# Patient Record
Sex: FEMALE | Race: WHITE | NOT HISPANIC OR LATINO | Employment: FULL TIME | URBAN - METROPOLITAN AREA
[De-identification: names, ages, dates, MRNs, and addresses within clinical notes are randomized per-mention and may not be internally consistent; named-entity substitution may affect disease eponyms.]

---

## 2017-01-04 ENCOUNTER — APPOINTMENT (OUTPATIENT)
Dept: PHYSICAL THERAPY | Facility: HOME HEALTHCARE | Age: 52
End: 2017-01-04
Payer: COMMERCIAL

## 2017-01-04 PROCEDURE — 97110 THERAPEUTIC EXERCISES: CPT

## 2017-01-04 PROCEDURE — 97140 MANUAL THERAPY 1/> REGIONS: CPT

## 2017-01-04 PROCEDURE — 97014 ELECTRIC STIMULATION THERAPY: CPT

## 2017-01-06 ENCOUNTER — APPOINTMENT (OUTPATIENT)
Dept: PHYSICAL THERAPY | Facility: HOME HEALTHCARE | Age: 52
End: 2017-01-06
Payer: COMMERCIAL

## 2017-01-06 PROCEDURE — 97014 ELECTRIC STIMULATION THERAPY: CPT

## 2017-01-06 PROCEDURE — 97140 MANUAL THERAPY 1/> REGIONS: CPT

## 2017-01-06 PROCEDURE — 97110 THERAPEUTIC EXERCISES: CPT

## 2017-01-11 ENCOUNTER — APPOINTMENT (OUTPATIENT)
Dept: PHYSICAL THERAPY | Facility: HOME HEALTHCARE | Age: 52
End: 2017-01-11
Payer: COMMERCIAL

## 2017-01-11 PROCEDURE — 97140 MANUAL THERAPY 1/> REGIONS: CPT

## 2017-01-11 PROCEDURE — 97110 THERAPEUTIC EXERCISES: CPT

## 2017-01-11 PROCEDURE — 97014 ELECTRIC STIMULATION THERAPY: CPT

## 2017-01-13 ENCOUNTER — ALLSCRIPTS OFFICE VISIT (OUTPATIENT)
Dept: OTHER | Facility: OTHER | Age: 52
End: 2017-01-13

## 2017-01-16 ENCOUNTER — GENERIC CONVERSION - ENCOUNTER (OUTPATIENT)
Dept: OTHER | Facility: OTHER | Age: 52
End: 2017-01-16

## 2017-01-16 ENCOUNTER — APPOINTMENT (OUTPATIENT)
Dept: PHYSICAL THERAPY | Facility: HOME HEALTHCARE | Age: 52
End: 2017-01-16
Payer: COMMERCIAL

## 2017-01-16 ENCOUNTER — APPOINTMENT (OUTPATIENT)
Dept: PHYSICAL THERAPY | Facility: CLINIC | Age: 52
End: 2017-01-16
Payer: COMMERCIAL

## 2017-01-16 PROCEDURE — 97162 PT EVAL MOD COMPLEX 30 MIN: CPT

## 2017-01-16 PROCEDURE — 97140 MANUAL THERAPY 1/> REGIONS: CPT

## 2017-01-18 ENCOUNTER — APPOINTMENT (OUTPATIENT)
Dept: PHYSICAL THERAPY | Facility: CLINIC | Age: 52
End: 2017-01-18
Payer: COMMERCIAL

## 2017-01-18 PROCEDURE — 97140 MANUAL THERAPY 1/> REGIONS: CPT

## 2017-01-18 PROCEDURE — 97110 THERAPEUTIC EXERCISES: CPT

## 2017-01-20 ENCOUNTER — APPOINTMENT (OUTPATIENT)
Dept: PHYSICAL THERAPY | Facility: HOME HEALTHCARE | Age: 52
End: 2017-01-20
Payer: COMMERCIAL

## 2017-01-23 ENCOUNTER — APPOINTMENT (OUTPATIENT)
Dept: PHYSICAL THERAPY | Facility: CLINIC | Age: 52
End: 2017-01-23
Payer: COMMERCIAL

## 2017-01-23 PROCEDURE — 97014 ELECTRIC STIMULATION THERAPY: CPT

## 2017-01-23 PROCEDURE — 97140 MANUAL THERAPY 1/> REGIONS: CPT

## 2017-01-23 PROCEDURE — 97110 THERAPEUTIC EXERCISES: CPT

## 2017-01-25 ENCOUNTER — APPOINTMENT (OUTPATIENT)
Dept: PHYSICAL THERAPY | Facility: CLINIC | Age: 52
End: 2017-01-25
Payer: COMMERCIAL

## 2017-01-25 PROCEDURE — 97140 MANUAL THERAPY 1/> REGIONS: CPT

## 2017-01-25 PROCEDURE — 97110 THERAPEUTIC EXERCISES: CPT

## 2017-01-27 ENCOUNTER — APPOINTMENT (OUTPATIENT)
Dept: PHYSICAL THERAPY | Facility: CLINIC | Age: 52
End: 2017-01-27
Payer: COMMERCIAL

## 2017-01-27 PROCEDURE — 97110 THERAPEUTIC EXERCISES: CPT

## 2017-01-27 PROCEDURE — 97140 MANUAL THERAPY 1/> REGIONS: CPT

## 2017-01-30 ENCOUNTER — APPOINTMENT (OUTPATIENT)
Dept: PHYSICAL THERAPY | Facility: CLINIC | Age: 52
End: 2017-01-30
Payer: COMMERCIAL

## 2017-01-30 PROCEDURE — 97140 MANUAL THERAPY 1/> REGIONS: CPT

## 2017-01-30 PROCEDURE — 97110 THERAPEUTIC EXERCISES: CPT

## 2017-02-01 ENCOUNTER — APPOINTMENT (OUTPATIENT)
Dept: PHYSICAL THERAPY | Facility: CLINIC | Age: 52
End: 2017-02-01
Payer: COMMERCIAL

## 2017-02-01 PROCEDURE — 97140 MANUAL THERAPY 1/> REGIONS: CPT

## 2017-02-01 PROCEDURE — 97110 THERAPEUTIC EXERCISES: CPT

## 2017-02-02 ENCOUNTER — GENERIC CONVERSION - ENCOUNTER (OUTPATIENT)
Dept: OTHER | Facility: OTHER | Age: 52
End: 2017-02-02

## 2017-02-03 ENCOUNTER — ALLSCRIPTS OFFICE VISIT (OUTPATIENT)
Dept: OTHER | Facility: OTHER | Age: 52
End: 2017-02-03

## 2017-02-03 ENCOUNTER — TRANSCRIBE ORDERS (OUTPATIENT)
Dept: ADMINISTRATIVE | Facility: HOSPITAL | Age: 52
End: 2017-02-03

## 2017-02-03 DIAGNOSIS — G57.02 LESION OF LEFT SCIATIC NERVE: ICD-10-CM

## 2017-02-03 DIAGNOSIS — S76.011A STRAIN OF FLEXOR MUSCLE OF HIP, RIGHT, INITIAL ENCOUNTER: Primary | ICD-10-CM

## 2017-02-03 DIAGNOSIS — M76.892 OTHER SPECIFIED ENTHESOPATHIES OF LEFT LOWER LIMB, EXCLUDING FOOT: ICD-10-CM

## 2017-02-03 DIAGNOSIS — M25.552 PAIN IN LEFT HIP: ICD-10-CM

## 2017-02-03 DIAGNOSIS — M70.62 TROCHANTERIC BURSITIS OF LEFT HIP: ICD-10-CM

## 2017-02-03 DIAGNOSIS — M76.32 ILIOTIBIAL BAND SYNDROME OF LEFT SIDE: ICD-10-CM

## 2017-02-03 DIAGNOSIS — R68.89 OTHER GENERAL SYMPTOMS AND SIGNS: ICD-10-CM

## 2017-02-03 DIAGNOSIS — S76.019A STRAIN OF MUSCLE, FASCIA AND TENDON OF UNSPECIFIED HIP, INITIAL ENCOUNTER: ICD-10-CM

## 2017-02-03 DIAGNOSIS — G57.00 LESION OF SCIATIC NERVE: ICD-10-CM

## 2017-02-03 DIAGNOSIS — M54.16 RADICULOPATHY OF LUMBAR REGION: ICD-10-CM

## 2017-02-06 ENCOUNTER — APPOINTMENT (OUTPATIENT)
Dept: PHYSICAL THERAPY | Facility: CLINIC | Age: 52
End: 2017-02-06
Payer: COMMERCIAL

## 2017-02-06 PROCEDURE — 97140 MANUAL THERAPY 1/> REGIONS: CPT

## 2017-02-06 PROCEDURE — 97110 THERAPEUTIC EXERCISES: CPT

## 2017-02-08 ENCOUNTER — APPOINTMENT (OUTPATIENT)
Dept: PHYSICAL THERAPY | Facility: CLINIC | Age: 52
End: 2017-02-08
Payer: COMMERCIAL

## 2017-02-08 PROCEDURE — 97014 ELECTRIC STIMULATION THERAPY: CPT

## 2017-02-08 PROCEDURE — 97140 MANUAL THERAPY 1/> REGIONS: CPT

## 2017-02-08 PROCEDURE — 97110 THERAPEUTIC EXERCISES: CPT

## 2017-02-09 ENCOUNTER — HOSPITAL ENCOUNTER (OUTPATIENT)
Dept: MRI IMAGING | Facility: HOSPITAL | Age: 52
Discharge: HOME | End: 2017-02-09
Attending: FAMILY MEDICINE
Payer: COMMERCIAL

## 2017-02-09 DIAGNOSIS — G57.00 LESION OF SCIATIC NERVE: ICD-10-CM

## 2017-02-09 DIAGNOSIS — M70.62 TROCHANTERIC BURSITIS OF LEFT HIP: ICD-10-CM

## 2017-02-09 DIAGNOSIS — S76.019A STRAIN OF MUSCLE, FASCIA AND TENDON OF UNSPECIFIED HIP, INITIAL ENCOUNTER: ICD-10-CM

## 2017-02-09 DIAGNOSIS — M25.552 PAIN IN LEFT HIP: ICD-10-CM

## 2017-02-09 PROCEDURE — 73721 MRI JNT OF LWR EXTRE W/O DYE: CPT

## 2017-02-10 ENCOUNTER — APPOINTMENT (OUTPATIENT)
Dept: PHYSICAL THERAPY | Facility: CLINIC | Age: 52
End: 2017-02-10
Payer: COMMERCIAL

## 2017-02-13 ENCOUNTER — APPOINTMENT (OUTPATIENT)
Dept: PHYSICAL THERAPY | Facility: CLINIC | Age: 52
End: 2017-02-13
Payer: COMMERCIAL

## 2017-02-15 ENCOUNTER — APPOINTMENT (OUTPATIENT)
Dept: PHYSICAL THERAPY | Facility: CLINIC | Age: 52
End: 2017-02-15
Payer: COMMERCIAL

## 2017-02-15 ENCOUNTER — APPOINTMENT (OUTPATIENT)
Dept: LAB | Facility: HOSPITAL | Age: 52
End: 2017-02-15
Attending: INTERNAL MEDICINE
Payer: COMMERCIAL

## 2017-02-15 ENCOUNTER — TRANSCRIBE ORDERS (OUTPATIENT)
Dept: ADMISSIONS | Facility: HOSPITAL | Age: 52
End: 2017-02-15

## 2017-02-15 ENCOUNTER — ALLSCRIPTS OFFICE VISIT (OUTPATIENT)
Dept: OTHER | Facility: OTHER | Age: 52
End: 2017-02-15

## 2017-02-15 DIAGNOSIS — R68.89 OTHER GENERAL SYMPTOMS AND SIGNS: ICD-10-CM

## 2017-02-15 LAB
FLUAV AG SPEC QL IA: NEGATIVE
FLUAV AG SPEC QL: NORMAL
FLUBV AG SPEC QL IA: NEGATIVE
FLUBV AG SPEC QL: NORMAL
RSV B RNA SPEC QL NAA+PROBE: NORMAL

## 2017-02-15 PROCEDURE — 87400 INFLUENZA A/B EACH AG IA: CPT

## 2017-02-15 PROCEDURE — 87798 DETECT AGENT NOS DNA AMP: CPT

## 2017-02-17 ENCOUNTER — APPOINTMENT (OUTPATIENT)
Dept: PHYSICAL THERAPY | Facility: CLINIC | Age: 52
End: 2017-02-17
Payer: COMMERCIAL

## 2017-02-17 ENCOUNTER — ALLSCRIPTS OFFICE VISIT (OUTPATIENT)
Dept: OTHER | Facility: OTHER | Age: 52
End: 2017-02-17

## 2017-02-20 ENCOUNTER — APPOINTMENT (OUTPATIENT)
Dept: PHYSICAL THERAPY | Facility: CLINIC | Age: 52
End: 2017-02-20
Payer: COMMERCIAL

## 2017-02-20 PROCEDURE — 97012 MECHANICAL TRACTION THERAPY: CPT

## 2017-02-20 PROCEDURE — 97110 THERAPEUTIC EXERCISES: CPT

## 2017-02-20 PROCEDURE — 97140 MANUAL THERAPY 1/> REGIONS: CPT

## 2017-02-22 ENCOUNTER — APPOINTMENT (OUTPATIENT)
Dept: PHYSICAL THERAPY | Facility: CLINIC | Age: 52
End: 2017-02-22
Payer: COMMERCIAL

## 2017-02-22 PROCEDURE — 97110 THERAPEUTIC EXERCISES: CPT

## 2017-02-22 PROCEDURE — 97140 MANUAL THERAPY 1/> REGIONS: CPT

## 2017-02-22 PROCEDURE — 97012 MECHANICAL TRACTION THERAPY: CPT

## 2017-02-24 ENCOUNTER — APPOINTMENT (OUTPATIENT)
Dept: PHYSICAL THERAPY | Facility: CLINIC | Age: 52
End: 2017-02-24
Payer: COMMERCIAL

## 2017-02-24 PROCEDURE — 97012 MECHANICAL TRACTION THERAPY: CPT

## 2017-02-24 PROCEDURE — 97110 THERAPEUTIC EXERCISES: CPT

## 2017-02-24 PROCEDURE — 97140 MANUAL THERAPY 1/> REGIONS: CPT

## 2017-02-27 ENCOUNTER — APPOINTMENT (OUTPATIENT)
Dept: PHYSICAL THERAPY | Facility: CLINIC | Age: 52
End: 2017-02-27
Payer: COMMERCIAL

## 2017-02-27 ENCOUNTER — GENERIC CONVERSION - ENCOUNTER (OUTPATIENT)
Dept: OTHER | Facility: OTHER | Age: 52
End: 2017-02-27

## 2017-02-27 PROCEDURE — 97140 MANUAL THERAPY 1/> REGIONS: CPT

## 2017-02-27 PROCEDURE — 97012 MECHANICAL TRACTION THERAPY: CPT

## 2017-02-27 PROCEDURE — 97164 PT RE-EVAL EST PLAN CARE: CPT

## 2017-02-27 PROCEDURE — 97110 THERAPEUTIC EXERCISES: CPT

## 2017-03-01 ENCOUNTER — APPOINTMENT (OUTPATIENT)
Dept: PHYSICAL THERAPY | Facility: CLINIC | Age: 52
End: 2017-03-01
Payer: COMMERCIAL

## 2017-03-01 PROCEDURE — 97140 MANUAL THERAPY 1/> REGIONS: CPT

## 2017-03-01 PROCEDURE — 97110 THERAPEUTIC EXERCISES: CPT

## 2017-03-01 PROCEDURE — 97012 MECHANICAL TRACTION THERAPY: CPT

## 2017-03-03 ENCOUNTER — APPOINTMENT (OUTPATIENT)
Dept: PHYSICAL THERAPY | Facility: CLINIC | Age: 52
End: 2017-03-03
Payer: COMMERCIAL

## 2017-03-03 PROCEDURE — 97140 MANUAL THERAPY 1/> REGIONS: CPT

## 2017-03-03 PROCEDURE — 97012 MECHANICAL TRACTION THERAPY: CPT

## 2017-03-03 PROCEDURE — 97110 THERAPEUTIC EXERCISES: CPT

## 2017-03-06 ENCOUNTER — APPOINTMENT (OUTPATIENT)
Dept: PHYSICAL THERAPY | Facility: CLINIC | Age: 52
End: 2017-03-06
Payer: COMMERCIAL

## 2017-03-08 ENCOUNTER — APPOINTMENT (OUTPATIENT)
Dept: PHYSICAL THERAPY | Facility: CLINIC | Age: 52
End: 2017-03-08
Payer: COMMERCIAL

## 2017-03-08 PROCEDURE — 97140 MANUAL THERAPY 1/> REGIONS: CPT

## 2017-03-08 PROCEDURE — 97012 MECHANICAL TRACTION THERAPY: CPT

## 2017-03-08 PROCEDURE — 97110 THERAPEUTIC EXERCISES: CPT

## 2017-03-10 ENCOUNTER — APPOINTMENT (OUTPATIENT)
Dept: PHYSICAL THERAPY | Facility: CLINIC | Age: 52
End: 2017-03-10
Payer: COMMERCIAL

## 2017-03-10 PROCEDURE — 97110 THERAPEUTIC EXERCISES: CPT

## 2017-03-10 PROCEDURE — 97012 MECHANICAL TRACTION THERAPY: CPT

## 2017-03-10 PROCEDURE — 97140 MANUAL THERAPY 1/> REGIONS: CPT

## 2017-03-13 ENCOUNTER — APPOINTMENT (OUTPATIENT)
Dept: PHYSICAL THERAPY | Facility: CLINIC | Age: 52
End: 2017-03-13
Payer: COMMERCIAL

## 2017-03-13 PROCEDURE — 97110 THERAPEUTIC EXERCISES: CPT

## 2017-03-13 PROCEDURE — 97140 MANUAL THERAPY 1/> REGIONS: CPT

## 2017-03-15 ENCOUNTER — APPOINTMENT (OUTPATIENT)
Dept: PHYSICAL THERAPY | Facility: CLINIC | Age: 52
End: 2017-03-15
Payer: COMMERCIAL

## 2017-03-15 PROCEDURE — 97012 MECHANICAL TRACTION THERAPY: CPT

## 2017-03-15 PROCEDURE — 97110 THERAPEUTIC EXERCISES: CPT

## 2017-03-15 PROCEDURE — 97140 MANUAL THERAPY 1/> REGIONS: CPT

## 2017-03-17 ENCOUNTER — ALLSCRIPTS OFFICE VISIT (OUTPATIENT)
Dept: OTHER | Facility: OTHER | Age: 52
End: 2017-03-17

## 2017-03-20 ENCOUNTER — APPOINTMENT (OUTPATIENT)
Dept: PHYSICAL THERAPY | Facility: CLINIC | Age: 52
End: 2017-03-20
Payer: COMMERCIAL

## 2017-03-20 PROCEDURE — 97012 MECHANICAL TRACTION THERAPY: CPT

## 2017-03-20 PROCEDURE — 97110 THERAPEUTIC EXERCISES: CPT

## 2017-03-20 PROCEDURE — 97140 MANUAL THERAPY 1/> REGIONS: CPT

## 2017-03-22 ENCOUNTER — APPOINTMENT (OUTPATIENT)
Dept: PHYSICAL THERAPY | Facility: CLINIC | Age: 52
End: 2017-03-22
Payer: COMMERCIAL

## 2017-03-22 PROCEDURE — 97012 MECHANICAL TRACTION THERAPY: CPT

## 2017-03-22 PROCEDURE — 97140 MANUAL THERAPY 1/> REGIONS: CPT

## 2017-03-22 PROCEDURE — 97110 THERAPEUTIC EXERCISES: CPT

## 2017-03-24 ENCOUNTER — APPOINTMENT (OUTPATIENT)
Dept: PHYSICAL THERAPY | Facility: CLINIC | Age: 52
End: 2017-03-24
Payer: COMMERCIAL

## 2017-03-24 PROCEDURE — 97012 MECHANICAL TRACTION THERAPY: CPT

## 2017-03-24 PROCEDURE — 97110 THERAPEUTIC EXERCISES: CPT

## 2017-03-24 PROCEDURE — 97140 MANUAL THERAPY 1/> REGIONS: CPT

## 2017-03-27 ENCOUNTER — APPOINTMENT (OUTPATIENT)
Dept: PHYSICAL THERAPY | Facility: CLINIC | Age: 52
End: 2017-03-27
Payer: COMMERCIAL

## 2017-03-27 PROCEDURE — 97140 MANUAL THERAPY 1/> REGIONS: CPT

## 2017-03-27 PROCEDURE — 97110 THERAPEUTIC EXERCISES: CPT

## 2017-03-27 PROCEDURE — 97012 MECHANICAL TRACTION THERAPY: CPT

## 2017-03-29 ENCOUNTER — GENERIC CONVERSION - ENCOUNTER (OUTPATIENT)
Dept: OTHER | Facility: OTHER | Age: 52
End: 2017-03-29

## 2017-03-29 ENCOUNTER — APPOINTMENT (OUTPATIENT)
Dept: PHYSICAL THERAPY | Facility: CLINIC | Age: 52
End: 2017-03-29
Payer: COMMERCIAL

## 2017-03-29 PROCEDURE — 97164 PT RE-EVAL EST PLAN CARE: CPT

## 2017-03-29 PROCEDURE — 97140 MANUAL THERAPY 1/> REGIONS: CPT

## 2017-03-29 PROCEDURE — 97012 MECHANICAL TRACTION THERAPY: CPT

## 2017-03-29 PROCEDURE — 97110 THERAPEUTIC EXERCISES: CPT

## 2017-03-31 ENCOUNTER — APPOINTMENT (OUTPATIENT)
Dept: PHYSICAL THERAPY | Facility: CLINIC | Age: 52
End: 2017-03-31
Payer: COMMERCIAL

## 2017-03-31 PROCEDURE — 97110 THERAPEUTIC EXERCISES: CPT

## 2017-03-31 PROCEDURE — 97012 MECHANICAL TRACTION THERAPY: CPT

## 2017-03-31 PROCEDURE — 97140 MANUAL THERAPY 1/> REGIONS: CPT

## 2017-04-03 ENCOUNTER — APPOINTMENT (OUTPATIENT)
Dept: PHYSICAL THERAPY | Facility: CLINIC | Age: 52
End: 2017-04-03
Payer: COMMERCIAL

## 2017-04-03 PROCEDURE — 97110 THERAPEUTIC EXERCISES: CPT

## 2017-04-03 PROCEDURE — 97012 MECHANICAL TRACTION THERAPY: CPT

## 2017-04-03 PROCEDURE — 97140 MANUAL THERAPY 1/> REGIONS: CPT

## 2017-04-05 ENCOUNTER — APPOINTMENT (OUTPATIENT)
Dept: PHYSICAL THERAPY | Facility: CLINIC | Age: 52
End: 2017-04-05
Payer: COMMERCIAL

## 2017-04-05 PROCEDURE — 97140 MANUAL THERAPY 1/> REGIONS: CPT

## 2017-04-05 PROCEDURE — 97110 THERAPEUTIC EXERCISES: CPT

## 2017-04-07 ENCOUNTER — APPOINTMENT (OUTPATIENT)
Dept: PHYSICAL THERAPY | Facility: CLINIC | Age: 52
End: 2017-04-07
Payer: COMMERCIAL

## 2017-04-07 PROCEDURE — 97140 MANUAL THERAPY 1/> REGIONS: CPT

## 2017-04-07 PROCEDURE — 97110 THERAPEUTIC EXERCISES: CPT

## 2017-04-10 ENCOUNTER — APPOINTMENT (OUTPATIENT)
Dept: PHYSICAL THERAPY | Facility: CLINIC | Age: 52
End: 2017-04-10
Payer: COMMERCIAL

## 2017-04-10 PROCEDURE — 97140 MANUAL THERAPY 1/> REGIONS: CPT

## 2017-04-10 PROCEDURE — 97012 MECHANICAL TRACTION THERAPY: CPT

## 2017-04-10 PROCEDURE — 97110 THERAPEUTIC EXERCISES: CPT

## 2017-04-12 ENCOUNTER — APPOINTMENT (OUTPATIENT)
Dept: PHYSICAL THERAPY | Facility: CLINIC | Age: 52
End: 2017-04-12
Payer: COMMERCIAL

## 2017-04-12 PROCEDURE — 97140 MANUAL THERAPY 1/> REGIONS: CPT

## 2017-04-12 PROCEDURE — 97110 THERAPEUTIC EXERCISES: CPT

## 2017-04-14 ENCOUNTER — APPOINTMENT (OUTPATIENT)
Dept: PHYSICAL THERAPY | Facility: CLINIC | Age: 52
End: 2017-04-14
Payer: COMMERCIAL

## 2017-04-14 PROCEDURE — 97140 MANUAL THERAPY 1/> REGIONS: CPT

## 2017-04-14 PROCEDURE — 97110 THERAPEUTIC EXERCISES: CPT

## 2017-04-17 ENCOUNTER — APPOINTMENT (OUTPATIENT)
Dept: PHYSICAL THERAPY | Facility: CLINIC | Age: 52
End: 2017-04-17
Payer: COMMERCIAL

## 2017-04-17 PROCEDURE — 97110 THERAPEUTIC EXERCISES: CPT

## 2017-04-17 PROCEDURE — 97140 MANUAL THERAPY 1/> REGIONS: CPT

## 2017-04-17 PROCEDURE — 97012 MECHANICAL TRACTION THERAPY: CPT

## 2017-04-19 ENCOUNTER — GENERIC CONVERSION - ENCOUNTER (OUTPATIENT)
Dept: OTHER | Facility: OTHER | Age: 52
End: 2017-04-19

## 2017-04-19 ENCOUNTER — APPOINTMENT (OUTPATIENT)
Dept: PHYSICAL THERAPY | Facility: CLINIC | Age: 52
End: 2017-04-19
Payer: COMMERCIAL

## 2017-04-19 PROCEDURE — 97140 MANUAL THERAPY 1/> REGIONS: CPT

## 2017-04-19 PROCEDURE — 97110 THERAPEUTIC EXERCISES: CPT

## 2017-04-21 ENCOUNTER — APPOINTMENT (OUTPATIENT)
Dept: PHYSICAL THERAPY | Facility: CLINIC | Age: 52
End: 2017-04-21
Payer: COMMERCIAL

## 2017-04-21 ENCOUNTER — ALLSCRIPTS OFFICE VISIT (OUTPATIENT)
Dept: OTHER | Facility: OTHER | Age: 52
End: 2017-04-21

## 2017-04-26 ENCOUNTER — APPOINTMENT (OUTPATIENT)
Dept: PHYSICAL THERAPY | Facility: CLINIC | Age: 52
End: 2017-04-26
Payer: COMMERCIAL

## 2017-04-28 ENCOUNTER — APPOINTMENT (OUTPATIENT)
Dept: PHYSICAL THERAPY | Facility: CLINIC | Age: 52
End: 2017-04-28
Payer: COMMERCIAL

## 2017-04-28 ENCOUNTER — GENERIC CONVERSION - ENCOUNTER (OUTPATIENT)
Dept: OTHER | Facility: OTHER | Age: 52
End: 2017-04-28

## 2017-04-28 PROCEDURE — 97012 MECHANICAL TRACTION THERAPY: CPT

## 2017-04-28 PROCEDURE — 97110 THERAPEUTIC EXERCISES: CPT

## 2017-04-28 PROCEDURE — 97164 PT RE-EVAL EST PLAN CARE: CPT

## 2017-04-28 PROCEDURE — 97140 MANUAL THERAPY 1/> REGIONS: CPT

## 2017-05-01 ENCOUNTER — APPOINTMENT (OUTPATIENT)
Dept: PHYSICAL THERAPY | Facility: CLINIC | Age: 52
End: 2017-05-01
Payer: COMMERCIAL

## 2017-05-01 DIAGNOSIS — R10.11 RIGHT UPPER QUADRANT PAIN: ICD-10-CM

## 2017-05-01 DIAGNOSIS — Z20.5 CONTACT WITH AND (SUSPECTED) EXPOSURE TO VIRAL HEPATITIS: ICD-10-CM

## 2017-05-01 DIAGNOSIS — R10.9 ABDOMINAL PAIN: ICD-10-CM

## 2017-05-01 PROCEDURE — 97110 THERAPEUTIC EXERCISES: CPT

## 2017-05-01 PROCEDURE — 97140 MANUAL THERAPY 1/> REGIONS: CPT

## 2017-05-02 ENCOUNTER — APPOINTMENT (OUTPATIENT)
Dept: LAB | Facility: CLINIC | Age: 52
End: 2017-05-02
Payer: COMMERCIAL

## 2017-05-02 DIAGNOSIS — R10.9 ABDOMINAL PAIN: ICD-10-CM

## 2017-05-02 LAB
ALBUMIN SERPL BCP-MCNC: 3.7 G/DL (ref 3.5–5)
ALP SERPL-CCNC: 87 U/L (ref 46–116)
ALT SERPL W P-5'-P-CCNC: 27 U/L (ref 12–78)
AST SERPL W P-5'-P-CCNC: 25 U/L (ref 5–45)
BASOPHILS # BLD AUTO: 0.04 THOUSANDS/ΜL (ref 0–0.1)
BASOPHILS NFR BLD AUTO: 1 % (ref 0–1)
BILIRUB DIRECT SERPL-MCNC: 0.13 MG/DL (ref 0–0.2)
BILIRUB SERPL-MCNC: 0.49 MG/DL (ref 0.2–1)
EOSINOPHIL # BLD AUTO: 0.74 THOUSAND/ΜL (ref 0–0.61)
EOSINOPHIL NFR BLD AUTO: 9 % (ref 0–6)
ERYTHROCYTE [DISTWIDTH] IN BLOOD BY AUTOMATED COUNT: 12.4 % (ref 11.6–15.1)
HCT VFR BLD AUTO: 39.2 % (ref 34.8–46.1)
HGB BLD-MCNC: 13 G/DL (ref 11.5–15.4)
LYMPHOCYTES # BLD AUTO: 1.59 THOUSANDS/ΜL (ref 0.6–4.47)
LYMPHOCYTES NFR BLD AUTO: 19 % (ref 14–44)
MCH RBC QN AUTO: 30.1 PG (ref 26.8–34.3)
MCHC RBC AUTO-ENTMCNC: 33.2 G/DL (ref 31.4–37.4)
MCV RBC AUTO: 91 FL (ref 82–98)
MONOCYTES # BLD AUTO: 0.65 THOUSAND/ΜL (ref 0.17–1.22)
MONOCYTES NFR BLD AUTO: 8 % (ref 4–12)
NEUTROPHILS # BLD AUTO: 5.14 THOUSANDS/ΜL (ref 1.85–7.62)
NEUTS SEG NFR BLD AUTO: 63 % (ref 43–75)
NRBC BLD AUTO-RTO: 0 /100 WBCS
PLATELET # BLD AUTO: 241 THOUSANDS/UL (ref 149–390)
PMV BLD AUTO: 10 FL (ref 8.9–12.7)
PROT SERPL-MCNC: 7.3 G/DL (ref 6.4–8.2)
RBC # BLD AUTO: 4.32 MILLION/UL (ref 3.81–5.12)
WBC # BLD AUTO: 8.18 THOUSAND/UL (ref 4.31–10.16)

## 2017-05-02 PROCEDURE — 85025 COMPLETE CBC W/AUTO DIFF WBC: CPT

## 2017-05-02 PROCEDURE — 80076 HEPATIC FUNCTION PANEL: CPT

## 2017-05-02 PROCEDURE — 36415 COLL VENOUS BLD VENIPUNCTURE: CPT

## 2017-05-03 ENCOUNTER — APPOINTMENT (OUTPATIENT)
Dept: PHYSICAL THERAPY | Facility: CLINIC | Age: 52
End: 2017-05-03
Payer: COMMERCIAL

## 2017-05-05 ENCOUNTER — HOSPITAL ENCOUNTER (OUTPATIENT)
Dept: ULTRASOUND IMAGING | Facility: HOSPITAL | Age: 52
Discharge: HOME | End: 2017-05-05
Attending: INTERNAL MEDICINE
Payer: COMMERCIAL

## 2017-05-05 ENCOUNTER — TRANSCRIBE ORDERS (OUTPATIENT)
Dept: ADMINISTRATIVE | Facility: HOSPITAL | Age: 52
End: 2017-05-05

## 2017-05-05 ENCOUNTER — APPOINTMENT (OUTPATIENT)
Dept: LAB | Facility: HOSPITAL | Age: 52
End: 2017-05-05
Attending: INTERNAL MEDICINE
Payer: COMMERCIAL

## 2017-05-05 DIAGNOSIS — R10.11 RIGHT UPPER QUADRANT PAIN: ICD-10-CM

## 2017-05-05 DIAGNOSIS — Z20.5 CONTACT WITH AND (SUSPECTED) EXPOSURE TO VIRAL HEPATITIS: ICD-10-CM

## 2017-05-05 DIAGNOSIS — Z20.5 EXPOSURE TO HEPATITIS A: Primary | ICD-10-CM

## 2017-05-05 DIAGNOSIS — Z20.5 EXPOSURE TO HEPATITIS A: ICD-10-CM

## 2017-05-05 DIAGNOSIS — Z20.5 EXPOSURE TO HEPATITIS B: ICD-10-CM

## 2017-05-05 LAB
ANION GAP SERPL CALCULATED.3IONS-SCNC: 10 MMOL/L (ref 4–13)
BUN SERPL-MCNC: 13 MG/DL (ref 5–25)
CALCIUM SERPL-MCNC: 8.9 MG/DL (ref 8.3–10.1)
CHLORIDE SERPL-SCNC: 105 MMOL/L (ref 100–108)
CO2 SERPL-SCNC: 28 MMOL/L (ref 21–32)
CREAT SERPL-MCNC: 0.88 MG/DL (ref 0.6–1.3)
GFR SERPL CREATININE-BSD FRML MDRD: >60 ML/MIN/1.73SQ M
GLUCOSE SERPL-MCNC: 98 MG/DL (ref 65–140)
POTASSIUM SERPL-SCNC: 3.7 MMOL/L (ref 3.5–5.3)
SODIUM SERPL-SCNC: 143 MMOL/L (ref 136–145)

## 2017-05-05 PROCEDURE — 36415 COLL VENOUS BLD VENIPUNCTURE: CPT

## 2017-05-05 PROCEDURE — 80048 BASIC METABOLIC PNL TOTAL CA: CPT

## 2017-05-05 PROCEDURE — 80074 ACUTE HEPATITIS PANEL: CPT

## 2017-05-05 PROCEDURE — 76705 ECHO EXAM OF ABDOMEN: CPT

## 2017-05-06 LAB
HAV IGM SER QL: NORMAL
HBV CORE IGM SER QL: NORMAL
HBV SURFACE AG SER QL: NORMAL
HCV AB SER QL: NORMAL

## 2017-06-02 ENCOUNTER — APPOINTMENT (EMERGENCY)
Dept: RADIOLOGY | Facility: HOSPITAL | Age: 52
End: 2017-06-02
Payer: COMMERCIAL

## 2017-06-02 ENCOUNTER — HOSPITAL ENCOUNTER (INPATIENT)
Facility: HOSPITAL | Age: 52
LOS: 1 days | Discharge: HOME | DRG: 103 | End: 2017-06-03
Attending: FAMILY MEDICINE | Admitting: HOSPITALIST
Payer: COMMERCIAL

## 2017-06-02 ENCOUNTER — APPOINTMENT (EMERGENCY)
Dept: CT IMAGING | Facility: HOSPITAL | Age: 52
End: 2017-06-02
Payer: COMMERCIAL

## 2017-06-02 ENCOUNTER — HOSPITAL ENCOUNTER (EMERGENCY)
Facility: HOSPITAL | Age: 52
End: 2017-06-02
Attending: EMERGENCY MEDICINE | Admitting: EMERGENCY MEDICINE
Payer: COMMERCIAL

## 2017-06-02 VITALS
WEIGHT: 161 LBS | DIASTOLIC BLOOD PRESSURE: 65 MMHG | RESPIRATION RATE: 16 BRPM | BODY MASS INDEX: 25.88 KG/M2 | OXYGEN SATURATION: 99 % | HEART RATE: 66 BPM | TEMPERATURE: 98.8 F | HEIGHT: 66 IN | SYSTOLIC BLOOD PRESSURE: 138 MMHG

## 2017-06-02 DIAGNOSIS — R11.0 NAUSEA: ICD-10-CM

## 2017-06-02 DIAGNOSIS — R26.89 BALANCE DISORDER: Primary | ICD-10-CM

## 2017-06-02 DIAGNOSIS — R26.2 AMBULATORY DYSFUNCTION: ICD-10-CM

## 2017-06-02 DIAGNOSIS — G45.9 TIA (TRANSIENT ISCHEMIC ATTACK): Primary | ICD-10-CM

## 2017-06-02 PROBLEM — R42 DIZZINESS: Status: ACTIVE | Noted: 2017-06-02

## 2017-06-02 PROBLEM — E78.5 HYPERLIPIDEMIA: Chronic | Status: ACTIVE | Noted: 2017-06-02

## 2017-06-02 PROBLEM — E78.5 HYPERLIPIDEMIA: Status: ACTIVE | Noted: 2017-06-02

## 2017-06-02 LAB
ALBUMIN SERPL BCP-MCNC: 4 G/DL (ref 3.5–5)
ALP SERPL-CCNC: 102 U/L (ref 46–116)
ALT SERPL W P-5'-P-CCNC: 28 U/L (ref 12–78)
ANION GAP SERPL CALCULATED.3IONS-SCNC: 13 MMOL/L (ref 4–13)
APTT PPP: 31 SECONDS (ref 23–35)
AST SERPL W P-5'-P-CCNC: 23 U/L (ref 5–45)
BACTERIA UR QL AUTO: ABNORMAL /HPF
BASOPHILS # BLD AUTO: 0.03 THOUSANDS/ΜL (ref 0–0.1)
BASOPHILS NFR BLD AUTO: 1 % (ref 0–1)
BILIRUB SERPL-MCNC: 0.4 MG/DL (ref 0.2–1)
BILIRUB UR QL STRIP: NEGATIVE
BUN SERPL-MCNC: 13 MG/DL (ref 5–25)
CALCIUM SERPL-MCNC: 9.2 MG/DL (ref 8.3–10.1)
CHLORIDE SERPL-SCNC: 101 MMOL/L (ref 100–108)
CLARITY UR: CLEAR
CO2 SERPL-SCNC: 26 MMOL/L (ref 21–32)
COLOR UR: YELLOW
CREAT SERPL-MCNC: 0.93 MG/DL (ref 0.6–1.3)
EOSINOPHIL # BLD AUTO: 0.35 THOUSAND/ΜL (ref 0–0.61)
EOSINOPHIL NFR BLD AUTO: 5 % (ref 0–6)
ERYTHROCYTE [DISTWIDTH] IN BLOOD BY AUTOMATED COUNT: 12.3 % (ref 11.6–15.1)
GFR SERPL CREATININE-BSD FRML MDRD: >60 ML/MIN/1.73SQ M
GLUCOSE SERPL-MCNC: 111 MG/DL (ref 65–140)
GLUCOSE UR STRIP-MCNC: NEGATIVE MG/DL
HCT VFR BLD AUTO: 41.3 % (ref 34.8–46.1)
HGB BLD-MCNC: 14.1 G/DL (ref 11.5–15.4)
HGB UR QL STRIP.AUTO: ABNORMAL
INR PPP: 0.9 (ref 0.86–1.16)
KETONES UR STRIP-MCNC: NEGATIVE MG/DL
LEUKOCYTE ESTERASE UR QL STRIP: NEGATIVE
LYMPHOCYTES # BLD AUTO: 2.25 THOUSANDS/ΜL (ref 0.6–4.47)
LYMPHOCYTES NFR BLD AUTO: 34 % (ref 14–44)
MCH RBC QN AUTO: 30.9 PG (ref 26.8–34.3)
MCHC RBC AUTO-ENTMCNC: 34.1 G/DL (ref 31.4–37.4)
MCV RBC AUTO: 90 FL (ref 82–98)
MONOCYTES # BLD AUTO: 0.63 THOUSAND/ΜL (ref 0.17–1.22)
MONOCYTES NFR BLD AUTO: 10 % (ref 4–12)
NEUTROPHILS # BLD AUTO: 3.28 THOUSANDS/ΜL (ref 1.85–7.62)
NEUTS SEG NFR BLD AUTO: 50 % (ref 43–75)
NITRITE UR QL STRIP: NEGATIVE
NON-SQ EPI CELLS URNS QL MICRO: ABNORMAL /HPF
PH UR STRIP.AUTO: 6 [PH] (ref 4.5–8)
PLATELET # BLD AUTO: 220 THOUSANDS/UL (ref 149–390)
PMV BLD AUTO: 9.5 FL (ref 8.9–12.7)
POTASSIUM SERPL-SCNC: 3.3 MMOL/L (ref 3.5–5.3)
PROT SERPL-MCNC: 7.4 G/DL (ref 6.4–8.2)
PROT UR STRIP-MCNC: NEGATIVE MG/DL
PROTHROMBIN TIME: 12 SECONDS (ref 12.1–14.4)
RBC # BLD AUTO: 4.57 MILLION/UL (ref 3.81–5.12)
RBC #/AREA URNS AUTO: ABNORMAL /HPF
SODIUM SERPL-SCNC: 140 MMOL/L (ref 136–145)
SP GR UR STRIP.AUTO: 1.01 (ref 1–1.03)
UROBILINOGEN UR QL STRIP.AUTO: 0.2 E.U./DL
WBC # BLD AUTO: 6.54 THOUSAND/UL (ref 4.31–10.16)
WBC #/AREA URNS AUTO: ABNORMAL /HPF

## 2017-06-02 PROCEDURE — 81001 URINALYSIS AUTO W/SCOPE: CPT | Performed by: EMERGENCY MEDICINE

## 2017-06-02 PROCEDURE — 71020 HB CHEST X-RAY 2VW FRONTAL&LATL: CPT

## 2017-06-02 PROCEDURE — 85730 THROMBOPLASTIN TIME PARTIAL: CPT | Performed by: EMERGENCY MEDICINE

## 2017-06-02 PROCEDURE — 99285 EMERGENCY DEPT VISIT HI MDM: CPT

## 2017-06-02 PROCEDURE — 96361 HYDRATE IV INFUSION ADD-ON: CPT

## 2017-06-02 PROCEDURE — 85610 PROTHROMBIN TIME: CPT | Performed by: EMERGENCY MEDICINE

## 2017-06-02 PROCEDURE — 36415 COLL VENOUS BLD VENIPUNCTURE: CPT | Performed by: EMERGENCY MEDICINE

## 2017-06-02 PROCEDURE — 96374 THER/PROPH/DIAG INJ IV PUSH: CPT

## 2017-06-02 PROCEDURE — 93005 ELECTROCARDIOGRAM TRACING: CPT | Performed by: EMERGENCY MEDICINE

## 2017-06-02 PROCEDURE — 85025 COMPLETE CBC W/AUTO DIFF WBC: CPT | Performed by: EMERGENCY MEDICINE

## 2017-06-02 PROCEDURE — 80053 COMPREHEN METABOLIC PANEL: CPT | Performed by: EMERGENCY MEDICINE

## 2017-06-02 PROCEDURE — 70450 CT HEAD/BRAIN W/O DYE: CPT

## 2017-06-02 RX ORDER — METOCLOPRAMIDE 5 MG/1
TABLET ORAL AS NEEDED
COMMUNITY
End: 2018-09-10 | Stop reason: ALTCHOICE

## 2017-06-02 RX ORDER — SENNOSIDES 8.6 MG
1 TABLET ORAL DAILY
Status: DISCONTINUED | OUTPATIENT
Start: 2017-06-03 | End: 2017-06-03 | Stop reason: HOSPADM

## 2017-06-02 RX ORDER — MELATONIN
50000 WEEKLY
COMMUNITY
End: 2018-06-04 | Stop reason: DRUGHIGH

## 2017-06-02 RX ORDER — DEXLANSOPRAZOLE 60 MG/1
60 CAPSULE, DELAYED RELEASE ORAL
COMMUNITY
Start: 2014-03-27 | End: 2018-03-06 | Stop reason: SDUPTHER

## 2017-06-02 RX ORDER — SODIUM CHLORIDE 9 MG/ML
80 INJECTION, SOLUTION INTRAVENOUS CONTINUOUS
Status: DISCONTINUED | OUTPATIENT
Start: 2017-06-02 | End: 2017-06-03 | Stop reason: HOSPADM

## 2017-06-02 RX ORDER — POTASSIUM CHLORIDE 20 MEQ/1
40 TABLET, EXTENDED RELEASE ORAL ONCE
Status: DISCONTINUED | OUTPATIENT
Start: 2017-06-02 | End: 2017-06-03 | Stop reason: HOSPADM

## 2017-06-02 RX ORDER — ASPIRIN 325 MG
325 TABLET ORAL DAILY
Status: DISCONTINUED | OUTPATIENT
Start: 2017-06-03 | End: 2017-06-03 | Stop reason: HOSPADM

## 2017-06-02 RX ORDER — ATORVASTATIN CALCIUM 40 MG/1
40 TABLET, FILM COATED ORAL EVERY EVENING
Status: DISCONTINUED | OUTPATIENT
Start: 2017-06-02 | End: 2017-06-03 | Stop reason: HOSPADM

## 2017-06-02 RX ORDER — SODIUM CHLORIDE 9 MG/ML
125 INJECTION, SOLUTION INTRAVENOUS CONTINUOUS
Status: DISCONTINUED | OUTPATIENT
Start: 2017-06-02 | End: 2017-06-02 | Stop reason: HOSPADM

## 2017-06-02 RX ORDER — MULTIVIT WITH MINERALS/LUTEIN
1000 TABLET ORAL DAILY
COMMUNITY

## 2017-06-02 RX ORDER — CHLORAL HYDRATE 500 MG
1000 CAPSULE ORAL DAILY
COMMUNITY

## 2017-06-02 RX ORDER — DOCUSATE SODIUM 100 MG/1
100 CAPSULE, LIQUID FILLED ORAL 2 TIMES DAILY
Status: DISCONTINUED | OUTPATIENT
Start: 2017-06-03 | End: 2017-06-03 | Stop reason: HOSPADM

## 2017-06-02 RX ORDER — ONDANSETRON 2 MG/ML
4 INJECTION INTRAMUSCULAR; INTRAVENOUS EVERY 6 HOURS PRN
Status: DISCONTINUED | OUTPATIENT
Start: 2017-06-02 | End: 2017-06-03 | Stop reason: HOSPADM

## 2017-06-02 RX ORDER — ACETAMINOPHEN 325 MG/1
650 TABLET ORAL EVERY 4 HOURS PRN
Status: DISCONTINUED | OUTPATIENT
Start: 2017-06-02 | End: 2017-06-03 | Stop reason: HOSPADM

## 2017-06-02 RX ORDER — ONDANSETRON 2 MG/ML
4 INJECTION INTRAMUSCULAR; INTRAVENOUS ONCE
Status: COMPLETED | OUTPATIENT
Start: 2017-06-02 | End: 2017-06-02

## 2017-06-02 RX ADMIN — SODIUM CHLORIDE 125 ML/HR: 0.9 INJECTION, SOLUTION INTRAVENOUS at 18:00

## 2017-06-02 RX ADMIN — ONDANSETRON 4 MG: 2 INJECTION INTRAMUSCULAR; INTRAVENOUS at 18:01

## 2017-06-03 ENCOUNTER — APPOINTMENT (INPATIENT)
Dept: RADIOLOGY | Facility: HOSPITAL | Age: 52
DRG: 103 | End: 2017-06-03
Payer: COMMERCIAL

## 2017-06-03 VITALS
WEIGHT: 160.72 LBS | RESPIRATION RATE: 18 BRPM | OXYGEN SATURATION: 99 % | SYSTOLIC BLOOD PRESSURE: 122 MMHG | HEART RATE: 59 BPM | HEIGHT: 66 IN | TEMPERATURE: 98 F | DIASTOLIC BLOOD PRESSURE: 66 MMHG | BODY MASS INDEX: 25.83 KG/M2

## 2017-06-03 PROBLEM — R42 DIZZINESS: Status: RESOLVED | Noted: 2017-06-02 | Resolved: 2017-06-03

## 2017-06-03 LAB
ANION GAP SERPL CALCULATED.3IONS-SCNC: 8 MMOL/L (ref 4–13)
BUN SERPL-MCNC: 10 MG/DL (ref 5–25)
CALCIUM SERPL-MCNC: 9 MG/DL (ref 8.3–10.1)
CHLORIDE SERPL-SCNC: 108 MMOL/L (ref 100–108)
CHOLEST SERPL-MCNC: 165 MG/DL (ref 50–200)
CO2 SERPL-SCNC: 27 MMOL/L (ref 21–32)
CREAT SERPL-MCNC: 0.76 MG/DL (ref 0.6–1.3)
ERYTHROCYTE [DISTWIDTH] IN BLOOD BY AUTOMATED COUNT: 12.7 % (ref 11.6–15.1)
GFR SERPL CREATININE-BSD FRML MDRD: >60 ML/MIN/1.73SQ M
GLUCOSE SERPL-MCNC: 96 MG/DL (ref 65–140)
HCT VFR BLD AUTO: 38.1 % (ref 34.8–46.1)
HDLC SERPL-MCNC: 64 MG/DL (ref 40–60)
HGB BLD-MCNC: 12.8 G/DL (ref 11.5–15.4)
LDLC SERPL CALC-MCNC: 93 MG/DL (ref 0–100)
MAGNESIUM SERPL-MCNC: 2.2 MG/DL (ref 1.6–2.6)
MCH RBC QN AUTO: 30.3 PG (ref 26.8–34.3)
MCHC RBC AUTO-ENTMCNC: 33.6 G/DL (ref 31.4–37.4)
MCV RBC AUTO: 90 FL (ref 82–98)
PLATELET # BLD AUTO: 214 THOUSANDS/UL (ref 149–390)
PMV BLD AUTO: 10.3 FL (ref 8.9–12.7)
POTASSIUM SERPL-SCNC: 3.9 MMOL/L (ref 3.5–5.3)
RBC # BLD AUTO: 4.22 MILLION/UL (ref 3.81–5.12)
SODIUM SERPL-SCNC: 143 MMOL/L (ref 136–145)
TRIGL SERPL-MCNC: 38 MG/DL
WBC # BLD AUTO: 5.15 THOUSAND/UL (ref 4.31–10.16)

## 2017-06-03 PROCEDURE — A9577 INJ MULTIHANCE: HCPCS | Performed by: INTERNAL MEDICINE

## 2017-06-03 PROCEDURE — 70553 MRI BRAIN STEM W/O & W/DYE: CPT

## 2017-06-03 PROCEDURE — 70544 MR ANGIOGRAPHY HEAD W/O DYE: CPT

## 2017-06-03 PROCEDURE — 70549 MR ANGIOGRAPH NECK W/O&W/DYE: CPT

## 2017-06-03 PROCEDURE — 85027 COMPLETE CBC AUTOMATED: CPT | Performed by: HOSPITALIST

## 2017-06-03 PROCEDURE — 80048 BASIC METABOLIC PNL TOTAL CA: CPT | Performed by: HOSPITALIST

## 2017-06-03 PROCEDURE — 80061 LIPID PANEL: CPT | Performed by: HOSPITALIST

## 2017-06-03 PROCEDURE — 83735 ASSAY OF MAGNESIUM: CPT | Performed by: HOSPITALIST

## 2017-06-03 RX ORDER — ASPIRIN 81 MG/1
81 TABLET ORAL DAILY
Qty: 30 TABLET | Refills: 0 | Status: ON HOLD | OUTPATIENT
Start: 2017-06-03 | End: 2018-02-16 | Stop reason: ALTCHOICE

## 2017-06-03 RX ORDER — PANTOPRAZOLE SODIUM 20 MG/1
20 TABLET, DELAYED RELEASE ORAL
Status: DISCONTINUED | OUTPATIENT
Start: 2017-06-03 | End: 2017-06-03 | Stop reason: HOSPADM

## 2017-06-03 RX ORDER — CHLORAL HYDRATE 500 MG
1000 CAPSULE ORAL DAILY
Status: DISCONTINUED | OUTPATIENT
Start: 2017-06-03 | End: 2017-06-03 | Stop reason: HOSPADM

## 2017-06-03 RX ORDER — ASCORBIC ACID 500 MG
1000 TABLET ORAL DAILY
Status: DISCONTINUED | OUTPATIENT
Start: 2017-06-03 | End: 2017-06-03 | Stop reason: HOSPADM

## 2017-06-03 RX ORDER — OMEGA-3S/DHA/EPA/FISH OIL/D3 300MG-1000
400 CAPSULE ORAL DAILY
Status: DISCONTINUED | OUTPATIENT
Start: 2017-06-03 | End: 2017-06-03 | Stop reason: HOSPADM

## 2017-06-03 RX ADMIN — GADOBENATE DIMEGLUMINE 14 ML: 529 INJECTION, SOLUTION INTRAVENOUS at 11:44

## 2017-06-03 RX ADMIN — PANTOPRAZOLE SODIUM 20 MG: 20 TABLET, DELAYED RELEASE ORAL at 05:15

## 2017-06-03 RX ADMIN — SODIUM CHLORIDE 80 ML/HR: 0.9 INJECTION, SOLUTION INTRAVENOUS at 00:14

## 2017-06-03 RX ADMIN — SODIUM CHLORIDE 80 ML/HR: 0.9 INJECTION, SOLUTION INTRAVENOUS at 05:15

## 2017-06-05 LAB
ATRIAL RATE: 62 BPM
P AXIS: 71 DEGREES
PR INTERVAL: 196 MS
QRS AXIS: 81 DEGREES
QRSD INTERVAL: 92 MS
QT INTERVAL: 410 MS
QTC INTERVAL: 416 MS
T WAVE AXIS: 67 DEGREES
VENTRICULAR RATE: 62 BPM

## 2017-06-06 ENCOUNTER — GENERIC CONVERSION - ENCOUNTER (OUTPATIENT)
Dept: INTERNAL MEDICINE CLINIC | Facility: CLINIC | Age: 52
End: 2017-06-06

## 2017-06-06 ENCOUNTER — GENERIC CONVERSION - ENCOUNTER (OUTPATIENT)
Dept: OTHER | Facility: OTHER | Age: 52
End: 2017-06-06

## 2017-06-06 ENCOUNTER — ALLSCRIPTS OFFICE VISIT (OUTPATIENT)
Dept: OTHER | Facility: OTHER | Age: 52
End: 2017-06-06

## 2017-06-16 ENCOUNTER — ALLSCRIPTS OFFICE VISIT (OUTPATIENT)
Dept: OTHER | Facility: OTHER | Age: 52
End: 2017-06-16

## 2017-06-18 ENCOUNTER — TRANSCRIBE ORDERS (OUTPATIENT)
Dept: ADMINISTRATIVE | Facility: HOSPITAL | Age: 52
End: 2017-06-18

## 2017-06-18 ENCOUNTER — APPOINTMENT (OUTPATIENT)
Dept: LAB | Facility: HOSPITAL | Age: 52
End: 2017-06-18
Payer: COMMERCIAL

## 2017-06-18 DIAGNOSIS — Z00.8 HEALTH EXAMINATION IN POPULATION SURVEYS: Primary | ICD-10-CM

## 2017-06-18 DIAGNOSIS — Z00.8 HEALTH EXAMINATION IN POPULATION SURVEYS: ICD-10-CM

## 2017-06-18 LAB
CHOLEST SERPL-MCNC: 182 MG/DL (ref 50–200)
EST. AVERAGE GLUCOSE BLD GHB EST-MCNC: 111 MG/DL
HBA1C MFR BLD: 5.5 % (ref 4.2–6.3)
HDLC SERPL-MCNC: 67 MG/DL (ref 40–60)
LDLC SERPL CALC-MCNC: 109 MG/DL (ref 0–100)
TRIGL SERPL-MCNC: 29 MG/DL

## 2017-06-18 PROCEDURE — 83036 HEMOGLOBIN GLYCOSYLATED A1C: CPT

## 2017-06-18 PROCEDURE — 80061 LIPID PANEL: CPT

## 2017-06-18 PROCEDURE — 36415 COLL VENOUS BLD VENIPUNCTURE: CPT

## 2017-08-08 ENCOUNTER — ALLSCRIPTS OFFICE VISIT (OUTPATIENT)
Dept: OTHER | Facility: OTHER | Age: 52
End: 2017-08-08

## 2017-11-06 ENCOUNTER — TRANSCRIBE ORDERS (OUTPATIENT)
Dept: ADMINISTRATIVE | Facility: HOSPITAL | Age: 52
End: 2017-11-06

## 2017-11-06 DIAGNOSIS — Z12.31 VISIT FOR SCREENING MAMMOGRAM: Primary | ICD-10-CM

## 2017-11-13 ENCOUNTER — TRANSCRIBE ORDERS (OUTPATIENT)
Dept: URGENT CARE | Facility: CLINIC | Age: 52
End: 2017-11-13

## 2017-11-13 ENCOUNTER — APPOINTMENT (OUTPATIENT)
Dept: RADIOLOGY | Facility: CLINIC | Age: 52
End: 2017-11-13
Payer: COMMERCIAL

## 2017-11-13 DIAGNOSIS — R05.9 COUGH: ICD-10-CM

## 2017-11-13 DIAGNOSIS — Z12.31 ENCOUNTER FOR SCREENING MAMMOGRAM FOR MALIGNANT NEOPLASM OF BREAST: ICD-10-CM

## 2017-11-13 DIAGNOSIS — J11.1 INFLUENZA DUE TO UNIDENTIFIED INFLUENZA VIRUS WITH OTHER RESPIRATORY MANIFESTATIONS: ICD-10-CM

## 2017-11-13 PROCEDURE — 71020 HB CHEST X-RAY 2VW FRONTAL&LATL: CPT

## 2017-12-05 ENCOUNTER — HOSPITAL ENCOUNTER (OUTPATIENT)
Dept: MAMMOGRAPHY | Facility: HOSPITAL | Age: 52
Discharge: HOME | End: 2017-12-05
Attending: INTERNAL MEDICINE
Payer: COMMERCIAL

## 2017-12-05 ENCOUNTER — GENERIC CONVERSION - ENCOUNTER (OUTPATIENT)
Dept: INTERNAL MEDICINE CLINIC | Facility: CLINIC | Age: 52
End: 2017-12-05

## 2017-12-05 DIAGNOSIS — Z12.31 VISIT FOR SCREENING MAMMOGRAM: ICD-10-CM

## 2017-12-05 PROCEDURE — 77063 BREAST TOMOSYNTHESIS BI: CPT

## 2017-12-05 PROCEDURE — G0202 SCR MAMMO BI INCL CAD: HCPCS

## 2017-12-08 ENCOUNTER — APPOINTMENT (OUTPATIENT)
Dept: LAB | Facility: CLINIC | Age: 52
End: 2017-12-08
Payer: COMMERCIAL

## 2017-12-08 ENCOUNTER — TRANSCRIBE ORDERS (OUTPATIENT)
Dept: URGENT CARE | Facility: CLINIC | Age: 52
End: 2017-12-08

## 2017-12-08 DIAGNOSIS — J11.1 INFLUENZA DUE TO UNIDENTIFIED INFLUENZA VIRUS WITH OTHER RESPIRATORY MANIFESTATIONS: ICD-10-CM

## 2017-12-08 LAB
FLUAV AG SPEC QL IA: NEGATIVE
FLUBV AG SPEC QL IA: NEGATIVE

## 2017-12-08 PROCEDURE — 87798 DETECT AGENT NOS DNA AMP: CPT

## 2017-12-08 PROCEDURE — 87400 INFLUENZA A/B EACH AG IA: CPT

## 2017-12-09 LAB
FLUAV AG SPEC QL: NORMAL
FLUBV AG SPEC QL: NORMAL
RSV B RNA SPEC QL NAA+PROBE: NORMAL

## 2017-12-11 ENCOUNTER — ALLSCRIPTS OFFICE VISIT (OUTPATIENT)
Dept: OTHER | Facility: OTHER | Age: 52
End: 2017-12-11

## 2017-12-12 NOTE — CONSULTS
Assessment    1  Nausea (787 02) (R11 0)  2  Colon cancer screening (V76 51) (Z12 11)  3  Gastroparesis (536 3) (K31 84)    Plan  Colon cancer screening    · COLONOSCOPY (GI, SURG); Status:Hold For - Scheduling; Requested for:10Xtq0351;   Perform:[facility]; Due:93Nqo7113; Ordered; For:Colon cancer screening; Ordered By:See Bee;  Colon cancer screening, Constipation    · Start: Prepopik 10-3 5-12 MG-GM-GM Oral Packet; DILUTE CONTENTS OF PACKET ANDUSE AS DIRECTED FOR BOWEL PREP  Rx By: David Nicholas; Dispense: 1 Days ; #:1 X 2 Packet Box; Refill: 0;For: Colon cancer screening, Constipation; STEPHY = N; Verified Transmission to [facility] #[id number] Last Updated By: System, SureScripts; 12/11/2017 2:19:30 PM  Constipation    · Start: PEG 3350 Oral Powder; Take 17 grams of powder mixed in 8 ounces of water dailyas needed  Rx By: David Nicholas; Dispense: 1 Days ; #:1 X 238 GM Bottle; Refill: 5;For: Constipation; STEPHY = N; Verified Transmission to [facility] #[id number] Last Updated By: System, SureScripts; 12/11/2017 2:29:03 PM    Discussion/Summary  Discussion Summary:   Gwen Martinez is a 50year old female who presents today, per referral by Dr Luz Gerardo, regarding her constipation, nausea, and GERD  Constipation  She reports irregular periods of constipation lasting up to three days despite adequate hydration  She was prescribed a capful of Miralax as needed to address her constipation  Her constipation will be further assessed by colonoscopy  Screening colonoscopy  Discussed risks, benefits, and alternatives of a screening colonoscopy  She is now due for one since she is now 50years old  Explained purposes of a screening colonoscopy to assess for possible causes of her constipation and to excise any polyps which could develop into cancer if left in place  She will do a Prepopik prep for her colonoscopy since she was unable to tolerate liquid prep  Nausea   Explained possible side effects of Reglan and discussed addition of Zofran to address her frequent nausea  She agreed to start Zofran 8 mg every 8 hours as needed for nausea  Chronic cough  Recommended that she try Claritin or Zyrtec for a possible allergic component of this cough  will return for follow-up regarding her symptoms in four months  GERD RUQ pain  She complains of RUQ pain which is worse in the early morning hours  Her recent liver tests were normal and she has already had a cholecystectomy  Therefore it is likely that this pain is related to her GERD  She will begin taking her Dexilant at night to reduce the likelihood that this pain will occur while she is sleeping or in the early morning  Gastroparesis- continue frequent small meals  will return in 4 months to follow-up on her constipation  Chief Complaint  Chief Complaint Free Text Note Form: pt consult for constipation, nausea and GERD, referred by Dr Rigo Starr      History of Present Illness  HPI: Juan Sierra is a 50year old female who presents today, per referral by Dr Rigo Starr, regarding her constipation, nausea, and GERD  She has periods of constipation lasting for up to three days with painful hard stools when she does have a bowel movement  She has not tried any OTC constipation medications in the past  She states that she has had looser bowel movements recently which she believes is associated with a viral illness that she currently has says that her last colonoscopy was when she was in her early 45s  She reports difficulty tolerating colonoscopy preps in the past  She denies a family history of colon cancer or noticing any bloody stools  Dexilant 60 mg in the morning for GERD  She only takes Reglan for nausea rarely for when she goes out to dinner  She has never tried any medication specifically for nausea  also reports a chronic cough which began a few months ago  She had a chest XR which was negative had an ERCP for SOD dysfunction about 16 years ago   She also has a past surgical history of cholecystectomy  she reports waking up in the middle of the night or early morning due to RUQ pain  Review of Systems  Complete-Female GI Adult:  Constitutional: No fever, no chills, feels well, no tiredness, no recent weight gain or weight loss  Eyes: No complaints of eye pain, no red eyes, no eyesight problems, no discharge, no dry eyes, no itching of eyes  ENT: no complaints of earache, no loss of hearing, no nose bleeds, no nasal discharge, no sore throat, no hoarseness  Cardiovascular: No complaints of slow heart rate, no fast heart rate, no chest pain, no palpitations, no leg claudication, no lower extremity edema  Respiratory: No complaints of shortness of breath, no wheezing, no cough, no SOB on exertion, no orthopnea, no PND  Gastrointestinal: constipation-- and-- GERD, but-- no vomiting,-- no diarrhea-- and-- no blood in stools--   The patient presents with complaints of abdominal pain (sometimes, on and off)  The patient presents with complaints of nausea (once or twice a week, depends on what she eats)  Genitourinary: No complaints of dysuria, no incontinence, no pelvic pain, no dysmenorrhea, no vaginal discharge or bleeding  Musculoskeletal: No complaints of arthralgias, no myalgias, no joint swelling or stiffness, no limb pain or swelling  Integumentary: No complaints of skin rash or lesions, no itching, no skin wounds, no breast pain or lump  Neurological: No complaints of headache, no confusion, no convulsions, no numbness, no dizziness or fainting, no tingling, no limb weakness, no difficulty walking  Psychiatric: Not suicidal, no sleep disturbance, no anxiety or depression, no change in personality, no emotional problems  Endocrine: No complaints of proptosis, no hot flashes, no muscle weakness, no deepening of the voice, no feelings of weakness    Hematologic/Lymphatic: No complaints of swollen glands, no swollen glands in the neck, does not bleed easily, does not bruise easily  ROS Reviewed:   ROS reviewed  Active Problems  1  Abnormal uterine bleeding (AUB) (626 9) (N93 9)  2  Ataxia (781 3) (R27 0)  3  Atopic dermatitis (691 8) (L20 9)  4  Closed fracture of symphysis pubis, right, initial encounter (808 2) (S32 591A)  5  Constipation (564 00) (K59 00)  6  Exposure to hepatitis A (V01 79) (Z20 5)  7  Female pelvic pain (625 9) (R10 2)  8  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)  9  Greater trochanteric bursitis of left hip (726 5) (M70 62)  10  Hamstring tendonitis of left thigh (727 09) (M76 892)  11  Headache (784 0) (R51)  12  Heart burn (787 1) (R12)  13  Hyperlipidemia (272 4) (E78 5)  14  Iliotibial band syndrome of left side (728 89) (M76 32)  15  Influenza (487 1) (J11 1)  16  Left hip pain (719 45) (M25 552)  17  Left lumbar radiculopathy (724 4) (M54 16)  18  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)  19  Lumbar strain (847 2) (S39 012A)  20  Menopausal disorder (627 9) (N95 9)  21  Menorrhagia (626 2) (N92 0)  22  Piriformis syndrome (355 0) (G57 00)  23  Piriformis syndrome, left (355 0) (G57 02)  24  Poor flexibility of tendon (727 81) (M67 80)  25  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)  26  Sacroiliitis (720 2) (M46 1)  27  Thyroid nodule (241 0) (E04 1)  28  Vitamin D deficiency (268 9) (E55 9)    Past Medical History    1  History of Acute frontal sinusitis, recurrence not specified (461 1) (J01 10)  2  History of Acute sinusitis (461 9) (J01 90)  3  History of Blood in urine (599 70) (R31 9)  4  History of Cough (786 2) (R05)  5  History of Encounter for routine pelvic examination (V72 31) (Z01 419)  6  History of Encounter for screening mammogram for malignant neoplasm of breast (V76 12) (Z12 31)  7  History of Encounter for screening mammogram for malignant neoplasm of breast (V76 12) (Z12 31)  8  History Of 1  Previous Pregnancies (V61 5)  9  History of uterine leiomyoma (V13 29) (Z86 018)  10   History of Irritable bowel syndrome (564 1) (K58 9)  11  History of Mild cervical dysplasia (622 11) (N87 0)  12  History of Sore throat (462) (J02 9)  Active Problems And Past Medical History Reviewed: The active problems and past medical history were reviewed and updated today  Surgical History    1  History of Cholecystectomy  2  History of Colonoscopy (Fiberoptic) Screening  3  History of Colposcopy  4  History of Diagnostic Esophagogastroduodenoscopy  5  History of Laparoscopy (Diagnostic)  6  History of Tubal Ligation  Surgical History Reviewed: The surgical history was reviewed and updated today  Family History  Mother   1  Family history of Hyperlipidemia  Paternal Grandmother   2  Family history of Breast Cancer (V16 3)  Family History   3  Family history of Cardiovascular Disorder (V17 49)  4  Denied: Family history of Colon Cancer  5  Family history of Diabetes Mellitus (V18 0)  6  Denied: Family history of Drug use  7  Denied: Family history of Osteoporosis  8  Denied: Family history of Ovarian Cancer  9  Family history of Thyroid Disorder (V18 19)  10  Denied: Family history of Uterine Cancer  Family History Reviewed: The family history was reviewed and updated today  Social History     · Always uses sunscreen   · Caffeine use (V49 89) (F15 90)   · Dental care, regularly   · Lives independently with spouse   · Never a smoker   · No alcohol use   · No drug use   · Uses Safety Equipment - Seatbelts  Social History Reviewed: The social history was reviewed and updated today  Current Meds  1  Dexilant 60 MG Oral Capsule Delayed Release; TAKE 1 CAPSULE DAILY EVERY MORNING BEFORE BREAKFAST; Therapy: 43MPT7134 to Recorded  2  Oseltamivir Phosphate 75 MG Oral Capsule; take one tablet twice a day for seven days; Therapy: 85UQL2283 to (Evaluate:93Wil2107)  Requested for: 66SHA7895; Last Rx:87Ciy0865 Ordered  3  Reglan TABS Recorded  4   Vitamin D (Ergocalciferol) 58890 UNIT Oral Capsule; take 1 capsule weekly; Therapy: 61PAH2218 to (Evaluate:89Iea5729)  Requested for: 35QRN9651; Last Rx:82Smr0960 Ordered  Medication List Reviewed: The medication list was reviewed and updated today  Allergies  1  Bactrim TABS    Vitals  Vital Signs    Recorded: 00Xbu6087 01:59PM   Temperature 98 F, Tympanic   Heart Rate 83   Systolic 933, LUE, Sitting   Diastolic 80, LUE, Sitting   Height 5 ft 6 in   Weight 164 lb    BMI Calculated 26 47   BSA Calculated 1 84   O2 Saturation 98, RA       Physical Exam   Constitutional  General appearance: No acute distress, well appearing and well nourished  Eyes  Conjunctiva and lids: No swelling, erythema or discharge  Pupils and irises: Equal, round and reactive to light  Ears, Nose, Mouth, and Throat  External inspection of ears and nose: Normal    Nasal mucosa, septum, and turbinates: Normal without edema or erythema  Oropharynx: Normal with no erythema, edema, exudate or lesions  Pulmonary  Respiratory effort: No increased work of breathing or signs of respiratory distress  Auscultation of lungs: Clear to auscultation  Cardiovascular  Auscultation of heart: Normal rate and rhythm, normal S1 and S2, without murmurs  Examination of extremities for edema and/or varicosities: Normal    Abdomen  Abdomen: Non-tender, no masses  Liver and spleen: No hepatomegaly or splenomegaly  Lymphatic  Palpation of lymph nodes in neck: No lymphadenopathy  Musculoskeletal  Gait and station: Normal    Digits and nails: Normal without clubbing or cyanosis  Inspection/palpation of joints, bones, and muscles: Normal    Skin  Skin and subcutaneous tissue: Normal without rashes or lesions  Psychiatric  Orientation to person, place, and time: Normal    Mood and affect: Normal          Attending Note  Scribe Attestation:  Sana [address] [facility] Prosper de souza acting as a scribe in the presence of the attending physician while the attending physician examines the patient    Physician Attestation:  I, Julito Bee DO personally performed the services described in this documentation as scribed in my presence, and it is both accurate and complete        Future Appointments    Date/Time Provider Specialty Site   03/02/2018 01:00 PM Tommy Grayson MD Neurology [address]  [facility]       Signatures   Electronically signed by : Abran Clark DO; Dec 11 2017  2:48PM EST                       (Author)    Electronically signed by : Abran Clark DO; Dec 11 2017  2:50PM EST                       (Author)    Electronically signed by : Abran Clark DO; Dec 11 2017  2:50PM EST                       (Author)

## 2018-01-11 NOTE — RESULT NOTES
Verified Results  (1) TISSUE EXAM 19Sxm3669 12:00AM Micky Giang     Test Name Result Flag Reference   LAB AP CASE REPORT (Report)     Surgical Pathology Report             Case: [id number]                   Authorizing Provider: Erlinda Long MD     Collected:      12/22/2016           Pathologist:      Dahlia Chavarria MD    Received:      12/27/2016 8189        Specimen:  Endometrium, Endometrial biopsy   LAB AP FINAL DIAGNOSIS (Report)     A  Endometrium, biopsy:        - Inactive endometrial glands with tubal metaplasia, and with   stromal condensation, fibrin thrombi and breakdown, suggestive of estrogen   related bleeding         - No hyperplasia or malignancy is identified  Interpretation performed at , [address]   Electronically signed by Dahlia Chavarria MD on 12/29/2016 at 12:19 PM   LAB AP SURGICAL ADDITIONAL INFORMATION (Report)     These tests were developed and their performance characteristics   determined by [facility] or [employer]  They may not be cleared or approved by the [place]  Food and   Drug Administration  The FDA has determined that such clearance or   approval is not necessary  These tests are used for clinical purposes  They should not be regarded as investigational or for research  This   laboratory has been approved by CLIA 88, designated as a high-complexity   laboratory and is qualified to perform these tests  LAB AP GROSS DESCRIPTION (Report)     A  The specimen is received in formalin, labeled with the patient's name   and hospital number, and is designated endometrial biopsy  The specimen   consists of multiple red-brown soft tissue fragments measuring in   aggregate 2 5 x 1 3 x 0 2 cm  Entirely submitted  One cassette      Note: The estimated total formalin fixation time based upon information   provided by the submitting clinician and the standard processing schedule   is over 72 hours     BMV

## 2018-01-11 NOTE — RESULT NOTES
Verified Results  * [facility] COMPLETE [facility] [id number] 07:19PM [facility] Order Number: [id number]   Performing Comments: 3 month f/u   - Patient Instructions: To schedule this appointment, please contact Central Scheduling at [phone]  Test Name Result Flag Reference   US PELVIS COMPLETE (TRANSABDOMINAL AND TRANSVAGINAL) (Report)     PELVIC ULTRASOUND, COMPLETE     INDICATION: 81-year-old with pelvic and perineal pain  LMP was March 2016  COMPARISON: None  TECHNIQUE:  Transabdominal pelvic ultrasound was performed in sagittal and transverse planes with a curvilinear transducer  Additional transvaginal imaging was performed to better evaluate the endometrium and ovaries  Imaging included volumetric    sweeps as well as traditional still imaging technique  FINDINGS:     UTERUS:   The uterus is anteverted in position, measuring 9 4 x 4 2 x 5 0 cm  Contour and echotexture appear normal      The cervix shows no suspicious abnormality  ENDOMETRIUM:    Normal caliber of 9 mm  Homogenous and normal in appearance  OVARIES/ADNEXA:   Right ovary: 2 0 x 1 4 x 1 5 cm  No suspicious right ovarian abnormality  Doppler flow within normal limits  Left ovary: 4 0 x 2 6 x 2 4 cm  No suspicious left ovarian abnormality  Dominant simple benign appearing follicle noted  Doppler flow within normal limits  No suspicious adnexal mass or loculated collections  There is no free fluid  IMPRESSION:        1  Left ovarian dominant follicle       2  Normal uterus            Workstation performed: [id number]     Signed by:   Souleymane Melara MD   6/10/16

## 2018-01-12 VITALS
WEIGHT: 161 LBS | HEART RATE: 65 BPM | SYSTOLIC BLOOD PRESSURE: 122 MMHG | DIASTOLIC BLOOD PRESSURE: 81 MMHG | BODY MASS INDEX: 25.99 KG/M2

## 2018-01-12 VITALS
WEIGHT: 160.13 LBS | BODY MASS INDEX: 25.85 KG/M2 | SYSTOLIC BLOOD PRESSURE: 128 MMHG | HEART RATE: 76 BPM | DIASTOLIC BLOOD PRESSURE: 86 MMHG

## 2018-01-12 NOTE — RESULT NOTES
Verified Results  * US PELVIS COMPLETE [facility] [place] AND TRANSVAGINAL) [id number] 10:16AM Tamiko Vargas Order Number: [id number]     Test Name Result Flag Reference   US PELVIS COMPLETE (TRANSABDOMINAL AND TRANSVAGINAL) (Report)     PELVIC ULTRASOUND, COMPLETE     INDICATION: 44-year-old woman with pelvic pain for 2 months  History of tubal ligation  LMP was in January 20, 2016  COMPARISON: Pelvic sonogram from February 9, 2013  TECHNIQUE:  Transabdominal pelvic ultrasound was performed in sagittal and transverse planes with a curvilinear transducer  Additional transvaginal imaging was performed to better evaluate the endometrium and ovaries  Imaging included volumetric    sweeps as well as traditional still imaging technique  FINDINGS:     UTERUS:   Position: Anteverted and anteflexed  Size: 7 3 x 3 9 x 4 5 cm  Echotexture: Normal contour and echogenicity  Cervix: Normal in appearance  ENDOMETRIUM:    Thickness: Normal, measuring 6 mm in maximal thickness  Echotexture: Normal and homogenous in echogenicity with no evidence of endometrial mass or fluid collection  OVARIES/ADNEXA:   Right ovary: 3 7 x 2 6 x 3 7 cm  No suspicious right ovarian abnormality  Two adjacent masses, the larger of which is a simple cyst, measuring 2 5 cm  The other mass measures 1 7 cm and is homogeneously moderately echogenic with what appears to be a fluid level and no internal    vascularity upon color Doppler interrogation  This is most likely a hemorrhagic cyst  Follow-up with endovaginal sonography in 3 months is recommended to ensure resolution  Doppler flow within normal limits  Left ovary: 2 6 x 1 7 x 1 8 cm  No suspicious left ovarian abnormality  Doppler flow within normal limits  No suspicious adnexal mass  Free fluid: None  IMPRESSION:      1  1 7 cm hemorrhagic cyst adjacent to a 2 5 cm simple cyst in the right ovary   Consider follow-up endovaginal sonography in 3 months to ensure resolution     2  Otherwise normal pelvic sonogram           Workstation performed: [id number]     Signed by:   Ashkan Jefferson MD   3/3/16

## 2018-01-13 VITALS
DIASTOLIC BLOOD PRESSURE: 76 MMHG | SYSTOLIC BLOOD PRESSURE: 119 MMHG | BODY MASS INDEX: 26.01 KG/M2 | HEART RATE: 57 BPM | WEIGHT: 161.13 LBS

## 2018-01-14 VITALS
WEIGHT: 163 LBS | BODY MASS INDEX: 26.2 KG/M2 | TEMPERATURE: 97.7 F | DIASTOLIC BLOOD PRESSURE: 76 MMHG | HEIGHT: 66 IN | SYSTOLIC BLOOD PRESSURE: 118 MMHG

## 2018-01-14 VITALS
BODY MASS INDEX: 25.88 KG/M2 | HEIGHT: 66 IN | HEART RATE: 67 BPM | WEIGHT: 161 LBS | SYSTOLIC BLOOD PRESSURE: 132 MMHG | DIASTOLIC BLOOD PRESSURE: 76 MMHG

## 2018-01-14 VITALS
DIASTOLIC BLOOD PRESSURE: 68 MMHG | BODY MASS INDEX: 26.74 KG/M2 | HEIGHT: 66 IN | SYSTOLIC BLOOD PRESSURE: 108 MMHG | WEIGHT: 166.38 LBS | TEMPERATURE: 98.3 F

## 2018-01-15 VITALS
DIASTOLIC BLOOD PRESSURE: 72 MMHG | OXYGEN SATURATION: 99 % | HEART RATE: 73 BPM | WEIGHT: 163.13 LBS | HEIGHT: 66 IN | SYSTOLIC BLOOD PRESSURE: 118 MMHG | BODY MASS INDEX: 26.22 KG/M2 | TEMPERATURE: 98.5 F

## 2018-01-17 NOTE — RESULT NOTES
Verified Results  (1) THIN PREP PAP WITH IMAGING [id number] 12:00AM Lakeshia Polk     Test Name Result Flag Reference   LAB AP CASE REPORT (Report)     Gynecologic Cytology Report            Case: [id number]                  Authorizing Provider: Cory Lawrence MD     Collected:      11/21/2016           First Screen:     LUZ Schwartz   Received:      11/27/2016 1973        Specimen:  LIQUID-BASED PAP, SCREENING, Endocervical   HPV HIGH RISK RESULT (Report)     HPV, High Risk: HPV NEG, HPV16 NEG, HPV18 NEG      Other High Risk HPV Negative, HPV 16 Negative, HPV 18 Negative  HPV types: 16,18,31,33,35,39,45,51,52,56,58,59,66 and 68 DNA are undetectable or below the pre-set threshold  Roche?s FDA approved Miladys 4800 is utilized with strict adherence to the ?s instruction  manual to test for the presence of High-Risk HPV DNA, as well as HPV 16 and HPV 18  This instrument  has been validated by our laboratory and/or by the   A negative result does not preclude the presence of HPV infection because results depend on adequate  specimen collection, absence of inhibitors and sufficient DNA to be detected  Additionally, HPV negative  results are not intended to prevent women from proceeding to colposcopy if clinically warranted  Positive HPV test results indicate the presence of any one or more of the high risk types, but since patients  are often co-infected with low-risk types it does not rule out the presence of low-risk types in patients  with mixed infections  LAB AP GYN PRIMARY INTERPRETATION      Negative for intraepithelial lesion or malignancy  Electronically signed by LUZ Schwartz on 11/30/2016 at 10:44 AM   LAB AP GYN SPECIMEN ADEQUACY      Satisfactory for evaluation  Endocervical/transformation zone component present     LAB AP GYN ADDITIONAL INFORMATION (Report)     [employer] FDA approved ,  and ThinPrep Imaging System are   utilized with strict adherence to the 's instruction manual to   prepare gynecologic and non-gynecologic cytology specimens for the   production of ThinPrep slides as well as for gynecologic ThinPrep imaging  These processes have been validated by our laboratory and/or by the     The Pap test is not a diagnostic procedure and should not be used as the   sole means to detect cervical cancer  It is only a screening procedure to   aid in the detection of cervical cancer and its precursors  Both   false-negative and false-positive results have been experienced  Your   patient's test result should be interpreted in this context together with   the history and clinical findings

## 2018-01-22 VITALS
TEMPERATURE: 97.2 F | HEART RATE: 97 BPM | WEIGHT: 160.25 LBS | HEIGHT: 66 IN | BODY MASS INDEX: 25.75 KG/M2 | OXYGEN SATURATION: 100 %

## 2018-01-22 VITALS — DIASTOLIC BLOOD PRESSURE: 76 MMHG | SYSTOLIC BLOOD PRESSURE: 126 MMHG

## 2018-01-22 VITALS
HEART RATE: 76 BPM | HEIGHT: 66 IN | SYSTOLIC BLOOD PRESSURE: 113 MMHG | DIASTOLIC BLOOD PRESSURE: 77 MMHG | BODY MASS INDEX: 26.3 KG/M2 | WEIGHT: 163.63 LBS

## 2018-01-23 VITALS
HEIGHT: 66 IN | SYSTOLIC BLOOD PRESSURE: 118 MMHG | BODY MASS INDEX: 26.36 KG/M2 | TEMPERATURE: 98 F | HEART RATE: 83 BPM | DIASTOLIC BLOOD PRESSURE: 80 MMHG | WEIGHT: 164 LBS | OXYGEN SATURATION: 98 %

## 2018-01-23 NOTE — PROGRESS NOTES
Assessment    1  Left hip pain (719 45) (M25 552)   2  Poor flexibility of tendon (727 81) (M67 80)   3  Greater trochanteric bursitis of left hip (726 5) (M70 62)   4  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)   5  Iliotibial band syndrome of left side (728 89) (M76 32)    Plan  Gluteus medius or minimus syndrome, Greater trochanteric bursitis of left hip, Iliotibial  band syndrome of left side, Left hip pain, Poor flexibility of tendon    · *1 - [facility]  Consult PT-please evaluate and treat for left  hip pain with trochanteric bursitis, iliotibial band syndrome and also tendinopathy of  gluteus medius/minimus at the insertion  There is a mild element of sacroiliac joint  dysfunction the left  Patient has limited flexibility of her hip flexor/quadriceps and  hamstring on the left versus the right  Range of motion, stretching, strengthening,  modalities including tens, graston, accupressure as appropriate  2-3 times a week for  4-6 weeks  Please teach home exercise and stretching program   Status: Active   Requested for: 17JSF5037  Care Summary provided  : Yes  Iliotibial band syndrome of left side    · Diclofenac-Misoprostol 75-0 2 MG Oral Tablet Delayed Release; TAKE 1 TABLET  BY MOUTH EVERY 12 HOURS AS NEEDED FOR PAIN   · TraMADol HCl - 50 MG Oral Tablet; TAKE 1 TABLET EVERY 12 HOURS AS  NEEDED    Discussion/Summary    Left hip pain after a fall 2 months ago with trochanter bursitis, iliotibial band,And gluteus medius/minimus tendinitis  Patient will start physical therapy  She is advised to avoid running but may try cycling, elliptical and especially swimming  Diclofenac on as needed basis for pain  Medications precautions discussed with the patient  She will avoid other NSAIDs at the same time  Followup in one month, sooner if needed  Patient will call me meantime with any questions or concerns  Chief Complaint    1   Hip Pain    History of Present Illness  HPI: Patient is a very pleasant 22-year-old female avid runner who presents for evaluation of her left hip pain which has been going on for 2 months  She recalls slipping and falling down onto her buttocks  Since then she has been complaining of persistent pain over the lateral and posterior aspect of the hip  She has been unable to run due to increasing pain  She was evaluated and treated by her chiropractor with only temporary improvement  She has taken ibuprofen although this exacerbates her gastritis and causes epigastric pain  Patient had an x-ray of the left hip which showed no abnormalities  Due to persistent pain she is here for evaluation  Pain is localized over the lateral and posterior aspect of the hip joint  No significant back pain  No numbness or tingling in lower extremities  Is mild weakness due to pain in her left leg  Review of Systems    Constitutional: No fever, no chills, feels well, no tiredness, no recent weight gain or loss  Eyes: No complaints of eyesight problems, no red eyes  ENT: no loss of hearing, no nosebleeds, no sore throat  Cardiovascular: No complaints of chest pain, no palpitations, no leg claudication or lower extremity edema  Respiratory: no compliants of shortness of breath, no wheezing, no cough  Gastrointestinal: no complaints of abdominal pain, no constipation, no nausea or diarrhea, no vomiting, no bloody stools  Genitourinary: no complaints of dysuria, no incontinence  Musculoskeletal: as noted in HPI  Integumentary: no complaints of skin rash or lesion, no itching or dry skin, no skin wounds  Neurological: no complaints of headache, no confusion, no numbness or tingling, no dizziness  Endocrine: No complaints of muscle weakness, no feelings of weakness, no frequent urination, no excessive thirst    Psychiatric: No suicidal thoughts, no anxiety, no feelings of depression  ROS reviewed  Active Problems    1   Abdominal pain (789 00) (R10 9)   2  Abnormal uterine bleeding (AUB) (626 9) (N93 9)   3  Advance directive discussed with patient (V65 49) (Z71 89)   4  Atopic dermatitis (691 8) (L20 9)   5  Encounter for routine gynecological examination (V72 31) (Z01 419)   6  Encounter for screening mammogram for breast cancer (V76 12) (Z12 31)   7  Female pelvic pain (625 9) (R10 2)   8  Gastroenteritis (558 9) (K52 9)   9  Heart burn (787 1) (R12)   10  Hyperlipidemia (272 4) (E78 5)   11  Lumbar strain (847 2) (S39 012A)   12  Menorrhagia (626 2) (N92 0)   13  Piriformis syndrome (355 0) (G57 00)   14  Piriformis syndrome, left (355 0) (G57 02)   15  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)   16  Screening for HPV (human papillomavirus) (V73 81) (Z11 51)   17  Thyroid nodule (241 0) (E04 1)   18  Vitamin D deficiency (268 9) (E55 9)    Past Medical History    · History of Acute frontal sinusitis, recurrence not specified (461 1) (J01 10)   · History of Acute sinusitis (461 9) (J01 90)   · History of Blood in urine (599 70) (R31 9)   · History of Cough (786 2) (R05)   · History of Encounter for routine pelvic examination (V72 31) (Z01 419)   · History of Encounter for screening mammogram for malignant neoplasm of breast  (V76 12) (Z12 31)   · History of Encounter for screening mammogram for malignant neoplasm of breast  (V76 12) (Z12 31)   · History Of 1  Previous Pregnancies (V61 5)   · History of uterine leiomyoma (V13 29) (Z86 018)   · History of Irritable bowel syndrome (564 1) (K58 9)   · History of Mild cervical dysplasia (622 11) (N87 0)   · History of Sinusitis (473 9) (J32 9)   · History of Sore throat (462) (J02 9)   · History of Vaginal Itching (625 8)   · History of Yeast infection (112 9) (B37 9)    The active problems and past medical history were reviewed and updated today        Surgical History    · History of Cholecystectomy   · History of Colonoscopy (Fiberoptic) Screening   · History of Colposcopy   · History of Diagnostic Esophagogastroduodenoscopy   · History of Laparoscopy (Diagnostic)   · History of Tubal Ligation    The surgical history was reviewed and updated today  Family History  Mother    · Family history of Hyperlipidemia  Paternal Grandmother    · Family history of Breast Cancer (V16 3)  Family History    · Family history of Cardiovascular Disorder (V17 49)   · Denied: Family history of Colon Cancer   · Family history of Diabetes Mellitus (V18 0)   · Denied: Family history of Osteoporosis   · Denied: Family history of Ovarian Cancer   · Family history of Thyroid Disorder (V18 19)   · Denied: Family history of Uterine Cancer    The family history was reviewed and updated today  Social History    · Always uses sunscreen   · Caffeine use (V49 89) (F15 90)   · Dental care, regularly   · Lives independently with spouse   · Never A Smoker   · No alcohol use   · No drug use   · Uses Safety Equipment - Seatbelts  The social history was reviewed and updated today  Current Meds   1  Dexilant 60 MG Oral Capsule Delayed Release; TAKE 1 CAPSULE DAILY EVERY   MORNING BEFORE BREAKFAST; Therapy: 07NEP5570 to Recorded   2  Fluticasone Propionate 50 MCG/ACT Nasal Suspension; USE 2 SPRAYS IN EACH   NOSTRIL ONCE DAILY; Therapy: 45PKS2864 to (Evaluate:10Jan2016); Last Rx:12Oct2015 Ordered   3  Nystatin 134704 UNIT/GM External Cream; apply BID; Therapy: 98OLX9826 to (Evaluate:07Jan2017)  Requested for: 58VDW2042; Last   Rx:08Nov2016 Ordered   4  Reglan TABS Recorded   5  Vitamin D (Ergocalciferol) 23976 UNIT Oral Capsule; take 1 capsule weekly; Therapy: 16VWE8545 to (Evaluate:97Dqc2506)  Requested for: 32Hgf8782; Last   Rx:78Fhp9506 Ordered    The medication list was reviewed and updated today  Allergies    1   Bactrim TABS    Vitals  Signs    Heart Rate: 81  Systolic: 040, RUE, Sitting  Diastolic: 86, RUE, Sitting  Height: 5 ft 6 in  Weight: 158 lb 15 22 oz  BMI Calculated: 25 66  BSA Calculated: 1 82    Physical Exam    Left Hip: Appearance: Normal  Tenderness: gluteus medius, greater trochanter and bursa and proximal ITB , but not the anterior aspect and not the sacroiliac joint  Palpatory findings include no palpable clunk, no crepitus and no warmth  Mild tenderness to pressure of the ischial tuberosity  Flexion: restricted AROM  Adduction: restricted AROM  Motor: Normal except as noted: Flexion was 4/5  Abduction was 5/5  Special Tests: NALDO test was equivocal, positive Silvano's test and Positive Ely's test  Limited flexibility of the hamstrings bilaterally left worse than right , but negative Straight Leg Raise  Constitutional - General appearance: Normal    Cardiovascular - Pulses: Normal  Examination of extremities for edema and/or varicosities: Normal       Lungs: Normal respiratory rate and rhythm, no wheezes, no cough, no dyspnea  Skin - Skin and subcutaneous tissue: Normal    Neurologic - Sensation: Normal  Lower extremity peripheral neuro exam: Normal    Psychiatric - Mood and affect: Normal    Eyes   Conjunctiva and lids: Normal        Results/Data  I personally reviewed the films/images/results in the office today  My interpretation follows  X-ray Review X-ray of the left hip shows no fracture or dislocation  No abnormal osseous lesions        Future Appointments    Date/Time Provider Specialty Site   01/13/2017 03:20 PM Zachary Saha MD Sports Medicine [address]      Signatures   Electronically signed by : Kitty Fisher MD; Dec  9 2016  7:39PM EST                       (Author)

## 2018-01-23 NOTE — CONSULTS
Chief Complaint  pt consult for constipation, nausea and GERD, referred by Dr Belle Simpson      History of Present Illness  Clarence Srinivasan is a 50year old female who presents today, per referral by Dr Belle Simpson, regarding her constipation, nausea, and GERD  She has periods of constipation lasting for up to three days with painful hard stools when she does have a bowel movement  She has not tried any OTC constipation medications in the past  She states that she has had looser bowel movements recently which she believes is associated with a viral illness that she currently has  She says that her last colonoscopy was when she was in her early 45s  She reports difficulty tolerating colonoscopy preps in the past  She denies a family history of colon cancer or noticing any bloody stools  Takes Dexilant 60 mg in the morning for GERD  She only takes Reglan for nausea rarely for when she goes out to dinner  She has never tried any medication specifically for nausea  She also reports a chronic cough which began a few months ago  She had a chest XR which was negative  She had an ERCP for SOD dysfunction about 16 years ago  She also has a past surgical history of cholecystectomy  Additionally, she reports waking up in the middle of the night or early morning due to RUQ pain  Review of Systems    Constitutional: No fever, no chills, feels well, no tiredness, no recent weight gain or weight loss  Eyes: No complaints of eye pain, no red eyes, no eyesight problems, no discharge, no dry eyes, no itching of eyes  ENT: no complaints of earache, no loss of hearing, no nose bleeds, no nasal discharge, no sore throat, no hoarseness  Cardiovascular: No complaints of slow heart rate, no fast heart rate, no chest pain, no palpitations, no leg claudication, no lower extremity edema  Respiratory: No complaints of shortness of breath, no wheezing, no cough, no SOB on exertion, no orthopnea, no PND     Gastrointestinal: constipation and GERD, but no vomiting, no diarrhea and no blood in stools    The patient presents with complaints of abdominal pain (sometimes, on and off)  The patient presents with complaints of nausea (once or twice a week, depends on what she eats)   Genitourinary: No complaints of dysuria, no incontinence, no pelvic pain, no dysmenorrhea, no vaginal discharge or bleeding  Musculoskeletal: No complaints of arthralgias, no myalgias, no joint swelling or stiffness, no limb pain or swelling  Integumentary: No complaints of skin rash or lesions, no itching, no skin wounds, no breast pain or lump  Neurological: No complaints of headache, no confusion, no convulsions, no numbness, no dizziness or fainting, no tingling, no limb weakness, no difficulty walking  Psychiatric: Not suicidal, no sleep disturbance, no anxiety or depression, no change in personality, no emotional problems  Endocrine: No complaints of proptosis, no hot flashes, no muscle weakness, no deepening of the voice, no feelings of weakness  Hematologic/Lymphatic: No complaints of swollen glands, no swollen glands in the neck, does not bleed easily, does not bruise easily  ROS reviewed  Active Problems    1  Abnormal uterine bleeding (AUB) (626 9) (N93 9)   2  Ataxia (781 3) (R27 0)   3  Atopic dermatitis (691 8) (L20 9)   4  Closed fracture of symphysis pubis, right, initial encounter (808 2) (S32 591A)   5  Constipation (564 00) (K59 00)   6  Exposure to hepatitis A (V01 79) (Z20 5)   7  Female pelvic pain (625 9) (R10 2)   8  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)   9  Greater trochanteric bursitis of left hip (726 5) (M70 62)   10  Hamstring tendonitis of left thigh (727 09) (M76 892)   11  Headache (784 0) (R51)   12  Heart burn (787 1) (R12)   13  Hyperlipidemia (272 4) (E78 5)   14  Iliotibial band syndrome of left side (728 89) (M76 32)   15  Influenza (487 1) (J11 1)   16   Left hip pain (719 45) (M25 552) 17  Left lumbar radiculopathy (724 4) (M54 16)   18  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)   19  Lumbar strain (847 2) (S39 012A)   20  Menopausal disorder (627 9) (N95 9)   21  Menorrhagia (626 2) (N92 0)   22  Piriformis syndrome (355 0) (G57 00)   23  Piriformis syndrome, left (355 0) (G57 02)   24  Poor flexibility of tendon (727 81) (M67 80)   25  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)   26  Sacroiliitis (720 2) (M46 1)   27  Thyroid nodule (241 0) (E04 1)   28  Vitamin D deficiency (268 9) (E55 9)    Past Medical History    · History of Acute frontal sinusitis, recurrence not specified (461 1) (J01 10)   · History of Acute sinusitis (461 9) (J01 90)   · History of Blood in urine (599 70) (R31 9)   · History of Cough (786 2) (R05)   · History of Encounter for routine pelvic examination (V72 31) (Z01 419)   · History of Encounter for screening mammogram for malignant neoplasm of breast  (V76 12) (Z12 31)   · History of Encounter for screening mammogram for malignant neoplasm of breast  (V76 12) (Z12 31)   · History Of 1  Previous Pregnancies (V61 5)   · History of uterine leiomyoma (V13 29) (Z86 018)   · History of Irritable bowel syndrome (564 1) (K58 9)   · History of Mild cervical dysplasia (622 11) (N87 0)   · History of Sore throat (462) (J02 9)    The active problems and past medical history were reviewed and updated today  Surgical History    · History of Cholecystectomy   · History of Colonoscopy (Fiberoptic) Screening   · History of Colposcopy   · History of Diagnostic Esophagogastroduodenoscopy   · History of Laparoscopy (Diagnostic)   · History of Tubal Ligation    The surgical history was reviewed and updated today         Family History    · Family history of Hyperlipidemia    · Family history of Breast Cancer (V16 3)    · Family history of Cardiovascular Disorder (V17 49)   · Denied: Family history of Colon Cancer   · Family history of Diabetes Mellitus (V18 0)   · Denied: Family history of Drug use   · Denied: Family history of Osteoporosis   · Denied: Family history of Ovarian Cancer   · Family history of Thyroid Disorder (V18 19)   · Denied: Family history of Uterine Cancer    The family history was reviewed and updated today  Social History    · Always uses sunscreen   · Caffeine use (V49 89) (F15 90)   · Dental care, regularly   · Lives independently with spouse   · Never a smoker   · No alcohol use   · No drug use   · Uses Safety Equipment - Seatbelts  The social history was reviewed and updated today  Current Meds   1  Dexilant 60 MG Oral Capsule Delayed Release; TAKE 1 CAPSULE DAILY EVERY   MORNING BEFORE BREAKFAST; Therapy: 92LVT2225 to Recorded   2  Oseltamivir Phosphate 75 MG Oral Capsule; take one tablet twice a day for seven days; Therapy: 29DLT1621 to (Evaluate:04Bno5276)  Requested for: 68VGN8774; Last   Rx:12Ixj7172 Ordered   3  Reglan TABS Recorded   4  Vitamin D (Ergocalciferol) 63832 UNIT Oral Capsule; take 1 capsule weekly; Therapy: 17EHO1848 to (Evaluate:61Xod7552)  Requested for: 48LFP9256; Last   Rx:84Yef4338 Ordered    The medication list was reviewed and updated today  Allergies    1  Bactrim TABS    Vitals   Recorded: 02Ajn7684 01:59PM   Temperature 98 F, Tympanic   Heart Rate 83   Systolic 478, LUE, Sitting   Diastolic 80, LUE, Sitting   Height 5 ft 6 in   Weight 164 lb    BMI Calculated 26 47   BSA Calculated 1 84   O2 Saturation 98, RA     Physical Exam    Constitutional   General appearance: No acute distress, well appearing and well nourished  Eyes   Conjunctiva and lids: No swelling, erythema or discharge  Pupils and irises: Equal, round and reactive to light  Ears, Nose, Mouth, and Throat   External inspection of ears and nose: Normal     Nasal mucosa, septum, and turbinates: Normal without edema or erythema  Oropharynx: Normal with no erythema, edema, exudate or lesions      Pulmonary   Respiratory effort: No increased work of breathing or signs of respiratory distress  Auscultation of lungs: Clear to auscultation  Cardiovascular   Auscultation of heart: Normal rate and rhythm, normal S1 and S2, without murmurs  Examination of extremities for edema and/or varicosities: Normal     Abdomen   Abdomen: Non-tender, no masses  Liver and spleen: No hepatomegaly or splenomegaly  Lymphatic   Palpation of lymph nodes in neck: No lymphadenopathy  Musculoskeletal   Gait and station: Normal     Digits and nails: Normal without clubbing or cyanosis  Inspection/palpation of joints, bones, and muscles: Normal     Skin   Skin and subcutaneous tissue: Normal without rashes or lesions  Psychiatric   Orientation to person, place, and time: Normal     Mood and affect: Normal          Assessment    1  Nausea (787 02) (R11 0)   2  Colon cancer screening (V76 51) (Z12 11)   3  Gastroparesis (536 3) (K31 84)    Plan  Colon cancer screening    · COLONOSCOPY (GI, SURG); Status:Hold For - Scheduling; Requested for:51Ith2382;    Perform:[facility]; Due:26Crg0447; Ordered; For:Colon cancer screening; Ordered By:Jose Bee [facility];  Colon cancer screening, Constipation    · Prepopik 10-3 5-12 MG-GM-GM Oral Packet; DILUTE CONTENTS OF PACKET AND  USE AS DIRECTED FOR BOWEL PREP   Rx By: Lili Koch; Dispense: 1 Days ; #:1 X 2 Packet Box; Refill: 0; For: Colon cancer screening, Constipation; STEPHY = N; Verified Transmission to [facility] Last Updated By: System, [employer]; 12/11/2017 2:19:30 PM  Constipation    · PEG 3350 Oral Powder; Take 17 grams of powder mixed in 8 ounces of water daily  as needed   Rx By: Lili Koch; Dispense: 1 Days ; #:1 X 238 GM Bottle;  Refill: 5; For: Constipation; STEPHY = N; Verified Transmission to [facility]26 Last Updated By: System, [employer]; 12/11/2017 2:29:03 PM    Discussion/Summary    Sharri Salmon is a 50year old female who presents today, per referral by Dr Sherri Kaba, regarding her constipation, nausea, and GERD  1  Constipation  She reports irregular periods of constipation lasting up to three days despite adequate hydration  She was prescribed a capful of Miralax as needed to address her constipation  Her constipation will be further assessed by colonoscopy  2  Screening colonoscopy  Discussed risks, benefits, and alternatives of a screening colonoscopy  She is now due for one since she is now 50years old  Explained purposes of a screening colonoscopy to assess for possible causes of her constipation and to excise any polyps which could develop into cancer if left in place  She will do a Prepopik prep for her colonoscopy since she was unable to tolerate liquid prep  3  Nausea  Explained possible side effects of Reglan and discussed addition of Zofran to address her frequent nausea  She agreed to start Zofran 8 mg every 8 hours as needed for nausea  4  Chronic cough  Recommended that she try Claritin or Zyrtec for a possible allergic component of this cough  She will return for follow-up regarding her symptoms in four months  5  GERD RUQ pain  She complains of RUQ pain which is worse in the early morning hours  Her recent liver tests were normal and she has already had a cholecystectomy  Therefore it is likely that this pain is related to her GERD  She will begin taking her Dexilant at night to reduce the likelihood that this pain will occur while she is sleeping or in the early morning  6  Gastroparesis- continue frequent small meals    She will return in 4 months to follow-up on her constipation        Future Appointments    Signatures   Electronically signed by : Dimitri Pierre DO; Dec 11 2017  2:50PM EST                       (Author)

## 2018-01-24 NOTE — MISCELLANEOUS
Assessment   1  Headache (784 0) (R51)  2  Menopausal disorder (627 9) (N95 9)  3  Never a smoker1   4  Ataxia (781 3) (R27 0)1      1 Amended By: Sumanth Menezes; Jun 06 2017 8:31 AM EST    Plan  Headache    · 1 - Marci Sol MD, [employer]  (Neurology) Co-Management  *  Status: Active  Requested for:  04VVV8779  Ordered; For: Headache;  Ordered By: Sumanth Menezes  Performed:   Due: 22NGW2523  () Care Summary provided  : Yes  Yeast infection    · Start: Fluconazole 150 MG Oral Tablet (Diflucan); TAKE 1 TABLET 1 TIME ONLY  Rx By: Sumanth Menezes; Dispense: 1 Days ; #:1 Tablet; Refill: 0;For: Yeast infection;   STEPHY = N; Verified Transmission to [id number] Last Updated By: [employer]; 6/6/2017 7:35:20 AM    Discussion/Summary  Discussion Summary:   Referral to neurology as sxs sound suspicious for migraine variant  Log symptoms  Start magensium and encouraged pt to try melatonin at hs  Increase water and limit activities as pt schedule is overfull and she admits this is stressful now  Followup with gyn to discuss options for hot flashes although pt feels if can rest better at night it would help as well 1    Medication SE Review and Pt Understands Tx: Possible side effects of new medications were reviewed with the patient/guardian today  The treatment plan was reviewed with the patient/guardian  The patient/guardian understands and agrees with the treatment plan   Self Referrals:   Self Referrals: No       1 Amended By: Sumanth Menezes; Jun 06 2017 8:33 AM EST    Chief Complaint  Chief Complaint Free Text Note Form: Pt presents for hosp f/up exam  Pt states she is feeling better since her stay  Told to start magnesium for HA symptoms  Appetite is normal and she is drinking water daily  Refills done as requested  History of Present Illness  TCM Communication [facility]: The patient is being contacted for follow-up after hospitalization  Hospital  records were reviewed1  1    She was hospitalized [facility]  The date of admission: June 2, 2017, date of discharge: [place] 3, 2017  Diagnosis: ataxia ?migraine1   She was discharged to home  Medications reviewed and updated today  She scheduled a follow up appointment  Follow-up appointments with other specialists: neurology  Communication performed and completed by   HPI: Pt developed acute ataxic gait and headache  Went to [facility] er and transferred to [facility] for neuro workup  Sxs have resolved  She is leaving for vacation tomorrow  She is postmenopausal and thinks that is contributing  Had nausea with sxs over the weekend  Gait is normal and no pain today  She did not start magnesium  She does not sleep well and thinks that also is a factor  Stress at home/work  No headache today and she did not setup neuro followup yet  1        1 Amended By: Dom Avila; Jun 06 2017 8:29 AM EST    Review of Systems  Complete-Female:   Constitutional:1  feeling tired1   Eyes:1  No complaints of eye pain, no red eyes, no eyesight problems, no discharge, no dry eyes, no itching of eyes1   ENT:1  no complaints of earache, no loss of hearing, no nose bleeds, no nasal discharge, no sore throat, no hoarseness1   Cardiovascular:1  No complaints of slow heart rate, no fast heart rate, no chest pain, no palpitations, no leg claudication, no lower extremity edema1   Respiratory:1  No complaints of shortness of breath, no wheezing, no cough, no SOB on exertion, no orthopnea, no PND1   Gastrointestinal:1  No complaints of abdominal pain, no constipation, no nausea or vomiting, no diarrhea, no bloody stools1   Genitourinary:1  No complaints of dysuria, no incontinence, no pelvic pain, no dysmenorrhea, no vaginal discharge or bleeding1   Musculoskeletal:1  as noted in [address]   Integumentary:1  No complaints of skin rash or lesions, no itching, no skin wounds, no breast pain or lump1   Neurological:1  as noted in [address]      Psychiatric:1  sleep disturbances1   Endocrine:1  No complaints of proptosis, no hot flashes, no muscle weakness, no deepening of the voice, no feelings of weakness1   Hematologic/Lymphatic:1  No complaints of swollen glands, no swollen glands in the neck, does not bleed easily, does not bruise easily1         1 Amended By: Jeana Nieto; Jun 06 2017 8:30 AM EST    Active Problems   1  Abdominal pain (789 00) (R10 9)  2  Abnormal uterine bleeding (AUB) (626 9) (N93 9)  3  Advance directive discussed with patient (V65 49) (Z71 89)  4  Atopic dermatitis (691 8) (L20 9)  5  Closed fracture of symphysis pubis, right, initial encounter (808 2) (S32 501A)  6  Encounter for routine gynecological examination (V72 31) (Z01 419)  7  Encounter for screening mammogram for breast cancer (V76 12) (Z12 31)  8  Exposure to hepatitis A (V01 79) (Z20 5)  9  Female pelvic pain (625 9) (R10 2)  10  Flu (487 1) (J11 1)  11  Flu-like symptoms (780 99) (R68 89)  12  Gastroenteritis (558 9) (K52 9)  13  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)  14  Greater trochanteric bursitis of left hip (726 5) (M70 62)  15  Hamstring tendonitis of left thigh (727 09) (M76 892)  16  Heart burn (787 1) (R12)  17  Hyperlipidemia (272 4) (E78 5)  18  Iliotibial band syndrome of left side (728 89) (M76 32)  19  Left hip pain (719 45) (M25 552)  20  Left lumbar radiculopathy (724 4) (M54 16)  21  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)  22  Lumbar strain (847 2) (S39 012A)  23  Menorrhagia (626 2) (N92 0)  24  Piriformis syndrome (355 0) (G57 00)  25  Piriformis syndrome, left (355 0) (G57 02)  26  Poor flexibility of tendon (727 81) (M67 80)  27  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)  28  Right upper quadrant abdominal pain of unknown etiology (789 01) (R10 11)  29  Screening for HPV (human papillomavirus) (V73 81) (Z11 51)  30  Thyroid nodule (241 0) (E04 1)  31  Vitamin D deficiency (268 9) (E55 9)    Past Medical History    1   History of Acute frontal sinusitis, recurrence not specified (461 1) (J01 10)  2  History of Acute sinusitis (461 9) (J01 90)  3  History of Blood in urine (599 70) (R31 9)  4  History of Cough (786 2) (R05)  5  History of Encounter for routine pelvic examination (V72 31) (Z01 419)  6  History of Encounter for screening mammogram for malignant neoplasm of breast   (V76 12) (Z12 31)  7  History of Encounter for screening mammogram for malignant neoplasm of breast   (V76 12) (Z12 31)  8  History Of 1  Previous Pregnancies (V61 5)  9  History of uterine leiomyoma (V13 29) (Z86 018)  10  History of Irritable bowel syndrome (564 1) (K58 9)  11  History of Mild cervical dysplasia (622 11) (N87 0)  12  History of Sore throat (462) (J02 9)  13  History of Vaginal Itching (625 8)    History of Yeast infection (112 9) (B37 9)          Surgical History   1  History of Cholecystectomy  2  History of Colonoscopy (Fiberoptic) Screening  3  History of Colposcopy  4  History of Diagnostic Esophagogastroduodenoscopy  5  History of Laparoscopy (Diagnostic)  6  History of Tubal Ligation  Surgical History Reviewed: The surgical history was reviewed and updated today  Family History  Mother   1  Family history of Hyperlipidemia  Paternal Grandmother   2  Family history of Breast Cancer (V16 3)  Family History   3  Family history of Cardiovascular Disorder (V17 49)  4  Denied: Family history of Colon Cancer  5  Family history of Diabetes Mellitus (V18 0)  6  Denied: Family history of Osteoporosis  7  Denied: Family history of Ovarian Cancer  8  Family history of Thyroid Disorder (V18 19)  9  Denied: Family history of Uterine Cancer  Family History Reviewed: The family history was reviewed and updated today         Social History    · Always uses sunscreen   · Caffeine use (V49 89) (F15 90)   · Dental care, regularly   · Lives independently with spouse   · Never A Smoker   · No alcohol use   · No drug use   · Uses Safety Equipment - Seatbelts  Social History Reviewed: The social history was reviewed and updated today  The social history was reviewed and is unchanged  Current Meds  1  Dexilant 60 MG Oral Capsule Delayed Release; TAKE 1 CAPSULE DAILY EVERY   MORNING BEFORE BREAKFAST; Therapy: 45RBV9641 to Recorded  2  Reglan TABS Recorded  3  TraMADol HCl - 50 MG Oral Tablet; TAKE 1 TABLET EVERY 12 HOURS AS NEEDED; Therapy: 32KBS6711 to (Evaluate:08Jan2017); Last Rx:75Nqo2920 Ordered  4  Vitamin D (Ergocalciferol) 49252 UNIT Oral Capsule; take 1 capsule weekly; Therapy: 87NUM9156 to (Evaluate:18May2018)  Requested for: 13EXK6452; Last   Rx:53Hkz6546 Ordered  Medication List Reviewed: The medication list was reviewed and updated today  Allergies   1  Bactrim TABS    Vitals  Signs   Recorded: 89XAG3044 86:01JA    Systolic: 972 1    Diastolic: 76 1   Recorded: 06Jun2017 07:30AM   Temperature: 97 2 F, Tympanic  Heart Rate: 97  Height: 5 ft 6 in  Weight: 160 lb 4 oz  BMI Calculated: 25 87  BSA Calculated: 1 82  O2 Saturation: 100     1 Amended By: Amanda Guerra; Jun 06 2017 8:30 AM EST    Physical Exam    Constitutional1    General appearance: No acute distress, well appearing and well nourished  1  No distress1   Eyes1    Conjunctiva and lids: No swelling, erythema or discharge  1    Pupils and irises: Equal, round and reactive to light  1    Ears, Nose, Mouth, and Throat1    External inspection of ears and nose: Normal 1    Otoscopic examination: Tympanic membranes translucent with normal light reflex  Canals patent without erythema  1    Nasal mucosa, septum, and turbinates: Normal without edema or erythema  1    Oropharynx: Normal with no erythema, edema, exudate or lesions  1    Pulmonary1    Respiratory effort: No increased work of breathing or signs of respiratory distress  1    Auscultation of lungs: Clear to auscultation  1    Cardiovascular1    Palpation of heart: Normal PMI, no thrills  1    Auscultation of heart: Normal rate and rhythm, normal S1 and S2, without murmurs  1    Examination of extremities for edema and/or varicosities: Normal 1    Carotid pulses: Normal 1    Abdomen1    Abdomen: Non-tender, no masses  1    Liver and spleen: No hepatomegaly or splenomegaly  1    Lymphatic1    Palpation of lymph nodes in neck: No lymphadenopathy  1    Musculoskeletal1    Gait and station: Normal 1    Digits and nails: Normal without clubbing or cyanosis  1    Inspection/palpation of joints, bones, and muscles: Normal 1    Skin1    Skin and subcutaneous tissue: Normal without rashes or lesions  1    Neurologic1    Cranial nerves: Cranial nerves 2-12 intact  1    Reflexes: 2+ and symmetric  1    Sensation: No sensory loss  1    Psychiatric1    Orientation to person, place, and time: Normal 1    Mood and affect: Normal 1          1 Amended By: John Francisco; Jun 06 2017 8:31 AM EST    Future Appointments    Date/Time Provider Specialty Site   06/16/2017 01:15 PM Dinh Lane DO Pain Management [facility]     Signatures   Electronically signed by : Shreyas Singh, ; Jun 5 2017 11:30AM EST                       (Co-author)    Electronically signed by : Juan Ramon Sotelo DO; Jun 6 2017  7:55AM EST                       (Author)    Electronically signed by : Juan Ramon Sotelo DO; Jun 6 2017  8:33AM EST                       (Author)

## 2018-01-24 NOTE — PROGRESS NOTES
Assessment   1  Encounter for preventive health examination (V70 0) (Z00 00)  2  Piriformis syndrome (355 0) (G57 00)    Plan  Atopic dermatitis    · Start: Nystatin 220650 UNIT/GM External Cream; apply BID1   Health Maintenance    · *VB-Depression Screening; Status:Complete;1    Done: 48PMK8636 03:39PM  Piriformis syndrome    · 1 - Flako GILLIS, Jeovanny Thomson  (Sports Medicine) Physician Referral  Consult  Status: Hold  For - Scheduling  Requested for: 09WBV4610  () Care Summary provided  : Yes     1 Amended By: Chepe Vu; Nov 08 2016 4:01 PM EST    Discussion/Summary  health maintenance visit1  healthy adult female1  Currently, she  eats an adequate diet1  and  has an adequate exercise regimen1  1   cervical cancer screening is current1  Breast cancer screening:  mammogram is current1  1   Colorectal cancer screening:  colorectal cancer screening is not indicated1  1   Osteoporosis screening:  bone mineral density testing is not indicated1  1   The  immunizations are up to date1  1   Advice and education were given regarding  weight bearing exercise1  1   Patient discussion:  discussed with the patient1  1   Pt has gyn followup  She is currently asxs and unsure if surgery needed  Referred to Dr Federica Hussein for eval since she has plan to run marathon in [place]          Self Referrals: No       1 Amended By: Chepe Vu; Nov 08 2016 9:03 PM EST    Chief Complaint  Pt presents for Annual exam  Pt feels ok today  No refills needed at this time  Appetite is good  History of Present Illness  HM, Adult Female: The patient is being seen for a health maintenance evaluation  The last health maintenance visit was 1 year year(s) ago  General Health: The patient's health since the last visit is described as good  She has regular dental visits  She denies vision problems  She denies hearing loss  Immunizations status: up to date  Lifestyle:  She consumes a diverse and healthy diet   She has weight concerns  She exercises regularly  She does not use tobacco  She denies alcohol use  She denies drug use  Reproductive health:1  the patient is perimenopausal1    She reports abnormal menses1   She uses no contraception1   She is sexually [place]   Pt saw gyn ? zeny upcoming1   Screening: Cancer screening reviewed and current1   Metabolic screening reviewed and current1   Risk screening reviewed and current1   HPI: Pt doing ok  Exercise limited by ? hamstring pull  No relief w chiropractor  Pt saw gyn and zeny was discussed but has f/u this month  Pt not having pain or bleeding now1        1 Amended By: Chepe Vu; Nov 08 2016 9:00 PM EST    Review of Systems    Constitutional:1  No fever, no chills, feels well, no tiredness, no recent weight gain or weight loss1   Eyes:1  No complaints of eye pain, no red eyes, no eyesight problems, no discharge, no dry eyes, no itching of eyes1   ENT:1  no complaints of earache, no loss of hearing, no nose bleeds, no nasal discharge, no sore throat, no hoarseness1   Cardiovascular:1  No complaints of slow heart rate, no fast heart rate, no chest pain, no palpitations, no leg claudication, no lower extremity edema1   Respiratory:1  No complaints of shortness of breath, no wheezing, no cough, no SOB on exertion, no orthopnea, no PND1   Gastrointestinal:1  No complaints of abdominal pain, no constipation, no nausea or vomiting, no diarrhea, no bloody stools1 , no abdominal pain1  and no constipation1   Genitourinary:1  No complaints of dysuria, no incontinence, no pelvic pain, no dysmenorrhea, no vaginal discharge or bleeding1 , no pelvic pain1  and no incontinence1   Musculoskeletal:1  No complaints of arthralgias, no myalgias, no joint swelling or stiffness, no limb pain or swelling1   Integumentary:1  No complaints of skin rash or lesions, no itching, no skin wounds, no breast pain or lump1      Neurological:1  No complaints of headache, no confusion, no convulsions, no numbness, no dizziness or fainting, no tingling, no limb weakness, no difficulty walking1   Psychiatric:1  Not suicidal, no sleep disturbance, no anxiety or depression, no change in personality, no emotional problems1   Endocrine:1  No complaints of proptosis, no hot flashes, no muscle weakness, no deepening of the voice, no feelings of weakness1   Hematologic/Lymphatic:1  No complaints of swollen glands, no swollen glands in the neck, does not bleed easily, does not bruise easily1   Over the past 2 weeks, how often have you been bothered by the following problems? 1 ) Little interest or pleasure in doing things? 1  Not at all1     2 ) Feeling down, depressed or hopeless? 1  Not at all1     3 ) Trouble falling asleep or sleeping too much? 1  Not at all1     4 ) Feeling tired or having little energy? 1  Not at all1     5 ) Poor appetite or overeating? 1  Not at all1     6 ) Feeling bad about yourself, or that you are a failure, or have let yourself or your family down? 1  Not at all1     7 ) Trouble concentrating on things, such as reading a newspaper or watching television? 1  Not at all1     8 ) Moving or speaking so slowly that other people could have noticed, or the opposite, moving or speaking faster than usual?1  Not at all1   How difficult have these problems made it for you to do your work, take care of things at home, or get along with people? 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)  12  Thyroid nodule (241 0) (E04 1)  13   Vitamin D deficiency (268 9) (E55 9)    Past Medical History    · History of Acute frontal sinusitis, recurrence not specified (461 1) (J01 10)   · History of Acute sinusitis (461 9) (J01 90)   · History of Blood in urine (599 70) (R31 9)   · History of Cough (786 2) (R05)   · History of Encounter for routine gynecological examination (V72 31) (Z01 419)   · History of Encounter for routine pelvic examination (V72 31) (Z01 419)   · History of Encounter for screening mammogram for malignant neoplasm of breast  (V76 12) (Z12 31)   · History of Encounter for screening mammogram for malignant neoplasm of breast  (V76 12) (Z12 31)   · History Of 1  Previous Pregnancies (V61 5)   · History of uterine leiomyoma (V13 29) (Z86 018)   · History of Irritable bowel syndrome (564 1) (K58 9)   · History of Mild cervical dysplasia (622 11) (N87 0)   · History of Sinusitis (473 9) (J32 9)   · History of Sore throat (462) (J02 9)   · History of Vaginal Itching (625 8)   · History of Yeast infection (112 9) (B37 9)    Surgical History    · History of Cholecystectomy   · History of Colonoscopy (Fiberoptic) Screening   · History of Colposcopy   · History of Diagnostic Esophagogastroduodenoscopy   · History of Laparoscopy (Diagnostic)   · History of Tubal Ligation    Family History  Mother    · Family history of Hyperlipidemia  Paternal Grandmother    · Family history of Breast Cancer (V16 3)  Family History    · Family history of Cardiovascular Disorder (V17 49)   · Denied: Family history of Colon Cancer   · Family history of Diabetes Mellitus (V18 0)   · Denied: Family history of Osteoporosis   · Denied: Family history of Ovarian Cancer   · Family history of Thyroid Disorder (V18 19)   · Denied: Family history of Uterine Cancer    Social History    · Always uses sunscreen   · Caffeine use (V49 89) (F15 90)   · Dental care, regularly   · Lives independently with spouse   · Never A Smoker   · No alcohol use   · No drug use   · Uses Safety Equipment - Seatbelts    Current Meds  1  Dexilant 60 MG Oral Capsule Delayed Release; TAKE 1 CAPSULE DAILY EVERY   MORNING BEFORE BREAKFAST; Therapy: 17SAA9365 to Recorded  2  Fluticasone Propionate 50 MCG/ACT Nasal Suspension; USE 2 SPRAYS IN EACH   NOSTRIL ONCE DAILY; Therapy: 36VIV3426 to (Evaluate:10Jan2016); Last Rx:12Oct2015 Ordered  3  Norethindrone Acetate 5 MG Oral Tablet; take one tab 3 x a day until bleeding stops; Therapy: 45Ttv6807 to (Evaluate:15Vfl5028)  Requested for: 25Rgx8671; Last   Rx:92Pmk2544 Ordered  4  Reglan TABS Recorded  5  Vitamin D (Ergocalciferol) 00659 UNIT Oral Capsule; take 1 capsule weekly; Therapy: 29XHF6019 to (Evaluate:84Uwc0586)  Requested for: 68Mjq3767; Last   Rx:13Pwj9691 Ordered    Allergies   1  Bactrim TABS    Vitals   Recorded: 46REA7795 03:50PM Recorded: 21AYF8920 29:78GD   Systolic 364    Diastolic 76    Temperature  97 8 F   Height  5 ft 6 in   Weight  161 lb 8 0 oz   BMI Calculated  26 07   BSA Calculated  1 83     Physical Exam    Constitutional1    General appearance: No acute distress, well appearing and well nourished  1    Head and Face1    Head and face: Normal 1    Palpation of the face and sinuses: No sinus tenderness  1    Eyes1    Conjunctiva and lids: No swelling, erythema or discharge  1    Pupils and irises: Equal, round, reactive to light  1    Ophthalmoscopic examination: Normal fundi and optic discs  1    Ears, Nose, Mouth, and Throat1    External inspection of ears and nose: Normal 1    Otoscopic examination: Tympanic membranes translucent with normal light reflex  Canals patent without erythema  1    Hearing: Normal 1    Nasal mucosa, septum, and turbinates: Normal without edema or erythema  1    Lips, teeth, and gums: Normal, good dentition  1    Oropharynx: Normal with no erythema, edema, exudate or lesions  1    Neck1    Neck: Supple, symmetric, trachea midline, no masses  1    Thyroid: Normal, no thyromegaly  1    Pulmonary1    Respiratory effort: No increased work of breathing or signs of respiratory distress  1    Percussion of chest: Normal 1    Palpation of chest: Normal 1    Auscultation of lungs: Clear to auscultation  1    Cardiovascular1    Auscultation of heart: Normal rate and rhythm, normal S1 and S2, no murmurs  1    Carotid pulses: 2+ bilaterally  1    Abdominal aorta: Normal 1    Femoral pulses: 2+ bilaterally  1    Pedal pulses: 2+ bilaterally  1    Peripheral vascular exam: Normal 1    Examination of extremities for edema and/or varicosities: Normal 1    Abdomen1    Abdomen: Non-tender, no masses  1    Liver and spleen: No hepatomegaly or splenomegaly  1    Examination for hernias: No hernia appreciated  1    Lymphatic1    Palpation of lymph nodes in neck: No lymphadenopathy  1    Palpation of lymph nodes in axillae: No lymphadenopathy  1    Palpation of lymph nodes in groin: No lymphadenopathy  1    Palpation of lymph nodes in other areas: No lymphadenopathy  1    Musculoskeletal1    Gait and station: Normal 1    Digits and nails: Normal without clubbing or cyanosis  1    Joints, bones, and muscles: Normal 1    Range of motion: Normal 1    Stability: Normal 1    Muscle strength/tone: Normal 1    Skin1    Skin and subcutaneous tissue: Normal without rashes or lesions  1    Palpation of skin and subcutaneous tissue: Normal turgor  1    Neurologic1    Cranial nerves: Cranial nerves II-XII intact  1    Cortical function: Normal mental status  1    Reflexes: 2+ and symmetric  1    Sensation: No sensory loss  1    Coordination: Normal finger to nose and heel to shin  1    Psychiatric1    Judgment and insight: Normal 1    Orientation to person, place, and time: Normal 1    Recent and remote memory: Intact  1    Mood and affect: Normal 1        1 Amended By: Nichole Edwards;  Nov 08 2016 9:01 PM EST    Results/Data  PHQ-2 Adult Depression Screening 50QDY7683 03:40PM User, Ahs     Test Name Result Flag Reference   PHQ-2 Adult Depression Score 0     Over the last two weeks, how often have you been bothered by any of the following problems? Little interest or pleasure in doing things: Not at all - 0  Feeling down, depressed, or hopeless: Not at all - 0   PHQ-2 Adult Depression Screening Negative       *VB-Depression Screening 42WIB2716 03:39PM Patsie Mealing     Test Name Result Flag Reference   Depression Scale Result      Depression Screen - Negative For Symptoms       Future Appointments    Date/Time Provider Specialty Site   11/21/2016 04:15 PM PITA Foster   [facility]stetrics/Gynecology [facility]     Signatures   Electronically signed by : Kyler Johnson DO; Nov 8 2016  9:03PM EST                       (Author)

## 2018-01-24 NOTE — PROGRESS NOTES
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)   2  Greater trochanteric bursitis of left hip (726 5) (M70 62)   3  Iliotibial band syndrome of left side (728 89) (M76 32)   4  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)   5  Piriformis syndrome, left (355 0) (G57 02)    Plan  Gluteus medius or minimus syndrome, Lumbar disc herniation with radiculopathy,  Piriformis syndrome, left    · 1 - Angelo Almanza DO [facility]  *Please evaluate and treat for  persistent left-sided buttock and hip pain with tenderness mostly over the gluteus  medius/superior gluteal nerve and piriformis  Patient also has lumbar disc herniation  with radicular impingement and has had improvement with lumbar traction, graston and  stretching exercises  Status: Active  Requested for: 21Apr2017  Care Summary provided  : Yes  Lumbar disc herniation with radiculopathy    · Traction Equipment; Status:Complete;   Done: 21Apr2017    Discussion/Summary    Persistent although somewhat improved left-sided posterolateral hip and buttock pain with radiation down the left thigh  Patient's area of tenderness and pain is mostly localized over the gluteus medius/minimus/piriformis area  This is complicated by likely lumbar radiculitis  I will refer the patient to spine and pain specialist-Dr Tc Lorenzo for evaluation  Patient will continue home exercise program   She will try to obtain a home lumbar traction unit from a medical supply store as it has proven very helpful during formal physical therapy  Chief Complaint    1  Hip Pain    History of Present Illness  HPI: Patient is a very pleasant 31-year-old female here for followup of her slowly improving left hip and buttock pain  She has been attending physical therapy which she found very helpful  She has had treatments with lumbar traction which significantly helped her pain   She unfortunately did not receive any focal or communication from medical supply company regarding a home lumbar traction unit  She has been doing light aerobic activity but no running as of yet  Pain is still localized over the left posterolateral hip and buttock and radiates to the lateral thigh  Review of Systems    Constitutional: no fever and no chills  Musculoskeletal: as noted in HPI  Neurological: no numbness and no tingling  ROS reviewed  Active Problems    1  Abdominal pain (789 00) (R10 9)   2  Abnormal uterine bleeding (AUB) (626 9) (N93 9)   3  Advance directive discussed with patient (V65 49) (Z71 89)   4  Atopic dermatitis (691 8) (L20 9)   5  Closed fracture of symphysis pubis, right, initial encounter (808 2) (S32 501A)   6  Encounter for routine gynecological examination (V72 31) (Z01 419)   7  Encounter for screening mammogram for breast cancer (V76 12) (Z12 31)   8  Female pelvic pain (625 9) (R10 2)   9  Flu (487 1) (J11 1)   10  Flu-like symptoms (780 99) (R68 89)   11  Gastroenteritis (558 9) (K52 9)   12  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)   13  Greater trochanteric bursitis of left hip (726 5) (M70 62)   14  Hamstring tendonitis of left thigh (727 09) (M76 892)   15  Heart burn (787 1) (R12)   16  Hyperlipidemia (272 4) (E78 5)   17  Iliotibial band syndrome of left side (728 89) (M76 32)   18  Left hip pain (719 45) (M25 552)   19  Left lumbar radiculopathy (724 4) (M54 16)   20  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)   21  Lumbar strain (847 2) (S39 012A)   22  Menorrhagia (626 2) (N92 0)   23  Piriformis syndrome (355 0) (G57 00)   24  Piriformis syndrome, left (355 0) (G57 02)   25  Poor flexibility of tendon (727 81) (M67 80)   26  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)   27  Screening for HPV (human papillomavirus) (V73 81) (Z11 51)   28  Sinusitis (473 9) (J32 9)   29  Thyroid nodule (241 0) (E04 1)   30   Vitamin D deficiency (268 9) (E55 9)    Past Medical History    · History of Acute frontal sinusitis, recurrence not specified (461 1) (J01 10)   · History of Acute sinusitis (461 9) (J01 90)   · History of Blood in urine (599 70) (R31 9)   · History of Cough (786 2) (R05)   · History of Encounter for routine pelvic examination (V72 31) (Z01 419)   · History of Encounter for screening mammogram for malignant neoplasm of breast  (V76 12) (Z12 31)   · History of Encounter for screening mammogram for malignant neoplasm of breast  (V76 12) (Z12 31)   · History Of 1  Previous Pregnancies (V61 5)   · History of uterine leiomyoma (V13 29) (Z86 018)   · History of Irritable bowel syndrome (564 1) (K58 9)   · History of Mild cervical dysplasia (622 11) (N87 0)   · History of Sore throat (462) (J02 9)   · History of Vaginal Itching (625 8)   · History of Yeast infection (112 9) (B37 9)    The active problems and past medical history were reviewed and updated today  Surgical History    · History of Cholecystectomy   · History of Colonoscopy (Fiberoptic) Screening   · History of Colposcopy   · History of Diagnostic Esophagogastroduodenoscopy   · History of Laparoscopy (Diagnostic)   · History of Tubal Ligation    Family History  Mother    · Family history of Hyperlipidemia  Paternal Grandmother    · Family history of Breast Cancer (V16 3)  Family History    · Family history of Cardiovascular Disorder (V17 49)   · Denied: Family history of Colon Cancer   · Family history of Diabetes Mellitus (V18 0)   · Denied: Family history of Osteoporosis   · Denied: Family history of Ovarian Cancer   · Family history of Thyroid Disorder (V18 19)   · Denied: Family history of Uterine Cancer    Social History    · Always uses sunscreen   · Caffeine use (V49 89) (F15 90)   · Dental care, regularly   · Lives independently with spouse   · Never A Smoker   · No alcohol use   · No drug use   · Uses Safety Equipment - Seatbelts    Current Meds   1  Amoxicillin 500 MG Oral Capsule; Take 1 TID;    Therapy: 49JAO1859 to (25 [id number])  Requested for: 29ZYG5803; Last   Rx:84Gud9323 Ordered   2  Dexilant 60 MG Oral Capsule Delayed Release; TAKE 1 CAPSULE DAILY EVERY   MORNING BEFORE BREAKFAST; Therapy: 82SQC1100 to Recorded   3  Fluticasone Propionate 50 MCG/ACT Nasal Suspension; USE 2 SPRAYS IN EACH   NOSTRIL ONCE DAILY; Therapy: 88NKM9115 to (Evaluate:10Jan2016); Last Rx:39Usc9670 Ordered   4  Nystatin 030715 UNIT/GM External Cream; apply BID; Therapy: 63XSP6387 to (Evaluate:07Jan2017)  Requested for: 82GKY7417; Last   Rx:08Nov2016 Ordered   5  Oseltamivir Phosphate 75 MG Oral Capsule; take 1 BID; Therapy: 64DWS4639 to (Evaluate:18Feb2017)  Requested for: 11CEV9522; Last   Rx:60Qqz2124 Ordered   6  Reglan TABS Recorded   7  TraMADol HCl - 50 MG Oral Tablet; TAKE 1 TABLET EVERY 12 HOURS AS NEEDED; Therapy: 31EMA8988 to (Evaluate:08Jan2017); Last Rx:31Zkq9566 Ordered   8  Vitamin D (Ergocalciferol) 26038 UNIT Oral Capsule; take 1 capsule weekly; Therapy: 22WLD9484 to (Evaluate:14Hnn2427)  Requested for: 21Jan2017; Last   Rx:21Jan2017 Ordered    The medication list was reviewed and updated today  Allergies    1  Bactrim TABS    Vitals   Recorded: 21Apr2017 08:19AM   Heart Rate 76   Systolic 079   Diastolic 77   Height 5 ft 6 in   Weight 163 lb 10 08 oz   BMI Calculated 26 41   BSA Calculated 1 84     Physical Exam    Left Hip: Appearance: Normal  Tenderness: gluteus medius and sacroiliac joint  Tenderness along gluteusmedius/minimus and over superior gluteal nerve  ROM: Full except as noted: Flexion: painful  Adduction: painful restricted AROM  Motor: Normal  Special Tests: NALDO test was equivocal and equivocal Straight Leg Raise  Constitutional - General appearance: Normal    Musculoskeletal - Lower extremity compartments: Normal    Cardiovascular - Examination of extremities for edema and/or varicosities: Normal       Lungs: Normal respiratory rate and rhythm, no wheezes, no cough, no dyspnea     Skin - Skin and subcutaneous tissue: Normal    Neurologic - Sensation: Normal    Psychiatric - Mood and affect: Normal       Signatures   Electronically signed by : Zaki Coffey MD; Apr 24 2017  9:34PM EST                       (Author)

## 2018-01-30 ENCOUNTER — TRANSCRIBE ORDERS (OUTPATIENT)
Dept: URGENT CARE | Facility: CLINIC | Age: 53
End: 2018-01-30

## 2018-02-15 ENCOUNTER — ANESTHESIA EVENT (OUTPATIENT)
Dept: PERIOP | Facility: HOSPITAL | Age: 53
End: 2018-02-15
Payer: COMMERCIAL

## 2018-02-15 NOTE — ANESTHESIA PREPROCEDURE EVALUATION
Review of Systems/Medical History  Patient summary reviewed  Chart reviewed      Cardiovascular  EKG reviewed, Exercise tolerance: good,  Hyperlipidemia,    Pulmonary       GI/Hepatic    GERD well controlled, Bowel prep            Endo/Other     GYN       Hematology   Musculoskeletal       Neurology   Psychology           Physical Exam    Airway    Mallampati score: I  TM Distance: >3 FB  Neck ROM: full     Dental   No notable dental hx     Cardiovascular  Cardiovascular exam normal    Pulmonary  Pulmonary exam normal     Other Findings        Anesthesia Plan  ASA Score- 2     Anesthesia Type- IV sedation with anesthesia with ASA Monitors  Additional Monitors:   Airway Plan:         Plan Factors-    Induction- intravenous  Postoperative Plan-     Informed Consent- Anesthetic plan and risks discussed with patient

## 2018-02-16 ENCOUNTER — HOSPITAL ENCOUNTER (OUTPATIENT)
Facility: HOSPITAL | Age: 53
Setting detail: OUTPATIENT SURGERY
Discharge: HOME | End: 2018-02-16
Attending: INTERNAL MEDICINE | Admitting: INTERNAL MEDICINE
Payer: COMMERCIAL

## 2018-02-16 ENCOUNTER — ANESTHESIA (OUTPATIENT)
Dept: PERIOP | Facility: HOSPITAL | Age: 53
End: 2018-02-16
Payer: COMMERCIAL

## 2018-02-16 VITALS
RESPIRATION RATE: 20 BRPM | HEART RATE: 70 BPM | DIASTOLIC BLOOD PRESSURE: 70 MMHG | SYSTOLIC BLOOD PRESSURE: 116 MMHG | TEMPERATURE: 97.7 F | OXYGEN SATURATION: 100 %

## 2018-02-16 PROCEDURE — G0121 COLON CA SCRN NOT HI RSK IND: HCPCS | Performed by: INTERNAL MEDICINE

## 2018-02-16 RX ORDER — SODIUM CHLORIDE, SODIUM LACTATE, POTASSIUM CHLORIDE, CALCIUM CHLORIDE 600; 310; 30; 20 MG/100ML; MG/100ML; MG/100ML; MG/100ML
125 INJECTION, SOLUTION INTRAVENOUS CONTINUOUS
Status: DISCONTINUED | OUTPATIENT
Start: 2018-02-16 | End: 2018-02-16 | Stop reason: HOSPADM

## 2018-02-16 RX ORDER — PROPOFOL 10 MG/ML
INJECTION, EMULSION INTRAVENOUS CONTINUOUS PRN
Status: DISCONTINUED | OUTPATIENT
Start: 2018-02-16 | End: 2018-02-16 | Stop reason: SURG

## 2018-02-16 RX ORDER — ONDANSETRON 2 MG/ML
INJECTION INTRAMUSCULAR; INTRAVENOUS AS NEEDED
Status: DISCONTINUED | OUTPATIENT
Start: 2018-02-16 | End: 2018-02-16 | Stop reason: SURG

## 2018-02-16 RX ORDER — PROPOFOL 10 MG/ML
INJECTION, EMULSION INTRAVENOUS AS NEEDED
Status: DISCONTINUED | OUTPATIENT
Start: 2018-02-16 | End: 2018-02-16 | Stop reason: SURG

## 2018-02-16 RX ADMIN — PROPOFOL 50 MG: 10 INJECTION, EMULSION INTRAVENOUS at 10:22

## 2018-02-16 RX ADMIN — SODIUM CHLORIDE, SODIUM LACTATE, POTASSIUM CHLORIDE, AND CALCIUM CHLORIDE 125 ML/HR: .6; .31; .03; .02 INJECTION, SOLUTION INTRAVENOUS at 09:11

## 2018-02-16 RX ADMIN — PROPOFOL 50 MG: 10 INJECTION, EMULSION INTRAVENOUS at 10:25

## 2018-02-16 RX ADMIN — PROPOFOL 50 MG: 10 INJECTION, EMULSION INTRAVENOUS at 10:17

## 2018-02-16 RX ADMIN — PROPOFOL 130 MCG/KG/MIN: 10 INJECTION, EMULSION INTRAVENOUS at 10:17

## 2018-02-16 RX ADMIN — ONDANSETRON HYDROCHLORIDE 4 MG: 2 INJECTION, SOLUTION INTRAVENOUS at 10:15

## 2018-02-16 NOTE — H&P
History and Physical - [facility]  Lanre DON [place] 50 y o  female MRN: [id number]                  HPI: Matilda Travis is a 50y o  year old female who presents for colon cancer screening  This is her first colonoscopy  REVIEW OF SYSTEMS: Per the HPI, and otherwise unremarkable  Historical Information   Past Medical History:   Diagnosis Date    Ataxia     GERD (gastroesophageal reflux disease)     Hyperlipidemia      Past Surgical History:   Procedure Laterality Date    ABDOMINAL SURGERY      CHOLECYSTECTOMY      COLONOSCOPY      ESOPHAGOGASTRODUODENOSCOPY      LAPAROSCOPY      TUBAL LIGATION       Social History   History   Alcohol Use No     History   Drug Use No     History   Smoking Status    Never Smoker   Smokeless Tobacco    Not on file     No family history on file  Meds/Allergies     Prescriptions Prior to Admission   Medication    Ascorbic Acid (VITAMIN C) 1000 MG tablet    cholecalciferol (VITAMIN D3) 1,000 units tablet    dexlansoprazole (DEXILANT) 60 MG capsule    Magnesium 400 MG CAPS    metoclopramide (REGLAN) 5 mg tablet    Omega-3 Fatty Acids (FISH OIL) 1,000 mg       Allergies   Allergen Reactions    Bactrim [Sulfamethoxazole-Trimethoprim] Other (See Comments)     lightheaded       Objective     Blood pressure 134/67, pulse 84, temperature 98 °F (36 7 °C), temperature source Tympanic, resp  rate 18, SpO2 100 %  PHYSICAL EXAM    Gen: NAD  CV: RRR  CHEST: Clear  ABD: soft, NT/ND  EXT: no edema      ASSESSMENT/PLAN:  This is a 50y o  year old female here for colon cancer screening      PLAN: colonoscopy

## 2018-02-16 NOTE — OP NOTE
**** GI/ENDOSCOPY REPORT ****     PATIENT NAME: JEISON TRENT ------ VISIT ID:  Patient ID:   [id number] YOB: 1965     INTRODUCTION: Colonoscopy - A 50 female patient presents for an outpatient   Colonoscopy at [facility]  PREVIOUS COLONOSCOPY: No prior colonoscopy  INDICATIONS: Screening-ADR  CONSENT:  The benefits, risks, and alternatives to the procedure were   discussed and informed consent was obtained from the patient  PREPARATION: EKG, pulse, pulse oximetry and blood pressure were monitored   throughout the procedure  The patient was identified by myself both   verbally and by visual inspection of ID band  ASA Classification: Class 2   - Patient has mild to moderate systemic disturbance that may or may not be   related to the disorder requiring surgery  Airway Assessment   Classification: Airway class 2 - Visualization of the soft palate, fauces   and uvula  MEDICATIONS: Anesthesia-check records     PROCEDURE:  The endoscope was passed without difficulty through the anus   under direct visualization and advanced to the cecum, confirmed by   ileocecal valve and appendiceal orifice  The scope was withdrawn and the   mucosa was carefully examined  The quality of the preparation was good  Cecal Intubation Time: 4 minutes(s) Scope Withdrawal Time: 8 minutes(s)     RECTAL EXAM: Normal rectal exam      FINDINGS:  The entire colon appeared to be normal      COMPLICATIONS: There were no complications  IMPRESSIONS: Normal colon  RECOMMENDATIONS: Colonoscopy recommended in 10 years  PATHOLOGY SPECIMENS:     ESTIMATED BLOOD LOSS:     PROCEDURE CODES:     ICD-9 Codes:     ICD-10 Codes:     PERFORMED BY: SIVA Singh  on 02/16/2018  Version 1, electronically signed by SIVA Wang  on   02/16/2018 at 10:44

## 2018-02-16 NOTE — ANESTHESIA POSTPROCEDURE EVALUATION
Post-Op Assessment Note      CV Status:  Stable    Mental Status:  Alert and awake    Hydration Status:  Euvolemic    PONV Controlled:  Controlled    Airway Patency:  Patent    Post Op Vitals Reviewed: Yes          Staff: CRNA           /58 (02/16/18 1036)    Temp 99 °F (37 2 °C) (02/16/18 1036)    Pulse 68 (02/16/18 1036)   Resp 20 (02/16/18 1036)    SpO2 100 % (02/16/18 1036)

## 2018-03-01 RX ORDER — ONDANSETRON 8 MG/1
8 TABLET, ORALLY DISINTEGRATING ORAL EVERY 8 HOURS PRN
COMMUNITY
Start: 2017-12-11 | End: 2019-11-08 | Stop reason: SDUPTHER

## 2018-03-02 ENCOUNTER — OFFICE VISIT (OUTPATIENT)
Dept: NEUROLOGY | Facility: CLINIC | Age: 53
End: 2018-03-02
Payer: COMMERCIAL

## 2018-03-02 VITALS
HEART RATE: 92 BPM | DIASTOLIC BLOOD PRESSURE: 80 MMHG | SYSTOLIC BLOOD PRESSURE: 121 MMHG | WEIGHT: 166 LBS | BODY MASS INDEX: 26.79 KG/M2

## 2018-03-02 DIAGNOSIS — G43.009 MIGRAINE WITHOUT AURA AND WITHOUT STATUS MIGRAINOSUS, NOT INTRACTABLE: Primary | ICD-10-CM

## 2018-03-02 DIAGNOSIS — G47.09 OTHER INSOMNIA: ICD-10-CM

## 2018-03-02 PROCEDURE — 99244 OFF/OP CNSLTJ NEW/EST MOD 40: CPT | Performed by: PSYCHIATRY & NEUROLOGY

## 2018-03-02 RX ORDER — ERGOCALCIFEROL 1.25 MG/1
CAPSULE ORAL
COMMUNITY
Start: 2018-02-05 | End: 2018-10-31 | Stop reason: SDUPTHER

## 2018-03-02 RX ORDER — KETOROLAC TROMETHAMINE 30 MG/ML
30 INJECTION, SOLUTION INTRAMUSCULAR; INTRAVENOUS ONCE
Status: COMPLETED | OUTPATIENT
Start: 2018-03-02 | End: 2018-03-02

## 2018-03-02 RX ADMIN — KETOROLAC TROMETHAMINE 30 MG: 30 INJECTION, SOLUTION INTRAMUSCULAR; INTRAVENOUS at 13:08
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
## 2018-03-02 NOTE — PATIENT INSTRUCTIONS
Preventive:   - continue doing magnessium oxide 400mg in am daily  Abortive:   - aleve as needed but less then 3 times a week  Vitamin D: her last vitamin D was 61  She is to stop taking vitamin D 50,000 iu and can start taking vitamin D 800 mg and Ca+ 200 mg combination  Which is over the counter

## 2018-03-06 ENCOUNTER — TELEPHONE (OUTPATIENT)
Dept: GASTROENTEROLOGY | Facility: CLINIC | Age: 53
End: 2018-03-06

## 2018-03-06 DIAGNOSIS — K21.9 GASTROESOPHAGEAL REFLUX DISEASE, ESOPHAGITIS PRESENCE NOT SPECIFIED: Primary | ICD-10-CM

## 2018-03-06 RX ORDER — DEXLANSOPRAZOLE 60 MG/1
60 CAPSULE, DELAYED RELEASE ORAL DAILY
Qty: 90 CAPSULE | Refills: 2 | Status: SHIPPED | OUTPATIENT
Start: 2018-03-06 | End: 2018-12-31 | Stop reason: SDUPTHER

## 2018-03-06 NOTE — TELEPHONE ENCOUNTER
Dr Marium Adrian patient requesting 90 day supply of dexilant called into [facility]  She is also asking if we have coupons in the office

## 2018-03-08 NOTE — TELEPHONE ENCOUNTER
Called pt, pt is aware of medication at pharmacy   I also called the pharmacy and they applied the coupon to her medication

## 2018-04-19 ENCOUNTER — ANNUAL EXAM (OUTPATIENT)
Dept: OBGYN CLINIC | Facility: MEDICAL CENTER | Age: 53
End: 2018-04-19
Payer: COMMERCIAL

## 2018-04-19 VITALS — BODY MASS INDEX: 27.12 KG/M2 | SYSTOLIC BLOOD PRESSURE: 110 MMHG | WEIGHT: 168 LBS | DIASTOLIC BLOOD PRESSURE: 82 MMHG

## 2018-04-19 DIAGNOSIS — Z01.419 WELL FEMALE EXAM WITH ROUTINE GYNECOLOGICAL EXAM: ICD-10-CM

## 2018-04-19 DIAGNOSIS — Z12.39 SCREENING FOR MALIGNANT NEOPLASM OF BREAST: ICD-10-CM

## 2018-04-19 DIAGNOSIS — N89.8 VAGINAL ODOR: Primary | ICD-10-CM

## 2018-04-19 DIAGNOSIS — N93.9 ABNORMAL UTERINE BLEEDING (AUB): ICD-10-CM

## 2018-04-19 PROCEDURE — 87510 GARDNER VAG DNA DIR PROBE: CPT | Performed by: OBSTETRICS & GYNECOLOGY

## 2018-04-19 PROCEDURE — 87660 TRICHOMONAS VAGIN DIR PROBE: CPT | Performed by: OBSTETRICS & GYNECOLOGY

## 2018-04-19 PROCEDURE — 87480 CANDIDA DNA DIR PROBE: CPT | Performed by: OBSTETRICS & GYNECOLOGY

## 2018-04-19 PROCEDURE — 99396 PREV VISIT EST AGE 40-64: CPT | Performed by: OBSTETRICS & GYNECOLOGY

## 2018-04-19 NOTE — PROGRESS NOTES
ASSESSMENT & PLAN: Allison Obregon is a 50 y o   with normal gynecologic exam     1   Routine well woman exam done today  2  Pap and HPV:  The patient's Pap and cotesting was not done today  Current ASCCP Guidelines reviewed  Last pap   3  Mammogram ordered  4  Colonoscopy   5  The following were reviewed in today's visit: breast self exam and mammography screening ordered  6  AUB - reviewed options, might be interested in ablation  Will think about it  Script given for TVUS  7  Vaginal odor and discharge - affirm sent     CC:  Annual Gynecologic Examination    HPI: Allison Obregon is a 50 y o   who presents for annual gynecologic examination  She has the following concerns:  States went 11 months without bleeding, followed by monthly cycles with increased cramping,   discharge and odor  States the discharge and odor has been present for some time, not worsening  Health Maintenance:    She wears her seatbelt routinely  She does perform regular monthly self breast exams  She feels safe at home  Pap: 2016  Last mammogram:   Last colonoscopy:     Past Medical History:   Diagnosis Date    Ataxia     Blood in urine     GERD (gastroesophageal reflux disease)     Hyperlipidemia     Irritable bowel syndrome     Mild cervical dysplasia     Pregnancy     1     Uterine leiomyoma        Past Surgical History:   Procedure Laterality Date    ABDOMINAL SURGERY      CHOLECYSTECTOMY      COLONOSCOPY      COLPOSCOPY      onset: 08    ESOPHAGOGASTRODUODENOSCOPY      LAPAROSCOPY      [place] COLONOSCOPY FLX DX W/COLLJ SPEC WHEN PFRMD N/A 2018    Procedure: COLONOSCOPY;  Surgeon: [employer], DO;  Location: [facility];  Service: Gastroenterology    TUBAL LIGATION         Past OB/Gyn History:  OB History      Para Term  AB Living    1 1            SAB TAB Ectopic Multiple Live Births                     Pt has menstrual issues   AUB, dysmenorrhea   History of sexually transmitted infection: No   History of abnormal pap smears: No      Patient is currently sexually active  heterosexual   The current method of family planning is none  Family History   Problem Relation Age of Onset    Hyperlipidemia Mother     Breast cancer Paternal Grandmother     Heart disease Family     Diabetes Family     Thyroid disease Family        Social History:  Social History     Social History    Marital status: /Civil Union     Spouse name: N/A    Number of children: N/A    Years of education: N/A     Occupational History    Not on file       Social History Main Topics    Smoking status: Never Smoker    Smokeless tobacco: Never Used    Alcohol use No    Drug use: No    Sexual activity: Yes     Partners: Male     Birth control/ protection: Female Sterilization     Other Topics Concern    Not on file     Social History Narrative    Always uses sunscreen    Caffeine use    Dental care, regularly    Lives independently with spouse    Uses seatbelts           Allergies   Allergen Reactions    Bactrim [Sulfamethoxazole-Trimethoprim] Other (See Comments)     lightheaded       Current Outpatient Prescriptions:     Ascorbic Acid (VITAMIN C) 1000 MG tablet, Take 1,000 mg by mouth daily, Disp: , Rfl:     cholecalciferol (VITAMIN D3) 1,000 units tablet, Take 50,000 Units by mouth once a week Takes weekly on weds , Disp: , Rfl:     dexlansoprazole (DEXILANT) 60 MG capsule, Take 1 capsule (60 mg total) by mouth daily for 90 days, Disp: 90 capsule, Rfl: 2    ergocalciferol (VITAMIN D2) 50,000 units, , Disp: , Rfl:     Magnesium 400 MG CAPS, Take 400 mg by mouth daily, Disp: , Rfl:     Omega-3 Fatty Acids (FISH OIL) 1,000 mg, Take 1,000 mg by mouth daily, Disp: , Rfl:     ondansetron (ZOFRAN-ODT) 8 mg disintegrating tablet, Take by mouth every 8 (eight) hours as needed, Disp: , Rfl:     metoclopramide (REGLAN) 5 mg tablet, Take by mouth as needed, Disp: , Rfl:       Review of Systems  Constitutional :no fever, feels well, no tiredness, no recent weight gain or loss  ENT: no ear ache, no loss of hearing, no nosebleeds or nasal discharge, no sore throat or hoarseness  Cardiovascular: no complaints of slow or fast heart beat, no chest pain, no palpitations, no leg claudication or lower extremity edema  Respiratory: no complaints of shortness of shortness of breath, no BOLDEN  Breasts:no complaints of breast pain, breast lump, or nipple discharge  Gastrointestinal: no complaints of abdominal pain, constipation, nausea, vomiting, or diarrhea or bloody stools  Genitourinary : no complaints of dysuria, incontinence, pelvic pain, no dysmenorrhea, vaginal discharge or abnormal vaginal bleeding and as noted in HPI  Musculoskeletal: no complaints of arthralgia, no myalgia, no joint swelling or stiffness, no limb pain or swelling  Integumentary: no complaints of skin rash or lesion, itching or dry skin  Neurological: no complaints of headache, no confusion, no numbness or tingling, no dizziness or fainting    Objective      /82   Wt 76 2 kg (168 lb)   LMP 04/05/2018   BMI 27 12 kg/m²     General:   appears stated age, cooperative, alert normal mood and affect   Neck: normal, supple,trachea midline, no masses   Heart: regular rate and rhythm, S1, S2 normal, no murmur, click, rub or gallop   Lungs: clear to auscultation bilaterally   Breasts: normal appearance, no masses or tenderness, Inspection negative, No nipple retraction or dimpling, No nipple discharge or bleeding, No axillary or supraclavicular adenopathy, Normal to palpation without dominant masses   Abdomen: soft, non-tender, without masses or organomegaly   Vulva: normal, normal female genitalia, Bartholin's, Urethra, Ringling normal, no lesions   Vagina: normal vagina, no discharge, exudate, lesion, or erythema   Urethra: normal   Cervix: Normal, no discharge   Affirm done   Uterus: normal size, contour, position, consistency, mobility, non-tender   Adnexa: normal adnexa   Lymphatic palpation of lymph nodes in neck, axilla, groin and/or other locations: no lymphadenopathy or masses noted   Skin normal skin turgor and no rashes     Psychiatric orientation to person, place, and time: normal  mood and affect: normal

## 2018-04-21 LAB
CANDIDA RRNA VAG QL PROBE: POSITIVE
G VAGINALIS RRNA GENITAL QL PROBE: NEGATIVE
T VAGINALIS RRNA GENITAL QL PROBE: NEGATIVE

## 2018-04-23 DIAGNOSIS — B37.3 CANDIDA VAGINITIS: Primary | ICD-10-CM

## 2018-04-23 RX ORDER — FLUCONAZOLE 150 MG/1
150 TABLET ORAL ONCE
Qty: 1 TABLET | Refills: 0 | Status: SHIPPED | OUTPATIENT
Start: 2018-04-23 | End: 2018-04-23

## 2018-04-23 NOTE — TELEPHONE ENCOUNTER
----- Message from Mabel Irizarry MD sent at 4/23/2018  1:57 PM EDT -----  Yeast infection on culture   Please treat with Fluconazole 150 mg po x 1

## 2018-05-08 ENCOUNTER — TELEPHONE (OUTPATIENT)
Dept: NEUROLOGY | Facility: CLINIC | Age: 53
End: 2018-05-08

## 2018-05-08 DIAGNOSIS — G43.709 CHRONIC MIGRAINE WITHOUT AURA WITHOUT STATUS MIGRAINOSUS, NOT INTRACTABLE: Primary | ICD-10-CM

## 2018-05-08 RX ORDER — DEXAMETHASONE 2 MG/1
2 TABLET ORAL
Qty: 5 TABLET | Refills: 0 | Status: SHIPPED | OUTPATIENT
Start: 2018-05-08 | End: 2018-09-10 | Stop reason: ALTCHOICE

## 2018-05-08 RX ORDER — OLANZAPINE 5 MG/1
5 TABLET ORAL
Qty: 5 TABLET | Refills: 0 | Status: SHIPPED | OUTPATIENT
Start: 2018-05-08 | End: 2018-09-10 | Stop reason: ALTCHOICE

## 2018-05-08 NOTE — TELEPHONE ENCOUNTER
Pt c/o HA every few weeks since she has seen Dr Huan Dean in march  Pt is waking up in middle of night w/HA  "Feels like of equilibrium is off & I just cant get a break from these HA's, they just wont ease off"  Pain today 6/10, this HA started @0230, described as constantly throbbed, in the front  c/o phono/photophobia, no nausea, not dizzy    No Hx of steroids, etc in the past    Current meds:  Mag 400 Qday  Fish oil  Vit D  Ca  Ibu    Please advise

## 2018-05-17 ENCOUNTER — OFFICE VISIT (OUTPATIENT)
Dept: SLEEP CENTER | Facility: CLINIC | Age: 53
End: 2018-05-17

## 2018-05-17 VITALS
RESPIRATION RATE: 18 BRPM | DIASTOLIC BLOOD PRESSURE: 78 MMHG | WEIGHT: 169 LBS | SYSTOLIC BLOOD PRESSURE: 122 MMHG | BODY MASS INDEX: 27.16 KG/M2 | HEIGHT: 66 IN | OXYGEN SATURATION: 98 % | HEART RATE: 63 BPM

## 2018-05-17 DIAGNOSIS — R23.2 HOT FLASHES: ICD-10-CM

## 2018-05-17 DIAGNOSIS — R29.818 SUSPECTED SLEEP APNEA: Primary | ICD-10-CM

## 2018-05-17 DIAGNOSIS — G47.09 OTHER INSOMNIA: ICD-10-CM

## 2018-05-17 DIAGNOSIS — N93.9 ABNORMAL UTERINE BLEEDING (AUB): ICD-10-CM

## 2018-05-17 DIAGNOSIS — G47.10 HYPERSOMNIA: ICD-10-CM

## 2018-05-17 DIAGNOSIS — F41.9 ANXIETY: ICD-10-CM

## 2018-05-17 DIAGNOSIS — G43.009 MIGRAINE WITHOUT AURA AND WITHOUT STATUS MIGRAINOSUS, NOT INTRACTABLE: ICD-10-CM

## 2018-05-17 NOTE — PROGRESS NOTES
Consultation - [id number] [facility]  50 y o  female  :1965  SII:[id number]    Physician Requesting Consult: Kortney Mario MD     Reason for Consult : [At your kind request] I saw this patient for initial sleep evaluation today  She is here for a complaint of disrupted sleep  PFSH, Problem List, Medications & Allergies were reviewed in EMR  She  has a past medical history of Ataxia; Blood in urine; GERD (gastroesophageal reflux disease); Hyperlipidemia; Irritable bowel syndrome; Mild cervical dysplasia; Pregnancy; and Uterine leiomyoma  She has a current medication list which includes the following prescription(s): vitamin c, cholecalciferol, dexamethasone, dexlansoprazole, ergocalciferol, magnesium, metoclopramide, olanzapine, fish oil, and ondansetron  HPI:  Sleep difficulties started around menopause and have been ongoing for several years  Her main difficulty is maintaining sleep  She reports snoring of several years duration that would disturb her bed partner  She now sleeps in a separate room  She is not aware of breathing difficulties during sleep or modifying factors  She has occasional low back pain with radicular symptoms that may disturb sleep  She reported:[No features of movement disorder]; [No features of parasomnia ]  Sleep Routine: Bedtime [is regular,]  typically between 9:00 p m  and 4:00 a m  She usually falls asleep in <  45 minutes she reads to assist falling asleep  She denied racing thoughts of clock watching  Sleep is interrupted 1-3  x/night x / night and she struggles to fall back asleep and may take up to an hour because of racing thoughts  She awakens spontaneously feeling unrefreshed  [She is spending approximately 7 hours in bed and estimates getting 6 hours sleep ]  She would awaken with headaches daily until around a week ago when she was started on  steroids and Zyprexa   She  has excessive daytime drowsiness and naps several times during the week when she gets home from work  She naps regularly on weekends for up to an hour  She self rated at Total score: 12 /24 on the [place] Sleepiness Scale  Habits:[ reports that she has never smoked  She has never used smokeless tobacco ], [ reports that she does not drink alcohol ], [ reports that she does not use drugs  ], Caffeine use is limited , Exercise regular around 6 times a week  Family History: [Negative for sleep disturbance ]   ROS: reviewed & as attached  [Significant for around 10 lb weight gain in the past 2 years  Acid reflux is controlled on current medication  She characterized herself as a Worrying time personality but denied depression  She rated herself at 2 on the PHQ-9 scale  There is report of abnormal uterine bleeding, no blood donations  She also has hot flashes that disturb sleep ]     EXAM: key  [x] system is Normal  [] see notes  VITALS  [] /78   Pulse 63   Resp 18   Ht 5' 6" (1 676 m)   Wt 76 7 kg (169 lb)   SpO2 98%   BMI 27 28 kg/m²     GENERAL  [x]  [Well] groomed female, well appearing, [at] her stated age, in [no apparent] distress  Speech is clear and coherent   PSYCH  [x]  Alert, orientated & cooperative  She is somewhat anxious  Mental state otherwise appears normal  Judgement & insight [are good]  EXTREM/  SKIN  [x]  [No] pedal edema  Skin is warm and dry  Color and hydration are good  HEAD  [x]  Craniofacial anatomy: [Normal]  EOMI  TMJ & Sinuses are normal    Neck  []  [Neck Circumference: 30 cm] [appears thick] [& there is extra fatty tissue], [no] abnormal masses [or] Lymhadenopathy  Thyroid is [normal]  Trachea is [central]  Nasal Airway  []  [Patent,] Septum is [central,] Mucous membranes appeared [normal]  Turbinates are [normal ] There is [no] rhinorrhea  Oral Airway  []  [Is crowded,] Modified Mallampati class II (hard and soft palate, upper portion of tonsils and uvula visible)  There is [no] tonsillar hypertrophy   Teeth Filemon Lye normal]  CVS  [x]  [Regular]Heart sounds  [No] murmur[s]  RESP  [x]   [Normal]effort  Air entry[good]  [bilaterally]  [No]wheezes  [No]rales  ABD  []   soft & non-tender  CNS  [x]   Grossly intact  Normal gait  Rombergs [Negative]  MSK  [x]   [Normal] muscle bulk, tone and power            IMPRESSION:   1  Suspected sleep apnea  Diagnostic Sleep Study   2  Other insomnia  Ambulatory referral to Sleep Medicine   3  Hot flashes     4  Anxiety     5  Hypersomnia  Diagnostic Sleep Study   6  Migraine without aura and without status migrainosus, not intractable     7  Abnormal uterine bleeding (AUB)      She may also have iron deficiency that together with radicular symptoms can cause periodic limb movements of sleep  PLAN:   1  With respect to above conditions, I counseled on pathophysiology, diagnosis, treatment options, risks and benefits; interrelationship and effects on symptoms and comorbidities; risks of no treatment; costs and insurance aspects  2  I advised avoiding sleeping supine, using alcohol or sedating medications close to bed time and on safe driving practices  3  Cognitive behavioral therapy was initiated with advise on Sleep Hygiene and behavioral techniques to manage Insomnia  Specifically limiting her time in bed to 6-1/2 hours, avoiding naps and on relaxation techniques  4  Nocturnal polysomnography is indicated and a diagnostic study will be scheduled  5    If this study shows periodic limb movements of sleep, she may also need a ferritin assay  5 Follow-up will be scheduled after the studies to review results, further details of treatment options and to initiate therapy  Thank you for allowing me to participate in the care of this patient  I will keep you apprised of developments       Sincerely,     Lenka Espinal MD  Board Certified Specialist   Authenticated electronically

## 2018-05-17 NOTE — PROGRESS NOTES
Review of Systems      Genitourinary hot flashes at night, post menopausal (no peroids) and sleep problems that vary with menstrual cycle    Cardiology none   Gastrointestinal frequent heartburn/acid reflux   Neurology frequent headaches and awaken with headache   Constitutional fatigue and weight change   Integumentary none   Psychiatry none   Musculoskeletal back pain   Pulmonary none   ENT throat clearing   Endocrine none   Hematological none

## 2018-05-18 ENCOUNTER — TELEPHONE (OUTPATIENT)
Dept: SLEEP CENTER | Facility: CLINIC | Age: 53
End: 2018-05-18

## 2018-05-18 DIAGNOSIS — B37.3 CANDIDIASIS OF VAGINA: Primary | ICD-10-CM

## 2018-05-18 RX ORDER — FLUCONAZOLE 150 MG/1
150 TABLET ORAL ONCE
Qty: 1 TABLET | Refills: 0 | Status: SHIPPED | OUTPATIENT
Start: 2018-05-18 | End: 2018-05-18

## 2018-05-18 NOTE — TELEPHONE ENCOUNTER
Patient called in stating that she is having itching and burning when she wipes  She said she has a history of yeast infections  I entered in a script for fluconazole if you would like to approve it

## 2018-05-20 ENCOUNTER — HOSPITAL ENCOUNTER (OUTPATIENT)
Dept: SLEEP CENTER | Facility: HOSPITAL | Age: 53
Discharge: HOME | End: 2018-05-20
Attending: INTERNAL MEDICINE
Payer: COMMERCIAL

## 2018-05-20 DIAGNOSIS — R29.818 SUSPECTED SLEEP APNEA: ICD-10-CM

## 2018-05-20 DIAGNOSIS — G47.10 HYPERSOMNIA: ICD-10-CM

## 2018-05-20 PROCEDURE — 95810 POLYSOM 6/> YRS 4/> PARAM: CPT

## 2018-05-21 NOTE — PROGRESS NOTES
Sleep Study Documentation    Pre-Sleep Study       Sleep testing procedure explained to patient:YES    Patient napped prior to study:NO    Caffeine:Dayshift worker after 12PM   Caffeine use:YES- chocolate  6 ounces    Alcohol:Dayshift workers after 5PM: Alcohol use:NO    Typical day for patient:YES       Study Documentation  Diagnostic   Snore:Mild  Supplemental O2: no    O2 flow rate (L/min) range n/a  O2 flow rate (L/min) final n/a  Minimum SaO2 90%  Baseline SaO2 95%      EKG abnormalities: no     EEG abnormalities: no    Study Terminated:no    Patient classification: employed       Post-Sleep Study    Medication used at bedtime or during sleep study:NO    Patient reports time it took to fall asleep:less than 20 minutes    Patient reports waking up during study:1 to 2 times  Patient reports returning to sleep without difficulty  Patient reports sleeping 6 to 8 hours without dreaming  Patient reports sleep during study:better than usual    Patient rated sleepiness: Not sleepy or tired    PAP treatment:no

## 2018-06-04 DIAGNOSIS — E55.9 VITAMIN D DEFICIENCY: Primary | ICD-10-CM

## 2018-06-04 RX ORDER — CHOLECALCIFEROL (VITAMIN D3) 1250 MCG
1 CAPSULE ORAL WEEKLY
Qty: 12 CAPSULE | Refills: 3 | Status: SHIPPED | OUTPATIENT
Start: 2018-06-04 | End: 2019-06-15 | Stop reason: SDUPTHER

## 2018-07-12 ENCOUNTER — APPOINTMENT (OUTPATIENT)
Dept: LAB | Facility: CLINIC | Age: 53
End: 2018-07-12
Payer: COMMERCIAL

## 2018-07-12 ENCOUNTER — TRANSCRIBE ORDERS (OUTPATIENT)
Dept: LAB | Facility: CLINIC | Age: 53
End: 2018-07-12

## 2018-07-12 DIAGNOSIS — Z00.8 HEALTH EXAMINATION IN POPULATION SURVEY: Primary | ICD-10-CM

## 2018-07-12 LAB
CHOLEST SERPL-MCNC: 197 MG/DL (ref 50–200)
EST. AVERAGE GLUCOSE BLD GHB EST-MCNC: 111 MG/DL
HBA1C MFR BLD: 5.5 % (ref 4.2–6.3)
HDLC SERPL-MCNC: 64 MG/DL (ref 40–60)
LDLC SERPL CALC-MCNC: 120 MG/DL (ref 0–100)
NONHDLC SERPL-MCNC: 133 MG/DL
TRIGL SERPL-MCNC: 64 MG/DL

## 2018-07-12 PROCEDURE — 36415 COLL VENOUS BLD VENIPUNCTURE: CPT

## 2018-07-12 PROCEDURE — 80061 LIPID PANEL: CPT

## 2018-07-12 PROCEDURE — 83036 HEMOGLOBIN GLYCOSYLATED A1C: CPT

## 2018-09-10 ENCOUNTER — OFFICE VISIT (OUTPATIENT)
Dept: INTERNAL MEDICINE CLINIC | Facility: CLINIC | Age: 53
End: 2018-09-10
Payer: COMMERCIAL

## 2018-09-10 VITALS
BODY MASS INDEX: 26.39 KG/M2 | TEMPERATURE: 98.1 F | OXYGEN SATURATION: 99 % | HEART RATE: 75 BPM | WEIGHT: 164.2 LBS | HEIGHT: 66 IN

## 2018-09-10 DIAGNOSIS — R53.83 FATIGUE, UNSPECIFIED TYPE: Primary | ICD-10-CM

## 2018-09-10 DIAGNOSIS — R07.0 THROAT PAIN: ICD-10-CM

## 2018-09-10 DIAGNOSIS — E04.1 THYROID NODULE: ICD-10-CM

## 2018-09-10 PROBLEM — M76.892 HAMSTRING TENDONITIS OF LEFT THIGH: Status: ACTIVE | Noted: 2017-02-17

## 2018-09-10 PROBLEM — R27.0 ATAXIA: Status: ACTIVE | Noted: 2017-06-06

## 2018-09-10 PROBLEM — K59.00 CONSTIPATION: Status: ACTIVE | Noted: 2017-12-07

## 2018-09-10 PROBLEM — K31.84 GASTROPARESIS: Status: ACTIVE | Noted: 2017-12-11

## 2018-09-10 PROBLEM — N95.9 MENOPAUSAL DISORDER: Status: ACTIVE | Noted: 2017-06-06

## 2018-09-10 PROBLEM — M51.16 LUMBAR DISC HERNIATION WITH RADICULOPATHY: Status: ACTIVE | Noted: 2017-03-17

## 2018-09-10 PROCEDURE — 99213 OFFICE O/P EST LOW 20 MIN: CPT | Performed by: INTERNAL MEDICINE

## 2018-09-10 PROCEDURE — 3008F BODY MASS INDEX DOCD: CPT | Performed by: INTERNAL MEDICINE

## 2018-09-10 NOTE — PROGRESS NOTES
Assessment/Plan:      Diagnoses and all orders for this visit:    Thyroid nodule  -     CK (with reflex to MB); Future  -     TSH, 3rd generation with Free T4 reflex; Future  -     CBC and differential; Future    Throat pain  Increase water intake  Ultrasound of thyroid             Patient ID: [place] is a 48 y o  female  HPI   Pt has had throat pain since last week  No fever  Difficulty swallowing  She had been running last week despite the hot weather and has not been drinking quite as much  Had some tenderness to palpation in area of thyroid  No cough has had increased headaches  Strep test negative     Review of Systems   Constitutional: Positive for fatigue  Negative for fever  HENT: Positive for sore throat and trouble swallowing  Eyes: Negative  Respiratory: Negative  Cardiovascular: Negative  Gastrointestinal: Negative  Musculoskeletal: Positive for myalgias  Skin: Negative  Allergic/Immunologic: Negative  Neurological: Positive for headaches  Hematological: Negative for adenopathy  Psychiatric/Behavioral: Positive for sleep disturbance  Past Medical History:   Diagnosis Date    Ataxia     Blood in urine     GERD (gastroesophageal reflux disease)     Hyperlipidemia     Irritable bowel syndrome     Mild cervical dysplasia     Pregnancy     1     Uterine leiomyoma      Past Surgical History:   Procedure Laterality Date    ABDOMINAL SURGERY      CHOLECYSTECTOMY      COLONOSCOPY      COLPOSCOPY      onset: 2/4/08    ESOPHAGOGASTRODUODENOSCOPY      LAPAROSCOPY      [place] COLONOSCOPY FLX DX W/COLLJ SPEC WHEN PFRMD N/A 2/16/2018    Procedure: COLONOSCOPY;  Surgeon: Alberto Mott DO;  Location: [facility];  Service: Gastroenterology    TUBAL LIGATION       Social History     Social History    Marital status: /Civil Union     Spouse name: N/A    Number of children: N/A    Years of education: N/A     Occupational History    Not on file  Social History Main Topics    Smoking status: Never Smoker    Smokeless tobacco: Never Used    Alcohol use No    Drug use: No    Sexual activity: Yes     Partners: Male     Birth control/ protection: Female Sterilization     Other Topics Concern    Not on file     Social History Narrative    Always uses sunscreen    Caffeine use    Dental care, regularly    Lives independently with spouse    Uses seatbelts         Allergies   Allergen Reactions    Bactrim [Sulfamethoxazole-Trimethoprim] Other (See Comments)     lightheaded              Physical Exam   Constitutional: She is oriented to person, place, and time  She appears well-developed and well-nourished  No distress  HENT:   Head: Normocephalic and atraumatic  Right Ear: External ear normal    Left Ear: External ear normal    Nose: Nose normal    Mouth/Throat: Oropharyngeal exudate present  Eyes: Conjunctivae and EOM are normal  Pupils are equal, round, and reactive to light  No scleral icterus  Neck: Normal range of motion  Neck supple  No JVD present  No tracheal deviation present  No thyromegaly present  Cardiovascular: Normal rate, regular rhythm, normal heart sounds and intact distal pulses  No murmur heard  Pulmonary/Chest: Effort normal and breath sounds normal  No stridor  No respiratory distress  She has no wheezes  She has no rales  Abdominal: Soft  Bowel sounds are normal  She exhibits no distension and no mass  There is no tenderness  There is no rebound and no guarding  Musculoskeletal: Normal range of motion  She exhibits no edema  Lymphadenopathy:     She has no cervical adenopathy  Neurological: She is alert and oriented to person, place, and time  She has normal reflexes  No cranial nerve deficit  Coordination normal    Skin: Skin is warm and dry  She is not diaphoretic  Psychiatric: She has a normal mood and affect   Her behavior is normal  Judgment and thought content normal    Nursing note and vitals reviewed

## 2018-09-13 ENCOUNTER — LAB (OUTPATIENT)
Dept: LAB | Facility: CLINIC | Age: 53
End: 2018-09-13
Payer: COMMERCIAL

## 2018-09-13 DIAGNOSIS — R53.83 FATIGUE, UNSPECIFIED TYPE: ICD-10-CM

## 2018-09-13 DIAGNOSIS — E04.1 THYROID NODULE: ICD-10-CM

## 2018-09-13 LAB
ANION GAP SERPL CALCULATED.3IONS-SCNC: 9 MMOL/L (ref 4–13)
BASOPHILS # BLD AUTO: 0.03 THOUSANDS/ΜL (ref 0–0.1)
BASOPHILS NFR BLD AUTO: 1 % (ref 0–1)
BUN SERPL-MCNC: 13 MG/DL (ref 5–25)
CALCIUM SERPL-MCNC: 9.4 MG/DL (ref 8.3–10.1)
CHLORIDE SERPL-SCNC: 102 MMOL/L (ref 100–108)
CK SERPL-CCNC: 59 U/L (ref 26–192)
CO2 SERPL-SCNC: 27 MMOL/L (ref 21–32)
CREAT SERPL-MCNC: 0.9 MG/DL (ref 0.6–1.3)
EOSINOPHIL # BLD AUTO: 0.06 THOUSAND/ΜL (ref 0–0.61)
EOSINOPHIL NFR BLD AUTO: 1 % (ref 0–6)
ERYTHROCYTE [DISTWIDTH] IN BLOOD BY AUTOMATED COUNT: 11.7 % (ref 11.6–15.1)
GFR SERPL CREATININE-BSD FRML MDRD: 74 ML/MIN/1.73SQ M
GLUCOSE P FAST SERPL-MCNC: 99 MG/DL (ref 65–99)
HCT VFR BLD AUTO: 40.8 % (ref 34.8–46.1)
HGB BLD-MCNC: 13.1 G/DL (ref 11.5–15.4)
IMM GRANULOCYTES # BLD AUTO: 0.02 THOUSAND/UL (ref 0–0.2)
IMM GRANULOCYTES NFR BLD AUTO: 0 % (ref 0–2)
IRON SERPL-MCNC: 89 UG/DL (ref 50–170)
LYMPHOCYTES # BLD AUTO: 1.23 THOUSANDS/ΜL (ref 0.6–4.47)
LYMPHOCYTES NFR BLD AUTO: 19 % (ref 14–44)
MCH RBC QN AUTO: 30.1 PG (ref 26.8–34.3)
MCHC RBC AUTO-ENTMCNC: 32.1 G/DL (ref 31.4–37.4)
MCV RBC AUTO: 94 FL (ref 82–98)
MONOCYTES # BLD AUTO: 0.75 THOUSAND/ΜL (ref 0.17–1.22)
MONOCYTES NFR BLD AUTO: 11 % (ref 4–12)
NEUTROPHILS # BLD AUTO: 4.54 THOUSANDS/ΜL (ref 1.85–7.62)
NEUTS SEG NFR BLD AUTO: 68 % (ref 43–75)
NRBC BLD AUTO-RTO: 0 /100 WBCS
PLATELET # BLD AUTO: 318 THOUSANDS/UL (ref 149–390)
PMV BLD AUTO: 9.5 FL (ref 8.9–12.7)
POTASSIUM SERPL-SCNC: 4.4 MMOL/L (ref 3.5–5.3)
RBC # BLD AUTO: 4.35 MILLION/UL (ref 3.81–5.12)
SODIUM SERPL-SCNC: 138 MMOL/L (ref 136–145)
T4 FREE SERPL-MCNC: 2.55 NG/DL (ref 0.76–1.46)
TSH SERPL DL<=0.05 MIU/L-ACNC: 0.01 UIU/ML (ref 0.36–3.74)
WBC # BLD AUTO: 6.63 THOUSAND/UL (ref 4.31–10.16)

## 2018-09-13 PROCEDURE — 83540 ASSAY OF IRON: CPT

## 2018-09-13 PROCEDURE — 82550 ASSAY OF CK (CPK): CPT

## 2018-09-13 PROCEDURE — 84443 ASSAY THYROID STIM HORMONE: CPT

## 2018-09-13 PROCEDURE — 36415 COLL VENOUS BLD VENIPUNCTURE: CPT

## 2018-09-13 PROCEDURE — 85025 COMPLETE CBC W/AUTO DIFF WBC: CPT

## 2018-09-13 PROCEDURE — 80048 BASIC METABOLIC PNL TOTAL CA: CPT

## 2018-09-13 PROCEDURE — 84439 ASSAY OF FREE THYROXINE: CPT

## 2018-09-14 ENCOUNTER — HOSPITAL ENCOUNTER (OUTPATIENT)
Dept: ULTRASOUND IMAGING | Facility: HOSPITAL | Age: 53
Discharge: HOME | End: 2018-09-14
Attending: INTERNAL MEDICINE
Payer: COMMERCIAL

## 2018-09-14 DIAGNOSIS — E04.1 THYROID NODULE: ICD-10-CM

## 2018-09-14 PROCEDURE — 76536 US EXAM OF HEAD AND NECK: CPT

## 2018-09-17 DIAGNOSIS — J02.9 SORE THROAT: ICD-10-CM

## 2018-09-17 DIAGNOSIS — E04.1 THYROID NODULE: Primary | ICD-10-CM

## 2018-09-17 NOTE — PROGRESS NOTES
Pt was informed  Pt states that she still has a sore throat at night that hasn't stopped for about 2 weeks  Pt would also like testing for mono sadia barr

## 2018-09-18 ENCOUNTER — LAB (OUTPATIENT)
Dept: LAB | Facility: CLINIC | Age: 53
End: 2018-09-18
Payer: COMMERCIAL

## 2018-09-18 DIAGNOSIS — J02.9 SORE THROAT: ICD-10-CM

## 2018-09-18 DIAGNOSIS — E04.1 THYROID NODULE: ICD-10-CM

## 2018-09-18 DIAGNOSIS — E04.1 THYROID NODULE: Primary | ICD-10-CM

## 2018-09-18 PROCEDURE — 86800 THYROGLOBULIN ANTIBODY: CPT

## 2018-09-18 PROCEDURE — 86663 EPSTEIN-BARR ANTIBODY: CPT

## 2018-09-18 PROCEDURE — 36415 COLL VENOUS BLD VENIPUNCTURE: CPT

## 2018-09-18 PROCEDURE — 86376 MICROSOMAL ANTIBODY EACH: CPT

## 2018-09-18 PROCEDURE — 86664 EPSTEIN-BARR NUCLEAR ANTIGEN: CPT

## 2018-09-18 PROCEDURE — 86665 EPSTEIN-BARR CAPSID VCA: CPT

## 2018-09-18 PROCEDURE — 86308 HETEROPHILE ANTIBODY SCREEN: CPT

## 2018-09-19 LAB — HETEROPH AB SER QL: NEGATIVE

## 2018-09-20 LAB
EBV EA IGG SER-ACNC: <9 U/ML (ref 0–8.9)
EBV NA IGG SER IA-ACNC: 187 U/ML (ref 0–17.9)
EBV PATRN SPEC IB-IMP: ABNORMAL
EBV VCA IGG SER IA-ACNC: 413 U/ML (ref 0–17.9)
EBV VCA IGM SER IA-ACNC: <36 U/ML (ref 0–35.9)
THYROGLOB AB SERPL-ACNC: <1 IU/ML (ref 0–0.9)
THYROPEROXIDASE AB SERPL-ACNC: 13 IU/ML (ref 0–34)

## 2018-09-20 NOTE — PROGRESS NOTES
Pt as informed   Pt states that she is confused and is wondering how the thyroid antibodies are normal but her T3 and T4 were abnormal

## 2018-10-10 ENCOUNTER — HOSPITAL ENCOUNTER (OUTPATIENT)
Dept: NUCLEAR MEDICINE | Facility: HOSPITAL | Age: 53
Discharge: HOME | End: 2018-10-10
Attending: INTERNAL MEDICINE
Payer: COMMERCIAL

## 2018-10-10 DIAGNOSIS — E04.1 THYROID NODULE: ICD-10-CM

## 2018-10-11 ENCOUNTER — HOSPITAL ENCOUNTER (OUTPATIENT)
Dept: NUCLEAR MEDICINE | Facility: HOSPITAL | Age: 53
Discharge: HOME | End: 2018-10-11
Attending: INTERNAL MEDICINE
Payer: COMMERCIAL

## 2018-10-11 PROCEDURE — A9516 IODINE I-123 SOD IODIDE MIC: HCPCS

## 2018-10-11 PROCEDURE — 78014 THYROID IMAGING W/BLOOD FLOW: CPT

## 2018-10-16 ENCOUNTER — TELEPHONE (OUTPATIENT)
Dept: INTERNAL MEDICINE CLINIC | Facility: CLINIC | Age: 53
End: 2018-10-16

## 2018-10-16 DIAGNOSIS — E04.1 THYROID NODULE: Primary | ICD-10-CM

## 2018-10-18 ENCOUNTER — OFFICE VISIT (OUTPATIENT)
Dept: INTERNAL MEDICINE CLINIC | Facility: CLINIC | Age: 53
End: 2018-10-18
Payer: COMMERCIAL

## 2018-10-18 VITALS
WEIGHT: 162.6 LBS | HEIGHT: 66 IN | HEART RATE: 52 BPM | BODY MASS INDEX: 26.13 KG/M2 | TEMPERATURE: 98.1 F | OXYGEN SATURATION: 98 %

## 2018-10-18 DIAGNOSIS — R79.89 ABNORMAL TSH: Primary | ICD-10-CM

## 2018-10-18 DIAGNOSIS — E04.1 THYROID NODULE: ICD-10-CM

## 2018-10-18 PROCEDURE — 99214 OFFICE O/P EST MOD 30 MIN: CPT | Performed by: INTERNAL MEDICINE

## 2018-10-18 NOTE — PROGRESS NOTES
Assessment/Plan:      Diagnoses and all orders for this visit:    Abnormal TSH  Thyroid scan suggests hyperactive thyroid but thyroid antibodies negative - tsh depressed but now throat sxs are resolving     Thyroid nodule  Reviewed ultrasound and thyroid uptake scan             Patient ID: Nicole Camarena is a 48 y o  female  HPI   Pt throat sxs or dysphagia  No fever  Feels fatigued  Jittery at times  Has appt 10/31 with Dr Angeles Skinner    Review of Systems   Constitutional: Negative  HENT: Negative  Eyes: Negative  Respiratory: Negative  Cardiovascular: Negative  Gastrointestinal: Negative  Endocrine: Negative  Genitourinary: Negative  Musculoskeletal: Negative  Skin: Negative  Allergic/Immunologic: Negative  Neurological: Negative  Hematological: Negative  Psychiatric/Behavioral: Negative  Past Medical History:   Diagnosis Date    Ataxia     Blood in urine     GERD (gastroesophageal reflux disease)     Hyperlipidemia     Irritable bowel syndrome     Mild cervical dysplasia     Pregnancy     1     Uterine leiomyoma      Past Surgical History:   Procedure Laterality Date    ABDOMINAL SURGERY      CHOLECYSTECTOMY      COLONOSCOPY      COLPOSCOPY      onset: 2/4/08    ESOPHAGOGASTRODUODENOSCOPY      LAPAROSCOPY      [place] COLONOSCOPY FLX DX W/COLLJ SPEC WHEN PFRMD N/A 2/16/2018    Procedure: COLONOSCOPY;  Surgeon: Clara Prabhakar DO;  [facility];  Service: Gastroenterology    TUBAL LIGATION       Social History     Social History    Marital status: /Civil Union     Spouse name: N/A    Number of children: N/A    Years of education: N/A     Occupational History    Not on file       Social History Main Topics    Smoking status: Never Smoker    Smokeless tobacco: Never Used    Alcohol use No    Drug use: No    Sexual activity: Yes     Partners: Male     Birth control/ protection: Female Sterilization     Other Topics Concern    Not on file     Social History Narrative    Always uses sunscreen    Caffeine use    Dental care, regularly    Lives independently with spouse    Uses seatbelts         Allergies   Allergen Reactions    Bactrim [Sulfamethoxazole-Trimethoprim] Other (See Comments)     lightheaded     /68         Physical Exam   Constitutional: She is oriented to person, place, and time  She appears well-developed and well-nourished  No distress  HENT:   Head: Normocephalic and atraumatic  Right Ear: External ear normal    Left Ear: External ear normal    Nose: Nose normal    Mouth/Throat: Oropharynx is clear and moist  No oropharyngeal exudate  Eyes: Pupils are equal, round, and reactive to light  Conjunctivae and EOM are normal  No scleral icterus  Neck: Normal range of motion  Neck supple  No JVD present  Cardiovascular: Normal rate, regular rhythm, normal heart sounds and intact distal pulses  No murmur heard  Pulmonary/Chest: Effort normal and breath sounds normal    Abdominal: Soft  Bowel sounds are normal    Musculoskeletal: Normal range of motion  Lymphadenopathy:     She has no cervical adenopathy  Neurological: She is alert and oriented to person, place, and time  She has normal reflexes  No cranial nerve deficit  Coordination normal    Skin: Skin is warm and dry  She is not diaphoretic  Psychiatric: She has a normal mood and affect  Her behavior is normal  Judgment and thought content normal    Nursing note and vitals reviewed

## 2018-10-31 ENCOUNTER — LAB (OUTPATIENT)
Dept: LAB | Facility: CLINIC | Age: 53
End: 2018-10-31
Payer: COMMERCIAL

## 2018-10-31 ENCOUNTER — OFFICE VISIT (OUTPATIENT)
Dept: ENDOCRINOLOGY | Facility: CLINIC | Age: 53
End: 2018-10-31
Payer: COMMERCIAL

## 2018-10-31 VITALS
DIASTOLIC BLOOD PRESSURE: 80 MMHG | HEART RATE: 73 BPM | BODY MASS INDEX: 26.48 KG/M2 | WEIGHT: 164.8 LBS | SYSTOLIC BLOOD PRESSURE: 100 MMHG | HEIGHT: 66 IN

## 2018-10-31 DIAGNOSIS — E55.9 VITAMIN D DEFICIENCY: ICD-10-CM

## 2018-10-31 DIAGNOSIS — E06.9 THYROIDITIS: ICD-10-CM

## 2018-10-31 DIAGNOSIS — E06.9 THYROIDITIS: Primary | ICD-10-CM

## 2018-10-31 DIAGNOSIS — E04.1 THYROID NODULE: ICD-10-CM

## 2018-10-31 LAB
25(OH)D3 SERPL-MCNC: 68.8 NG/ML (ref 30–100)
T3 SERPL-MCNC: 1 NG/ML (ref 0.6–1.8)
T4 FREE SERPL-MCNC: 0.65 NG/DL (ref 0.76–1.46)
TSH SERPL DL<=0.05 MIU/L-ACNC: 13.9 UIU/ML (ref 0.36–3.74)

## 2018-10-31 PROCEDURE — 99244 OFF/OP CNSLTJ NEW/EST MOD 40: CPT | Performed by: INTERNAL MEDICINE

## 2018-10-31 PROCEDURE — 36415 COLL VENOUS BLD VENIPUNCTURE: CPT

## 2018-10-31 PROCEDURE — 84439 ASSAY OF FREE THYROXINE: CPT

## 2018-10-31 PROCEDURE — 84443 ASSAY THYROID STIM HORMONE: CPT

## 2018-10-31 PROCEDURE — 84445 ASSAY OF TSI GLOBULIN: CPT

## 2018-10-31 PROCEDURE — 84480 ASSAY TRIIODOTHYRONINE (T3): CPT

## 2018-10-31 PROCEDURE — 82306 VITAMIN D 25 HYDROXY: CPT

## 2018-10-31 NOTE — LETTER
October 31, 2018     [facility],   [address] [address] [place] [address]    Patient: Bev Dominguez   YOB: 1965   Date of Visit: 10/31/2018       Dear Dr Malvin Heller: Thank you for referring Demetrio Marion to me for evaluation  Below are my notes for this consultation  If you have questions, please do not hesitate to call me  I look forward to following your patient along with you  Sincerely,        Gayatri Barros MD        CC: No Recipients  Gayatri Barros MD  10/31/2018  1:05 PM  Sign at close encounter   Bernadine Sotosh 48 y o  female MRN: [id number]    Encounter: [id number]      Assessment/Plan     Problem List Items Addressed This Visit     Thyroid nodule     Stable colloid cyst - does not need f/u          Vitamin D deficiency     Continue supplementations          Relevant Orders    Vitamin D 25 hydroxy Lab Collect    Thyroiditis - Primary     Low uptake and scan consistent with thyroiditis - symptoms have improved - will check thyroid function tests - she may become hypothyroid and will need to be started on levothyroxine          Relevant Orders    TSH, 3rd generation Lab Collect    T4, free Lab Collect    Thyroid stimulating immunoglobulin Lab Collect    T3 Lab Collect        CC:   Thyroid dysfunction     History of Present Illness      HPI:54 y/o woman referred here for evaluation of thyroid dysfunction - she had Anterior neck pain/tenderness in sept- resolved in a few weeks     Had jitteriness , hair loss , palpitations , hyperdefecation , has chronic sleep issues , excessive sweating , anxiety ,lost some weight and have gained back  All these symptoms have  improved past couple of weeks     Review of Systems   Constitutional: Positive for fatigue and unexpected weight change  Eyes: Positive for visual disturbance  Cardiovascular: Positive for palpitations  Gastrointestinal: Positive for constipation  Negative for diarrhea, nausea and vomiting  Genitourinary: Positive for menstrual problem  Musculoskeletal: Negative for arthralgias, gait problem and myalgias  Skin: Negative for color change, pallor, rash and wound  Psychiatric/Behavioral: Positive for sleep disturbance  The patient is nervous/anxious  All other systems reviewed and are negative        Historical Information   Past Medical History:   Diagnosis Date    Ataxia     Blood in urine     GERD (gastroesophageal reflux disease)     Hyperlipidemia     Irritable bowel syndrome     Mild cervical dysplasia     Pituitary adenoma (HCC)     Pregnancy     1     Uterine leiomyoma      Past Surgical History:   Procedure Laterality Date    ABDOMINAL SURGERY      CHOLECYSTECTOMY      COLONOSCOPY      COLPOSCOPY      onset: 2/4/08    ESOPHAGOGASTRODUODENOSCOPY      LAPAROSCOPY      NV COLONOSCOPY FLX DX W/COLLJ SPEC WHEN PFRMD N/A 2/16/2018    Procedure: COLONOSCOPY;  Surgeon: Michael Stratton DO;  Location: [facility];  Service: Gastroenterology    TUBAL LIGATION       Social History   History   Alcohol Use No     History   Drug Use No     History   Smoking Status    Never Smoker   Smokeless Tobacco    Never Used     Family History:   Family History   Problem Relation Age of Onset    Hyperlipidemia Mother     Hypertension Mother     Hashimoto's thyroiditis Mother     Breast cancer Paternal Grandmother     Heart disease Family     Diabetes Family     Thyroid disease Family     Hypertension Brother     Hashimoto's thyroiditis Brother        Meds/Allergies   Current Outpatient Prescriptions   Medication Sig Dispense Refill    Ascorbic Acid (VITAMIN C) 1000 MG tablet Take 1,000 mg by mouth daily      Cholecalciferol (VITAMIN D3) 94553 units CAPS Take 1 capsule (50,000 Units total) by mouth once a week 12 capsule 3    dexlansoprazole (DEXILANT) 60 MG capsule Take 1 capsule (60 mg total) by mouth daily for 90 days 90 capsule 2    Magnesium 400 MG CAPS Take 400 mg by mouth daily      Omega-3 Fatty Acids (FISH OIL) 1,000 mg Take 1,000 mg by mouth daily      ondansetron (ZOFRAN-ODT) 8 mg disintegrating tablet Take by mouth every 8 (eight) hours as needed       No current facility-administered medications for this visit  Allergies   Allergen Reactions    Bactrim [Sulfamethoxazole-Trimethoprim] Other (See Comments)     lightheaded       Objective   Vitals: Blood pressure 100/80, pulse 73, height 5' 6" (1 676 m), weight 74 8 kg (164 lb 12 8 oz)  Physical Exam   Constitutional: She is oriented to person, place, and time  She appears well-developed and well-nourished  No distress  HENT:   Head: Normocephalic and atraumatic  Mouth/Throat: No oropharyngeal exudate  Eyes: Conjunctivae and EOM are normal  No scleral icterus  Neck: Normal range of motion  Neck supple  No thyromegaly present  Cardiovascular: Normal rate, regular rhythm and normal heart sounds  No murmur heard  Pulmonary/Chest: Effort normal and breath sounds normal  No respiratory distress  She has no wheezes  She has no rales  Abdominal: Soft  Bowel sounds are normal  She exhibits no distension  There is no tenderness  There is no rebound  Musculoskeletal: Normal range of motion  She exhibits no edema or deformity  Lymphadenopathy:     She has no cervical adenopathy  Neurological: She is alert and oriented to person, place, and time  Skin: Skin is warm and dry  No rash noted  No erythema  No pallor  Psychiatric: She has a normal mood and affect  Her behavior is normal  Judgment and thought content normal        The history was obtained from the review of the chart, patient  Lab Results:   Lab Results   Component Value Date/Time    TSH 3RD GENERATON 0 014 (L) 09/13/2018 06:54 AM    Free T4 2 55 (H) 09/13/2018 06:54 AM    Thyroglobulin Ab <1 0 09/18/2018 11:45 AM     Imaging Studies:     Study Result     THYROID SCAN AND UPTAKE     INDICATION: Thyroid tenderness and difficulty swallowing  Review of labs shows elevated free T4 and depressed TSH      COMPARISON:  Thyroid ultrasound dated 9/14/2018 and 7/26/2016      TECHNIQUE:   The study was performed following the oral administration of 0 255 mCi I-123      FINDINGS:      The thyroid scan demonstrates weak, with difficulty distinguishing thyroid gland from background normal is  No distinct hot or cold nodules      Thyroid uptake values are measured as:     6 hours:   0 7% (N =  5 -15%)     24 hours: 0 4% (N =15 - 35%)     IMPRESSION:     Markedly depressed thyroid uptake in the setting of thyrotoxicosis  Findings suggest thyroiditis  Correlate with laboratory workup for antibody evidence of Hashimoto's disease  THYROID ULTRASOUND     INDICATION:    E04 1: Nontoxic single thyroid nodule      COMPARISON:  Thyroid sonogram 7/26/2016     TECHNIQUE:   Ultrasound of the thyroid was performed with a high frequency linear transducer in transverse and sagittal planes including volumetric imaging sweeps as well as traditional still imaging technique      FINDINGS:  Thyroid parenchyma is diffusely heterogeneous in echotexture      Right lobe:  4 x 1 7 x 1 5 cm  Left lobe:  4 3 x 2 2 x 1 5 cm  Isthmus:  0 3 cm      Multiple bilateral colloid cysts  Cluster of colloid cyst in the lower pole of the left lobe unchanged  No dominant solid nodules      IMPRESSION:     No nodule meets current ACR criteria for requiring biopsy or followup ultrasounds      Multiple bilateral stable colloid cyst     Study Result     MRI BRAIN AND SELLA  WITH AND WITHOUT CONTRAST   INDICATION- History of pituitary adenoma  Patient now has elevated   prolactin levels  COMPARISON- 12/19/2011 is the most recent of to previous examinations  TECHNIQUE-   Brain- Axial T2  Axial FLAIR  Axial gradient  Axial T1  Axial T1 post   contrast    Sella- Sagittal T1, Coronal T1 pre-and-post contrast, coronal post   contrast dynamic imaging  Coronal T2   Sagittal T1 post contrast  Targeted images of the sella were performed requiring additional time   at acquisition and interpretation of approximately 25%   8 mL of Gadavist was injected intravenously without immediate   consequence  IMAGE QUALITY-  Diagnostic  FINDINGS-   BRAIN PARENCHYMA-  There is no discrete mass, mass effect or midline   shift  No abnormal white matter signal identified  Brainstem and   cerebellum demonstrate normal signal  There is no intracranial   hemorrhage  There is no evidence of acute infarction and diffusion   imaging is unremarkable  Postcontrast imaging is normal    VENTRICLES-  Normal    SELLA AND PITUITARY GLAND-  Pituitary gland is normal in size in the   pituitary stalk is midline  Gland appears to homogeneously enhance   with no discrete nodule identified similar to the previous examination  Previously described subcentimeter focus of poor enhancement within   the right lateral aspect of the gland in September of 2010 clearly seen   on today's examination  Normal suprasellar and parasellar structures  ORBITS-  Normal    PARANASAL SINUSES-  Normal    VASCULATURE-  Evaluation of the major intracranial vasculature   demonstrates appropriate flow voids  CALVARIUM AND SKULL BASE-  Normal    EXTRACRANIAL SOFT TISSUES-  Normal    IMPRESSION-   1  Normal MRI of the brain and pituitary gland  No pituitary adenoma   identified on today's examination  Transcribed on- [id number]           Hasmukh NAGEL 1711, RAD DO        [place] [facility]- [address] , [place] DO        Electronically [facility], RAD DO        Released Date Time- 06/17/15 1303         I have personally reviewed pertinent reports  Portions of the record may have been created with voice recognition software  Occasional wrong word or "sound a like" substitutions may have occurred due to the inherent limitations of voice recognition software   Read the chart carefully and recognize, using context, where substitutions have occurred

## 2018-10-31 NOTE — PROGRESS NOTES
Lanre OCHOA [place] 48 y o  female MRN: [id number]    Encounter: [id number]      Assessment/Plan     Problem List Items Addressed This Visit     Thyroid nodule     Stable colloid cyst - does not need f/u          Vitamin D deficiency     Continue supplementations          Relevant Orders    Vitamin D 25 hydroxy Lab Collect    Thyroiditis - Primary     Low uptake and scan consistent with thyroiditis - symptoms have improved - will check thyroid function tests - she may become hypothyroid and will need to be started on levothyroxine          Relevant Orders    TSH, 3rd generation Lab Collect    T4, free Lab Collect    Thyroid stimulating immunoglobulin Lab Collect    T3 Lab Collect        CC:   Thyroid dysfunction     History of Present Illness      HPI:52 y/o woman referred here for evaluation of thyroid dysfunction - she had Anterior neck pain/tenderness in sept- resolved in a few weeks     Had jitteriness , hair loss , palpitations , hyperdefecation , has chronic sleep issues , excessive sweating , anxiety ,lost some weight and have gained back  All these symptoms have  improved past couple of weeks     Review of Systems   Constitutional: Positive for fatigue and unexpected weight change  Eyes: Positive for visual disturbance  Cardiovascular: Positive for palpitations  Gastrointestinal: Positive for constipation  Negative for diarrhea, nausea and vomiting  Genitourinary: Positive for menstrual problem  Musculoskeletal: Negative for arthralgias, gait problem and myalgias  Skin: Negative for color change, pallor, rash and wound  Psychiatric/Behavioral: Positive for sleep disturbance  The patient is nervous/anxious  All other systems reviewed and are negative        Historical Information   Past Medical History:   Diagnosis Date    Ataxia     Blood in urine     GERD (gastroesophageal reflux disease)     Hyperlipidemia     Irritable bowel syndrome     Mild cervical dysplasia     Pituitary adenoma ([facility] [address] )     Pregnancy     1     Uterine leiomyoma      Past Surgical History:   Procedure Laterality Date    ABDOMINAL SURGERY      CHOLECYSTECTOMY      COLONOSCOPY      COLPOSCOPY      onset: 2/4/08    ESOPHAGOGASTRODUODENOSCOPY      LAPAROSCOPY      DC COLONOSCOPY FLX DX W/COLLJ SPEC WHEN PFRMD N/A 2/16/2018    Procedure: COLONOSCOPY;  Surgeon: Lashanda Lott DO;  Location: [facility];  Service: Gastroenterology    TUBAL LIGATION       Social History   History   Alcohol Use No     History   Drug Use No     History   Smoking Status    Never Smoker   Smokeless Tobacco    Never Used     Family History:   Family History   Problem Relation Age of Onset    Hyperlipidemia Mother     Hypertension Mother     Hashimoto's thyroiditis Mother     Breast cancer Paternal Grandmother     Heart disease Family     Diabetes Family     Thyroid disease Family     Hypertension Brother     Hashimoto's thyroiditis Brother        Meds/Allergies   Current Outpatient Prescriptions   Medication Sig Dispense Refill    Ascorbic Acid (VITAMIN C) 1000 MG tablet Take 1,000 mg by mouth daily      Cholecalciferol (VITAMIN D3) 47125 units CAPS Take 1 capsule (50,000 Units total) by mouth once a week 12 capsule 3    dexlansoprazole (DEXILANT) 60 MG capsule Take 1 capsule (60 mg total) by mouth daily for 90 days 90 capsule 2    Magnesium 400 MG CAPS Take 400 mg by mouth daily      Omega-3 Fatty Acids (FISH OIL) 1,000 mg Take 1,000 mg by mouth daily      ondansetron (ZOFRAN-ODT) 8 mg disintegrating tablet Take by mouth every 8 (eight) hours as needed       No current facility-administered medications for this visit  Allergies   Allergen Reactions    Bactrim [Sulfamethoxazole-Trimethoprim] Other (See Comments)     lightheaded       Objective   Vitals: Blood pressure 100/80, pulse 73, height 5' 6" (1 676 m), weight 74 8 kg (164 lb 12 8 oz)  Physical Exam   Constitutional: She is oriented to person, place, and time   She appears well-developed and well-nourished  No distress  HENT:   Head: Normocephalic and atraumatic  Mouth/Throat: No oropharyngeal exudate  Eyes: Conjunctivae and EOM are normal  No scleral icterus  Neck: Normal range of motion  Neck supple  No thyromegaly present  Cardiovascular: Normal rate, regular rhythm and normal heart sounds  No murmur heard  Pulmonary/Chest: Effort normal and breath sounds normal  No respiratory distress  She has no wheezes  She has no rales  Abdominal: Soft  Bowel sounds are normal  She exhibits no distension  There is no tenderness  There is no rebound  Musculoskeletal: Normal range of motion  She exhibits no edema or deformity  Lymphadenopathy:     She has no cervical adenopathy  Neurological: She is alert and oriented to person, place, and time  Skin: Skin is warm and dry  No rash noted  No erythema  No pallor  Psychiatric: She has a normal mood and affect  Her behavior is normal  Judgment and thought content normal        The history was obtained from the review of the chart, patient  Lab Results:   Lab Results   Component Value Date/Time    TSH 3RD GENERATON 0 014 (L) 09/13/2018 06:54 AM    Free T4 2 55 (H) 09/13/2018 06:54 AM    Thyroglobulin Ab <1 0 09/18/2018 11:45 AM     Imaging Studies:     Study Result     THYROID SCAN AND UPTAKE     INDICATION: Thyroid tenderness and difficulty swallowing  Review of labs shows elevated free T4 and depressed TSH      COMPARISON:  Thyroid ultrasound dated 9/14/2018 and 7/26/2016      TECHNIQUE:   The study was performed following the oral administration of 0 255 mCi I-123      FINDINGS:      The thyroid scan demonstrates weak, with difficulty distinguishing thyroid gland from background normal is    No distinct hot or cold nodules      Thyroid uptake values are measured as:     6 hours:   0 7% (N =  5 -15%)     24 hours: 0 4% (N =15 - 35%)     IMPRESSION:     Markedly depressed thyroid uptake in the setting of thyrotoxicosis  Findings suggest thyroiditis  Correlate with laboratory workup for antibody evidence of Hashimoto's disease  THYROID ULTRASOUND     INDICATION:    E04 1: Nontoxic single thyroid nodule      COMPARISON:  Thyroid sonogram 7/26/2016     TECHNIQUE:   Ultrasound of the thyroid was performed with a high frequency linear transducer in transverse and sagittal planes including volumetric imaging sweeps as well as traditional still imaging technique      FINDINGS:  Thyroid parenchyma is diffusely heterogeneous in echotexture      Right lobe:  4 x 1 7 x 1 5 cm  Left lobe:  4 3 x 2 2 x 1 5 cm  Isthmus:  0 3 cm      Multiple bilateral colloid cysts  Cluster of colloid cyst in the lower pole of the left lobe unchanged  No dominant solid nodules      IMPRESSION:     No nodule meets current ACR criteria for requiring biopsy or followup ultrasounds      Multiple bilateral stable colloid cyst     Study Result     MRI BRAIN AND SELLA  WITH AND WITHOUT CONTRAST   INDICATION- History of pituitary adenoma  Patient now has elevated   prolactin levels  COMPARISON- 12/19/2011 is the most recent of to previous examinations  TECHNIQUE-   Brain- Axial T2  Axial FLAIR  Axial gradient  Axial T1  Axial T1 post   contrast    Sella- Sagittal T1, Coronal T1 pre-and-post contrast, coronal post   contrast dynamic imaging  Coronal T2  Sagittal T1 post contrast    Targeted images of the sella were performed requiring additional time   at acquisition and interpretation of approximately 25%   8 mL of Gadavist was injected intravenously without immediate   consequence  IMAGE QUALITY-  Diagnostic  FINDINGS-   BRAIN PARENCHYMA-  There is no discrete mass, mass effect or midline   shift  No abnormal white matter signal identified  Brainstem and   cerebellum demonstrate normal signal  There is no intracranial   hemorrhage  There is no evidence of acute infarction and diffusion   imaging is unremarkable    Postcontrast imaging is normal    VENTRICLES-  Normal    SELLA AND PITUITARY GLAND-  Pituitary gland is normal in size in the   pituitary stalk is midline  Gland appears to homogeneously enhance   with no discrete nodule identified similar to the previous examination  Previously described subcentimeter focus of poor enhancement within   the right lateral aspect of the gland in September of 2010 clearly seen   on today's examination  Normal suprasellar and parasellar structures  ORBITS-  Normal    PARANASAL SINUSES-  Normal    VASCULATURE-  Evaluation of the major intracranial vasculature   demonstrates appropriate flow voids  CALVARIUM AND SKULL BASE-  Normal    EXTRACRANIAL SOFT TISSUES-  Normal    IMPRESSION-   1  Normal MRI of the brain and pituitary gland  No pituitary adenoma   identified on today's examination  Transcribed on- [id number]           Hasmukh NAGEL 1711, RAD DO        [place] [facility]- [address] , [place] DO        Electronically [facility] DO        Released Date Time- 06/17/15 1303         I have personally reviewed pertinent reports  Portions of the record may have been created with voice recognition software  Occasional wrong word or "sound a like" substitutions may have occurred due to the inherent limitations of voice recognition software  Read the chart carefully and recognize, using context, where substitutions have occurred

## 2018-10-31 NOTE — ASSESSMENT & PLAN NOTE
Low uptake and scan consistent with thyroiditis - symptoms have improved - will check thyroid function tests - she may become hypothyroid and will need to be started on levothyroxine

## 2018-11-02 ENCOUNTER — TELEPHONE (OUTPATIENT)
Dept: ENDOCRINOLOGY | Facility: CLINIC | Age: 53
End: 2018-11-02

## 2018-11-02 DIAGNOSIS — E06.9 THYROIDITIS: Primary | ICD-10-CM

## 2018-11-02 LAB — TSI SER-ACNC: <0.1 IU/L (ref 0–0.55)

## 2018-11-02 NOTE — PROGRESS NOTES
Please call the patient regarding labs - tsh high - thyroid gland has slowed down - this may recover on its own - repeat tsh and free t4 in 2 weeks and if tsh not improving will start synthroid - vitamin d in good range continue supplementations

## 2018-11-02 NOTE — TELEPHONE ENCOUNTER
----- Message from Gayatri Barros MD sent at 11/1/2018  8:01 PM EDT -----  Please call the patient regarding labs - tsh high - thyroid gland has slowed down - this may recover on its own - repeat tsh and free t4 in 2 weeks and if tsh not improving will start synthroid - vitamin d in good range continue supplementations

## 2018-11-14 ENCOUNTER — TRANSCRIBE ORDERS (OUTPATIENT)
Dept: URGENT CARE | Facility: CLINIC | Age: 53
End: 2018-11-14

## 2018-11-14 ENCOUNTER — APPOINTMENT (OUTPATIENT)
Dept: LAB | Facility: CLINIC | Age: 53
End: 2018-11-14
Payer: COMMERCIAL

## 2018-11-14 DIAGNOSIS — E06.9 THYROIDITIS, UNSPECIFIED: Primary | ICD-10-CM

## 2018-11-14 LAB
T4 FREE SERPL-MCNC: 0.73 NG/DL (ref 0.76–1.46)
TSH SERPL DL<=0.05 MIU/L-ACNC: 9.31 UIU/ML (ref 0.36–3.74)

## 2018-11-14 PROCEDURE — 36415 COLL VENOUS BLD VENIPUNCTURE: CPT

## 2018-11-14 PROCEDURE — 84439 ASSAY OF FREE THYROXINE: CPT

## 2018-11-14 PROCEDURE — 84443 ASSAY THYROID STIM HORMONE: CPT

## 2018-11-14 NOTE — PROGRESS NOTES
Please call the patient regarding labs - thyroid gland is underactive - start levothyroxine 50 mcg daily - take on empty stomach and wait 1 hour before breakfast - repeat tsh and free t4 in 6 weeks

## 2018-11-15 ENCOUNTER — TELEPHONE (OUTPATIENT)
Dept: ENDOCRINOLOGY | Facility: CLINIC | Age: 53
End: 2018-11-15

## 2018-11-15 DIAGNOSIS — E06.9 THYROIDITIS: Primary | ICD-10-CM

## 2018-11-15 DIAGNOSIS — E03.9 HYPOTHYROIDISM, UNSPECIFIED TYPE: Primary | ICD-10-CM

## 2018-11-15 RX ORDER — LEVOTHYROXINE SODIUM 0.05 MG/1
50 TABLET ORAL DAILY
Qty: 30 TABLET | Refills: 4 | Status: SHIPPED | OUTPATIENT
Start: 2018-11-15 | End: 2019-02-14 | Stop reason: SDUPTHER

## 2018-11-15 NOTE — TELEPHONE ENCOUNTER
----- Message from Elton Mcdonald MD sent at 11/14/2018  6:23 PM EST -----  Please call the patient regarding labs - thyroid gland is underactive - start levothyroxine 50 mcg daily - take on empty stomach and wait 1 hour before breakfast - repeat tsh and free t4 in 6 weeks

## 2018-12-12 ENCOUNTER — OFFICE VISIT (OUTPATIENT)
Dept: ENDOCRINOLOGY | Facility: CLINIC | Age: 53
End: 2018-12-12
Payer: COMMERCIAL

## 2018-12-12 VITALS
DIASTOLIC BLOOD PRESSURE: 90 MMHG | SYSTOLIC BLOOD PRESSURE: 126 MMHG | BODY MASS INDEX: 26.68 KG/M2 | WEIGHT: 166 LBS | HEIGHT: 66 IN | HEART RATE: 62 BPM

## 2018-12-12 DIAGNOSIS — E55.9 VITAMIN D DEFICIENCY: ICD-10-CM

## 2018-12-12 DIAGNOSIS — E04.1 THYROID NODULE: Primary | ICD-10-CM

## 2018-12-12 DIAGNOSIS — E06.9 THYROIDITIS: ICD-10-CM

## 2018-12-12 DIAGNOSIS — E03.8 OTHER SPECIFIED HYPOTHYROIDISM: ICD-10-CM

## 2018-12-12 PROCEDURE — 99214 OFFICE O/P EST MOD 30 MIN: CPT | Performed by: PHYSICIAN ASSISTANT

## 2018-12-12 NOTE — LETTER
December 13, 2018     Shannan Love DO  [address]  [place] [id number]    Patient: Kalpana Fofana   YOB: 1965   Date of Visit: 12/12/2018       Dear Dr Jimmy Gilmore: Thank you for referring Bria Bryson to me for evaluation  Below are my notes for this consultation  If you have questions, please do not hesitate to call me  I look forward to following your patient along with you  Sincerely,        Scott Lee PA-C        CC: No Recipients  Scott Lee PA-C  12/13/2018  9:17 AM  Sign at close encounter    Established Patient Progress Note       Chief Complaint   Patient presents with    Thyroid Problem        History of Present Illness:     Kalpana Fofana is a 48 y o  female with a history of hyperthyroidism due to Thyroiditis  Scan showed low uptake consistent with thyroiditis  Hyperthyroid symptoms resolved and she developed hypothyroidism  She had severe fatigue  She has been started on levothyroxine about 1 month ago and fatigue is improving  She does continue with hot flashes and difficulty sleeping  Weight has been stable  She had  Thyroid ultrasound which showed  Colloid cysts stable compared to 2016  She has FH hypothyroidism in mother and brother  For Vitamin D Deficiency, she is taking supplements       Patient Active Problem List   Diagnosis    Ambulatory dysfunction    Hyperlipidemia    Migraine without aura and without status migrainosus, not intractable    Other insomnia    Abnormal uterine bleeding (AUB)    Suspected sleep apnea    Hypersomnia    Anxiety    Hot flashes    Ataxia    Atopic dermatitis    Constipation    Gastroparesis    Hamstring tendonitis of left thigh    Iliotibial band syndrome of left side    Lumbar disc herniation with radiculopathy    Menopausal disorder    Piriformis syndrome, left    Thyroid nodule    Vitamin D deficiency    Throat pain    Abnormal TSH    Thyroiditis    Other specified hypothyroidism      Past Medical History:   Diagnosis Date    Ataxia     Blood in urine     GERD (gastroesophageal reflux disease)     Hyperlipidemia     Irritable bowel syndrome     Mild cervical dysplasia     Pituitary adenoma (HCC)     Pregnancy     1     Uterine leiomyoma       Past Surgical History:   Procedure Laterality Date    ABDOMINAL SURGERY      CHOLECYSTECTOMY      COLONOSCOPY      COLPOSCOPY      onset: 2/4/08    ESOPHAGOGASTRODUODENOSCOPY      LAPAROSCOPY      MA COLONOSCOPY FLX DX W/COLLJ SPEC WHEN PFRMD N/A 2/16/2018    Procedure: COLONOSCOPY;  Surgeon: Jaci Kirk DO;  Location: [facility];  Service: Gastroenterology    TUBAL LIGATION        Family History   Problem Relation Age of Onset    Hyperlipidemia Mother     Hypertension Mother     Hashimoto's thyroiditis Mother     Breast cancer Paternal Grandmother     Heart disease Family     Diabetes Family     Thyroid disease Family     Hypertension Brother     Hashimoto's thyroiditis Brother      Social History   Substance Use Topics    Smoking status: Never Smoker    Smokeless tobacco: Never Used    Alcohol use No     Allergies   Allergen Reactions    Bactrim [Sulfamethoxazole-Trimethoprim] Other (See Comments)     lightheaded       Current Outpatient Prescriptions:     Ascorbic Acid (VITAMIN C) 1000 MG tablet, Take 1,000 mg by mouth daily, Disp: , Rfl:     Cholecalciferol (VITAMIN D3) 23682 units CAPS, Take 1 capsule (50,000 Units total) by mouth once a week, Disp: 12 capsule, Rfl: 3    dexlansoprazole (DEXILANT) 60 MG capsule, Take 1 capsule (60 mg total) by mouth daily for 90 days, Disp: 90 capsule, Rfl: 2    levothyroxine 50 mcg tablet, Take 1 tablet (50 mcg total) by mouth daily, Disp: 30 tablet, Rfl: 4    Magnesium 400 MG CAPS, Take 400 mg by mouth daily, Disp: , Rfl:     Omega-3 Fatty Acids (FISH OIL) 1,000 mg, Take 1,000 mg by mouth daily, Disp: , Rfl:     ondansetron (ZOFRAN-ODT) 8 mg disintegrating tablet, Take by mouth every 8 (eight) hours as needed, Disp: , Rfl:     Review of Systems   Constitutional: Positive for fatigue  Negative for activity change, appetite change, chills, diaphoresis, fever and unexpected weight change  HENT: Negative for trouble swallowing and voice change  Eyes: Negative for visual disturbance  Respiratory: Negative for shortness of breath  Cardiovascular: Negative for chest pain and palpitations  Gastrointestinal: Negative for abdominal pain, constipation and diarrhea  Endocrine: Negative for cold intolerance, heat intolerance, polydipsia, polyphagia and polyuria  Genitourinary: Negative for frequency and menstrual problem  Musculoskeletal: Negative for arthralgias and myalgias  Skin: Negative for rash  Allergic/Immunologic: Negative for food allergies  Neurological: Positive for headaches  Negative for dizziness and tremors  Hematological: Negative for adenopathy  Psychiatric/Behavioral: Positive for sleep disturbance  All other systems reviewed and are negative  Physical Exam:  Body mass index is 26 79 kg/m²  /90   Pulse 62   Ht 5' 6" (1 676 m)   Wt 75 3 kg (166 lb)   BMI 26 79 kg/m²     Wt Readings from Last 3 Encounters:   12/12/18 75 3 kg (166 lb)   10/31/18 74 8 kg (164 lb 12 8 oz)   10/18/18 73 8 kg (162 lb 9 6 oz)       Physical Exam   Constitutional: She is oriented to person, place, and time  She appears well-developed and well-nourished  No distress  HENT:   Head: Normocephalic and atraumatic  Eyes: Pupils are equal, round, and reactive to light  Conjunctivae are normal    Neck: Normal range of motion  Neck supple  No thyromegaly present  Cardiovascular: Normal rate, regular rhythm and normal heart sounds  Pulmonary/Chest: Effort normal and breath sounds normal  No respiratory distress  She has no wheezes  She has no rales  Abdominal: Soft  Bowel sounds are normal  She exhibits no distension  There is no tenderness  Musculoskeletal: Normal range of motion  She exhibits no edema  Neurological: She is alert and oriented to person, place, and time  Skin: Skin is warm and dry  Psychiatric: She has a normal mood and affect  Vitals reviewed  Labs:       Lab Results   Component Value Date    CREATININE 0 90 09/13/2018    CREATININE 0 76 06/03/2017    CREATININE 0 93 06/02/2017    BUN 13 09/13/2018     06/07/2015    K 4 4 09/13/2018     09/13/2018    CO2 27 09/13/2018     eGFR   Date Value Ref Range Status   09/13/2018 74 ml/min/1 73sq m Final       Lab Results   Component Value Date    CHOL 176 06/07/2015    HDL 64 (H) 07/12/2018    TRIG 64 07/12/2018       Lab Results   Component Value Date    ALT 28 06/02/2017    AST 23 06/02/2017    ALKPHOS 102 06/02/2017    BILITOT 0 8 06/07/2015       Lab Results   Component Value Date    FREET4 0 73 (L) 11/14/2018         Impression & Plan:    Problem List Items Addressed This Visit     Thyroid nodule - Primary     Stable colloid cyst, doesn't need follow up         Vitamin D deficiency     Continue Supplements  Thyroiditis     Hyperthyroid phase has resolved and she was started on levothyroxine for symptomatic/persistent hypothyroidism  Other specified hypothyroidism     Continue levothyroxine  She has order to repeat lab testing in the next few weeks after starting medication  No orders of the defined types were placed in this encounter  There are no Patient Instructions on file for this visit  Discussed with the patient and all questioned fully answered  She will call me if any problems arise  Follow-up appointment in 6 months       Counseled patient on diagnostic results, prognosis, risk and benefit of treatment options, instruction for management, importance of treatment compliance, Risk  factor reduction and impressions      Ankit Rhodes PA-C

## 2018-12-13 ENCOUNTER — TELEPHONE (OUTPATIENT)
Dept: NEUROLOGY | Facility: CLINIC | Age: 53
End: 2018-12-13

## 2018-12-13 DIAGNOSIS — G43.709 CHRONIC MIGRAINE WITHOUT AURA WITHOUT STATUS MIGRAINOSUS, NOT INTRACTABLE: Primary | ICD-10-CM

## 2018-12-13 RX ORDER — RIZATRIPTAN BENZOATE 10 MG/1
10 TABLET ORAL ONCE AS NEEDED
Qty: 9 TABLET | Refills: 0 | Status: SHIPPED | OUTPATIENT
Start: 2018-12-13 | End: 2019-06-13 | Stop reason: SDUPTHER

## 2018-12-13 RX ORDER — DEXAMETHASONE 1 MG
1 TABLET ORAL
Qty: 5 TABLET | Refills: 0 | Status: SHIPPED | OUTPATIENT
Start: 2018-12-13 | End: 2019-03-11 | Stop reason: HOSPADM

## 2018-12-13 NOTE — TELEPHONE ENCOUNTER
Take rizatriptan now and may repeat in 2 hours    Limit of 3 a week or 9 a month  Decadron 1mg for 5 days in am

## 2018-12-13 NOTE — TELEPHONE ENCOUNTER
When did migraine start? Yesterday around 12:30 am  Location/Description: sharp pain-"started on top of my head and then both temples"  Pain scale: 3/10 now but yesterday it's 10/10  Associated symptoms:nausea  Precipitating factors: "I'm not sure"  Recently dx w/ hypothyroid last month, started on Synthroid-being followed by endo  Alleviating factors: otc aleve 2 tabs in am yesterday and 2 tylenol yesterday around 6 am-slightly effective  Took 3 motrin this morning 5 minutes ago  prescription-zofran prescribed by gastro  What medications have you tried for this migraine headache? PREVENTIVE: [place] Shoulders, tylenol    Educated the patient that we do not recommend they take any OTC med more than 3 days in any 1 week due to medication over use headache  Pt verbalized understanding    Current migraine medications are confirmed as: Mag 400 mg daily  Vit D 50,000 once a week prescribed by pcp  Not taking daily  Fish oil daily    Medications tried in the past? Tried decadron (steroid) x 5 days in May  Tried olanzapine but had side effects (dry mouth and anxious)  Not tried depakote       Follow up appt scheduled 2/22/19 @ [facility]        [phone]

## 2018-12-13 NOTE — ASSESSMENT & PLAN NOTE
Continue levothyroxine  She has order to repeat lab testing in the next few weeks after starting medication

## 2018-12-13 NOTE — ASSESSMENT & PLAN NOTE
Hyperthyroid phase has resolved and she was started on levothyroxine for symptomatic/persistent hypothyroidism

## 2018-12-13 NOTE — TELEPHONE ENCOUNTER
Pt is requesting an excuse for work for yesterday and today  Pt plans to return to work tomorrow  Okay to generate?     Clinical team: fax to [phone] (patient employer) [employer]

## 2018-12-27 ENCOUNTER — APPOINTMENT (OUTPATIENT)
Dept: LAB | Facility: CLINIC | Age: 53
End: 2018-12-27
Payer: COMMERCIAL

## 2018-12-27 ENCOUNTER — TRANSCRIBE ORDERS (OUTPATIENT)
Dept: URGENT CARE | Facility: CLINIC | Age: 53
End: 2018-12-27

## 2018-12-27 DIAGNOSIS — E06.9 THYROIDITIS, UNSPECIFIED: Primary | ICD-10-CM

## 2018-12-27 LAB
T4 FREE SERPL-MCNC: 1 NG/DL (ref 0.76–1.46)
TSH SERPL DL<=0.05 MIU/L-ACNC: 1.9 UIU/ML (ref 0.36–3.74)

## 2018-12-27 PROCEDURE — 84443 ASSAY THYROID STIM HORMONE: CPT

## 2018-12-27 PROCEDURE — 84439 ASSAY OF FREE THYROXINE: CPT

## 2018-12-27 PROCEDURE — 36415 COLL VENOUS BLD VENIPUNCTURE: CPT

## 2018-12-28 ENCOUNTER — TELEPHONE (OUTPATIENT)
Dept: INTERNAL MEDICINE CLINIC | Facility: CLINIC | Age: 53
End: 2018-12-28

## 2018-12-28 DIAGNOSIS — B37.9 YEAST INFECTION: Primary | ICD-10-CM

## 2018-12-28 RX ORDER — FLUCONAZOLE 150 MG/1
150 TABLET ORAL ONCE
Qty: 1 TABLET | Refills: 1 | Status: SHIPPED | OUTPATIENT
Start: 2018-12-28 | End: 2018-12-28

## 2018-12-30 NOTE — PROGRESS NOTES
Please call the patient regarding labs - tsh improved - continue levothyroxine at current dose - repeat tsh and free t4 in 3 months

## 2018-12-31 ENCOUNTER — TELEPHONE (OUTPATIENT)
Dept: GASTROENTEROLOGY | Facility: CLINIC | Age: 53
End: 2018-12-31

## 2018-12-31 ENCOUNTER — TELEPHONE (OUTPATIENT)
Dept: ENDOCRINOLOGY | Facility: CLINIC | Age: 53
End: 2018-12-31

## 2018-12-31 DIAGNOSIS — E03.9 HYPOTHYROIDISM, UNSPECIFIED TYPE: Primary | ICD-10-CM

## 2018-12-31 DIAGNOSIS — K21.9 GASTROESOPHAGEAL REFLUX DISEASE, ESOPHAGITIS PRESENCE NOT SPECIFIED: ICD-10-CM

## 2018-12-31 RX ORDER — DEXLANSOPRAZOLE 60 MG/1
60 CAPSULE, DELAYED RELEASE ORAL DAILY
Qty: 90 CAPSULE | Refills: 0 | Status: SHIPPED | OUTPATIENT
Start: 2018-12-31 | End: 2019-02-13 | Stop reason: SDUPTHER

## 2018-12-31 NOTE — TELEPHONE ENCOUNTER
----- Message from Ritu Hackett MD sent at 12/29/2018  8:58 PM EST -----  Please call the patient regarding labs - tsh improved - continue levothyroxine at current dose - repeat tsh and free t4 in 3 months

## 2019-01-28 ENCOUNTER — HOSPITAL ENCOUNTER (OUTPATIENT)
Dept: MAMMOGRAPHY | Facility: HOSPITAL | Age: 54
Discharge: HOME | End: 2019-01-28
Attending: OBSTETRICS & GYNECOLOGY
Payer: COMMERCIAL

## 2019-01-28 VITALS — HEIGHT: 66 IN | BODY MASS INDEX: 26.68 KG/M2 | WEIGHT: 166 LBS

## 2019-01-28 DIAGNOSIS — Z12.39 SCREENING FOR MALIGNANT NEOPLASM OF BREAST: ICD-10-CM

## 2019-01-28 PROCEDURE — 77063 BREAST TOMOSYNTHESIS BI: CPT

## 2019-01-28 PROCEDURE — 77067 SCR MAMMO BI INCL CAD: CPT

## 2019-01-31 ENCOUNTER — OFFICE VISIT (OUTPATIENT)
Dept: OBGYN CLINIC | Facility: MEDICAL CENTER | Age: 54
End: 2019-01-31
Payer: COMMERCIAL

## 2019-01-31 VITALS
BODY MASS INDEX: 26.52 KG/M2 | HEIGHT: 66 IN | WEIGHT: 165 LBS | DIASTOLIC BLOOD PRESSURE: 80 MMHG | SYSTOLIC BLOOD PRESSURE: 110 MMHG

## 2019-01-31 DIAGNOSIS — R23.2 HOT FLASHES: ICD-10-CM

## 2019-01-31 DIAGNOSIS — N95.1 PERIMENOPAUSE: ICD-10-CM

## 2019-01-31 DIAGNOSIS — N94.10 DYSPAREUNIA IN FEMALE: Primary | ICD-10-CM

## 2019-01-31 PROCEDURE — 99214 OFFICE O/P EST MOD 30 MIN: CPT | Performed by: NURSE PRACTITIONER

## 2019-01-31 NOTE — PROGRESS NOTES
Assessment Diagnoses and all orders for this visit:    Dyspareunia in female    Perimenopause    Hot flashes         Plan: Pt  Wants to try otc coconut oil for lubrication with intercourse  If this is not effective will call me for rx for estrace to be called to her pharmacy  We discussed options for hot flashes, perimenopause, vaginal atrophy with aging, and options for treating sxs to               improve quality of life  25 mins was spent with her, of which > 50% was counseling and coordination of care  Subjective   Donte Rosales is a 48 y o  female here for a problem visit  Patient is complaining of pain with sex and hot flashes for the past few months  Her cc is dyspareunia  Has not tried otc lubricants b/c she feels wet enough  Patient Active Problem List   Diagnosis    Ambulatory dysfunction    Hyperlipidemia    Migraine without aura and without status migrainosus, not intractable    Other insomnia    Abnormal uterine bleeding (AUB)    Suspected sleep apnea    Hypersomnia    Anxiety    Hot flashes    Ataxia    Atopic dermatitis    Constipation    Gastroparesis    Hamstring tendonitis of left thigh    Iliotibial band syndrome of left side    Lumbar disc herniation with radiculopathy    Menopausal disorder    Piriformis syndrome, left    Thyroid nodule    Vitamin D deficiency    Throat pain    Abnormal TSH    Thyroiditis    Other specified hypothyroidism       Gynecologic History  Patient's last menstrual period was 07/31/2018  The current method of family planning is  btl       Past Medical History:   Diagnosis Date    Ataxia     Blood in urine     GERD (gastroesophageal reflux disease)     Hyperlipidemia     Irritable bowel syndrome     Mild cervical dysplasia     Pituitary adenoma ([address] )     Pregnancy     1     Uterine leiomyoma      Past Surgical History:   Procedure Laterality Date    ABDOMINAL SURGERY      CHOLECYSTECTOMY      COLONOSCOPY      COLPOSCOPY      onset: 2/4/08    ESOPHAGOGASTRODUODENOSCOPY      LAPAROSCOPY      DE COLONOSCOPY FLX DX W/COLLJ SPEC WHEN PFRMD N/A 2/16/2018    Procedure: COLONOSCOPY;  Surgeon: Clara Prabhakar DO;  Location: [facility];  Service: Gastroenterology    TUBAL LIGATION       Family History   Problem Relation Age of Onset    Hyperlipidemia Mother     Hypertension Mother     Hashimoto's thyroiditis Mother     Breast cancer Paternal Grandmother     Heart disease Family     Diabetes Family     Thyroid disease Family     Hypertension Brother     Hashimoto's thyroiditis Brother     Breast cancer Paternal Aunt     Ovarian cancer Paternal Aunt      Social History     Social History    Marital status: /Civil Union     Spouse name: N/A    Number of children: N/A    Years of education: N/A     Occupational History    Not on file       Social History Main Topics    Smoking status: Never Smoker    Smokeless tobacco: Never Used    Alcohol use No    Drug use: No    Sexual activity: Yes     Partners: Male     Birth control/ protection: Female Sterilization     Other Topics Concern    Not on file     Social History Narrative    Always uses sunscreen    Caffeine use    Dental care, regularly    Lives independently with spouse    Uses seatbelts         Allergies   Allergen Reactions    Bactrim [Sulfamethoxazole-Trimethoprim] Other (See Comments)     lightheaded       Current Outpatient Prescriptions:     Ascorbic Acid (VITAMIN C) 1000 MG tablet, Take 1,000 mg by mouth daily, Disp: , Rfl:     Cholecalciferol (VITAMIN D3) 44965 units CAPS, Take 1 capsule (50,000 Units total) by mouth once a week, Disp: 12 capsule, Rfl: 3    dexlansoprazole (DEXILANT) 60 MG capsule, Take 1 capsule (60 mg total) by mouth daily for 90 days, Disp: 90 capsule, Rfl: 0    levothyroxine 50 mcg tablet, Take 1 tablet (50 mcg total) by mouth daily, Disp: 30 tablet, Rfl: 4    Magnesium 400 MG CAPS, Take 400 mg by mouth daily, Disp: , Rfl:     Omega-3 Fatty Acids (FISH OIL) 1,000 mg, Take 1,000 mg by mouth daily, Disp: , Rfl:     ondansetron (ZOFRAN-ODT) 8 mg disintegrating tablet, Take by mouth every 8 (eight) hours as needed, Disp: , Rfl:     dexamethasone (DECADRON) 1 mg tablet, Take 1 tablet (1 mg total) by mouth daily with breakfast (Patient not taking: Reported on 1/31/2019 ), Disp: 5 tablet, Rfl: 0    rizatriptan (MAXALT) 10 MG tablet, Take 1 tablet (10 mg total) by mouth once as needed for migraine for up to 1 dose May repeat in 2 hours if needed (Patient not taking: Reported on 1/31/2019 ), Disp: 9 tablet, Rfl: 0    Review of Systems  Constitutional :no fever, feels well, no tiredness, no recent weight gain or loss  ENT: no ear ache, no loss of hearing, no nosebleeds or nasal discharge, no sore throat or hoarseness  Cardiovascular: no complaints of slow or fast heart beat, no chest pain, no palpitations, no leg claudication or lower extremity edema  Respiratory: no complaints of shortness of shortness of breath, no BOLDEN  Breasts:no complaints of breast pain, breast lump, or nipple discharge  Gastrointestinal: no complaints of abdominal pain, constipation, nausea, vomiting, or diarrhea or bloody stools  Genitourinary : no complaints of dysuria, incontinence, pelvic pain, no dysmenorrhea, vaginal discharge or abnormal vaginal bleeding and as noted in HPI  Musculoskeletal: no complaints of arthralgia, no myalgia, no joint swelling or stiffness, no limb pain or swelling    Integumentary: no complaints of skin rash or lesion, itching or dry skin  Neurological: no complaints of headache, no confusion, no numbness or tingling, no dizziness or fainting     Objective     /80   Ht 5' 6" (1 676 m)   Wt 74 8 kg (165 lb)   LMP 07/31/2018   BMI 26 63 kg/m²     General:   appears stated age, cooperative, alert normal mood and affect   Abdomen: soft, non-tender, without masses or organomegaly Vulva: normal   Vagina: atrophic changes   Urethra: normal   Cervix: Normal, no discharge     Psychiatric orientation to person, place, and time: normal  mood and affect: normal

## 2019-02-13 DIAGNOSIS — K21.9 GASTROESOPHAGEAL REFLUX DISEASE, ESOPHAGITIS PRESENCE NOT SPECIFIED: ICD-10-CM

## 2019-02-14 DIAGNOSIS — E03.9 HYPOTHYROIDISM, UNSPECIFIED TYPE: ICD-10-CM

## 2019-02-14 RX ORDER — LEVOTHYROXINE SODIUM 0.05 MG/1
50 TABLET ORAL DAILY
Qty: 90 TABLET | Refills: 1 | Status: SHIPPED | OUTPATIENT
Start: 2019-02-14 | End: 2019-08-19 | Stop reason: SDUPTHER

## 2019-02-21 NOTE — PROGRESS NOTES
[address] [facility]  PATIENT:  Jamilah Oliveira  MRN:  [id number]  :  1965  DATE OF SERVICE:  2019      Assessment/Plan:        Problem List Items Addressed This Visit        Cardiovascular and Mediastinum    Migraine without aura and without status migrainosus, not intractable - Primary    Relevant Medications    ketorolac (TORADOL) 10 mg tablet          Preventive:   - Continue doing magnesium oxide 400mg in am daily  Abortive:   - at the onset of mild headache patient's take Aleve  If this fails patients take rizatriptan  If this fails patient is to call us so we can have patient either go to urgent care or call an something to help abort the headache  I will also send out Toradol 10 mg which she can take conjunction with Zofran and Benadryl 50 mg at home to break the headache         History of Present Illness: We had the pleasure of evaluating Lanre Crain in neurological  follow-up today  As you know,  she is a 48 y o  right handed female  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    PREVENTIVE:   - magnesium oxide 400 mg     ABORTIVE:  - motrin, tylenol, ketorolac  - has try Decadron in olanzapine to break headache and that did help  -rizatriptan    Non-Medical/Alternative Treatments used in the past for headaches: Chiropractic adjustment    What is your current pain level - 10     How often do the headaches occur -   2018: March: 3 headaches, April: 4 headaches, May: 13 headache (decadron and olanzapine given),  - none, July- 4 headaches, Au headache, September: 3 headaches, October: one (needed message), November: 5 headaches, December: 7 headache (one headaches lasted for 4 day)  2019 Jan: none, Feb: two mild headaches 4-5 and took aleve      What time of the day do the headaches start - usually when she wakes up in the middle of the night or in the morning  How long do the headaches last -  Would last all day and they can last all day with out magnesium       Are you ever headache free - yes now    Where are they located - frontal   What is the intensity of pain - 4/10 and they use to be 8-9/10  Any warning prior to headache and how long do they last -  none  Describe your usual headache - Throbbing,  Pressure, aching, dull    Associated symptoms:   · Decrease of appetite, nausea   · Photophobia, phonophobia  -    light-headed or dizzy   · prefer to be alone and in a dark room, unable to work    Headache are worse if the patient:  no  Headache trigger: menstruation       Past Medical History:   Diagnosis Date    Ataxia     Blood in urine     GERD (gastroesophageal reflux disease)     Hyperlipidemia     Irritable bowel syndrome     Mild cervical dysplasia     Pituitary adenoma ([facility] [address] )     Pregnancy     1     Uterine leiomyoma        Patient Active Problem List   Diagnosis    Ambulatory dysfunction    Hyperlipidemia    Migraine without aura and without status migrainosus, not intractable    Other insomnia    Abnormal uterine bleeding (AUB)    Suspected sleep apnea    Hypersomnia    Anxiety    Hot flashes    Ataxia    Atopic dermatitis    Constipation    Gastroparesis    Hamstring tendonitis of left thigh    Iliotibial band syndrome of left side    Lumbar disc herniation with radiculopathy    Menopausal disorder    Piriformis syndrome, left    Thyroid nodule    Vitamin D deficiency    Throat pain    Abnormal TSH    Thyroiditis    Other specified hypothyroidism       Medications:      Current Outpatient Medications   Medication Sig Dispense Refill    Ascorbic Acid (VITAMIN C) 1000 MG tablet Take 1,000 mg by mouth daily      Cholecalciferol (VITAMIN D3) 77928 units CAPS Take 1 capsule (50,000 Units total) by mouth once a week 12 capsule 3    DEXILANT 60 MG capsule Take 1 capsule (60 mg total) by mouth daily for 90 days 90 capsule 0    fluconazole (DIFLUCAN) 150 mg tablet Take 1 tablet by mouth as needed  1    levothyroxine 50 mcg tablet Take 1 tablet (50 mcg total) by mouth daily 90 tablet 1    Magnesium 400 MG CAPS Take 400 mg by mouth daily      Omega-3 Fatty Acids (FISH OIL) 1,000 mg Take 1,000 mg by mouth daily      ondansetron (ZOFRAN-ODT) 8 mg disintegrating tablet Take 8 mg by mouth every 8 (eight) hours as needed       rizatriptan (MAXALT) 10 MG tablet Take 1 tablet (10 mg total) by mouth once as needed for migraine for up to 1 dose May repeat in 2 hours if needed 9 tablet 0    dexamethasone (DECADRON) 1 mg tablet Take 1 tablet (1 mg total) by mouth daily with breakfast (Patient not taking: Reported on 1/31/2019 ) 5 tablet 0    ketorolac (TORADOL) 10 mg tablet 1 tabs at onset of migraine, t i d  P r n  Take with food/milk/antacid  10 tablet 0     No current facility-administered medications for this visit  Allergies:       Allergies   Allergen Reactions    Bactrim [Sulfamethoxazole-Trimethoprim] Other (See Comments)     lightheaded       Family History:     Family History   Problem Relation Age of Onset    Hyperlipidemia Mother     Hypertension Mother     Hashimoto's thyroiditis Mother     Breast cancer Paternal Grandmother     Heart disease Family     Diabetes Family     Thyroid disease Family     Hypertension Brother     Hashimoto's thyroiditis Brother     Breast cancer Paternal Aunt     Ovarian cancer Paternal Aunt        Social History:     Social History     Socioeconomic History    Marital status: /Civil Union     Spouse name: Not on file    Number of children: Not on file    Years of education: Not on file    Highest education level: Not on file   Occupational History    Not on file   Social Needs    Financial resource strain: Not on file    Food insecurity:     Worry: Not on file     Inability: Not on file    Transportation needs:     Medical: Not on file     Non-medical: Not on file   Tobacco Use    Smoking status: Never Smoker    Smokeless tobacco: Never Used   Substance and Sexual Activity    Alcohol use: No    Drug use: No    Sexual activity: Yes     Partners: Male     Birth control/protection: Female Sterilization   Lifestyle    Physical activity:     Days per week: Not on file     Minutes per session: Not on file    Stress: Not on file   Relationships    Social connections:     Talks on phone: Not on file     Gets together: Not on file     Attends Jewish service: Not on file     Active member of club or organization: Not on file     Attends meetings of clubs or organizations: Not on file     Relationship status: Not on file    Intimate partner violence:     Fear of current or ex partner: Not on file     Emotionally abused: Not on file     Physically abused: Not on file     Forced sexual activity: Not on file   Other Topics Concern    Not on file   Social History Narrative    Always uses sunscreen    Caffeine use    Dental care, regularly    Lives independently with spouse    Uses seatbelts         Objective:   Physical Exam:                                                                   Vitals:            /80 (BP Location: Left arm, Patient Position: Sitting, Cuff Size: Standard)   Pulse 78   Ht 5' 6" (1 676 m)   Wt 76 kg (167 lb 8 oz)   BMI 27 04 kg/m²   BP Readings from Last 3 Encounters:   02/22/19 102/80   01/31/19 110/80   12/12/18 126/90     Pulse Readings from Last 3 Encounters:   02/22/19 78   12/12/18 62   10/31/18 73            CONSTITUTIONAL: Well developed, well nourished, well groomed  No dysmorphic features       Eyes:  PERRLA, EOM normal Neck:  Normal ROM, neck supple  HEENT:  Normocephalic atraumatic  No meningismus  Oropharynx is clear and moist  No oral mucosal lesions  Chest:  Respirations regular and unlabored  Cardiovascular:  Distal extremities warm without palpable edema or tenderness, no observed significant swelling  Musculoskeletal:  Full range of motion  Skin:  warm and dry   Psychiatric:  Normal behavior and appropriate affect        Neurological Examination:   Mental status/cognitive function: Orientated to time, place and person  Cranial Nerves: 2 to 12 intact  Motor Exam:  5/5 upper and lower extremity  Sensory: grossly intact light touch in all extremities  Reflexes: 2/4 throughout  Coordination: Finger nose finger intact bilaterally, no tremor noted  Gait: steady casual and tandem gait  Romberg Negative  Review of Systems:   Review of Systems   Constitutional: Negative  HENT: Negative  Eyes: Negative  Respiratory: Negative  Cardiovascular: Positive for chest pain  Gastrointestinal: Positive for constipation and nausea  Endocrine: Positive for cold intolerance and heat intolerance  Genitourinary: Negative  Musculoskeletal: Negative  Skin: Negative  Allergic/Immunologic: Negative  Neurological: Positive for dizziness (Occasionally ), light-headedness and headaches  Hematological: Bruises/bleeds easily  Psychiatric/Behavioral: Positive for sleep disturbance (Trouble staying asleep )  I personally reviewed and updated the ROS that was entered by the medical assistant       I have spent 30 minutes with Patient  today in which greater than 50% of this time was spent in counseling/coordination of care regarding Intructions for management, Patient and family education, Importance of tx compliance, Risk factor reductions, Impressions and Plan of care as above        Author:  Kamille Quiroz MD 2/22/2019 2:41 PM

## 2019-02-22 ENCOUNTER — OFFICE VISIT (OUTPATIENT)
Dept: NEUROLOGY | Facility: CLINIC | Age: 54
End: 2019-02-22
Payer: COMMERCIAL

## 2019-02-22 VITALS
WEIGHT: 167.5 LBS | HEART RATE: 78 BPM | BODY MASS INDEX: 26.92 KG/M2 | DIASTOLIC BLOOD PRESSURE: 80 MMHG | HEIGHT: 66 IN | SYSTOLIC BLOOD PRESSURE: 102 MMHG

## 2019-02-22 DIAGNOSIS — G43.009 MIGRAINE WITHOUT AURA AND WITHOUT STATUS MIGRAINOSUS, NOT INTRACTABLE: Primary | ICD-10-CM

## 2019-02-22 PROCEDURE — 99214 OFFICE O/P EST MOD 30 MIN: CPT | Performed by: PSYCHIATRY & NEUROLOGY

## 2019-02-22 RX ORDER — KETOROLAC TROMETHAMINE 10 MG/1
TABLET, FILM COATED ORAL
Qty: 10 TABLET | Refills: 0 | Status: SHIPPED | OUTPATIENT
Start: 2019-02-22 | End: 2019-07-25 | Stop reason: SDUPTHER

## 2019-02-22 RX ORDER — FLUCONAZOLE 150 MG/1
1 TABLET ORAL AS NEEDED
Refills: 1 | COMMUNITY
Start: 2018-12-28 | End: 2019-03-11 | Stop reason: HOSPADM

## 2019-02-22 NOTE — PATIENT INSTRUCTIONS
Preventive:   - continue doing magnesium oxide 400mg in am daily  She has done really well with this  Abortive:   - at the onset of mild headache patient's take Aleve  If this fails patients take rizatriptan  If this fails patient is to call us so we can have patient either go to urgent care or call an something to help abort the headache    I will also send out Toradol 10 mg which she can take conjunction with Zofran and Benadryl 50 mg at home to break the headache

## 2019-03-12 ENCOUNTER — OFFICE VISIT (OUTPATIENT)
Dept: GASTROENTEROLOGY | Facility: HOSPITAL | Age: 54
End: 2019-03-12
Payer: COMMERCIAL

## 2019-03-12 VITALS
TEMPERATURE: 98.9 F | HEART RATE: 80 BPM | BODY MASS INDEX: 27 KG/M2 | DIASTOLIC BLOOD PRESSURE: 72 MMHG | HEIGHT: 66 IN | SYSTOLIC BLOOD PRESSURE: 111 MMHG | WEIGHT: 168 LBS

## 2019-03-12 DIAGNOSIS — K31.84 GASTROPARESIS: ICD-10-CM

## 2019-03-12 DIAGNOSIS — R10.11 RUQ PAIN: ICD-10-CM

## 2019-03-12 DIAGNOSIS — K59.09 OTHER CONSTIPATION: Primary | ICD-10-CM

## 2019-03-12 DIAGNOSIS — K83.4 SPHINCTER OF ODDI DYSFUNCTION: ICD-10-CM

## 2019-03-12 DIAGNOSIS — R11.0 NAUSEA: ICD-10-CM

## 2019-03-12 PROCEDURE — 99214 OFFICE O/P EST MOD 30 MIN: CPT | Performed by: PHYSICIAN ASSISTANT

## 2019-03-12 RX ORDER — POLYETHYLENE GLYCOL 3350 17 G/17G
17 POWDER, FOR SOLUTION ORAL DAILY
Qty: 578 G | Refills: 3 | Status: SHIPPED | OUTPATIENT
Start: 2019-03-12 | End: 2019-11-08

## 2019-03-12 NOTE — PROGRESS NOTES
Assessment/Plan:     Diagnoses and all orders for this visit:    Nausea  Her main complaint is nausea  She states it happens daily  It does not seem associated with food but can be  Is also associated with exercise  She is taking Zofran with some relief however the medication wears off and her symptoms return  She does have a history of gastroparesis, see below and this may be causing some of her symptoms but may also be related to peptic ulcer disease, dyspepsia, hypothyroid  Her TSH was recently extremely elevated, this has returned to normal in December however she has not had a checked since  Therefore would recommend upper endoscopy for further evaluation     -     Case request operating room: ESOPHAGOGASTRODUODENOSCOPY (EGD); Standing  -     Case request operating room: ESOPHAGOGASTRODUODENOSCOPY (EGD)    Other constipation  She has a history of chronic constipation however she has been reluctant to take medications on a regular basis  We did discuss daily MiraLax to help with improving her constipation, abdominal cramping and bloating and ultimately may also improve her nausea  She is agreeable  I told her if she still is in going regularly she can increase to twice a day  -     polyethylene glycol (GLYCOLAX) powder; Take 17 g by mouth daily    Gastroparesis  She has a history of gastroparesis however reviewing her gastric emptying study she did have a delay at 2:00 a m  and mild at 3 but by 4 it was within normal limits  Given the fact that this study is more than 11years old I did discuss with her that her symptoms may not be from gastroparesis as mentioned above  If her endoscopy is negative I would recommend repeating her gastric emptying study prior to initiating Reglan due to its potential side effects  RUQ pain  She has had right upper quadrant pain for some time  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  Some likely that this is contributing    It is more likely functional in nature/related to her constipation and hopefully with improvement of her symptoms this pain will also improve  Will see her back after her procedure to discuss all results  Subjective:      Patient ID: Harshal York is a 48 y o  female  HPI     This is a follow-up for nausea, GERD, abdominal pain and constipation  She states she takes Dexilant 60 mg daily for her reflux with good control of her symptoms  Unfortunately she does continue with persistent nausea but no vomiting  She states that this can be prior to eating or with eating but is also unrelated including when she is out jogging  She was previously diagnosed with gastroparesis in 2014 however her gastric emptying study showed only had delay at the 2 hour [place], 3 hour was close to normal and at 4 hours was within normal limits  She states she was on Reglan for a period of time after that study was good control of her nausea and her symptoms eventually improved and she stopped taking it altogether  When we began seeing her last year it was recommended that she try Zofran instead which she has been taking  She states unfortunately that the 8 mg dose does not last her all of 8 hours and her symptoms returned  She also describes pain in the right upper quadrant  She denies any precipitating factors  He can even come on at night  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 and underwent ERCP with sphincterotomy in the past   She also complains of constipation  It was previously recommended that she take MiraLax however she states she has been reluctant to rely" on this medication but recently started taking it every other day and is having a bowel movement approximately every 3 days  She states if she does go that long she does get significant bloating and cramping  She did have a colonoscopy in February 2018 which was within normal limits and recommended to be repeated in 10 years time    She did have a upper endoscopy around 2014, these results are not available  Patient Active Problem List   Diagnosis    Ambulatory dysfunction    Hyperlipidemia    Migraine without aura and without status migrainosus, not intractable    Other insomnia    Abnormal uterine bleeding (AUB)    Suspected sleep apnea    Hypersomnia    Anxiety    Hot flashes    Ataxia    Atopic dermatitis    Other constipation    Gastroparesis    Hamstring tendonitis of left thigh    Iliotibial band syndrome of left side    Lumbar disc herniation with radiculopathy    Menopausal disorder    Piriformis syndrome, left    Thyroid nodule    Vitamin D deficiency    Throat pain    Abnormal TSH    Thyroiditis    Other specified hypothyroidism    Nausea    RUQ pain    Sphincter of Oddi dysfunction     Allergies   Allergen Reactions    Bactrim [Sulfamethoxazole-Trimethoprim] Other (See Comments)     lightheaded     Current Outpatient Medications on File Prior to Visit   Medication Sig    Ascorbic Acid (VITAMIN C) 1000 MG tablet Take 1,000 mg by mouth daily    Cholecalciferol (VITAMIN D3) 73667 units CAPS Take 1 capsule (50,000 Units total) by mouth once a week    DEXILANT 60 MG capsule Take 1 capsule (60 mg total) by mouth daily for 90 days    ketorolac (TORADOL) 10 mg tablet 1 tabs at onset of migraine, t i d  P r n  Take with food/milk/antacid   levothyroxine 50 mcg tablet Take 1 tablet (50 mcg total) by mouth daily    Magnesium 400 MG CAPS Take 400 mg by mouth daily    Omega-3 Fatty Acids (FISH OIL) 1,000 mg Take 1,000 mg by mouth daily    ondansetron (ZOFRAN-ODT) 8 mg disintegrating tablet Take 8 mg by mouth every 8 (eight) hours as needed     rizatriptan (MAXALT) 10 MG tablet Take 1 tablet (10 mg total) by mouth once as needed for migraine for up to 1 dose May repeat in 2 hours if needed     No current facility-administered medications on file prior to visit        Family History   Problem Relation Age of Onset  Hyperlipidemia Mother     Hypertension Mother     Hashimoto's thyroiditis Mother     Breast cancer Paternal Grandmother     Heart disease Family     Diabetes Family     Thyroid disease Family     Hypertension Brother     Hashimoto's thyroiditis Brother     Breast cancer Paternal Aunt     Ovarian cancer Paternal Aunt      Past Medical History:   Diagnosis Date    Ataxia     Blood in urine     GERD (gastroesophageal reflux disease)     Hyperlipidemia     Irritable bowel syndrome     Mild cervical dysplasia     Pituitary adenoma ([facility] [address] )     Pregnancy     1     Uterine leiomyoma      Social History     Socioeconomic History    Marital status: /Civil Union     Spouse name: None    Number of children: None    Years of education: None    Highest education level: None   Occupational History    None   Social Needs    Financial resource strain: None    Food insecurity:     Worry: None     Inability: None    Transportation needs:     Medical: None     Non-medical: None   Tobacco Use    Smoking status: Never Smoker    Smokeless tobacco: Never Used   Substance and Sexual Activity    Alcohol use: No    Drug use: No    Sexual activity: Yes     Partners: Male     Birth control/protection: Female Sterilization   Lifestyle    Physical activity:     Days per week: None     Minutes per session: None    Stress: None   Relationships    Social connections:     Talks on phone: None     Gets together: None     Attends Mormon service: None     Active member of club or organization: None     Attends meetings of clubs or organizations: None     Relationship status: None    Intimate partner violence:     Fear of current or ex partner: None     Emotionally abused: None     Physically abused: None     Forced sexual activity: None   Other Topics Concern    None   Social History Narrative    Always uses sunscreen    Caffeine use    Dental care, regularly    Lives independently with spouse    Uses seatbelts     Past Surgical History:   Procedure Laterality Date    ABDOMINAL SURGERY      CHOLECYSTECTOMY      COLONOSCOPY      COLPOSCOPY      onset: 2/4/08    ESOPHAGOGASTRODUODENOSCOPY      LAPAROSCOPY      OH COLONOSCOPY FLX DX W/COLLJ SPEC WHEN PFRMD N/A 2/16/2018    Procedure: COLONOSCOPY;  Surgeon: Ana Horn DO;  Location: [facility];  Service: Gastroenterology    TUBAL LIGATION           Review of Systems   Constitutional: Negative for fever  Gastrointestinal: Positive for abdominal pain, constipation and nausea  Negative for diarrhea and vomiting  Genitourinary: Negative for dysuria, frequency and hematuria  Musculoskeletal: Positive for arthralgias  Negative for myalgias  Neurological: Negative for headaches  All other systems reviewed and are negative  Objective:      /72 (BP Location: Right arm, Patient Position: Sitting, Cuff Size: Adult)   Pulse 80   Temp 98 9 °F (37 2 °C) (Tympanic)   Ht 5' 6" (1 676 m)   Wt 76 2 kg (168 lb)   BMI 27 12 kg/m²          Physical Exam   Constitutional: She is oriented to person, place, and time  She appears well-developed and well-nourished  HENT:   Head: Normocephalic and atraumatic  Eyes: Conjunctivae and EOM are normal    Neck: Normal range of motion  Cardiovascular: Normal rate and regular rhythm  Pulmonary/Chest: Effort normal and breath sounds normal    Abdominal: Soft  Bowel sounds are normal  She exhibits no distension  There is no tenderness  Musculoskeletal: Normal range of motion  Neurological: She is alert and oriented to person, place, and time  Skin: Skin is warm and dry  Psychiatric: She has a normal mood and affect   Her behavior is normal

## 2019-03-12 NOTE — H&P (VIEW-ONLY)
Assessment/Plan:     Diagnoses and all orders for this visit:    Nausea  Her main complaint is nausea  She states it happens daily  It does not seem associated with food but can be  Is also associated with exercise  She is taking Zofran with some relief however the medication wears off and her symptoms return  She does have a history of gastroparesis, see below and this may be causing some of her symptoms but may also be related to peptic ulcer disease, dyspepsia, hypothyroid  Her TSH was recently extremely elevated, this has returned to normal in December however she has not had a checked since  Therefore would recommend upper endoscopy for further evaluation     -     Case request operating room: ESOPHAGOGASTRODUODENOSCOPY (EGD); Standing  -     Case request operating room: ESOPHAGOGASTRODUODENOSCOPY (EGD)    Other constipation  She has a history of chronic constipation however she has been reluctant to take medications on a regular basis  We did discuss daily MiraLax to help with improving her constipation, abdominal cramping and bloating and ultimately may also improve her nausea  She is agreeable  I told her if she still is in going regularly she can increase to twice a day  -     polyethylene glycol (GLYCOLAX) powder; Take 17 g by mouth daily    Gastroparesis  She has a history of gastroparesis however reviewing her gastric emptying study she did have a delay at 2:00 a m  and mild at 3 but by 4 it was within normal limits  Given the fact that this study is more than 11years old I did discuss with her that her symptoms may not be from gastroparesis as mentioned above  If her endoscopy is negative I would recommend repeating her gastric emptying study prior to initiating Reglan due to its potential side effects  RUQ pain  She has had right upper quadrant pain for some time  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  Some likely that this is contributing    It is more likely functional in nature/related to her constipation and hopefully with improvement of her symptoms this pain will also improve  Will see her back after her procedure to discuss all results  Subjective:      Patient ID: Malena Scott is a 48 y o  female  HPI     This is a follow-up for nausea, GERD, abdominal pain and constipation  She states she takes Dexilant 60 mg daily for her reflux with good control of her symptoms  Unfortunately she does continue with persistent nausea but no vomiting  She states that this can be prior to eating or with eating but is also unrelated including when she is out jogging  She was previously diagnosed with gastroparesis in 2014 however her gastric emptying study showed only had delay at the 2 hour maria [place] carmen, 3 hour was close to normal and at 4 hours was within normal limits  She states she was on Reglan for a period of time after that study was good control of her nausea and her symptoms eventually improved and she stopped taking it altogether  When we began seeing her last year it was recommended that she try Zofran instead which she has been taking  She states unfortunately that the 8 mg dose does not last her all of 8 hours and her symptoms returned  She also describes pain in the right upper quadrant  She denies any precipitating factors  He can even come on at night  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 and underwent ERCP with sphincterotomy in the past   She also complains of constipation  It was previously recommended that she take MiraLax however she states she has been reluctant to rely" on this medication but recently started taking it every other day and is having a bowel movement approximately every 3 days  She states if she does go that long she does get significant bloating and cramping  She did have a colonoscopy in February 2018 which was within normal limits and recommended to be repeated in 10 years time    She did have a upper endoscopy around 2014, these results are not available  Patient Active Problem List   Diagnosis    Ambulatory dysfunction    Hyperlipidemia    Migraine without aura and without status migrainosus, not intractable    Other insomnia    Abnormal uterine bleeding (AUB)    Suspected sleep apnea    Hypersomnia    Anxiety    Hot flashes    Ataxia    Atopic dermatitis    Other constipation    Gastroparesis    Hamstring tendonitis of left thigh    Iliotibial band syndrome of left side    Lumbar disc herniation with radiculopathy    Menopausal disorder    Piriformis syndrome, left    Thyroid nodule    Vitamin D deficiency    Throat pain    Abnormal TSH    Thyroiditis    Other specified hypothyroidism    Nausea    RUQ pain    Sphincter of Oddi dysfunction     Allergies   Allergen Reactions    Bactrim [Sulfamethoxazole-Trimethoprim] Other (See Comments)     lightheaded     Current Outpatient Medications on File Prior to Visit   Medication Sig    Ascorbic Acid (VITAMIN C) 1000 MG tablet Take 1,000 mg by mouth daily    Cholecalciferol (VITAMIN D3) 92737 units CAPS Take 1 capsule (50,000 Units total) by mouth once a week    DEXILANT 60 MG capsule Take 1 capsule (60 mg total) by mouth daily for 90 days    ketorolac (TORADOL) 10 mg tablet 1 tabs at onset of migraine, t i d  P r n  Take with food/milk/antacid   levothyroxine 50 mcg tablet Take 1 tablet (50 mcg total) by mouth daily    Magnesium 400 MG CAPS Take 400 mg by mouth daily    Omega-3 Fatty Acids (FISH OIL) 1,000 mg Take 1,000 mg by mouth daily    ondansetron (ZOFRAN-ODT) 8 mg disintegrating tablet Take 8 mg by mouth every 8 (eight) hours as needed     rizatriptan (MAXALT) 10 MG tablet Take 1 tablet (10 mg total) by mouth once as needed for migraine for up to 1 dose May repeat in 2 hours if needed     No current facility-administered medications on file prior to visit        Family History   Problem Relation Age of Onset  Hyperlipidemia Mother     Hypertension Mother     Hashimoto's thyroiditis Mother     Breast cancer Paternal Grandmother     Heart disease Family     Diabetes Family     Thyroid disease Family     Hypertension Brother     Hashimoto's thyroiditis Brother     Breast cancer Paternal Aunt     Ovarian cancer Paternal Aunt      Past Medical History:   Diagnosis Date    Ataxia     Blood in urine     GERD (gastroesophageal reflux disease)     Hyperlipidemia     Irritable bowel syndrome     Mild cervical dysplasia     Pituitary adenoma ([facility] [address] )     Pregnancy     1     Uterine leiomyoma      Social History     Socioeconomic History    Marital status: /Civil Union     Spouse name: None    Number of children: None    Years of education: None    Highest education level: None   Occupational History    None   Social Needs    Financial resource strain: None    Food insecurity:     Worry: None     Inability: None    Transportation needs:     Medical: None     Non-medical: None   Tobacco Use    Smoking status: Never Smoker    Smokeless tobacco: Never Used   Substance and Sexual Activity    Alcohol use: No    Drug use: No    Sexual activity: Yes     Partners: Male     Birth control/protection: Female Sterilization   Lifestyle    Physical activity:     Days per week: None     Minutes per session: None    Stress: None   Relationships    Social connections:     Talks on phone: None     Gets together: None     Attends Restoration service: None     Active member of club or organization: None     Attends meetings of clubs or organizations: None     Relationship status: None    Intimate partner violence:     Fear of current or ex partner: None     Emotionally abused: None     Physically abused: None     Forced sexual activity: None   Other Topics Concern    None   Social History Narrative    Always uses sunscreen    Caffeine use    Dental care, regularly    Lives independently with spouse    Uses seatbelts     Past Surgical History:   Procedure Laterality Date    ABDOMINAL SURGERY      CHOLECYSTECTOMY      COLONOSCOPY      COLPOSCOPY      onset: 2/4/08    ESOPHAGOGASTRODUODENOSCOPY      LAPAROSCOPY      CO COLONOSCOPY FLX DX W/COLLJ SPEC WHEN PFRMD N/A 2/16/2018    Procedure: COLONOSCOPY;  Surgeon: Nirav Sotelo DO;  Location: [facility];  Service: Gastroenterology    TUBAL LIGATION           Review of Systems   Constitutional: Negative for fever  Gastrointestinal: Positive for abdominal pain, constipation and nausea  Negative for diarrhea and vomiting  Genitourinary: Negative for dysuria, frequency and hematuria  Musculoskeletal: Positive for arthralgias  Negative for myalgias  Neurological: Negative for headaches  All other systems reviewed and are negative  Objective:      /72 (BP Location: Right arm, Patient Position: Sitting, Cuff Size: Adult)   Pulse 80   Temp 98 9 °F (37 2 °C) (Tympanic)   Ht 5' 6" (1 676 m)   Wt 76 2 kg (168 lb)   BMI 27 12 kg/m²          Physical Exam   Constitutional: She is oriented to person, place, and time  She appears well-developed and well-nourished  HENT:   Head: Normocephalic and atraumatic  Eyes: Conjunctivae and EOM are normal    Neck: Normal range of motion  Cardiovascular: Normal rate and regular rhythm  Pulmonary/Chest: Effort normal and breath sounds normal    Abdominal: Soft  Bowel sounds are normal  She exhibits no distension  There is no tenderness  Musculoskeletal: Normal range of motion  Neurological: She is alert and oriented to person, place, and time  Skin: Skin is warm and dry  Psychiatric: She has a normal mood and affect   Her behavior is normal

## 2019-03-19 ENCOUNTER — HOSPITAL ENCOUNTER (OUTPATIENT)
Facility: HOSPITAL | Age: 54
Setting detail: OUTPATIENT SURGERY
Discharge: HOME | End: 2019-03-19
Attending: INTERNAL MEDICINE | Admitting: INTERNAL MEDICINE
Payer: COMMERCIAL

## 2019-03-19 ENCOUNTER — ANESTHESIA (OUTPATIENT)
Dept: PERIOP | Facility: HOSPITAL | Age: 54
End: 2019-03-19
Payer: COMMERCIAL

## 2019-03-19 ENCOUNTER — ANESTHESIA EVENT (OUTPATIENT)
Dept: PERIOP | Facility: HOSPITAL | Age: 54
End: 2019-03-19
Payer: COMMERCIAL

## 2019-03-19 VITALS
DIASTOLIC BLOOD PRESSURE: 87 MMHG | SYSTOLIC BLOOD PRESSURE: 135 MMHG | HEART RATE: 69 BPM | TEMPERATURE: 98 F | RESPIRATION RATE: 20 BRPM | OXYGEN SATURATION: 100 %

## 2019-03-19 DIAGNOSIS — R10.11 RUQ PAIN: Primary | ICD-10-CM

## 2019-03-19 DIAGNOSIS — R11.0 NAUSEA: ICD-10-CM

## 2019-03-19 PROCEDURE — 88305 TISSUE EXAM BY PATHOLOGIST: CPT | Performed by: PATHOLOGY

## 2019-03-19 PROCEDURE — 43239 EGD BIOPSY SINGLE/MULTIPLE: CPT | Performed by: INTERNAL MEDICINE

## 2019-03-19 RX ORDER — ALBUTEROL SULFATE 2.5 MG/3ML
2.5 SOLUTION RESPIRATORY (INHALATION) ONCE AS NEEDED
Status: DISCONTINUED | OUTPATIENT
Start: 2019-03-19 | End: 2019-03-19 | Stop reason: HOSPADM

## 2019-03-19 RX ORDER — LIDOCAINE HYDROCHLORIDE 20 MG/ML
INJECTION, SOLUTION EPIDURAL; INFILTRATION; INTRACAUDAL; PERINEURAL AS NEEDED
Status: DISCONTINUED | OUTPATIENT
Start: 2019-03-19 | End: 2019-03-19 | Stop reason: SURG

## 2019-03-19 RX ORDER — ONDANSETRON 2 MG/ML
4 INJECTION INTRAMUSCULAR; INTRAVENOUS ONCE AS NEEDED
Status: COMPLETED | OUTPATIENT
Start: 2019-03-19 | End: 2019-03-19

## 2019-03-19 RX ORDER — PROPOFOL 10 MG/ML
INJECTION, EMULSION INTRAVENOUS AS NEEDED
Status: DISCONTINUED | OUTPATIENT
Start: 2019-03-19 | End: 2019-03-19 | Stop reason: SURG

## 2019-03-19 RX ORDER — SODIUM CHLORIDE, SODIUM LACTATE, POTASSIUM CHLORIDE, CALCIUM CHLORIDE 600; 310; 30; 20 MG/100ML; MG/100ML; MG/100ML; MG/100ML
125 INJECTION, SOLUTION INTRAVENOUS CONTINUOUS
Status: DISCONTINUED | OUTPATIENT
Start: 2019-03-19 | End: 2019-03-19 | Stop reason: HOSPADM

## 2019-03-19 RX ORDER — ONDANSETRON 2 MG/ML
4 INJECTION INTRAMUSCULAR; INTRAVENOUS ONCE AS NEEDED
Status: DISCONTINUED | OUTPATIENT
Start: 2019-03-19 | End: 2019-03-19 | Stop reason: HOSPADM

## 2019-03-19 RX ORDER — SODIUM CHLORIDE, SODIUM LACTATE, POTASSIUM CHLORIDE, CALCIUM CHLORIDE 600; 310; 30; 20 MG/100ML; MG/100ML; MG/100ML; MG/100ML
100 INJECTION, SOLUTION INTRAVENOUS CONTINUOUS
Status: DISCONTINUED | OUTPATIENT
Start: 2019-03-19 | End: 2019-03-19 | Stop reason: HOSPADM

## 2019-03-19 RX ADMIN — ONDANSETRON HYDROCHLORIDE 4 MG: 2 INJECTION, SOLUTION INTRAMUSCULAR; INTRAVENOUS at 11:23

## 2019-03-19 RX ADMIN — PROPOFOL 50 MG: 10 INJECTION, EMULSION INTRAVENOUS at 10:45

## 2019-03-19 RX ADMIN — LIDOCAINE HYDROCHLORIDE 80 MG: 20 INJECTION, SOLUTION EPIDURAL; INFILTRATION; INTRACAUDAL; PERINEURAL at 10:35

## 2019-03-19 RX ADMIN — PROPOFOL 50 MG: 10 INJECTION, EMULSION INTRAVENOUS at 10:40

## 2019-03-19 RX ADMIN — PROPOFOL 50 MG: 10 INJECTION, EMULSION INTRAVENOUS at 10:50

## 2019-03-19 RX ADMIN — SODIUM CHLORIDE, SODIUM LACTATE, POTASSIUM CHLORIDE, AND CALCIUM CHLORIDE: .6; .31; .03; .02 INJECTION, SOLUTION INTRAVENOUS at 10:00

## 2019-03-19 RX ADMIN — PROPOFOL 100 MG: 10 INJECTION, EMULSION INTRAVENOUS at 10:35

## 2019-03-19 NOTE — ANESTHESIA PREPROCEDURE EVALUATION
Review of Systems/Medical History      No history of anesthetic complications     Cardiovascular  Exercise tolerance (METS): >4,  Hyperlipidemia,    Pulmonary  Negative pulmonary ROS        GI/Hepatic    GERD ,        Negative  ROS        Endo/Other  History of thyroid disease , hypothyroidism,      GYN       Hematology  Negative hematology ROS      Musculoskeletal  Negative musculoskeletal ROS        Neurology  Negative neurology ROS   Headaches,    Psychology   Anxiety,              Physical Exam    Airway    Mallampati score: I  TM Distance: >3 FB  Neck ROM: full     Dental   No notable dental hx     Cardiovascular  Cardiovascular exam normal    Pulmonary  Pulmonary exam normal     Other Findings        Anesthesia Plan  ASA Score- 2     Anesthesia Type- IV sedation with anesthesia with ASA Monitors  Additional Monitors:   Airway Plan:         Plan Factors-  Patient did not smoke on day of surgery  Induction- intravenous  Postoperative Plan-     Informed Consent- Anesthetic plan and risks discussed with patient  I personally reviewed this patient with the CRNA  Discussed and agreed on the Anesthesia Plan with the CRNA  Karrie Nissen

## 2019-03-19 NOTE — DISCHARGE INSTR - AVS FIRST PAGE
OPERATIVE REPORT  PATIENT NAME: Mikayla Reynaga [place]    :  1965  MRN: [id number]  Pt Location: [facility] RO[facility] 03    SURGERY DATE: 3/19/2019    Surgeon(s) and Role:     Laura Alvarez MD - Primary    Preop Diagnosis:  Nausea [R11 0]    Post-Op Diagnosis Codes:     * Nausea [R11 0]    Procedure(s) (LRB):  ESOPHAGOGASTRODUODENOSCOPY (EGD) (N/A)    Specimen(s):  ID Type Source Tests Collected by Time Destination   1 : biopsy R/O celiac, retrieved by cold biopsy forceps Tissue Duodenum TISSUE EXAM Dacia Reynaga MD 3/19/2019 1050    2 : antrum and body biopsy, retrieved by cold biopsy forceps Tissue Stomach TISSUE EXAM Dacia Reynaga MD 3/19/2019 1051    3 : biopsy of gastric polyp,  retrieved by cold biopsy forceps Tissue Stomach TISSUE EXAM Dacia Reynaga MD 3/19/2019 1053      ESOPHAGOGASTRODUODENOSCOPY    PROCEDURE: EGD/ Biopsy    INDICATIONS: Dyspepsia and Nausea    POST-OP DIAGNOSIS: See the impression below    SEDATION: Monitored anesthesia care, check anesthesia records    CONSENT:  Informed consent was obtained for the procedure, including sedation after explaining the risks and benefits of the procedure  Risks including but not limited to bleeding, perforation, infection, aspiration were discussed in detail  Also explained about less than 100% sensitivity with the exam and other alternatives  PREPARATION:   EKG tracing, pulse oximetry, blood pressure were monitored throughout the procedure  Patient was identified by myself both verbally and by visual inspection of ID band  DESCRIPTION:   Patient was placed in the left lateral decubitus position and was sedated with the above medication  The gastroscope was introduced in to the oropharynx and the esophagus was intubated under direct visualization  Scope was passed down the esophagus up to 2nd part of the duodenum   A careful inspection was made as the gastroscope was withdrawn, including a retroflexed view of the stomach; findings and interventions are described below      FINDINGS:    #1  Esophagus and GEJ- the esophagus was normal   The GE junction was noted at 38 cm  A 1 cm sliding hiatal hernia was seen  #2  Stomach- moderate refluxed bile was seen in the gastric body  a few polyps were seen in the gastric body  These appeared to be fundic gland polyps  Biopsies were done  Biopsies were also done of the normal gastric mucosa in the antrum and the body  #3  Duodenum- normal duodenal bulb and 2nd portion  Random biopsies were done  IMPRESSIONS:      1  Normal esophagus  2  1 cm sliding hiatal hernia  3  Moderate amount of reflux bile in the gastric body might cause some symptoms  4  Gastric fundic gland polyps  Biopsies were done  5  Normal duodenum  Biopsies done to rule out celiac disease  RECOMMENDATIONS:     1  Follow up with the results of the biopsy Dr Joshua Price 2 weeks  2  Consider Gaviscon 2 to 3 times a day to see if this would help  3  Follow up with Dr Russell Gomez  COMPLICATIONS:  None; patient tolerated the procedure well            DISPOSITION: PACU           CONDITION: Stable

## 2019-03-19 NOTE — OP NOTE
OPERATIVE REPORT  PATIENT NAME: Virgilio Crain    :  1965  MRN: [id number]  Pt Location: [facility] RO[facility] 03    SURGERY DATE: 3/19/2019    Surgeon(s) and Role:     Mike Dorman MD - Primary    Preop Diagnosis:  Nausea [R11 0]    Post-Op Diagnosis Codes:     * Nausea [R11 0]    Procedure(s) (LRB):  ESOPHAGOGASTRODUODENOSCOPY (EGD) (N/A)    Specimen(s):  ID Type Source Tests Collected by Time Destination   1 : biopsy R/O celiac, retrieved by cold biopsy forceps Tissue Duodenum TISSUE EXAM Belle Parnell MD 3/19/2019 1050    2 : antrum and body biopsy, retrieved by cold biopsy forceps Tissue Stomach TISSUE EXAM Belle Parnell MD 3/19/2019 1051    3 : biopsy of gastric polyp,  retrieved by cold biopsy forceps Tissue Stomach TISSUE EXAM Belle Parnell MD 3/19/2019 1053      ESOPHAGOGASTRODUODENOSCOPY    PROCEDURE: EGD/ Biopsy    INDICATIONS: Dyspepsia and Nausea    POST-OP DIAGNOSIS: See the impression below    SEDATION: Monitored anesthesia care, check anesthesia records    CONSENT:  Informed consent was obtained for the procedure, including sedation after explaining the risks and benefits of the procedure  Risks including but not limited to bleeding, perforation, infection, aspiration were discussed in detail  Also explained about less than 100% sensitivity with the exam and other alternatives  PREPARATION:   EKG tracing, pulse oximetry, blood pressure were monitored throughout the procedure  Patient was identified by myself both verbally and by visual inspection of ID band  DESCRIPTION:   Patient was placed in the left lateral decubitus position and was sedated with the above medication  The gastroscope was introduced in to the oropharynx and the esophagus was intubated under direct visualization  Scope was passed down the esophagus up to 2nd part of the duodenum   A careful inspection was made as the gastroscope was withdrawn, including a retroflexed view of the stomach; findings and interventions are described below      FINDINGS:    #1  Esophagus and GEJ- the esophagus was normal   The GE junction was noted at 38 cm  A 1 cm sliding hiatal hernia was seen  #2  Stomach- moderate refluxed bile was seen in the gastric body  a few polyps were seen in the gastric body  These appeared to be fundic gland polyps  Biopsies were done  Biopsies were also done of the normal gastric mucosa in the antrum and the body  #3  Duodenum- normal duodenal bulb and 2nd portion  Random biopsies were done  IMPRESSIONS:      1  Normal esophagus  2  1 cm sliding hiatal hernia  3  Moderate amount of reflux bile in the gastric body might cause some symptoms  4  Gastric fundic gland polyps  Biopsies were done  5  Normal duodenum  Biopsies done to rule out celiac disease  RECOMMENDATIONS:     1  Follow up with the results of the biopsy Dr Rosa Samayoa 2 weeks  2  Consider Gaviscon 2 to 3 times a day to see if this would help  3  Follow up with Dr Trinidad Dancer  COMPLICATIONS:  None; patient tolerated the procedure well            DISPOSITION: PACU           CONDITION: Stable

## 2019-03-20 ENCOUNTER — TELEPHONE (OUTPATIENT)
Dept: GASTROENTEROLOGY | Facility: AMBULARY SURGERY CENTER | Age: 54
End: 2019-03-20

## 2019-03-20 NOTE — TELEPHONE ENCOUNTER
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

## 2019-03-26 ENCOUNTER — TELEPHONE (OUTPATIENT)
Dept: GASTROENTEROLOGY | Facility: AMBULARY SURGERY CENTER | Age: 54
End: 2019-03-26

## 2019-04-02 ENCOUNTER — TRANSCRIBE ORDERS (OUTPATIENT)
Dept: URGENT CARE | Facility: CLINIC | Age: 54
End: 2019-04-02

## 2019-04-02 ENCOUNTER — LAB (OUTPATIENT)
Dept: LAB | Facility: CLINIC | Age: 54
End: 2019-04-02
Payer: COMMERCIAL

## 2019-04-02 DIAGNOSIS — E03.9 HYPOTHYROIDISM, UNSPECIFIED TYPE: ICD-10-CM

## 2019-04-02 LAB
T4 FREE SERPL-MCNC: 0.97 NG/DL (ref 0.76–1.46)
TSH SERPL DL<=0.05 MIU/L-ACNC: 2.19 UIU/ML (ref 0.36–3.74)

## 2019-04-02 PROCEDURE — 84443 ASSAY THYROID STIM HORMONE: CPT

## 2019-04-02 PROCEDURE — 36415 COLL VENOUS BLD VENIPUNCTURE: CPT

## 2019-04-02 PROCEDURE — 84439 ASSAY OF FREE THYROXINE: CPT

## 2019-04-02 PROCEDURE — 83001 ASSAY OF GONADOTROPIN (FSH): CPT | Performed by: NURSE PRACTITIONER

## 2019-04-04 ENCOUNTER — TELEPHONE (OUTPATIENT)
Dept: INTERNAL MEDICINE CLINIC | Facility: CLINIC | Age: 54
End: 2019-04-04

## 2019-04-04 ENCOUNTER — OFFICE VISIT (OUTPATIENT)
Dept: OBGYN CLINIC | Facility: MEDICAL CENTER | Age: 54
End: 2019-04-04
Payer: COMMERCIAL

## 2019-04-04 VITALS — SYSTOLIC BLOOD PRESSURE: 110 MMHG | WEIGHT: 169 LBS | DIASTOLIC BLOOD PRESSURE: 74 MMHG | BODY MASS INDEX: 27.28 KG/M2

## 2019-04-04 DIAGNOSIS — N89.8 VAGINAL DISCHARGE: ICD-10-CM

## 2019-04-04 DIAGNOSIS — R23.2 HOT FLASHES: Primary | ICD-10-CM

## 2019-04-04 LAB — FSH SERPL-ACNC: 95 MIU/ML

## 2019-04-04 PROCEDURE — 87660 TRICHOMONAS VAGIN DIR PROBE: CPT | Performed by: NURSE PRACTITIONER

## 2019-04-04 PROCEDURE — 87480 CANDIDA DNA DIR PROBE: CPT | Performed by: NURSE PRACTITIONER

## 2019-04-04 PROCEDURE — 99214 OFFICE O/P EST MOD 30 MIN: CPT | Performed by: NURSE PRACTITIONER

## 2019-04-04 PROCEDURE — 87510 GARDNER VAG DNA DIR PROBE: CPT | Performed by: NURSE PRACTITIONER

## 2019-04-04 RX ORDER — PAROXETINE 7.5 MG/1
CAPSULE ORAL
Qty: 30 CAPSULE | Refills: 2 | Status: SHIPPED | OUTPATIENT
Start: 2019-04-04 | End: 2019-04-16 | Stop reason: CLARIF

## 2019-04-06 LAB
CANDIDA RRNA VAG QL PROBE: NEGATIVE
G VAGINALIS RRNA GENITAL QL PROBE: POSITIVE
T VAGINALIS RRNA GENITAL QL PROBE: NEGATIVE

## 2019-04-08 DIAGNOSIS — N76.0 BV (BACTERIAL VAGINOSIS): Primary | ICD-10-CM

## 2019-04-08 DIAGNOSIS — B96.89 BV (BACTERIAL VAGINOSIS): Primary | ICD-10-CM

## 2019-04-08 RX ORDER — METRONIDAZOLE 500 MG/1
500 TABLET ORAL EVERY 12 HOURS SCHEDULED
Qty: 14 TABLET | Refills: 0 | Status: SHIPPED | OUTPATIENT
Start: 2019-04-08 | End: 2019-04-15 | Stop reason: ALTCHOICE

## 2019-04-11 DIAGNOSIS — R10.11 RUQ PAIN: Primary | ICD-10-CM

## 2019-04-15 ENCOUNTER — OFFICE VISIT (OUTPATIENT)
Dept: ENDOCRINOLOGY | Facility: CLINIC | Age: 54
End: 2019-04-15
Payer: COMMERCIAL

## 2019-04-15 VITALS
DIASTOLIC BLOOD PRESSURE: 70 MMHG | BODY MASS INDEX: 27.48 KG/M2 | HEIGHT: 66 IN | WEIGHT: 171 LBS | HEART RATE: 66 BPM | SYSTOLIC BLOOD PRESSURE: 110 MMHG

## 2019-04-15 DIAGNOSIS — E03.9 HYPOTHYROIDISM, UNSPECIFIED TYPE: Primary | ICD-10-CM

## 2019-04-15 DIAGNOSIS — E04.1 THYROID NODULE: ICD-10-CM

## 2019-04-15 DIAGNOSIS — R23.2 HOT FLASHES: ICD-10-CM

## 2019-04-15 DIAGNOSIS — E55.9 VITAMIN D DEFICIENCY: ICD-10-CM

## 2019-04-15 DIAGNOSIS — E03.8 OTHER SPECIFIED HYPOTHYROIDISM: ICD-10-CM

## 2019-04-15 PROCEDURE — 99214 OFFICE O/P EST MOD 30 MIN: CPT | Performed by: PHYSICIAN ASSISTANT

## 2019-04-16 DIAGNOSIS — R23.2 HOT FLASHES: Primary | ICD-10-CM

## 2019-04-16 PROBLEM — E06.9 THYROIDITIS: Status: RESOLVED | Noted: 2018-10-31 | Resolved: 2019-04-16

## 2019-04-16 PROBLEM — D35.2 PITUITARY ADENOMA (HCC): Status: RESOLVED | Noted: 2019-04-16 | Resolved: 2019-04-16

## 2019-04-16 PROBLEM — R79.89 ABNORMAL TSH: Status: RESOLVED | Noted: 2018-10-18 | Resolved: 2019-04-16

## 2019-04-17 ENCOUNTER — OFFICE VISIT (OUTPATIENT)
Dept: GASTROENTEROLOGY | Facility: HOSPITAL | Age: 54
End: 2019-04-17
Payer: COMMERCIAL

## 2019-04-17 VITALS
HEIGHT: 66 IN | SYSTOLIC BLOOD PRESSURE: 107 MMHG | WEIGHT: 170 LBS | DIASTOLIC BLOOD PRESSURE: 74 MMHG | TEMPERATURE: 98.7 F | BODY MASS INDEX: 27.32 KG/M2 | HEART RATE: 56 BPM

## 2019-04-17 DIAGNOSIS — K59.09 OTHER CONSTIPATION: ICD-10-CM

## 2019-04-17 DIAGNOSIS — R10.11 RUQ PAIN: Primary | ICD-10-CM

## 2019-04-17 DIAGNOSIS — K29.70 GASTRITIS WITHOUT BLEEDING, UNSPECIFIED CHRONICITY, UNSPECIFIED GASTRITIS TYPE: ICD-10-CM

## 2019-04-17 DIAGNOSIS — K31.84 GASTROPARESIS: ICD-10-CM

## 2019-04-17 DIAGNOSIS — K83.4 SPHINCTER OF ODDI DYSFUNCTION: ICD-10-CM

## 2019-04-17 DIAGNOSIS — R11.0 NAUSEA: ICD-10-CM

## 2019-04-17 PROCEDURE — 99214 OFFICE O/P EST MOD 30 MIN: CPT | Performed by: PHYSICIAN ASSISTANT

## 2019-04-17 RX ORDER — DICYCLOMINE HCL 20 MG
20 TABLET ORAL EVERY 6 HOURS
Qty: 60 TABLET | Refills: 2 | Status: SHIPPED | OUTPATIENT
Start: 2019-04-17 | End: 2020-12-16 | Stop reason: SDUPTHER

## 2019-04-17 RX ORDER — PAROXETINE 10 MG/1
10 TABLET, FILM COATED ORAL DAILY
Qty: 30 TABLET | Refills: 2 | Status: SHIPPED | OUTPATIENT
Start: 2019-04-17 | End: 2019-06-15 | Stop reason: ALTCHOICE

## 2019-04-24 ENCOUNTER — TELEPHONE (OUTPATIENT)
Dept: NEUROLOGY | Facility: CLINIC | Age: 54
End: 2019-04-24

## 2019-04-24 DIAGNOSIS — G43.009 MIGRAINE WITHOUT AURA AND WITHOUT STATUS MIGRAINOSUS, NOT INTRACTABLE: Primary | ICD-10-CM

## 2019-04-24 RX ORDER — DIVALPROEX SODIUM 250 MG/1
250 TABLET, EXTENDED RELEASE ORAL
Qty: 8 TABLET | Refills: 0 | Status: SHIPPED | OUTPATIENT
Start: 2019-04-24 | End: 2019-06-13 | Stop reason: SDUPTHER

## 2019-04-24 RX ORDER — DEXAMETHASONE 1 MG
1 TABLET ORAL
Qty: 5 TABLET | Refills: 0 | Status: SHIPPED | OUTPATIENT
Start: 2019-04-24 | End: 2019-06-13 | Stop reason: SDUPTHER

## 2019-05-03 DIAGNOSIS — B37.9 YEAST INFECTION: Primary | ICD-10-CM

## 2019-05-03 RX ORDER — FLUCONAZOLE 150 MG/1
150 TABLET ORAL ONCE
Qty: 1 TABLET | Refills: 0 | Status: SHIPPED | OUTPATIENT
Start: 2019-05-03 | End: 2019-05-03

## 2019-05-29 ENCOUNTER — TELEPHONE (OUTPATIENT)
Dept: INTERNAL MEDICINE CLINIC | Facility: CLINIC | Age: 54
End: 2019-05-29

## 2019-05-29 DIAGNOSIS — N89.8 VAGINAL IRRITATION: Primary | ICD-10-CM

## 2019-05-29 DIAGNOSIS — G89.29 CHRONIC PAIN OF RIGHT KNEE: Primary | ICD-10-CM

## 2019-05-29 DIAGNOSIS — M25.561 CHRONIC PAIN OF RIGHT KNEE: Primary | ICD-10-CM

## 2019-05-29 RX ORDER — NYSTATIN AND TRIAMCINOLONE ACETONIDE 100000; 1 [USP'U]/G; MG/G
OINTMENT TOPICAL
Qty: 30 G | Refills: 0 | Status: SHIPPED | OUTPATIENT
Start: 2019-05-29 | End: 2020-02-20 | Stop reason: ALTCHOICE

## 2019-05-30 ENCOUNTER — APPOINTMENT (OUTPATIENT)
Dept: RADIOLOGY | Facility: CLINIC | Age: 54
End: 2019-05-30
Payer: COMMERCIAL

## 2019-05-30 DIAGNOSIS — M25.561 CHRONIC PAIN OF RIGHT KNEE: ICD-10-CM

## 2019-05-30 DIAGNOSIS — G89.29 CHRONIC PAIN OF RIGHT KNEE: ICD-10-CM

## 2019-05-30 PROCEDURE — 73562 X-RAY EXAM OF KNEE 3: CPT

## 2019-06-11 ENCOUNTER — APPOINTMENT (OUTPATIENT)
Dept: LAB | Facility: CLINIC | Age: 54
End: 2019-06-11
Payer: COMMERCIAL

## 2019-06-11 DIAGNOSIS — E03.9 HYPOTHYROIDISM, UNSPECIFIED TYPE: ICD-10-CM

## 2019-06-11 LAB
T4 FREE SERPL-MCNC: 1.11 NG/DL (ref 0.76–1.46)
TSH SERPL DL<=0.05 MIU/L-ACNC: 2.47 UIU/ML (ref 0.36–3.74)

## 2019-06-11 PROCEDURE — 36415 COLL VENOUS BLD VENIPUNCTURE: CPT

## 2019-06-11 PROCEDURE — 84443 ASSAY THYROID STIM HORMONE: CPT

## 2019-06-11 PROCEDURE — 84439 ASSAY OF FREE THYROXINE: CPT

## 2019-06-12 ENCOUNTER — TELEPHONE (OUTPATIENT)
Dept: ENDOCRINOLOGY | Facility: CLINIC | Age: 54
End: 2019-06-12

## 2019-06-13 ENCOUNTER — TELEPHONE (OUTPATIENT)
Dept: NEUROLOGY | Facility: CLINIC | Age: 54
End: 2019-06-13

## 2019-06-13 DIAGNOSIS — G43.709 CHRONIC MIGRAINE WITHOUT AURA WITHOUT STATUS MIGRAINOSUS, NOT INTRACTABLE: ICD-10-CM

## 2019-06-13 DIAGNOSIS — G43.009 MIGRAINE WITHOUT AURA AND WITHOUT STATUS MIGRAINOSUS, NOT INTRACTABLE: ICD-10-CM

## 2019-06-13 RX ORDER — TOPIRAMATE 25 MG/1
TABLET ORAL
Qty: 100 TABLET | Refills: 2 | Status: SHIPPED | OUTPATIENT
Start: 2019-06-13 | End: 2019-07-25

## 2019-06-13 RX ORDER — RIZATRIPTAN BENZOATE 10 MG/1
10 TABLET ORAL ONCE AS NEEDED
Qty: 9 TABLET | Refills: 0 | Status: SHIPPED | OUTPATIENT
Start: 2019-06-13 | End: 2019-07-25 | Stop reason: SDUPTHER

## 2019-06-13 RX ORDER — DIVALPROEX SODIUM 250 MG/1
250 TABLET, EXTENDED RELEASE ORAL
Qty: 8 TABLET | Refills: 0 | Status: SHIPPED | OUTPATIENT
Start: 2019-06-13 | End: 2019-11-08

## 2019-06-13 RX ORDER — DEXAMETHASONE 1 MG
1 TABLET ORAL
Qty: 5 TABLET | Refills: 0 | Status: SHIPPED | OUTPATIENT
Start: 2019-06-13 | End: 2019-11-08

## 2019-06-15 ENCOUNTER — TRANSCRIBE ORDERS (OUTPATIENT)
Dept: LAB | Facility: MEDICAL CENTER | Age: 54
End: 2019-06-15

## 2019-06-15 ENCOUNTER — APPOINTMENT (OUTPATIENT)
Dept: LAB | Facility: MEDICAL CENTER | Age: 54
End: 2019-06-15
Payer: COMMERCIAL

## 2019-06-15 ENCOUNTER — OFFICE VISIT (OUTPATIENT)
Dept: INTERNAL MEDICINE CLINIC | Facility: CLINIC | Age: 54
End: 2019-06-15
Payer: COMMERCIAL

## 2019-06-15 VITALS
DIASTOLIC BLOOD PRESSURE: 70 MMHG | HEART RATE: 61 BPM | TEMPERATURE: 96.9 F | HEIGHT: 66 IN | OXYGEN SATURATION: 99 % | BODY MASS INDEX: 27.04 KG/M2 | WEIGHT: 168.25 LBS | SYSTOLIC BLOOD PRESSURE: 124 MMHG

## 2019-06-15 DIAGNOSIS — R33.9 RETENTION OF URINE, UNSPECIFIED: Primary | ICD-10-CM

## 2019-06-15 DIAGNOSIS — E55.9 VITAMIN D DEFICIENCY: ICD-10-CM

## 2019-06-15 DIAGNOSIS — G47.00 INSOMNIA, UNSPECIFIED TYPE: ICD-10-CM

## 2019-06-15 DIAGNOSIS — G43.719 INTRACTABLE CHRONIC MIGRAINE WITHOUT AURA AND WITHOUT STATUS MIGRAINOSUS: ICD-10-CM

## 2019-06-15 DIAGNOSIS — R33.9 URINARY RETENTION: Primary | ICD-10-CM

## 2019-06-15 DIAGNOSIS — R33.9 URINARY RETENTION: ICD-10-CM

## 2019-06-15 DIAGNOSIS — G43.719 INTRACTABLE CHRONIC MIGRAINE WITHOUT AURA AND WITHOUT STATUS MIGRAINOSUS: Primary | ICD-10-CM

## 2019-06-15 PROBLEM — G43.009 MIGRAINE WITHOUT AURA AND WITHOUT STATUS MIGRAINOSUS, NOT INTRACTABLE: Status: RESOLVED | Noted: 2018-03-02 | Resolved: 2019-06-15

## 2019-06-15 LAB
ALBUMIN SERPL BCP-MCNC: 3.9 G/DL (ref 3.5–5)
ALP SERPL-CCNC: 91 U/L (ref 46–116)
ALT SERPL W P-5'-P-CCNC: 32 U/L (ref 12–78)
ANION GAP SERPL CALCULATED.3IONS-SCNC: 5 MMOL/L (ref 4–13)
AST SERPL W P-5'-P-CCNC: 23 U/L (ref 5–45)
BACTERIA UR QL AUTO: NORMAL /HPF
BILIRUB SERPL-MCNC: 0.38 MG/DL (ref 0.2–1)
BILIRUB UR QL STRIP: NEGATIVE
BUN SERPL-MCNC: 17 MG/DL (ref 5–25)
CALCIUM SERPL-MCNC: 8.9 MG/DL (ref 8.3–10.1)
CHLORIDE SERPL-SCNC: 107 MMOL/L (ref 100–108)
CLARITY UR: CLEAR
CO2 SERPL-SCNC: 27 MMOL/L (ref 21–32)
COLOR UR: YELLOW
CREAT SERPL-MCNC: 0.93 MG/DL (ref 0.6–1.3)
GFR SERPL CREATININE-BSD FRML MDRD: 71 ML/MIN/1.73SQ M
GLUCOSE P FAST SERPL-MCNC: 82 MG/DL (ref 65–99)
GLUCOSE UR STRIP-MCNC: NEGATIVE MG/DL
HGB UR QL STRIP.AUTO: ABNORMAL
INSULIN SERPL-ACNC: 13.4 MU/L (ref 3–25)
KETONES UR STRIP-MCNC: ABNORMAL MG/DL
LEUKOCYTE ESTERASE UR QL STRIP: NEGATIVE
NITRITE UR QL STRIP: NEGATIVE
NON-SQ EPI CELLS URNS QL MICRO: NORMAL /HPF
PH UR STRIP.AUTO: 6 [PH]
POTASSIUM SERPL-SCNC: 3.8 MMOL/L (ref 3.5–5.3)
PROLACTIN SERPL-MCNC: 15.1 NG/ML
PROT SERPL-MCNC: 7.1 G/DL (ref 6.4–8.2)
PROT UR STRIP-MCNC: NEGATIVE MG/DL
RBC #/AREA URNS AUTO: NORMAL /HPF
SODIUM SERPL-SCNC: 139 MMOL/L (ref 136–145)
SP GR UR STRIP.AUTO: 1.02 (ref 1–1.03)
UROBILINOGEN UR QL STRIP.AUTO: 0.2 E.U./DL
WBC #/AREA URNS AUTO: NORMAL /HPF

## 2019-06-15 PROCEDURE — 83525 ASSAY OF INSULIN: CPT

## 2019-06-15 PROCEDURE — 80053 COMPREHEN METABOLIC PANEL: CPT

## 2019-06-15 PROCEDURE — 81001 URINALYSIS AUTO W/SCOPE: CPT | Performed by: INTERNAL MEDICINE

## 2019-06-15 PROCEDURE — 84146 ASSAY OF PROLACTIN: CPT

## 2019-06-15 PROCEDURE — 99214 OFFICE O/P EST MOD 30 MIN: CPT | Performed by: INTERNAL MEDICINE

## 2019-06-15 PROCEDURE — 36415 COLL VENOUS BLD VENIPUNCTURE: CPT

## 2019-06-15 PROCEDURE — 1036F TOBACCO NON-USER: CPT | Performed by: INTERNAL MEDICINE

## 2019-06-15 PROCEDURE — 3008F BODY MASS INDEX DOCD: CPT | Performed by: INTERNAL MEDICINE

## 2019-06-15 RX ORDER — CHOLECALCIFEROL (VITAMIN D3) 1250 MCG
1 CAPSULE ORAL WEEKLY
Qty: 12 CAPSULE | Refills: 3 | Status: SHIPPED | OUTPATIENT
Start: 2019-06-15 | End: 2019-06-15 | Stop reason: SDUPTHER

## 2019-06-17 RX ORDER — CHOLECALCIFEROL (VITAMIN D3) 1250 MCG
1 CAPSULE ORAL WEEKLY
Qty: 12 CAPSULE | Refills: 3 | Status: SHIPPED | OUTPATIENT
Start: 2019-06-17 | End: 2020-09-14 | Stop reason: SDUPTHER

## 2019-06-18 DIAGNOSIS — G43.709 CHRONIC MIGRAINE WITHOUT AURA WITHOUT STATUS MIGRAINOSUS, NOT INTRACTABLE: Primary | ICD-10-CM

## 2019-06-18 RX ORDER — OLANZAPINE 5 MG/1
TABLET ORAL
Qty: 5 TABLET | Refills: 0 | Status: SHIPPED | OUTPATIENT
Start: 2019-06-18 | End: 2019-08-19 | Stop reason: ALTCHOICE

## 2019-07-10 RX ORDER — OCTISALATE, AVOBENZONE, HOMOSALATE, AND OCTOCRYLENE 29.4; 29.4; 49; 25.48 MG/ML; MG/ML; MG/ML; MG/ML
1 LOTION TOPICAL DAILY
Qty: 30 CAPSULE | Refills: 0 | Status: SHIPPED | COMMUNITY
Start: 2019-07-10 | End: 2019-08-09

## 2019-07-12 RX ORDER — OCTISALATE, AVOBENZONE, HOMOSALATE, AND OCTOCRYLENE 29.4; 29.4; 49; 25.48 MG/ML; MG/ML; MG/ML; MG/ML
1 LOTION TOPICAL DAILY
Qty: 30 CAPSULE | Refills: 0 | Status: SHIPPED | OUTPATIENT
Start: 2019-07-12 | End: 2021-08-11 | Stop reason: ALTCHOICE

## 2019-07-23 NOTE — PROGRESS NOTES
[facility] [facility]  PATIENT:  Clementine Dinh  MRN:  [id number]  :  1965  DATE OF SERVICE:  2019      Assessment/Plan:     Chronic migraine without aura without status migrainosus, not intractable   Preventive:   - continue doing magnesium oxide 400mg in am daily    -Start Venlafaxine 37 5 mg (1 cap) daily in the am for 14 days  Increase to 2 caps (75mg) after that   Abortive:   - at the onset of mild headache patient's take Aleve  - At onset of migraine, patient is to take rizatriptan  May be repeated in 2 hours  If repeated, take Toradol 10 mg which she can take conjunction with Zofran and Benadryl 50 mg at home to break the headache         Problem List Items Addressed This Visit        Cardiovascular and Mediastinum    Chronic migraine without aura without status migrainosus, not intractable - Primary      Preventive:   - continue doing magnesium oxide 400mg in am daily    -Start Venlafaxine 37 5 mg (1 cap) daily in the am for 14 days  Increase to 2 caps (75mg) after that   Abortive:   - at the onset of mild headache patient's take Aleve  - At onset of migraine, patient is to take rizatriptan  May be repeated in 2 hours  If repeated, take Toradol 10 mg which she can take conjunction with Zofran and Benadryl 50 mg at home to break the headache         Relevant Medications    Naproxen Sodium (ALEVE) 220 MG CAPS    ibuprofen (MOTRIN) 200 mg tablet    venlafaxine (EFFEXOR-XR) 37 5 mg 24 hr capsule    ketorolac (TORADOL) 10 mg tablet    rizatriptan (MAXALT) 10 MG tablet      Other Visit Diagnoses     Migraine without aura and without status migrainosus, not intractable        Relevant Medications    Naproxen Sodium (ALEVE) 220 MG CAPS    ibuprofen (MOTRIN) 200 mg tablet    venlafaxine (EFFEXOR-XR) 37 5 mg 24 hr capsule    ketorolac (TORADOL) 10 mg tablet    rizatriptan (MAXALT) 10 MG tablet              History of Present Illness:    We had the pleasure of evaluating Dorrine A Paras in neurological follow up  today for headaches  As you know,  she is a 48 y o  right handedfemale  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    PREVENTIVE:   magnesium oxide 400 mg   Topamax (tingling and mental fogginess)  Venlafaxine     ABORTIVE:  motrin, tylenol, ketorolac, aleve  has try Decadron in olanzapine to break headache and that did help  rizatriptan  Non-Medical/Alternative Treatments used in the past for headaches: Chiropractic adjustment  Headache are worse if the patient:  no  Headache trigger: menstruation    Any warning prior to headache and how long do they last -  none     What is your current pain level - 3-4/10     How often do the headaches occur -   : March: 3 headaches, April: 4 headaches, May: 13 headache (decadron and olanzapine given),  - none, July- 4 headaches, Au headache, September: 3 headaches, October: one (needed message), November: 5 headaches, December: 7 headache (one headaches lasted for 4 day)  2019: none, Feb: two mild headaches 4-5 and took aleve  May and  almost daily  July has been better-last rizatriptan was end of       What time of the day do the headaches start - usually when she wakes up in the middle of the night or in the morning  How long do the headaches last -  Would last all day  Are you ever headache free - yes  Where are they located - frontal   What is the intensity of pain - 4/10 and they use to be 6-7/10  Describe your usual headache - Throbbing,  Pressure, aching, dull  Associated symptoms:   · Decrease of appetite, nausea   · Photophobia, phonophobia  -    light-headed or dizzy   · prefer to be alone and in a dark room, unable to work    Number of days missed per month because of headaches:  Work (or school) days: 0  Social or Family activities: 0      What time of the year do headaches occur more frequently?   no  Have you seen someone else for headaches or pain? No  Have you had trigger point injection performed and how often? No  Have you had Botox injection performed and how often? No   Have you had epidural injections or transforaminal injections performed? No    Have you used CBD or THC for your headaches and how often? No  Are you current pregnant or planning on getting pregnant? No, post-menopausal  Have you ever had any Brain imaging? yes     6/3/2017 MRI brain  Normal unenhanced MRI of the brain  Small bilateral mastoid effusions, left side greater than right  6/3/2017 MRA head  Congenital variants  Otherwise, intact [place]  6/3/2017 MRA carotids  Minimal atherosclerotic disease involving the proximal left internal carotid artery  No evidence of flow-limiting stenosis  No evidence of aneurysm, vascular malformation or vascular cut off  I personally reviewed these images      Past Medical History:   Diagnosis Date    Abnormal TSH 10/18/2018    Ataxia     Blood in urine     GERD (gastroesophageal reflux disease)     Irritable bowel syndrome     Mild cervical dysplasia     Pituitary adenoma ([facility] [address] )     Pregnancy     1     Thyroiditis 10/31/2018    Uterine leiomyoma        Patient Active Problem List   Diagnosis    Ambulatory dysfunction    Hyperlipidemia    Other insomnia    Abnormal uterine bleeding (AUB)    Suspected sleep apnea    Hypersomnia    Anxiety    Hot flashes    Ataxia    Atopic dermatitis    Other constipation    Gastroparesis    Hamstring tendonitis of left thigh    Iliotibial band syndrome of left side    Lumbar disc herniation with radiculopathy    Menopausal disorder    Piriformis syndrome, left    Thyroid nodule    Vitamin D deficiency    Throat pain    Other specified hypothyroidism    Nausea    RUQ pain    Sphincter of Oddi dysfunction    Gastritis    Chronic migraine without aura without status migrainosus, not intractable       Medications:      Current Outpatient Medications   Medication Sig Dispense Refill    Ascorbic Acid (VITAMIN C) 1000 MG tablet Take 1,000 mg by mouth daily      Cholecalciferol (VITAMIN D3) 35510 units CAPS Take 1 capsule (50,000 Units total) by mouth once a week 12 capsule 3    dexlansoprazole (DEXILANT) 60 MG capsule Take 1 capsule by mouth 30 minutes before breakfast  90 capsule 2    dicyclomine (BENTYL) 20 mg tablet Take 1 tablet (20 mg total) by mouth every 6 (six) hours (Patient taking differently: Take 20 mg by mouth as needed ) 60 tablet 2    ibuprofen (MOTRIN) 200 mg tablet Take 600 mg by mouth as needed for mild pain      ketorolac (TORADOL) 10 mg tablet 1 tabs at onset of migraine, t i d  P r n  Take with food/milk/antacid   10 tablet 0    levothyroxine 50 mcg tablet Take 1 tablet (50 mcg total) by mouth daily 90 tablet 1    Magnesium 400 MG CAPS Take 400 mg by mouth daily      Naproxen Sodium (ALEVE) 220 MG CAPS Take 2 capsules by mouth as needed      Omega-3 Fatty Acids (FISH OIL) 1,000 mg Take 1,000 mg by mouth daily      ondansetron (ZOFRAN-ODT) 8 mg disintegrating tablet Take 8 mg by mouth every 8 (eight) hours as needed       Probiotic Product (ALIGN) 4 MG CAPS Take 1 capsule (4 mg total) by mouth daily for 30 days 30 capsule 0    rizatriptan (MAXALT) 10 MG tablet Take 1 tablet (10 mg total) by mouth once as needed for migraine for up to 1 dose May repeat in 2 hours if needed 9 tablet 0    dexamethasone (DECADRON) 1 mg tablet Take 1 tablet (1 mg total) by mouth daily with breakfast (Patient not taking: Reported on 7/25/2019) 5 tablet 0    divalproex sodium (DEPAKOTE ER) 250 mg 24 hr tablet Take 1 tablet (250 mg total) by mouth daily at bedtime 2 tablets at bedtime for 3 days then 1 tablet at bedtime for 2 days (Patient not taking: Reported on 7/25/2019) 8 tablet 0    ergocalciferol (VITAMIN D2) 50,000 units       nystatin-triamcinolone (MYCOLOG-II) ointment Apply externally to affected area twice a day  (Patient not taking: Reported on 6/15/2019) 30 g 0    OLANZapine (ZyPREXA) 5 mg tablet 1 tab at bedtime for 5 days (Patient not taking: Reported on 7/25/2019) 5 tablet 0    polyethylene glycol (GLYCOLAX) powder Take 17 g by mouth daily (Patient not taking: Reported on 7/25/2019) 578 g 3    Probiotic Product (ALIGN) 4 MG CAPS Take 1 capsule (4 mg total) by mouth daily (Patient not taking: Reported on 7/25/2019) 30 capsule 0    venlafaxine (EFFEXOR-XR) 37 5 mg 24 hr capsule Take 1 cap in am for 14 days then increase to 2 caps 60 capsule 3     No current facility-administered medications for this visit  Allergies:       Allergies   Allergen Reactions    Bactrim [Sulfamethoxazole-Trimethoprim] Other (See Comments)     lightheaded       Family History:     Family History   Problem Relation Age of Onset    Hyperlipidemia Mother     Hypertension Mother     Hashimoto's thyroiditis Mother     Breast cancer Paternal Grandmother     Heart disease Family     Diabetes Family     Thyroid disease Family     Hypertension Brother     Hashimoto's thyroiditis Brother     Breast cancer Paternal Aunt     Ovarian cancer Paternal Aunt        Social History:     Social History     Socioeconomic History    Marital status: /Civil Union     Spouse name: Not on file    Number of children: Not on file    Years of education: Not on file    Highest education level: Not on file   Occupational History    Not on file   Social Needs    Financial resource strain: Not on file    Food insecurity:     Worry: Not on file     Inability: Not on file    Transportation needs:     Medical: Not on file     Non-medical: Not on file   Tobacco Use    Smoking status: Never Smoker    Smokeless tobacco: Never Used   Substance and Sexual Activity    Alcohol use: No    Drug use: No    Sexual activity: Yes     Partners: Male     Birth control/protection: Female Sterilization   Lifestyle    Physical activity:     Days per week: Not on file     Minutes per session: Not on file    Stress: Not on file   Relationships    Social connections:     Talks on phone: Not on file     Gets together: Not on file     Attends Congregational service: Not on file     Active member of club or organization: Not on file     Attends meetings of clubs or organizations: Not on file     Relationship status: Not on file    Intimate partner violence:     Fear of current or ex partner: Not on file     Emotionally abused: Not on file     Physically abused: Not on file     Forced sexual activity: Not on file   Other Topics Concern    Not on file   Social History Narrative    Always uses sunscreen    Caffeine use    Dental care, regularly    Lives independently with spouse    Uses seatbelts         Objective:   Physical Exam:                                                                   Vitals:            /76 (BP Location: Left arm, Patient Position: Sitting, Cuff Size: Adult)   Pulse 60   Ht 5' 6" (1 676 m)   Wt 76 9 kg (169 lb 9 6 oz)   BMI 27 37 kg/m²   BP Readings from Last 3 Encounters:   07/25/19 108/76   06/15/19 124/70   04/17/19 107/74     Pulse Readings from Last 3 Encounters:   07/25/19 60   06/15/19 61   04/17/19 56                CONSTITUTIONAL: Well developed, well nourished, well groomed  No dysmorphic features  Eyes:  PERRLA, EOM normal      Neck:  Normal ROM, neck supple  HEENT:  Normocephalic atraumatic  No meningismus  Oropharynx is clear and moist  No oral mucosal lesions  Chest:  Respirations regular and unlabored      Cardiovascular:  Distal extremities warm without palpable edema or tenderness, no observed significant swelling  Musculoskeletal:  Full range of motion  (see below under neurologic exam for evaluation of motor function and gait)   Skin:  warm and dry   Psychiatric:  Normal behavior and appropriate affect        SKULL AND SPINE  ROM: Full range of motion  Tenderness: No focal tenderness    MENTAL STATUS  Orientation: Alert and oriented x 3  Fund of knowledge: Intact  CRANIAL NERVES  II: PERRL  Visual fields full  III, IV, VI: Extraocular movements intact  No nystagmus  V: Facial sensation normal V1-V3  VII: Facial movements normal and symmetric  VIII: Intact hearing bilaterally  IX, X: Palate elevation normal and symmetric  XI: Intact trapezius, SCM strength  XII: Tongue protrudes to the midline    MOTOR (Upper and lower extremities)   Bulk/tone/abnormal movement: Normal muscle bulk and tone  Strength: Strength 5/5 throughout  COORDINATION   Station/Gait: Normal baseline gait  SENSORY  Romberg sign absent  Reflexes:  deep tendon reflexes are 2+/4 throughout, flexor response bilaterally       Review of Systems:   Review of Systems   Constitutional: Negative  HENT: Negative  Eyes: Negative  Respiratory: Negative  Cardiovascular: Negative  Gastrointestinal: Negative  Endocrine: Negative  Genitourinary: Negative  Musculoskeletal: Negative  Skin: Negative  Allergic/Immunologic: Negative  Neurological: Positive for headaches  Hematological: Negative  Psychiatric/Behavioral: Positive for decreased concentration (foggy ) and sleep disturbance (difficulty falling and staying asleep )         I personally reviewed and updated the ROS that was entered by the medical assistant       I have spent 45 minutes with Patient  today in which greater than 50% of this time was spent in counseling/coordination of care regarding Diagnostic results, Prognosis, Risks and benefits of tx options, Intructions for management, Patient and family education, Importance of tx compliance and Risk factor reductions        Author:  Giovanni Li PA-C 7/25/2019 4:03 PM

## 2019-07-24 ENCOUNTER — TELEPHONE (OUTPATIENT)
Dept: GASTROENTEROLOGY | Facility: AMBULARY SURGERY CENTER | Age: 54
End: 2019-07-24

## 2019-07-24 DIAGNOSIS — K21.9 GASTROESOPHAGEAL REFLUX DISEASE, ESOPHAGITIS PRESENCE NOT SPECIFIED: ICD-10-CM

## 2019-07-24 RX ORDER — DEXLANSOPRAZOLE 60 MG/1
CAPSULE, DELAYED RELEASE ORAL
Qty: 90 CAPSULE | Refills: 2 | Status: SHIPPED | OUTPATIENT
Start: 2019-07-24 | End: 2020-07-31

## 2019-07-24 RX ORDER — ERGOCALCIFEROL 1.25 MG/1
CAPSULE ORAL
COMMUNITY
Start: 2019-06-15 | End: 2019-08-19 | Stop reason: SDUPTHER

## 2019-07-24 NOTE — TELEPHONE ENCOUNTER
DR COREAS'S PT    Pt called requesting a refill dexilant  Refill Request for Medication: dexilant    Dose: 60mg    Quantity: 90days     Pharmacy: [facility]

## 2019-07-25 ENCOUNTER — OFFICE VISIT (OUTPATIENT)
Dept: NEUROLOGY | Facility: CLINIC | Age: 54
End: 2019-07-25
Payer: COMMERCIAL

## 2019-07-25 ENCOUNTER — TELEPHONE (OUTPATIENT)
Dept: GASTROENTEROLOGY | Facility: HOSPITAL | Age: 54
End: 2019-07-25

## 2019-07-25 VITALS
BODY MASS INDEX: 27.26 KG/M2 | HEART RATE: 60 BPM | DIASTOLIC BLOOD PRESSURE: 76 MMHG | SYSTOLIC BLOOD PRESSURE: 108 MMHG | HEIGHT: 66 IN | WEIGHT: 169.6 LBS

## 2019-07-25 DIAGNOSIS — G43.709 CHRONIC MIGRAINE WITHOUT AURA WITHOUT STATUS MIGRAINOSUS, NOT INTRACTABLE: Primary | ICD-10-CM

## 2019-07-25 DIAGNOSIS — G43.009 MIGRAINE WITHOUT AURA AND WITHOUT STATUS MIGRAINOSUS, NOT INTRACTABLE: ICD-10-CM

## 2019-07-25 PROCEDURE — 99215 OFFICE O/P EST HI 40 MIN: CPT | Performed by: PHYSICIAN ASSISTANT

## 2019-07-25 RX ORDER — KETOROLAC TROMETHAMINE 10 MG/1
TABLET, FILM COATED ORAL
Qty: 10 TABLET | Refills: 0 | Status: SHIPPED | OUTPATIENT
Start: 2019-07-25 | End: 2020-12-10 | Stop reason: SDUPTHER

## 2019-07-25 RX ORDER — VENLAFAXINE HYDROCHLORIDE 37.5 MG/1
CAPSULE, EXTENDED RELEASE ORAL
Qty: 60 CAPSULE | Refills: 3 | Status: SHIPPED | OUTPATIENT
Start: 2019-07-25 | End: 2019-10-22 | Stop reason: SDUPTHER

## 2019-07-25 RX ORDER — IBUPROFEN 200 MG
600 TABLET ORAL AS NEEDED
COMMUNITY
End: 2022-07-11 | Stop reason: ALTCHOICE

## 2019-07-25 RX ORDER — COVID-19 ANTIGEN TEST
2 KIT MISCELLANEOUS AS NEEDED
COMMUNITY

## 2019-07-25 RX ORDER — RIZATRIPTAN BENZOATE 10 MG/1
10 TABLET ORAL ONCE AS NEEDED
Qty: 9 TABLET | Refills: 0 | Status: SHIPPED | OUTPATIENT
Start: 2019-07-25 | End: 2020-02-20 | Stop reason: SDUPTHER

## 2019-07-25 NOTE — PATIENT INSTRUCTIONS
Preventive:   - continue doing magnesium oxide 400mg in am daily    -Start Venlafaxine 37 5 mg (1 cap) daily in the am for 14 days  Increase to 2 caps (75mg) after that   Abortive:   - at the onset of mild headache patient's take Aleve  - At onset of migraine, patient is to take rizatriptan  May be repeated in 2 hours  If repeated, take Toradol 10 mg which she can take conjunction with Zofran and Benadryl 50 mg at home to break the headache    Neck pain:   - We discussed the role of neck pathology and poor posture, with straightening of the normal cervical lordosis, in headaches  We discussed how tightening of the neck muscles can irritate the nerves in the occipital region of her head and cause or worsen head pain  We also discussed and demonstrated neck strengthening and relaxation exercises, as well as giving written instructions on these exercises  - We talked about the importance of good posture for improving shoulder, neck, and head pain  The patient was given visualization exercises for correcting posture, which patient will practice at home  If this simple exercise does not help improve the posture, we will consider formal physical therapy in the future  Medication overuse headaches:   - We discussed medication overuse headache [facility]) and how to avoid it in the future  It was explained that all analgesics have the potential to cause medication overuse headache [facility]) and analgesic overuse can negate the effectiveness of headache preventive measures  After successful [address] treatment, preventive medications for an underlying primary headache disorder have a greater chance for success  Avoid medications with narcotics, barbiturates, or caffeine in them as these can cause rebound headaches after very few doses and can interfere with other headache medicine efficacy  Taking any analgesics for more than 2-3 days a week can cause medication overuse headache       Reproductive age women: Should take folic acid daily when taking anti-seizure drugs especially Depakote  [place] Prescription Drug Monitoring Program report was reviewed and was appropriate      Headache management instructions  - When patient has a moderate to severe headache, they should seek rest, initiate relaxation and apply cold compresses to the head  - Maintain regular sleep schedule  Adults need at least 7-8 hours of uninterrupted a night  - Limit over the counter medications such as Tylenol, Ibuprofen, Aleve, Excedrin  (No more than 3 times a week)  - Maintain headache diary  We discussed an NANCY for a smart phone is "Migraine shelley"  - Limit caffeine to 1-2 cups 8 to 16 oz a day or less  - Avoid dietary trigger  (aged cheese, peanuts, MSG, aspartame and nitrates)  - Patient is to have regular frequent meals to prevent headache onset  - Please drink at least 64 ounces of water a day to help remain hydrated  Please call with any questions or concerns   Office number is [phone]

## 2019-07-25 NOTE — TELEPHONE ENCOUNTER
Called and talked to the patient because [facility] sent me a Refill Authorization Request for her Dexilant 60MG, wanted to let her know that I got the form signed and  Faxed it  back to [facility]  I called [facility] to make sure they received the form, They said they did, they filled it and she picked it up the Pharmacy  Patient expressed understanding

## 2019-07-25 NOTE — ASSESSMENT & PLAN NOTE
Preventive:   - continue doing magnesium oxide 400mg in am daily    -Start Venlafaxine 37 5 mg (1 cap) daily in the am for 14 days  Increase to 2 caps (75mg) after that   Abortive:   - at the onset of mild headache patient's take Aleve  - At onset of migraine, patient is to take rizatriptan  May be repeated in 2 hours    If repeated, take Toradol 10 mg which she can take conjunction with Zofran and Benadryl 50 mg at home to break the headache

## 2019-08-19 ENCOUNTER — OFFICE VISIT (OUTPATIENT)
Dept: ENDOCRINOLOGY | Facility: CLINIC | Age: 54
End: 2019-08-19
Payer: COMMERCIAL

## 2019-08-19 VITALS
WEIGHT: 170.8 LBS | HEIGHT: 66 IN | SYSTOLIC BLOOD PRESSURE: 108 MMHG | DIASTOLIC BLOOD PRESSURE: 80 MMHG | HEART RATE: 73 BPM | BODY MASS INDEX: 27.45 KG/M2

## 2019-08-19 DIAGNOSIS — G43.709 CHRONIC MIGRAINE WITHOUT AURA WITHOUT STATUS MIGRAINOSUS, NOT INTRACTABLE: ICD-10-CM

## 2019-08-19 DIAGNOSIS — E03.9 HYPOTHYROIDISM, UNSPECIFIED TYPE: ICD-10-CM

## 2019-08-19 DIAGNOSIS — E55.9 VITAMIN D DEFICIENCY: ICD-10-CM

## 2019-08-19 DIAGNOSIS — E03.8 OTHER SPECIFIED HYPOTHYROIDISM: Primary | ICD-10-CM

## 2019-08-19 PROCEDURE — 99214 OFFICE O/P EST MOD 30 MIN: CPT | Performed by: INTERNAL MEDICINE

## 2019-08-19 RX ORDER — LEVOTHYROXINE SODIUM 0.05 MG/1
50 TABLET ORAL DAILY
Qty: 90 TABLET | Refills: 3 | Status: SHIPPED | OUTPATIENT
Start: 2019-08-19 | End: 2020-02-20 | Stop reason: SDUPTHER

## 2019-08-19 NOTE — PROGRESS NOTES
Lanre OCHOA Isabella 48 y o  female MRN: [id number]    Encounter: [id number]      Assessment/Plan     Problem List Items Addressed This Visit        Endocrine    Other specified hypothyroidism - Primary     Continue levothyroxine at current dose-will check thyroid function tests         Relevant Medications    levothyroxine 50 mcg tablet    Other Relevant Orders    TSH, 3rd generation Lab Collect    T4, free Lab Collect       Cardiovascular and Mediastinum    Chronic migraine without aura without status migrainosus, not intractable     Continue follow-up with Neurology team            Other    Vitamin D deficiency     Continue supplementations         Relevant Orders    Vitamin D 25 hydroxy Lab Collect      Other Visit Diagnoses     Hypothyroidism, unspecified type        Relevant Medications    levothyroxine 50 mcg tablet        CC:   Hypothyroidism     History of Present Illness     HPI: 49 y/o woman with history of hypothyroidism and vitamin D deficiency here for follow up     Hot flashes which has improved recently   For hypothyroidism she is currently on Levothyroxine 50 mcg daily and has been taking it regularly and properly  Complains of fatigue and sleep disturbances  Weight has been fairly stable  Continues to work with neurologist for migraines and has been on different medications  Review of Systems   Constitutional: Positive for fatigue  Respiratory: Negative for cough and shortness of breath  Cardiovascular: Negative for leg swelling  Gastrointestinal: Positive for constipation  Negative for diarrhea, nausea and vomiting  Musculoskeletal: Negative for gait problem  Skin: Negative for rash and wound  Neurological: Positive for headaches  Psychiatric/Behavioral: Positive for sleep disturbance         Historical Information   Past Medical History:   Diagnosis Date    Abnormal TSH 10/18/2018    Ataxia     Blood in urine     GERD (gastroesophageal reflux disease)     Irritable bowel syndrome  Mild cervical dysplasia     Pituitary adenoma ([facility] [address] )     Pregnancy     1     Thyroiditis 10/31/2018    Uterine leiomyoma      Past Surgical History:   Procedure Laterality Date    ABDOMINAL SURGERY      CHOLECYSTECTOMY      COLONOSCOPY      COLPOSCOPY      onset: 2/4/08    ESOPHAGOGASTRODUODENOSCOPY      LAPAROSCOPY      IL COLONOSCOPY FLX DX W/COLLJ SPEC WHEN PFRMD N/A 2/16/2018    Procedure: COLONOSCOPY;  Surgeon: Teena Butt DO;  Location: [facility];  Service: Gastroenterology    IL ESOPHAGOGASTRODUODENOSCOPY TRANSORAL DIAGNOSTIC N/A 3/19/2019    Procedure: ESOPHAGOGASTRODUODENOSCOPY (EGD);   Surgeon: Anshu Ramires MD;  Location: [facility];  Service: Gastroenterology    TUBAL LIGATION       Social History   Social History     Substance and Sexual Activity   Alcohol Use No     Social History     Substance and Sexual Activity   Drug Use No     Social History     Tobacco Use   Smoking Status Never Smoker   Smokeless Tobacco Never Used     Family History:   Family History   Problem Relation Age of Onset    Hyperlipidemia Mother     Hypertension Mother     Hashimoto's thyroiditis Mother     Breast cancer Paternal Grandmother     Heart disease Family     Diabetes Family     Thyroid disease Family     Hypertension Brother     Hashimoto's thyroiditis Brother     Breast cancer Paternal Aunt     Ovarian cancer Paternal Aunt        Meds/Allergies   Current Outpatient Medications   Medication Sig Dispense Refill    Ascorbic Acid (VITAMIN C) 1000 MG tablet Take 1,000 mg by mouth daily      Cholecalciferol (VITAMIN D3) 54377 units CAPS Take 1 capsule (50,000 Units total) by mouth once a week 12 capsule 3    dexlansoprazole (DEXILANT) 60 MG capsule Take 1 capsule by mouth 30 minutes before breakfast  90 capsule 2    dicyclomine (BENTYL) 20 mg tablet Take 1 tablet (20 mg total) by mouth every 6 (six) hours (Patient taking differently: Take 20 mg by mouth as needed ) 60 tablet 2    ibuprofen (MOTRIN) 200 mg tablet Take 600 mg by mouth as needed for mild pain      ketorolac (TORADOL) 10 mg tablet 1 tabs at onset of migraine, t i d  P r n  Take with food/milk/antacid  10 tablet 0    levothyroxine 50 mcg tablet Take 1 tablet (50 mcg total) by mouth daily 90 tablet 3    Magnesium 400 MG CAPS Take 400 mg by mouth daily      Naproxen Sodium (ALEVE) 220 MG CAPS Take 2 capsules by mouth as needed      Omega-3 Fatty Acids (FISH OIL) 1,000 mg Take 1,000 mg by mouth daily      ondansetron (ZOFRAN-ODT) 8 mg disintegrating tablet Take 8 mg by mouth every 8 (eight) hours as needed       polyethylene glycol (GLYCOLAX) powder Take 17 g by mouth daily 578 g 3    Probiotic Product (ALIGN) 4 MG CAPS Take 1 capsule (4 mg total) by mouth daily 30 capsule 0    rizatriptan (MAXALT) 10 MG tablet Take 1 tablet (10 mg total) by mouth once as needed for migraine for up to 1 dose May repeat in 2 hours if needed 9 tablet 0    venlafaxine (EFFEXOR-XR) 37 5 mg 24 hr capsule Take 1 cap in am for 14 days then increase to 2 caps 60 capsule 3    dexamethasone (DECADRON) 1 mg tablet Take 1 tablet (1 mg total) by mouth daily with breakfast (Patient not taking: Reported on 7/25/2019) 5 tablet 0    divalproex sodium (DEPAKOTE ER) 250 mg 24 hr tablet Take 1 tablet (250 mg total) by mouth daily at bedtime 2 tablets at bedtime for 3 days then 1 tablet at bedtime for 2 days (Patient not taking: Reported on 7/25/2019) 8 tablet 0    nystatin-triamcinolone (MYCOLOG-II) ointment Apply externally to affected area twice a day  (Patient not taking: Reported on 6/15/2019) 30 g 0     No current facility-administered medications for this visit  Allergies   Allergen Reactions    Bactrim [Sulfamethoxazole-Trimethoprim] Other (See Comments)     lightheaded       Objective   Vitals: Blood pressure 108/80, pulse 73, height 5' 6" (1 676 m), weight 77 5 kg (170 lb 12 8 oz)      Physical Exam   Constitutional: She is oriented to person, place, and time  She appears well-developed and well-nourished  No distress  HENT:   Head: Normocephalic and atraumatic  Eyes: EOM are normal  No scleral icterus  Neck: Normal range of motion  Neck supple  No thyromegaly present  Cardiovascular: Normal rate, regular rhythm and normal heart sounds  No murmur heard  Pulmonary/Chest: Effort normal and breath sounds normal  No respiratory distress  She has no wheezes  She has no rales  Abdominal: Soft  Bowel sounds are normal  She exhibits no distension  There is no tenderness  Musculoskeletal: Normal range of motion  She exhibits no edema or deformity  Lymphadenopathy:     She has no cervical adenopathy  Neurological: She is alert and oriented to person, place, and time  Skin: Skin is warm and dry  Psychiatric: She has a normal mood and affect  Her behavior is normal  Judgment and thought content normal        The history was obtained from the review of the chart, patient  Lab Results:   Lab Results   Component Value Date/Time    TSH 3RD GENERATON 2 470 06/11/2019 09:13 AM    TSH 3RD GENERATON 2 190 04/02/2019 12:24 PM    TSH 3RD GENERATON 1 900 12/27/2018 09:25 AM    Free T4 1 11 06/11/2019 09:13 AM    Free T4 0 97 04/02/2019 12:24 PM    Free T4 1 00 12/27/2018 09:25 AM       Imaging Studies:   Results for orders placed during the hospital encounter of 09/14/18   US thyroid    Impression No nodule meets current ACR criteria for requiring biopsy or followup ultrasounds  Multiple bilateral stable colloid cyst         Reference: ACR Thyroid Imaging, Reporting and Data System (TI-RADS): White Paper of the [employer]  J AM Ricarda Radiol 2188;67:484-363  (additional recommendations based on American Thyroid Association 2015 guidelines )      Workstation performed: [id number]         I have personally reviewed pertinent reports  Portions of the record may have been created with voice recognition software   Occasional wrong word or "sound a like" substitutions may have occurred due to the inherent limitations of voice recognition software  Read the chart carefully and recognize, using context, where substitutions have occurred

## 2019-10-22 DIAGNOSIS — G43.709 CHRONIC MIGRAINE WITHOUT AURA WITHOUT STATUS MIGRAINOSUS, NOT INTRACTABLE: ICD-10-CM

## 2019-10-22 RX ORDER — VENLAFAXINE HYDROCHLORIDE 75 MG/1
75 CAPSULE, EXTENDED RELEASE ORAL DAILY
Qty: 90 CAPSULE | Refills: 1 | Status: SHIPPED | OUTPATIENT
Start: 2019-10-22 | End: 2020-05-18

## 2019-10-30 DIAGNOSIS — B37.2 CANDIDAL INTERTRIGO: Primary | ICD-10-CM

## 2019-10-30 DIAGNOSIS — B37.2 CANDIDAL INTERTRIGO: ICD-10-CM

## 2019-10-30 RX ORDER — NYSTATIN 100000 [USP'U]/G
POWDER TOPICAL 3 TIMES DAILY
Qty: 60 G | Refills: 0 | Status: SHIPPED | OUTPATIENT
Start: 2019-10-30 | End: 2021-03-04 | Stop reason: ALTCHOICE

## 2019-10-30 RX ORDER — NYSTATIN 100000 [USP'U]/G
POWDER TOPICAL 3 TIMES DAILY
Qty: 60 G | Refills: 0 | Status: SHIPPED | OUTPATIENT
Start: 2019-10-30 | End: 2019-10-30 | Stop reason: SDUPTHER

## 2019-11-04 NOTE — PROGRESS NOTES
[facility]  PATIENT:  Sabrina Pires  MRN:  [id number]  :  1965  DATE OF SERVICE:  2019      Assessment/Plan:     Chronic migraine without aura without status migrainosus, not intractable   Preventive:   - continue doing magnesium oxide 400mg in am daily  - Venlafaxine 75 mg in the am    Abortive:   - at the onset of mild headache patient's take Aleve  - At onset of migraine, patient is to take rizatriptan  May be repeated in 2 hours  If repeated, take Toradol 10 mg which she can take conjunction with Zofran and Benadryl 50 mg at home to break the headache    Hot flashes  This has improved since last visit and the addition of Venlafaxine  Continue to follow with GYN    Vitamin D deficiency  Continue with vitamin D supplementation         Problem List Items Addressed This Visit        Cardiovascular and Mediastinum    Hot flashes     This has improved since last visit and the addition of Venlafaxine  Continue to follow with GYN         Chronic migraine without aura without status migrainosus, not intractable - Primary      Preventive:   - continue doing magnesium oxide 400mg in am daily  - Venlafaxine 75 mg in the am    Abortive:   - at the onset of mild headache patient's take Aleve  - At onset of migraine, patient is to take rizatriptan  May be repeated in 2 hours  If repeated, take Toradol 10 mg which she can take conjunction with Zofran and Benadryl 50 mg at home to break the headache         Relevant Medications    ondansetron (ZOFRAN-ODT) 8 mg disintegrating tablet       Other    Vitamin D deficiency     Continue with vitamin D supplementation                  History of Present Illness: We had the pleasure of evaluating Lanre Crain in neurological follow up  today for headaches  As you know,  she is a 48 y o  right handedfemale  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    PREVENTIVE:   magnesium oxide 400 mg   Topamax (tingling and mental fogginess)  Venlafaxine     ABORTIVE:  motrin, tylenol, ketorolac, aleve  has try Decadron in olanzapine to break headache and that did help  rizatriptan  Non-Medical/Alternative Treatments used in the past for headaches: Chiropractic adjustment  Headache are worse if the patient:  no  Headache trigger: menstruation     Any warning prior to headache and how long do they last -  none      What is your current pain level - 0/10     How often do the headaches occur -   : March: 3 headaches, April: 4 headaches, May: 13 headache (decadron and olanzapine given),  - none, July- 4 headaches, Au headache, September: 3 headaches, October: one (needed message), November: 5 headaches, December: 7 headache (one headaches lasted for 4 day)  2019: none, Feb: two mild headaches 4-5 and took aleve  May and  almost daily  July has been better-last rizatriptan was end of  5  Sept 4  Oct 0  Nov 0 so far      What time of the day do the headaches start - usually when she wakes up in the middle of the night or in the morning  How long do the headaches last -  Would last all day  Are you ever headache free - yes  Where are they located - frontal   What is the intensity of pain - 4/10 and they use to be 6-7/10  Describe your usual headache - Throbbing,  Pressure, aching, dull  Associated symptoms:   · Decrease of appetite, nausea · Photophobia, phonophobia  -    light-headed or dizzy   · prefer to be alone and in a dark room, unable to work     Number of days missed per month because of headaches:  Work (or school) days: 0  Social or Family activities: 0      What time of the year do headaches occur more frequently?   no  Have you seen someone else for headaches or pain? No  Have you had trigger point injection performed and how often? No  Have you had Botox injection performed and how often? No   Have you had epidural injections or transforaminal injections performed? No     Have you used CBD or THC for your headaches and how often? No  Are you current pregnant or planning on getting pregnant? No, post-menopausal  Have you ever had any Brain imaging? yes      6/3/2017 MRI brain  Normal unenhanced MRI of the brain   Small bilateral mastoid effusions, left side greater than right      6/3/2017 MRA head  Congenital variants   Otherwise, intact [place]      6/3/2017 MRA carotids  Minimal atherosclerotic disease involving the proximal left internal carotid artery   No evidence of flow-limiting stenosis   No evidence of aneurysm, vascular malformation or vascular cut off       I personally reviewed these images  - Reviewed old notes from physician seen in the past- see above HPI for summary of previous encounters       Past Medical History:   Diagnosis Date    Abnormal TSH 10/18/2018    Ataxia     Blood in urine     GERD (gastroesophageal reflux disease)     Irritable bowel syndrome     Mild cervical dysplasia     [facility] adenoma ([facility] [address]     Pregnancy     1     Thyroiditis 10/31/2018    Uterine leiomyoma        Patient Active Problem List   Diagnosis    Ambulatory dysfunction    Hyperlipidemia    Other insomnia    Abnormal uterine bleeding (AUB)    Suspected sleep apnea    Hypersomnia    Anxiety    Hot flashes    Ataxia    Atopic dermatitis    Other constipation    Gastroparesis    Hamstring tendonitis of left thigh    Iliotibial band syndrome of left side    Lumbar disc herniation with radiculopathy    Menopausal disorder    Piriformis syndrome, left    Thyroid nodule    Vitamin D deficiency    Throat pain    Other specified hypothyroidism    Nausea    RUQ pain    Sphincter of Oddi dysfunction    Gastritis    Chronic migraine without aura without status migrainosus, not intractable       Medications:      Current Outpatient Medications   Medication Sig Dispense Refill    Ascorbic Acid (VITAMIN C) 1000 MG tablet Take 1,000 mg by mouth daily      Cholecalciferol (VITAMIN D3) 28109 units CAPS Take 1 capsule (50,000 Units total) by mouth once a week 12 capsule 3    dexlansoprazole (DEXILANT) 60 MG capsule Take 1 capsule by mouth 30 minutes before breakfast  90 capsule 2    dicyclomine (BENTYL) 20 mg tablet Take 1 tablet (20 mg total) by mouth every 6 (six) hours (Patient taking differently: Take 20 mg by mouth as needed ) 60 tablet 2    ibuprofen (MOTRIN) 200 mg tablet Take 600 mg by mouth as needed for mild pain      ketorolac (TORADOL) 10 mg tablet 1 tabs at onset of migraine, t i d  P r n  Take with food/milk/antacid  10 tablet 0    levothyroxine 50 mcg tablet Take 1 tablet (50 mcg total) by mouth daily 90 tablet 3    Magnesium 400 MG CAPS Take 400 mg by mouth daily      Naproxen Sodium (ALEVE) 220 MG CAPS Take 2 capsules by mouth as needed      nystatin (MYCOSTATIN) powder Apply topically 3 (three) times a day 60 g 0    nystatin-triamcinolone (MYCOLOG-II) ointment Apply externally to affected area twice a day   30 g 0    Omega-3 Fatty Acids (FISH OIL) 1,000 mg Take 1,000 mg by mouth daily      ondansetron (ZOFRAN-ODT) 8 mg disintegrating tablet Take 1 tablet (8 mg total) by mouth every 8 (eight) hours as needed for nausea or vomiting 20 tablet 0    Probiotic Product (ALIGN) 4 MG CAPS Take 1 capsule (4 mg total) by mouth daily 30 capsule 0    rizatriptan (MAXALT) 10 MG tablet Take 1 tablet (10 mg total) by mouth once as needed for migraine for up to 1 dose May repeat in 2 hours if needed 9 tablet 0    venlafaxine (EFFEXOR-XR) 75 mg 24 hr capsule Take 1 capsule (75 mg total) by mouth daily 90 capsule 1     No current facility-administered medications for this visit  Allergies:       Allergies   Allergen Reactions    Bactrim [Sulfamethoxazole-Trimethoprim] Other (See Comments)     lightheaded       Family History:     Family History   Problem Relation Age of Onset    Hyperlipidemia Mother     Hypertension Mother     Hashimoto's thyroiditis Mother     Breast cancer Paternal Grandmother     Heart disease Family     Diabetes Family     Thyroid disease Family     Hypertension Brother     Hashimoto's thyroiditis Brother     Breast cancer Paternal Aunt     Ovarian cancer Paternal Aunt        Social History:     Social History     Socioeconomic History    Marital status: /Civil Union     Spouse name: Not on file    Number of children: Not on file    Years of education: Not on file    Highest education level: Not on file   Occupational History    Not on file   Social Needs    Financial resource strain: Not on file    Food insecurity:     Worry: Not on file     Inability: Not on file    Transportation needs:     Medical: Not on file     Non-medical: Not on file   Tobacco Use    Smoking status: Never Smoker    Smokeless tobacco: Never Used   Substance and Sexual Activity    Alcohol use: No    Drug use: No    Sexual activity: Yes     Partners: Male     Birth control/protection: Female Sterilization   Lifestyle    Physical activity:     Days per week: Not on file     Minutes per session: Not on file    Stress: Not on file   Relationships    Social connections:     Talks on phone: Not on file     Gets together: Not on file     Attends Restorationist service: Not on file     Active member of club or organization: Not on file     Attends meetings of clubs or organizations: Not on file Relationship status: Not on file    Intimate partner violence:     Fear of current or ex partner: Not on file     Emotionally abused: Not on file     Physically abused: Not on file     Forced sexual activity: Not on file   Other Topics Concern    Not on file   Social History Narrative    Always uses sunscreen    Caffeine use    Dental care, regularly    Lives independently with spouse    Uses seatbelts         Objective:   Physical Exam:                                                                   Vitals:            /64 (BP Location: Left arm, Patient Position: Sitting, Cuff Size: Standard)   Pulse 66   Ht 5' 6" (1 676 m)   Wt 77 4 kg (170 lb 9 6 oz)   BMI 27 54 kg/m²   BP Readings from Last 3 Encounters:   11/08/19 121/64   08/19/19 108/80   07/25/19 108/76     Pulse Readings from Last 3 Encounters:   11/08/19 66   08/19/19 73   07/25/19 60                CONSTITUTIONAL: Well developed, well nourished, well groomed  No dysmorphic features  Eyes:  PERRLA, EOM normal      Neck:  Normal ROM, neck supple  HEENT:  Normocephalic atraumatic  No meningismus  Oropharynx is clear and moist  No oral mucosal lesions  Chest:  Respirations regular and unlabored  Cardiovascular:  Distal extremities warm without palpable edema or tenderness, no observed significant swelling  Musculoskeletal:  Full range of motion  (see below under neurologic exam for evaluation of motor function and gait)   Skin:  warm and dry   Psychiatric:  Normal behavior and appropriate affect        SKULL AND SPINE  ROM: Full range of motion  Tenderness: No focal tenderness    MENTAL STATUS  Orientation: Alert and oriented x 3  Fund of knowledge: Intact  CRANIAL NERVES  II: PERRL  Visual fields full  III, IV, VI: Extraocular movements intact  No nystagmus    V: Facial sensation normal V1-V3  VII: Facial movements normal and symmetric  VIII: Intact hearing bilaterally  IX, X: Palate elevation normal and symmetric  XI: Intact trapezius, SCM strength  XII: Tongue protrudes to the midline    MOTOR (Upper and lower extremities)   Bulk/tone/abnormal movement: Normal muscle bulk and tone  Strength: Strength 5/5 throughout  COORDINATION   Station/Gait: Normal baseline gait  Reflexes:  deep tendon reflexes are 2+/4 throughout, flexor response bilaterally                 Review of Systems:   Review of Systems   Constitutional: Negative  HENT: Negative  Eyes: Negative  Respiratory: Negative  Cardiovascular: Negative  Gastrointestinal: Negative  Endocrine: Negative  Genitourinary: Negative  Musculoskeletal: Negative  Skin: Negative  Allergic/Immunologic: Negative  Neurological: Positive for headaches  Hematological: Negative  Psychiatric/Behavioral: Negative  I personally reviewed and updated the ROS that was entered by the medical assistant       I have spent 25 minutes with Patient  today in which greater than 50% of this time was spent in counseling/coordination of care regarding Prognosis, Risks and benefits of tx options, Intructions for management, Patient and family education, Importance of tx compliance and Risk factor reductions        Author:  Veto Means PA-C 11/8/2019 3:29 PM

## 2019-11-08 ENCOUNTER — OFFICE VISIT (OUTPATIENT)
Dept: NEUROLOGY | Facility: CLINIC | Age: 54
End: 2019-11-08
Payer: COMMERCIAL

## 2019-11-08 VITALS
HEIGHT: 66 IN | WEIGHT: 170.6 LBS | BODY MASS INDEX: 27.42 KG/M2 | SYSTOLIC BLOOD PRESSURE: 121 MMHG | DIASTOLIC BLOOD PRESSURE: 64 MMHG | HEART RATE: 66 BPM

## 2019-11-08 DIAGNOSIS — E55.9 VITAMIN D DEFICIENCY: ICD-10-CM

## 2019-11-08 DIAGNOSIS — R23.2 HOT FLASHES: ICD-10-CM

## 2019-11-08 DIAGNOSIS — G43.709 CHRONIC MIGRAINE WITHOUT AURA WITHOUT STATUS MIGRAINOSUS, NOT INTRACTABLE: Primary | ICD-10-CM

## 2019-11-08 PROCEDURE — 99214 OFFICE O/P EST MOD 30 MIN: CPT | Performed by: PHYSICIAN ASSISTANT

## 2019-11-08 RX ORDER — ONDANSETRON 8 MG/1
8 TABLET, ORALLY DISINTEGRATING ORAL EVERY 8 HOURS PRN
Qty: 20 TABLET | Refills: 0 | Status: SHIPPED | OUTPATIENT
Start: 2019-11-08 | End: 2020-12-10 | Stop reason: SDUPTHER

## 2019-11-08 NOTE — ASSESSMENT & PLAN NOTE
Preventive:   - continue doing magnesium oxide 400mg in am daily  - Venlafaxine 75 mg in the am    Abortive:   - at the onset of mild headache patient's take Aleve  - At onset of migraine, patient is to take rizatriptan  May be repeated in 2 hours    If repeated, take Toradol 10 mg which she can take conjunction with Zofran and Benadryl 50 mg at home to break the headache

## 2019-11-08 NOTE — PATIENT INSTRUCTIONS
Chronic migraine without aura without status migrainosus, not intractable   Preventive:   - continue doing magnesium oxide 400mg in am daily  - Venlafaxine 75 mg in the am    Abortive:   - at the onset of mild headache patient's take Aleve  - At onset of migraine, patient is to take rizatriptan  May be repeated in 2 hours  If repeated, take Toradol 10 mg which she can take conjunction with Zofran and Benadryl 50 mg at home to break the headache      Neck pain:   - We discussed the role of neck pathology and poor posture, with straightening of the normal cervical lordosis, in headaches  We discussed how tightening of the neck muscles can irritate the nerves in the occipital region of her head and cause or worsen head pain  We also discussed and demonstrated neck strengthening and relaxation exercises, as well as giving written instructions on these exercises  - We talked about the importance of good posture for improving shoulder, neck, and head pain  The patient was given visualization exercises for correcting posture, which patient will practice at home  If this simple exercise does not help improve the posture, we will consider formal physical therapy in the future  Medication overuse headaches:   - We discussed medication overuse headache [facility]) and how to avoid it in the future  It was explained that all analgesics have the potential to cause medication overuse headache [facility]) and analgesic overuse can negate the effectiveness of headache preventive measures  After successful [address] treatment, preventive medications for an underlying primary headache disorder have a greater chance for success  Avoid medications with narcotics, barbiturates, or caffeine in them as these can cause rebound headaches after very few doses and can interfere with other headache medicine efficacy  Taking any analgesics for more than 2-3 days a week can cause medication overuse headache       Reproductive age women: Should take folic acid daily when taking anti-seizure drugs especially Depakote  [place] Prescription Drug Monitoring Program report was reviewed and was appropriate      Headache management instructions  - When patient has a moderate to severe headache, they should seek rest, initiate relaxation and apply cold compresses to the head  - Maintain regular sleep schedule  Adults need at least 7-8 hours of uninterrupted a night  - Limit over the counter medications such as Tylenol, Ibuprofen, Aleve, Excedrin  (No more than 3 times a week)  - Maintain headache diary  We discussed an NANCY for a smart phone is "Migraine shelley"  - Limit caffeine to 1-2 cups 8 to 16 oz a day or less  - Avoid dietary trigger  (aged cheese, peanuts, MSG, aspartame and nitrates)  - Patient is to have regular frequent meals to prevent headache onset  - Please drink at least 64 ounces of water a day to help remain hydrated  Please call with any questions or concerns   Office number is [phone]

## 2019-11-11 DIAGNOSIS — B02.9 HERPES ZOSTER WITHOUT COMPLICATION: Primary | ICD-10-CM

## 2019-11-11 DIAGNOSIS — B02.9 HERPES ZOSTER WITHOUT COMPLICATION: ICD-10-CM

## 2019-11-11 RX ORDER — VALACYCLOVIR HYDROCHLORIDE 1 G/1
1000 TABLET, FILM COATED ORAL 3 TIMES DAILY
Qty: 21 TABLET | Refills: 0 | Status: SHIPPED | OUTPATIENT
Start: 2019-11-11 | End: 2019-11-11 | Stop reason: SDUPTHER

## 2019-11-11 RX ORDER — VALACYCLOVIR HYDROCHLORIDE 1 G/1
1000 TABLET, FILM COATED ORAL 3 TIMES DAILY
Qty: 21 TABLET | Refills: 0 | Status: SHIPPED | OUTPATIENT
Start: 2019-11-11 | End: 2020-02-20 | Stop reason: ALTCHOICE

## 2019-11-25 ENCOUNTER — TELEPHONE (OUTPATIENT)
Dept: GASTROENTEROLOGY | Facility: AMBULARY SURGERY CENTER | Age: 54
End: 2019-11-25

## 2019-11-25 NOTE — TELEPHONE ENCOUNTER
Dr Omar Kendrick patient Hx- constipation, abdominal pain, gastroparesis    Patient call for rectal bleeding  Treated for shingles on rectum x 3 week- valtrex and triamcinolone  States she thinks she had viral illness that is improving  Initially constipated- BM difficult with straining/ no blood  Following BMs loose with bright red blood in toilet, bowl and tissue x 5 since yesterday  One episode of n/v   Denies dizziness, diaphoretic, palpitations or sob  Taking probiotics and fiber  Miralax prescribed, but is not taking  Recommended patient continue monitoring BMs and to ER if bleeding continues or she becomes symptomatic  Miralax if constipation returns

## 2019-11-25 NOTE — TELEPHONE ENCOUNTER
Patients GI provider:  Dr Eduardo Jurist    Number to return call: ( [phone]    Reason for call: Pt calling with some rectal bleeding and diarrhea, hx shingles on the rectum area    Scheduled procedure/appointment date if applicable: Apt/procedure n/a

## 2019-12-26 ENCOUNTER — LAB (OUTPATIENT)
Dept: LAB | Facility: CLINIC | Age: 54
End: 2019-12-26
Payer: COMMERCIAL

## 2019-12-26 DIAGNOSIS — E03.8 OTHER SPECIFIED HYPOTHYROIDISM: ICD-10-CM

## 2019-12-26 DIAGNOSIS — E55.9 VITAMIN D DEFICIENCY: ICD-10-CM

## 2019-12-26 LAB
25(OH)D3 SERPL-MCNC: 57.6 NG/ML (ref 30–100)
T4 FREE SERPL-MCNC: 0.98 NG/DL (ref 0.76–1.46)
TSH SERPL DL<=0.05 MIU/L-ACNC: 1.72 UIU/ML (ref 0.36–3.74)

## 2019-12-26 PROCEDURE — 84439 ASSAY OF FREE THYROXINE: CPT

## 2019-12-26 PROCEDURE — 82306 VITAMIN D 25 HYDROXY: CPT

## 2019-12-26 PROCEDURE — 36415 COLL VENOUS BLD VENIPUNCTURE: CPT

## 2019-12-26 PROCEDURE — 84443 ASSAY THYROID STIM HORMONE: CPT

## 2019-12-30 ENCOUNTER — TELEPHONE (OUTPATIENT)
Dept: ENDOCRINOLOGY | Facility: CLINIC | Age: 54
End: 2019-12-30

## 2019-12-30 NOTE — TELEPHONE ENCOUNTER
----- Message from Manuel Rowland MD sent at 12/29/2019 10:52 PM EST -----  Please call the patient regarding labs - thyroid function tests and vitamin D ok- continue levothyroxine and vitamin D

## 2019-12-30 NOTE — RESULT ENCOUNTER NOTE
Please call the patient regarding labs - thyroid function tests and vitamin D ok- continue levothyroxine and vitamin D

## 2020-02-03 ENCOUNTER — TELEPHONE (OUTPATIENT)
Dept: OBGYN CLINIC | Facility: MEDICAL CENTER | Age: 55
End: 2020-02-03

## 2020-02-05 ENCOUNTER — OFFICE VISIT (OUTPATIENT)
Dept: OBGYN CLINIC | Facility: CLINIC | Age: 55
End: 2020-02-05
Payer: COMMERCIAL

## 2020-02-05 VITALS — DIASTOLIC BLOOD PRESSURE: 70 MMHG | SYSTOLIC BLOOD PRESSURE: 122 MMHG | WEIGHT: 169 LBS | BODY MASS INDEX: 27.28 KG/M2

## 2020-02-05 DIAGNOSIS — Z12.31 ENCOUNTER FOR SCREENING MAMMOGRAM FOR MALIGNANT NEOPLASM OF BREAST: ICD-10-CM

## 2020-02-05 DIAGNOSIS — N95.0 PMB (POSTMENOPAUSAL BLEEDING): Primary | ICD-10-CM

## 2020-02-05 DIAGNOSIS — R10.2 PELVIC PAIN: ICD-10-CM

## 2020-02-05 DIAGNOSIS — R31.9 HEMATURIA, UNSPECIFIED TYPE: ICD-10-CM

## 2020-02-05 LAB
SL AMB  POCT GLUCOSE, UA: NEGATIVE
SL AMB LEUKOCYTE ESTERASE,UA: NEGATIVE
SL AMB POCT BILIRUBIN,UA: NEGATIVE
SL AMB POCT BLOOD,UA: 50
SL AMB POCT CLARITY,UA: CLEAR
SL AMB POCT COLOR,UA: YELLOW
SL AMB POCT KETONES,UA: ABNORMAL
SL AMB POCT NITRITE,UA: NEGATIVE
SL AMB POCT PH,UA: 5
SL AMB POCT SPECIFIC GRAVITY,UA: 1.01
SL AMB POCT URINE PROTEIN: NEGATIVE
SL AMB POCT UROBILINOGEN: NEGATIVE

## 2020-02-05 PROCEDURE — 81002 URINALYSIS NONAUTO W/O SCOPE: CPT | Performed by: OBSTETRICS & GYNECOLOGY

## 2020-02-05 PROCEDURE — 1036F TOBACCO NON-USER: CPT | Performed by: OBSTETRICS & GYNECOLOGY

## 2020-02-05 PROCEDURE — 99214 OFFICE O/P EST MOD 30 MIN: CPT | Performed by: OBSTETRICS & GYNECOLOGY

## 2020-02-05 PROCEDURE — 87086 URINE CULTURE/COLONY COUNT: CPT | Performed by: OBSTETRICS & GYNECOLOGY

## 2020-02-05 NOTE — PROGRESS NOTES
Assessment Lanre was seen today for abnormal bleeding  Diagnoses and all orders for this visit:    Hematuria, unspecified type  -     POCT urine dip    PMB (postmenopausal bleeding)  -     US pelvis complete w transvaginal; Future    Pelvic pain  -     Urine culture  -     US pelvis complete w transvaginal; Future    Encounter for screening mammogram for malignant neoplasm of breast  -     Mammo screening bilateral w 3d & cad; Future         Plan    Subjective   Milli Torres is a 48 y o  female here for a problem visit  Patient is complaining of spotting after intercourse  Originally thought it was associated with urination, but noticed pink spotting with coitus  Reports increased cramping as well  Uses water based lubricant for intercourse  LMP was 2018  Patient Active Problem List   Diagnosis    Ambulatory dysfunction    Hyperlipidemia    Other insomnia    Abnormal uterine bleeding (AUB)    Suspected sleep apnea    Hypersomnia    Anxiety    Hot flashes    Ataxia    Atopic dermatitis    Other constipation    Gastroparesis    Hamstring tendonitis of left thigh    Iliotibial band syndrome of left side    Lumbar disc herniation with radiculopathy    Menopausal disorder    Piriformis syndrome, left    Thyroid nodule    Vitamin D deficiency    Throat pain    Other specified hypothyroidism    Nausea    RUQ pain    Sphincter of Oddi dysfunction    Gastritis    Chronic migraine without aura without status migrainosus, not intractable       Gynecologic History  No LMP recorded  Patient is perimenopausal   The current method of family planning is post menopausal status      Past Medical History:   Diagnosis Date    Abnormal TSH 10/18/2018    Ataxia     Blood in urine     GERD (gastroesophageal reflux disease)     Irritable bowel syndrome     Mild cervical dysplasia     Pituitary adenoma ([facility] [address] )     Pregnancy     1     Thyroiditis 10/31/2018    Uterine leiomyoma      Past Surgical History:   Procedure Laterality Date    ABDOMINAL SURGERY      CHOLECYSTECTOMY      COLONOSCOPY      COLPOSCOPY      onset: 2/4/08    ESOPHAGOGASTRODUODENOSCOPY      LAPAROSCOPY      UT COLONOSCOPY FLX DX W/COLLJ SPEC WHEN PFRMD N/A 2/16/2018    Procedure: COLONOSCOPY;  Surgeon: Lashanda Contreras DO;  Location: [facility];  Service: Gastroenterology    UT ESOPHAGOGASTRODUODENOSCOPY TRANSORAL DIAGNOSTIC N/A 3/19/2019    Procedure: ESOPHAGOGASTRODUODENOSCOPY (EGD);   Surgeon: Mercedez Baarjas MD;  Location: [facility];  Service: Gastroenterology    TUBAL LIGATION       Family History   Problem Relation Age of Onset    Hyperlipidemia Mother     Hypertension Mother     Hashimoto's thyroiditis Mother     Breast cancer Paternal Grandmother     Heart disease Family     Diabetes Family     Thyroid disease Family     Hypertension Brother     Hashimoto's thyroiditis Brother     Breast cancer Paternal Aunt     Ovarian cancer Paternal Aunt      Social History     Socioeconomic History    Marital status: /Civil Union     Spouse name: Not on file    Number of children: Not on file    Years of education: Not on file    Highest education level: Not on file   Occupational History    Not on file   Social Needs    Financial resource strain: Not on file    Food insecurity:     Worry: Not on file     Inability: Not on file    Transportation needs:     Medical: Not on file     Non-medical: Not on file   Tobacco Use    Smoking status: Never Smoker    Smokeless tobacco: Never Used   Substance and Sexual Activity    Alcohol use: No    Drug use: No    Sexual activity: Yes     Partners: Male     Birth control/protection: Female Sterilization   Lifestyle    Physical activity:     Days per week: Not on file     Minutes per session: Not on file    Stress: Not on file   Relationships    Social connections:     Talks on phone: Not on file     Gets together: Not on file     Attends Orthodoxy service: Not on file     Active member of club or organization: Not on file     Attends meetings of clubs or organizations: Not on file     Relationship status: Not on file    Intimate partner violence:     Fear of current or ex partner: Not on file     Emotionally abused: Not on file     Physically abused: Not on file     Forced sexual activity: Not on file   Other Topics Concern    Not on file   Social History Narrative    Always uses sunscreen    Caffeine use    Dental care, regularly    Lives independently with spouse    Uses seatbelts     Allergies   Allergen Reactions    Bactrim [Sulfamethoxazole-Trimethoprim] Other (See Comments)     lightheaded       Current Outpatient Medications:     Ascorbic Acid (VITAMIN C) 1000 MG tablet, Take 1,000 mg by mouth daily, Disp: , Rfl:     Cholecalciferol (VITAMIN D3) 08875 units CAPS, Take 1 capsule (50,000 Units total) by mouth once a week, Disp: 12 capsule, Rfl: 3    dexlansoprazole (DEXILANT) 60 MG capsule, Take 1 capsule by mouth 30 minutes before breakfast , Disp: 90 capsule, Rfl: 2    ibuprofen (MOTRIN) 200 mg tablet, Take 600 mg by mouth as needed for mild pain, Disp: , Rfl:     ketorolac (TORADOL) 10 mg tablet, 1 tabs at onset of migraine, t i d  P r n   Take with food/milk/antacid , Disp: 10 tablet, Rfl: 0    levothyroxine 50 mcg tablet, Take 1 tablet (50 mcg total) by mouth daily, Disp: 90 tablet, Rfl: 3    Magnesium 400 MG CAPS, Take 400 mg by mouth daily, Disp: , Rfl:     Naproxen Sodium (ALEVE) 220 MG CAPS, Take 2 capsules by mouth as needed, Disp: , Rfl:     Omega-3 Fatty Acids (FISH OIL) 1,000 mg, Take 1,000 mg by mouth daily, Disp: , Rfl:     ondansetron (ZOFRAN-ODT) 8 mg disintegrating tablet, Take 1 tablet (8 mg total) by mouth every 8 (eight) hours as needed for nausea or vomiting, Disp: 20 tablet, Rfl: 0    Probiotic Product (ALIGN) 4 MG CAPS, Take 1 capsule (4 mg total) by mouth daily, Disp: 30 capsule, Rfl: 0    rizatriptan (MAXALT) 10 MG tablet, Take 1 tablet (10 mg total) by mouth once as needed for migraine for up to 1 dose May repeat in 2 hours if needed, Disp: 9 tablet, Rfl: 0    venlafaxine (EFFEXOR-XR) 75 mg 24 hr capsule, Take 1 capsule (75 mg total) by mouth daily, Disp: 90 capsule, Rfl: 1    dicyclomine (BENTYL) 20 mg tablet, Take 1 tablet (20 mg total) by mouth every 6 (six) hours (Patient not taking: Reported on 2/5/2020), Disp: 60 tablet, Rfl: 2    nystatin (MYCOSTATIN) powder, Apply topically 3 (three) times a day (Patient not taking: Reported on 2/5/2020), Disp: 60 g, Rfl: 0    nystatin-triamcinolone (MYCOLOG-II) ointment, Apply externally to affected area twice a day  (Patient not taking: Reported on 2/5/2020), Disp: 30 g, Rfl: 0    valACYclovir (VALTREX) 1,000 mg tablet, Take 1 tablet (1,000 mg total) by mouth 3 (three) times a day for 7 days, Disp: 21 tablet, Rfl: 0    Review of Systems  Constitutional :no fever, feels well, no tiredness, no recent weight gain or loss  ENT: no ear ache, no loss of hearing, no nosebleeds or nasal discharge, no sore throat or hoarseness  Cardiovascular: no complaints of slow or fast heart beat, no chest pain, no palpitations, no leg claudication or lower extremity edema  Respiratory: no complaints of shortness of shortness of breath, no BOLDEN  Breasts:no complaints of breast pain, breast lump, or nipple discharge  Gastrointestinal: no complaints of abdominal pain, constipation, nausea, vomiting, or diarrhea or bloody stools  Genitourinary : no complaints of dysuria, incontinence, pelvic pain, no dysmenorrhea, vaginal discharge or abnormal vaginal bleeding and as noted in HPI  Musculoskeletal: no complaints of arthralgia, no myalgia, no joint swelling or stiffness, no limb pain or swelling    Integumentary: no complaints of skin rash or lesion, itching or dry skin  Neurological: no complaints of headache, no confusion, no numbness or tingling, no dizziness or fainting     Objective     /70   Wt 76 7 kg (169 lb)   BMI 27 28 kg/m²     General:   appears stated age, cooperative, alert normal mood and affect   Neck: normal, supple,trachea midline, no masses   Heart: regular rate and rhythm, S1, S2 normal, no murmur, click, rub or gallop   Lungs: clear to auscultation bilaterally   Abdomen: soft, non-tender, without masses or organomegaly   Vulva: normal, normal female genitalia, Bartholin's, Urethra, [place] normal, no lesions, normal female hair distribution   Vagina: normal vagina, no discharge, exudate, lesion, or erythema and no bleeding   Urethra: normal   Cervix: Normal, no discharge  Uterus: normal size, contour, position, consistency, mobility, non-tender   Adnexa: normal adnexa   Lymphatic palpation of lymph nodes in neck, axilla, groin and/or other locations: no lymphadenopathy or masses noted   Skin normal skin turgor and no rashes     Psychiatric orientation to person, place, and time: normal  mood and affect: normal

## 2020-02-07 LAB — BACTERIA UR CULT: NORMAL

## 2020-02-17 ENCOUNTER — TELEPHONE (OUTPATIENT)
Dept: NEUROLOGY | Facility: CLINIC | Age: 55
End: 2020-02-17

## 2020-02-17 NOTE — TELEPHONE ENCOUNTER
Patient states that she has been having balance issues over the last month  She states that she "just doesn't feel right"  Patient states that it usually happens when she is really fatigued  Patient states that it's not going away and she's getting concerned  Patient states that she has had approximately 2 headaches requiring rizatriptan during this time  Patient states that November was a bad month and that she had 3 headaches during that month  Patient states that this feeling is intermittent and that nothing in particular exacerbates it  She states that if you tell her to go to the ED, she is not going to go, because the last time was a waste of time  Preventative medications:  Venlafaxine 75mg daily  dexilant 60mg daily      Please advise  Patient is also not scheduled to be seen until November 2020  Is it possible to have patient seen prior to this? # [phone] okay to leave a detailed message

## 2020-02-18 NOTE — TELEPHONE ENCOUNTER
I was able to move patient on 2/20/2020 at 115 pm to accommodate patient  Called patient and offered appt and she accepted  Ning Bravo notified

## 2020-02-18 NOTE — TELEPHONE ENCOUNTER
Called and spoke to pt and offered her appts for tomorrow available with Nehal Monisha, she declined stating due to work she cannot leave tomorrow and Thursday (she is already scheduled for an appt at the [facility])  She asked if she could be seen when she is there, I advised there was no availability for that day and time  I did offer morning appt but she states she is working in the morning  Pt asking for an afternoon appt if at all possible   S/w Mariposa Lambert, she will look to see what we can offer

## 2020-02-20 ENCOUNTER — OFFICE VISIT (OUTPATIENT)
Dept: ENDOCRINOLOGY | Facility: CLINIC | Age: 55
End: 2020-02-20
Payer: COMMERCIAL

## 2020-02-20 ENCOUNTER — OFFICE VISIT (OUTPATIENT)
Dept: NEUROLOGY | Facility: CLINIC | Age: 55
End: 2020-02-20
Payer: COMMERCIAL

## 2020-02-20 VITALS
HEART RATE: 64 BPM | BODY MASS INDEX: 27 KG/M2 | DIASTOLIC BLOOD PRESSURE: 74 MMHG | HEIGHT: 66 IN | WEIGHT: 168 LBS | SYSTOLIC BLOOD PRESSURE: 102 MMHG

## 2020-02-20 VITALS
SYSTOLIC BLOOD PRESSURE: 118 MMHG | BODY MASS INDEX: 27.45 KG/M2 | DIASTOLIC BLOOD PRESSURE: 80 MMHG | HEART RATE: 65 BPM | WEIGHT: 170.8 LBS | HEIGHT: 66 IN

## 2020-02-20 DIAGNOSIS — G43.709 CHRONIC MIGRAINE WITHOUT AURA WITHOUT STATUS MIGRAINOSUS, NOT INTRACTABLE: Primary | ICD-10-CM

## 2020-02-20 DIAGNOSIS — E03.8 OTHER SPECIFIED HYPOTHYROIDISM: ICD-10-CM

## 2020-02-20 DIAGNOSIS — E04.1 THYROID NODULE: ICD-10-CM

## 2020-02-20 DIAGNOSIS — E55.9 VITAMIN D DEFICIENCY: Primary | ICD-10-CM

## 2020-02-20 DIAGNOSIS — E03.9 HYPOTHYROIDISM, UNSPECIFIED TYPE: ICD-10-CM

## 2020-02-20 PROCEDURE — 3008F BODY MASS INDEX DOCD: CPT | Performed by: PHYSICIAN ASSISTANT

## 2020-02-20 PROCEDURE — 1036F TOBACCO NON-USER: CPT | Performed by: PHYSICIAN ASSISTANT

## 2020-02-20 PROCEDURE — 99214 OFFICE O/P EST MOD 30 MIN: CPT | Performed by: PHYSICIAN ASSISTANT

## 2020-02-20 PROCEDURE — 99213 OFFICE O/P EST LOW 20 MIN: CPT | Performed by: PHYSICIAN ASSISTANT

## 2020-02-20 RX ORDER — DEXAMETHASONE 1 MG
1 TABLET ORAL
Qty: 5 TABLET | Refills: 0 | Status: SHIPPED | OUTPATIENT
Start: 2020-02-20 | End: 2020-03-23 | Stop reason: SDUPTHER

## 2020-02-20 RX ORDER — DIPHENHYDRAMINE HCL 25 MG
50 CAPSULE ORAL AS NEEDED
COMMUNITY

## 2020-02-20 RX ORDER — RIZATRIPTAN BENZOATE 10 MG/1
10 TABLET ORAL ONCE AS NEEDED
Qty: 9 TABLET | Refills: 0 | Status: SHIPPED | OUTPATIENT
Start: 2020-02-20 | End: 2020-05-15 | Stop reason: SDUPTHER

## 2020-02-20 RX ORDER — CYPROHEPTADINE HYDROCHLORIDE 4 MG/1
4 TABLET ORAL
Qty: 30 TABLET | Refills: 0 | Status: SHIPPED | OUTPATIENT
Start: 2020-02-20 | End: 2020-05-28 | Stop reason: ALTCHOICE

## 2020-02-20 RX ORDER — LEVOTHYROXINE SODIUM 0.05 MG/1
50 TABLET ORAL DAILY
Qty: 90 TABLET | Refills: 3 | Status: SHIPPED | OUTPATIENT
Start: 2020-02-20

## 2020-02-20 NOTE — ASSESSMENT & PLAN NOTE
Preventive:   -continue doing magnesium oxide 400mg in am daily    -Continue Venlafaxine 75mg   -Cyproheptadine 4 mg at bedtime for next 30 nights   Abortive:   - at the onset of mild headache patient's take Aleve  - At onset of migraine, patient is to take rizatriptan  May be repeated in 2 hours    If repeated, take Toradol 10 mg which she can take conjunction with Zofran and Benadryl 50 mg at home to break the headache    For next 5 days in the am take Decadron

## 2020-02-20 NOTE — PROGRESS NOTES
[facility]  PATIENT:  Buddy Adams  MRN:  [id number]  :  1965  DATE OF SERVICE:  2020      Assessment/Plan:     Chronic migraine without aura without status migrainosus, not intractable   Preventive:   -continue doing magnesium oxide 400mg in am daily    -Continue Venlafaxine 75mg   -Cyproheptadine 4 mg at bedtime for next 30 nights   Abortive:   - at the onset of mild headache patient's take Aleve  - At onset of migraine, patient is to take rizatriptan  May be repeated in 2 hours  If repeated, take Toradol 10 mg which she can take conjunction with Zofran and Benadryl 50 mg at home to break the headache    For next 5 days in the am take Decadron         Problem List Items Addressed This Visit        Cardiovascular and Mediastinum    Chronic migraine without aura without status migrainosus, not intractable - Primary      Preventive:   -continue doing magnesium oxide 400mg in am daily    -Continue Venlafaxine 75mg   -Cyproheptadine 4 mg at bedtime for next 30 nights   Abortive:   - at the onset of mild headache patient's take Aleve  - At onset of migraine, patient is to take rizatriptan  May be repeated in 2 hours  If repeated, take Toradol 10 mg which she can take conjunction with Zofran and Benadryl 50 mg at home to break the headache    For next 5 days in the am take Decadron         Relevant Medications    dexamethasone (DECADRON) 1 mg tablet    cyproheptadine (PERIACTIN) 4 mg tablet    rizatriptan (MAXALT) 10 MG tablet           History of Present Illness: We had the pleasure of evaluating Lanre Crain in neurological follow up  today for headaches  As you know,  she is a 48 y o   right handed female  She is a phlebotomist and works at a [employer]       Patient reports she has been worsening  Has had a few migraines lately  Has been having some balance issues  Patient got a Marius Schaumann Dog on 2019 and is now 1 months old    Hasn't been sleeping well   gets home around 1 am and dog wakes up  Sometimes has difficulty falling asleep after this  Gets up around 5 am     QTc 2017 416 ms     Balance problem:  May of 2017 she states she woke up with balance issues and was falling to the left  She states she could not catch her balance and this lasted for 10 minutes  Did have a work up done MRI head ad MRA head and neck which was negative           Migraine headaches without aura: What medications do you take or have you taken for your headaches?    PREVENTIVE:   magnesium oxide 400 mg   Topamax (tingling and mental fogginess)  Venlafaxine     ABORTIVE:  motrin, tylenol, ketorolac, aleve  has try Decadron in olanzapine to break headache and that did help  rizatriptan  Non-Medical/Alternative Treatments used in the past for headaches: Chiropractic adjustment  Headache are worse if the patient:  no  Headache trigger: menstruation     Any warning prior to headache and how long do they last -  none      What is your current pain level - 2-3/10     How often do the headaches occur -   2018: March: 3 headaches, April: 4 headaches, May: 13 headache (decadron and olanzapine given),  - none, July- 4 headaches, Au headache, September: 3 headaches, October: one (needed message), November: 5 headaches, December: 7 headache (one headaches lasted for 4 day)  2019: none, Feb: two mild headaches 4-5 and took aleve  May and  almost daily  July has been better-last rizatriptan was end of  4  Oct 0  Nov 0 so far  2020-3  Feb 2-3   Feels "off" in her head      What time of the day do the headaches start - usually when she wakes up in the middle of the night or in the morning  How long do the headaches last -  Would last all day  Are you ever headache free - yes  Where are they located - frontal   What is the intensity of pain - 4/10 and they use to be 6-7/10  Describe your usual headache - Throbbing,  Pressure, aching, dull  Associated symptoms:   · Decrease of appetite, nausea   · Photophobia, phonophobia  -    light-headed or dizzy   · prefer to be alone and in a dark room, unable to work     Number of days missed per month because of headaches:  Work (or school) days: 0  Social or Family activities: 0      What time of the year do headaches occur more frequently?   no  Have you seen someone else for headaches or pain? No  Have you had trigger point injection performed and how often? No  Have you had Botox injection performed and how often? No   Have you had epidural injections or transforaminal injections performed? No     Have you used CBD or THC for your headaches and how often? No  Are you current pregnant or planning on getting pregnant? No, post-menopausal  Have you ever had any Brain imaging? yes      6/3/2017 MRI brain  Normal unenhanced MRI of the brain   Small bilateral mastoid effusions, left side greater than right      6/3/2017 MRA head  Congenital variants   Otherwise, intact [place]      6/3/2017 MRA carotids  Minimal atherosclerotic disease involving the proximal left internal carotid artery   No evidence of flow-limiting stenosis   No evidence of aneurysm, vascular malformation or vascular cut off      I personally reviewed these images      Reviewed old notes from physician seen in the past- see above HPI for summary of previous encounters      Past Medical History:   Diagnosis Date    Abnormal TSH 10/18/2018    Ataxia     Blood in urine     GERD (gastroesophageal reflux disease)     Irritable bowel syndrome     Mild cervical dysplasia     Pituitary adenoma ([facility] [address] )     Pregnancy     1     Thyroiditis 10/31/2018    Uterine leiomyoma        Patient Active Problem List   Diagnosis    Ambulatory dysfunction    Hyperlipidemia    Other insomnia    Abnormal uterine bleeding (AUB)    Suspected sleep apnea    Hypersomnia    Anxiety    Hot flashes    Ataxia    Atopic dermatitis    Other constipation    Gastroparesis    Hamstring tendonitis of left thigh    Iliotibial band syndrome of left side    Lumbar disc herniation with radiculopathy    Menopausal disorder    Piriformis syndrome, left    Thyroid nodule    Vitamin D deficiency    Throat pain    Other specified hypothyroidism    Nausea    RUQ pain    Sphincter of Oddi dysfunction    Gastritis    Chronic migraine without aura without status migrainosus, not intractable       Medications:      Current Outpatient Medications   Medication Sig Dispense Refill    Ascorbic Acid (VITAMIN C) 1000 MG tablet Take 1,000 mg by mouth daily      Cholecalciferol (VITAMIN D3) 91486 units CAPS Take 1 capsule (50,000 Units total) by mouth once a week 12 capsule 3    dexlansoprazole (DEXILANT) 60 MG capsule Take 1 capsule by mouth 30 minutes before breakfast  90 capsule 2    diphenhydrAMINE (BENADRYL) 25 mg capsule Take 50 mg by mouth as needed (migraines)       ketorolac (TORADOL) 10 mg tablet 1 tabs at onset of migraine, t i d  P r n  Take with food/milk/antacid   10 tablet 0    levothyroxine 50 mcg tablet Take 1 tablet (50 mcg total) by mouth daily 90 tablet 3    Magnesium 400 MG CAPS Take 400 mg by mouth daily      Naproxen Sodium (ALEVE) 220 MG CAPS Take 2 capsules by mouth as needed      Omega-3 Fatty Acids (FISH OIL) 1,000 mg Take 1,000 mg by mouth daily      ondansetron (ZOFRAN-ODT) 8 mg disintegrating tablet Take 1 tablet (8 mg total) by mouth every 8 (eight) hours as needed for nausea or vomiting 20 tablet 0    Probiotic Product (ALIGN) 4 MG CAPS Take 1 capsule (4 mg total) by mouth daily 30 capsule 0    rizatriptan (MAXALT) 10 MG tablet Take 1 tablet (10 mg total) by mouth once as needed for migraine for up to 1 dose May repeat in 2 hours if needed 9 tablet 0    venlafaxine (EFFEXOR-XR) 75 mg 24 hr capsule Take 1 capsule (75 mg total) by mouth daily 90 capsule 1    cyproheptadine (PERIACTIN) 4 mg tablet Take 1 tablet (4 mg total) by mouth daily at bedtime as needed for allergies (headaches) (Patient not taking: Reported on 2/20/2020) 30 tablet 0    dexamethasone (DECADRON) 1 mg tablet Take 1 tablet (1 mg total) by mouth daily with breakfast (Patient not taking: Reported on 2/20/2020) 5 tablet 0    dicyclomine (BENTYL) 20 mg tablet Take 1 tablet (20 mg total) by mouth every 6 (six) hours 60 tablet 2    ibuprofen (MOTRIN) 200 mg tablet Take 600 mg by mouth as needed for mild pain      nystatin (MYCOSTATIN) powder Apply topically 3 (three) times a day (Patient taking differently: Apply 1 application topically 3 (three) times a day as needed ) 60 g 0    nystatin-triamcinolone (MYCOLOG-II) ointment Apply externally to affected area twice a day  (Patient not taking: Reported on 2/5/2020) 30 g 0     No current facility-administered medications for this visit  Allergies:       Allergies   Allergen Reactions    Bactrim [Sulfamethoxazole-Trimethoprim] Other (See Comments)     lightheaded       Family History:     Family History   Problem Relation Age of Onset    Hyperlipidemia Mother     Hypertension Mother     Hashimoto's thyroiditis Mother     Breast cancer Paternal Grandmother     Heart disease Family     Diabetes Family     Thyroid disease Family     Hypertension Brother     Hashimoto's thyroiditis Brother     Breast cancer Paternal Aunt     Ovarian cancer Paternal Aunt        Social History:     Social History     Socioeconomic History    Marital status: /Civil Union     Spouse name: Not on file    Number of children: Not on file    Years of education: Not on file    Highest education level: Not on file   Occupational History    Not on file   Social Needs    Financial resource strain: Not on file    Food insecurity:     Worry: Not on file     Inability: Not on file    Transportation needs:     Medical: Not on file     Non-medical: Not on file   Tobacco Use    Smoking status: Never Smoker    Smokeless tobacco: Never Used   Substance and Sexual Activity    Alcohol use: No    Drug use: No    Sexual activity: Yes     Partners: Male     Birth control/protection: Female Sterilization   Lifestyle    Physical activity:     Days per week: Not on file     Minutes per session: Not on file    Stress: Not on file   Relationships    Social connections:     Talks on phone: Not on file     Gets together: Not on file     Attends Christianity service: Not on file     Active member of club or organization: Not on file     Attends meetings of clubs or organizations: Not on file     Relationship status: Not on file    Intimate partner violence:     Fear of current or ex partner: Not on file     Emotionally abused: Not on file     Physically abused: Not on file     Forced sexual activity: Not on file   Other Topics Concern    Not on file   Social History Narrative    Always uses sunscreen    Caffeine use    Dental care, regularly    Lives independently with spouse    Uses seatbelts         Objective:   Physical Exam:                                                                   Vitals:            /74 (BP Location: Left arm, Patient Position: Sitting, Cuff Size: Adult)   Pulse 64   Ht 5' 6" (1 676 m)   Wt 76 2 kg (168 lb)   BMI 27 12 kg/m²   BP Readings from Last 3 Encounters:   02/20/20 102/74   02/05/20 122/70   11/08/19 121/64     Pulse Readings from Last 3 Encounters:   02/20/20 64   11/08/19 66   08/19/19 73                CONSTITUTIONAL: Well developed, well nourished, well groomed  No dysmorphic features  Eyes:  PERRLA, EOM normal      Neck:  Normal ROM, neck supple  HEENT:  Normocephalic atraumatic  No meningismus  Oropharynx is clear and moist  No oral mucosal lesions  Chest:  Respirations regular and unlabored  Cardiovascular:  Distal extremities warm without palpable edema or tenderness, no observed significant swelling  Musculoskeletal:  Full range of motion    (see below under neurologic exam for evaluation of motor function and gait)   Skin:  warm and dry   Psychiatric:  Normal behavior and appropriate affect        SKULL AND SPINE  ROM: Full range of motion  Tenderness: No focal tenderness    MENTAL STATUS  Orientation: Alert and oriented x 3  Fund of knowledge: Intact  CRANIAL NERVES  II: PERRL  III, IV, VI: Extraocular movements intact  No nystagmus  V: Facial sensation normal V1-V3  VII: Facial movements normal and symmetric  VIII: Intact hearing bilaterally  IX, X: Palate elevation normal and symmetric  XI: Intact trapezius, SCM strength  XII: Tongue protrudes to the midline    MOTOR (Upper and lower extremities)   Bulk/tone/abnormal movement: Normal muscle bulk and tone  Strength: Strength 5/5 throughout  COORDINATION   Station/Gait: Normal baseline gait  Finger to nose is normal    Reflexes:  deep tendon reflexes are 2+/4 throughout               Review of Systems:   Review of Systems  Constitutional: Negative  HENT: Positive for tinnitus (left ear at times )  Eyes: Positive for photophobia  Respiratory: Negative  Cardiovascular: Negative  Gastrointestinal: Positive for nausea  Endocrine: Negative  Genitourinary: Negative  Musculoskeletal: Negative  Balance issues    Skin: Negative  Allergic/Immunologic: Negative  Neurological: Positive for dizziness, light-headedness and headaches  Hematological: Negative  Psychiatric/Behavioral: Negative      I personally reviewed the ROS entered by the MA    Greater than 50% of the 25 minutes evaluation was a face-to-face discussion regarding  the pathophysiology of her current symptoms and further plan, as well as counseling, educating, and coordinating the patient's care including diagnostic results, impression, and recommendations, risks and benefits of treatment, instructions for disease self management, treatment instructions and follow up requirements    Author:  Raquel Jin, [place] 2/20/2020 2:06 PM

## 2020-02-20 NOTE — PATIENT INSTRUCTIONS
Preventive:   -continue doing magnesium oxide 400mg in am daily    -Continue Venlafaxine 75mg   -Cyproheptadine 4 mg at bedtime for next 30 nights   Abortive:   - at the onset of mild headache patient's take Aleve  - At onset of migraine, patient is to take rizatriptan  May be repeated in 2 hours  If repeated, take Toradol 10 mg which she can take conjunction with Zofran and Benadryl 50 mg at home to break the headache    For next 5 days in the am take Decadron    Neck pain:   - We discussed the role of neck pathology and poor posture, with straightening of the normal cervical lordosis, in headaches  We discussed how tightening of the neck muscles can irritate the nerves in the occipital region of her head and cause or worsen head pain  We also discussed and demonstrated neck strengthening and relaxation exercises, as well as giving written instructions on these exercises  - We talked about the importance of good posture for improving shoulder, neck, and head pain  The patient was given visualization exercises for correcting posture, which patient will practice at home  If this simple exercise does not help improve the posture, we will consider formal physical therapy in the future  Medication overuse headaches:   - We discussed medication overuse headache [facility]) and how to avoid it in the future  It was explained that all analgesics have the potential to cause medication overuse headache [facility]) and analgesic overuse can negate the effectiveness of headache preventive measures  After successful [address] treatment, preventive medications for an underlying primary headache disorder have a greater chance for success  Avoid medications with narcotics, barbiturates, or caffeine in them as these can cause rebound headaches after very few doses and can interfere with other headache medicine efficacy  Taking any analgesics for more than 2-3 days a week can cause medication overuse headache       Reproductive age women: Should take folic acid daily when taking anti-seizure drugs especially Depakote  [place] Prescription Drug Monitoring Program report was reviewed and was appropriate      Headache management instructions  - When patient has a moderate to severe headache, they should seek rest, initiate relaxation and apply cold compresses to the head  - Maintain regular sleep schedule  Adults need at least 7-8 hours of uninterrupted a night  - Limit over the counter medications such as Tylenol, Ibuprofen, Aleve, Excedrin  (No more than 3 times a week)  - Maintain headache diary  We discussed an NANCY for a smart phone is "Migraine shelley"  - Limit caffeine to 1-2 cups 8 to 16 oz a day or less  - Avoid dietary trigger  (aged cheese, peanuts, MSG, aspartame and nitrates)  - Patient is to have regular frequent meals to prevent headache onset  - Please drink at least 64 ounces of water a day to help remain hydrated  Please call with any questions or concerns   Office number is [phone]

## 2020-02-20 NOTE — PROGRESS NOTES
Review of Systems   Constitutional: Negative  HENT: Positive for tinnitus (left ear at times )  Eyes: Positive for photophobia  Respiratory: Negative  Cardiovascular: Negative  Gastrointestinal: Positive for nausea  Endocrine: Negative  Genitourinary: Negative  Musculoskeletal: Negative  Balance issues    Skin: Negative  Allergic/Immunologic: Negative  Neurological: Positive for dizziness, light-headedness and headaches  Hematological: Negative  Psychiatric/Behavioral: Negative

## 2020-02-20 NOTE — PROGRESS NOTES
Established Patient Progress Note       Chief Complaint   Patient presents with    Hypothyroidism        History of Present Illness:     Torsten Philip is a 48 y o  female with a history of hypothyroidism following episode of thyroiditis in 2018  Currently she is taking levothyroixine 50mcg daily and properly  In general she has been feeling well  Not sleeping well due to new puppy and having more headaches- saw neurology today for this  For Vitamin D Deficiency, she is taking supplements         Patient Active Problem List   Diagnosis    Ambulatory dysfunction    Hyperlipidemia    Other insomnia    Abnormal uterine bleeding (AUB)    Suspected sleep apnea    Hypersomnia    Anxiety    Hot flashes    Ataxia    Atopic dermatitis    Other constipation    Gastroparesis    Hamstring tendonitis of left thigh    Iliotibial band syndrome of left side    Lumbar disc herniation with radiculopathy    Menopausal disorder    Piriformis syndrome, left    Thyroid nodule    Vitamin D deficiency    Throat pain    Other specified hypothyroidism    Nausea    RUQ pain    Sphincter of Oddi dysfunction    Gastritis    Chronic migraine without aura without status migrainosus, not intractable      Past Medical History:   Diagnosis Date    Abnormal TSH 10/18/2018    Ataxia     Blood in urine     GERD (gastroesophageal reflux disease)     Irritable bowel syndrome     Mild cervical dysplasia     Pituitary adenoma ([address] )     Pregnancy     1     Thyroiditis 10/31/2018    Uterine leiomyoma       Past Surgical History:   Procedure Laterality Date    ABDOMINAL SURGERY      CHOLECYSTECTOMY      COLONOSCOPY      COLPOSCOPY      onset: 2/4/08    ESOPHAGOGASTRODUODENOSCOPY      LAPAROSCOPY      ME COLONOSCOPY FLX DX W/COLLJ SPEC WHEN PFRMD N/A 2/16/2018    Procedure: COLONOSCOPY;  Surgeon: Adelina Acevedo DO;  Location: [facility];  Service: Gastroenterology    ME ESOPHAGOGASTRODUODENOSCOPY TRANSORAL DIAGNOSTIC N/A 3/19/2019    Procedure: ESOPHAGOGASTRODUODENOSCOPY (EGD); Surgeon: Zahra Bro MD;  Location: [facility];  Service: Gastroenterology    TUBAL LIGATION        Family History   Problem Relation Age of Onset    Hyperlipidemia Mother     Hypertension Mother     Hashimoto's thyroiditis Mother     Breast cancer Paternal Grandmother     Heart disease Family     Diabetes Family     Thyroid disease Family     Hypertension Brother     Hashimoto's thyroiditis Brother     Breast cancer Paternal Aunt     Ovarian cancer Paternal Aunt      Social History     Tobacco Use    Smoking status: Never Smoker    Smokeless tobacco: Never Used   Substance Use Topics    Alcohol use: No     Allergies   Allergen Reactions    Bactrim [Sulfamethoxazole-Trimethoprim] Other (See Comments)     lightheaded       Current Outpatient Medications:     Ascorbic Acid (VITAMIN C) 1000 MG tablet, Take 1,000 mg by mouth daily, Disp: , Rfl:     Cholecalciferol (VITAMIN D3) 62795 units CAPS, Take 1 capsule (50,000 Units total) by mouth once a week, Disp: 12 capsule, Rfl: 3    dexlansoprazole (DEXILANT) 60 MG capsule, Take 1 capsule by mouth 30 minutes before breakfast , Disp: 90 capsule, Rfl: 2    dicyclomine (BENTYL) 20 mg tablet, Take 1 tablet (20 mg total) by mouth every 6 (six) hours, Disp: 60 tablet, Rfl: 2    diphenhydrAMINE (BENADRYL) 25 mg capsule, Take 50 mg by mouth as needed (migraines) , Disp: , Rfl:     ibuprofen (MOTRIN) 200 mg tablet, Take 600 mg by mouth as needed for mild pain, Disp: , Rfl:     ketorolac (TORADOL) 10 mg tablet, 1 tabs at onset of migraine, t i d  P r n   Take with food/milk/antacid , Disp: 10 tablet, Rfl: 0    levothyroxine 50 mcg tablet, Take 1 tablet (50 mcg total) by mouth daily, Disp: 90 tablet, Rfl: 3    Magnesium 400 MG CAPS, Take 400 mg by mouth daily, Disp: , Rfl:     Naproxen Sodium (ALEVE) 220 MG CAPS, Take 2 capsules by mouth as needed, Disp: , Rfl:     nystatin (MYCOSTATIN) powder, Apply topically 3 (three) times a day (Patient taking differently: Apply 1 application topically 3 (three) times a day as needed ), Disp: 60 g, Rfl: 0    Omega-3 Fatty Acids (FISH OIL) 1,000 mg, Take 1,000 mg by mouth daily, Disp: , Rfl:     ondansetron (ZOFRAN-ODT) 8 mg disintegrating tablet, Take 1 tablet (8 mg total) by mouth every 8 (eight) hours as needed for nausea or vomiting, Disp: 20 tablet, Rfl: 0    Probiotic Product (ALIGN) 4 MG CAPS, Take 1 capsule (4 mg total) by mouth daily, Disp: 30 capsule, Rfl: 0    rizatriptan (MAXALT) 10 MG tablet, Take 1 tablet (10 mg total) by mouth once as needed for migraine for up to 1 dose May repeat in 2 hours if needed, Disp: 9 tablet, Rfl: 0    venlafaxine (EFFEXOR-XR) 75 mg 24 hr capsule, Take 1 capsule (75 mg total) by mouth daily, Disp: 90 capsule, Rfl: 1    cyproheptadine (PERIACTIN) 4 mg tablet, Take 1 tablet (4 mg total) by mouth daily at bedtime as needed for allergies (headaches) (Patient not taking: Reported on 2/20/2020), Disp: 30 tablet, Rfl: 0    dexamethasone (DECADRON) 1 mg tablet, Take 1 tablet (1 mg total) by mouth daily with breakfast (Patient not taking: Reported on 2/20/2020), Disp: 5 tablet, Rfl: 0    Review of Systems   Constitutional: Negative for activity change, appetite change, chills, diaphoresis, fatigue, fever and unexpected weight change  HENT: Negative for trouble swallowing and voice change  Eyes: Negative for visual disturbance  Respiratory: Negative for shortness of breath  Cardiovascular: Negative for chest pain and palpitations  Gastrointestinal: Negative for abdominal pain, constipation and diarrhea  Endocrine: Negative for cold intolerance, heat intolerance, polydipsia, polyphagia and polyuria  Genitourinary: Negative for frequency and menstrual problem  Musculoskeletal: Negative for arthralgias and myalgias  Skin: Negative for rash  Allergic/Immunologic: Negative for food allergies  Neurological: Positive for headaches  Negative for dizziness and tremors  Hematological: Negative for adenopathy  Psychiatric/Behavioral: Positive for sleep disturbance  All other systems reviewed and are negative  Physical Exam:  Body mass index is 27 57 kg/m²  /80 (BP Location: Left arm, Patient Position: Sitting, Cuff Size: Standard)   Pulse 65   Ht 5' 6" (1 676 m)   Wt 77 5 kg (170 lb 12 8 oz)   BMI 27 57 kg/m²    Wt Readings from Last 3 Encounters:   02/20/20 77 5 kg (170 lb 12 8 oz)   02/20/20 76 2 kg (168 lb)   02/05/20 76 7 kg (169 lb)       Physical Exam   Constitutional: She is oriented to person, place, and time  She appears well-developed and well-nourished  No distress  HENT:   Head: Normocephalic and atraumatic  Eyes: Pupils are equal, round, and reactive to light  Conjunctivae are normal    Neck: Normal range of motion  Neck supple  No thyromegaly present  Cardiovascular: Normal rate, regular rhythm and normal heart sounds  Pulmonary/Chest: Effort normal and breath sounds normal  No respiratory distress  She has no wheezes  She has no rales  Abdominal: Soft  Bowel sounds are normal  She exhibits no distension  There is no tenderness  Musculoskeletal: Normal range of motion  She exhibits no edema  Neurological: She is alert and oriented to person, place, and time  Skin: Skin is warm and dry  Psychiatric: She has a normal mood and affect  Vitals reviewed        Labs:       Lab Results   Component Value Date    CREATININE 0 93 06/15/2019    CREATININE 0 90 09/13/2018    CREATININE 0 76 06/03/2017    BUN 17 06/15/2019     06/07/2015    K 3 8 06/15/2019     06/15/2019    CO2 27 06/15/2019     eGFR   Date Value Ref Range Status   06/15/2019 71 ml/min/1 73sq m Final       Lab Results   Component Value Date    CHOL 176 06/07/2015    HDL 64 (H) 07/12/2018    TRIG 64 07/12/2018       Lab Results   Component Value Date    ALT 32 06/15/2019    AST 23 06/15/2019    ALKPHOS 91 06/15/2019    BILITOT 0 8 06/07/2015       Lab Results   Component Value Date    FREET4 0 98 12/26/2019         Impression & Plan:    Problem List Items Addressed This Visit        Endocrine    Thyroid nodule     Ultrasound in 2018 showed small colloid cyst- no additional ultrasound testing required  Relevant Medications    levothyroxine 50 mcg tablet    Other specified hypothyroidism     TSH remains normal, Continue levothyroxine  Relevant Medications    levothyroxine 50 mcg tablet       Other    Vitamin D deficiency - Primary     Continue Supplements  Relevant Orders    Vitamin D 25 hydroxy      Other Visit Diagnoses     Hypothyroidism, unspecified type        Relevant Medications    levothyroxine 50 mcg tablet    Other Relevant Orders    TSH, 3rd generation    T4, free          Orders Placed This Encounter   Procedures    TSH, 3rd generation     This is a patient instruction: This test is non-fasting  Please drink two glasses of water morning of bloodwork  Standing Status:   Future     Standing Expiration Date:   8/20/2021    T4, free     Standing Status:   Future     Standing Expiration Date:   8/20/2021    Vitamin D 25 hydroxy     Standing Status:   Future     Standing Expiration Date:   8/20/2021       There are no Patient Instructions on file for this visit  Discussed with the patient and all questioned fully answered  She will call me if any problems arise  Follow-up appointment in 12 months       Counseled patient on diagnostic results, prognosis, risk and benefit of treatment options, instruction for management, importance of treatment compliance, Risk  factor reduction and impressions      Hodan Neely PA-C

## 2020-03-23 ENCOUNTER — TELEPHONE (OUTPATIENT)
Dept: NEUROLOGY | Facility: CLINIC | Age: 55
End: 2020-03-23

## 2020-03-23 DIAGNOSIS — G43.709 CHRONIC MIGRAINE WITHOUT AURA WITHOUT STATUS MIGRAINOSUS, NOT INTRACTABLE: ICD-10-CM

## 2020-03-23 RX ORDER — DEXAMETHASONE 1 MG
1 TABLET ORAL
Qty: 5 TABLET | Refills: 0 | Status: SHIPPED | OUTPATIENT
Start: 2020-03-23 | End: 2020-05-15

## 2020-03-23 NOTE — LETTER
March 27, 2020       Patient: Yohannes Rinaldi   YOB: 1965           To Whom it May Concern,       Misha Wagner is under the professional care of [facility]  Please excuse her from leaving work     early on 3/23/2020, her absence on 3/24/2020, and her absence on 3/25/2020 due to a medical condition  If     you have further questions, please do not hesitate to contact the office           Sincerely,        Jez Foster PA-C

## 2020-03-23 NOTE — TELEPHONE ENCOUNTER
Have her take Toradol now  Restart cyproheptadine 1/2 tab at bedtime  Decadron 1 mg for next 5 days in am

## 2020-03-23 NOTE — TELEPHONE ENCOUNTER
When did migraine start? Started yesterday  Location/Description: bilateral in the occipital area  Pain scale: 4  Associated symptoms:nausea, vertigo "feel off-kilter"    What medications have you tried for this migraine headache? rizatriptan    Current migraine medications are confirmed as: Only PRNs    Took rizatriptan twice yesterday; did not help     torodol prn, she has not taken this yet  zofran prn, she stated she will take one of these  shortly    Cyproheptadine didn't help she only took it for about 12 days, felt very fatigued  Medications tried in the past? steroid in Feb    Patient also concerned if she should be worried; questioning if she may have corona virus  Informed the patient that sxs include fever, coughing or SOB  Patient denies any of these sxs  [phone]  Okay to leave message

## 2020-03-23 NOTE — TELEPHONE ENCOUNTER
Does the pain worsen with eating? Or with taking a deep breath? Liver does not have that type of pain receptor    She should contact her PCP about this issue

## 2020-03-23 NOTE — TELEPHONE ENCOUNTER
Pt called and states that she forgot to let Vonnie Dumont know in addition to migraine, she is also having pain underneath her right side of rib cage  She is afraid that her current migraine meds is affecting her liver  I did advised pt to contact her pcp  Pt verbalized understanding  Any other recommendation?

## 2020-03-24 NOTE — TELEPHONE ENCOUNTER
Patient states pain sometimes worsens with eating, but does not worsen with breath  Will llikely follow up with GI doctor rather than PCP

## 2020-03-27 NOTE — TELEPHONE ENCOUNTER
Pt called in  She is feeling better and returned to work yesterday  She is asking for a work excuse for leaving work early on 3/23 and returning 3/26  Okay to generate?     Fax attn Lian Dinero [phone]

## 2020-05-09 ENCOUNTER — APPOINTMENT (OUTPATIENT)
Dept: LAB | Facility: HOSPITAL | Age: 55
End: 2020-05-09
Attending: INTERNAL MEDICINE
Payer: COMMERCIAL

## 2020-05-09 ENCOUNTER — TELEMEDICINE (OUTPATIENT)
Dept: INTERNAL MEDICINE CLINIC | Facility: CLINIC | Age: 55
End: 2020-05-09
Payer: COMMERCIAL

## 2020-05-09 VITALS
SYSTOLIC BLOOD PRESSURE: 107 MMHG | BODY MASS INDEX: 28.28 KG/M2 | DIASTOLIC BLOOD PRESSURE: 78 MMHG | HEIGHT: 66 IN | WEIGHT: 176 LBS

## 2020-05-09 DIAGNOSIS — E78.5 HYPERLIPIDEMIA, UNSPECIFIED HYPERLIPIDEMIA TYPE: ICD-10-CM

## 2020-05-09 DIAGNOSIS — Z12.31 ENCOUNTER FOR SCREENING MAMMOGRAM FOR MALIGNANT NEOPLASM OF BREAST: ICD-10-CM

## 2020-05-09 DIAGNOSIS — Z29.9 PREVENTIVE MEASURE: ICD-10-CM

## 2020-05-09 DIAGNOSIS — E55.9 VITAMIN D DEFICIENCY: ICD-10-CM

## 2020-05-09 DIAGNOSIS — E03.8 OTHER SPECIFIED HYPOTHYROIDISM: ICD-10-CM

## 2020-05-09 DIAGNOSIS — Z00.00 ANNUAL PHYSICAL EXAM: Primary | ICD-10-CM

## 2020-05-09 DIAGNOSIS — R39.198 URINARY DYSFUNCTION: ICD-10-CM

## 2020-05-09 DIAGNOSIS — G43.709 CHRONIC MIGRAINE WITHOUT AURA WITHOUT STATUS MIGRAINOSUS, NOT INTRACTABLE: ICD-10-CM

## 2020-05-09 DIAGNOSIS — M25.40 JOINT SWELLING: ICD-10-CM

## 2020-05-09 DIAGNOSIS — I10 HYPERTENSION, UNSPECIFIED TYPE: ICD-10-CM

## 2020-05-09 LAB
ALBUMIN SERPL BCP-MCNC: 3.9 G/DL (ref 3.5–5)
ALP SERPL-CCNC: 92 U/L (ref 46–116)
ALT SERPL W P-5'-P-CCNC: 21 U/L (ref 12–78)
ANION GAP SERPL CALCULATED.3IONS-SCNC: 7 MMOL/L (ref 4–13)
AST SERPL W P-5'-P-CCNC: 22 U/L (ref 5–45)
BACTERIA UR QL AUTO: ABNORMAL /HPF
BASOPHILS # BLD AUTO: 0.05 THOUSANDS/ΜL (ref 0–0.1)
BASOPHILS NFR BLD AUTO: 1 % (ref 0–1)
BILIRUB SERPL-MCNC: 0.5 MG/DL (ref 0.2–1)
BILIRUB UR QL STRIP: NEGATIVE
BUN SERPL-MCNC: 12 MG/DL (ref 5–25)
CALCIUM SERPL-MCNC: 8.8 MG/DL (ref 8.3–10.1)
CHLORIDE SERPL-SCNC: 103 MMOL/L (ref 100–108)
CHOLEST SERPL-MCNC: 212 MG/DL (ref 50–200)
CLARITY UR: CLEAR
CO2 SERPL-SCNC: 28 MMOL/L (ref 21–32)
COLOR UR: YELLOW
CREAT SERPL-MCNC: 0.97 MG/DL (ref 0.6–1.3)
CRP SERPL QL: 4 MG/L
EOSINOPHIL # BLD AUTO: 0.09 THOUSAND/ΜL (ref 0–0.61)
EOSINOPHIL NFR BLD AUTO: 2 % (ref 0–6)
ERYTHROCYTE [DISTWIDTH] IN BLOOD BY AUTOMATED COUNT: 12.3 % (ref 11.6–15.1)
EST. AVERAGE GLUCOSE BLD GHB EST-MCNC: 114 MG/DL
FERRITIN SERPL-MCNC: 36 NG/ML (ref 8–388)
GFR SERPL CREATININE-BSD FRML MDRD: 67 ML/MIN/1.73SQ M
GLUCOSE P FAST SERPL-MCNC: 101 MG/DL (ref 65–99)
GLUCOSE UR STRIP-MCNC: NEGATIVE MG/DL
HBA1C MFR BLD: 5.6 %
HCT VFR BLD AUTO: 44.1 % (ref 34.8–46.1)
HDLC SERPL-MCNC: 68 MG/DL
HGB BLD-MCNC: 14.3 G/DL (ref 11.5–15.4)
HGB UR QL STRIP.AUTO: ABNORMAL
IMM GRANULOCYTES # BLD AUTO: 0.01 THOUSAND/UL (ref 0–0.2)
IMM GRANULOCYTES NFR BLD AUTO: 0 % (ref 0–2)
IRON SATN MFR SERPL: 25 %
IRON SERPL-MCNC: 88 UG/DL (ref 50–170)
KETONES UR STRIP-MCNC: NEGATIVE MG/DL
LDLC SERPL CALC-MCNC: 135 MG/DL (ref 0–100)
LEUKOCYTE ESTERASE UR QL STRIP: NEGATIVE
LYMPHOCYTES # BLD AUTO: 1.61 THOUSANDS/ΜL (ref 0.6–4.47)
LYMPHOCYTES NFR BLD AUTO: 39 % (ref 14–44)
MAGNESIUM SERPL-MCNC: 2.2 MG/DL (ref 1.6–2.6)
MCH RBC QN AUTO: 30.7 PG (ref 26.8–34.3)
MCHC RBC AUTO-ENTMCNC: 32.4 G/DL (ref 31.4–37.4)
MCV RBC AUTO: 95 FL (ref 82–98)
MONOCYTES # BLD AUTO: 0.38 THOUSAND/ΜL (ref 0.17–1.22)
MONOCYTES NFR BLD AUTO: 9 % (ref 4–12)
NEUTROPHILS # BLD AUTO: 1.99 THOUSANDS/ΜL (ref 1.85–7.62)
NEUTS SEG NFR BLD AUTO: 49 % (ref 43–75)
NITRITE UR QL STRIP: NEGATIVE
NON-SQ EPI CELLS URNS QL MICRO: ABNORMAL /HPF
NONHDLC SERPL-MCNC: 144 MG/DL
NRBC BLD AUTO-RTO: 0 /100 WBCS
PH UR STRIP.AUTO: 6 [PH]
PLATELET # BLD AUTO: 263 THOUSANDS/UL (ref 149–390)
PMV BLD AUTO: 9.3 FL (ref 8.9–12.7)
POTASSIUM SERPL-SCNC: 4.1 MMOL/L (ref 3.5–5.3)
PROT SERPL-MCNC: 7.4 G/DL (ref 6.4–8.2)
PROT UR STRIP-MCNC: NEGATIVE MG/DL
RBC # BLD AUTO: 4.66 MILLION/UL (ref 3.81–5.12)
RBC #/AREA URNS AUTO: ABNORMAL /HPF
SODIUM SERPL-SCNC: 138 MMOL/L (ref 136–145)
SP GR UR STRIP.AUTO: 1.02 (ref 1–1.03)
TIBC SERPL-MCNC: 359 UG/DL (ref 250–450)
TRIGL SERPL-MCNC: 45 MG/DL
TSH SERPL DL<=0.05 MIU/L-ACNC: 1.8 UIU/ML (ref 0.36–3.74)
UROBILINOGEN UR QL STRIP.AUTO: 0.2 E.U./DL
WBC # BLD AUTO: 4.13 THOUSAND/UL (ref 4.31–10.16)
WBC #/AREA URNS AUTO: ABNORMAL /HPF

## 2020-05-09 PROCEDURE — 86430 RHEUMATOID FACTOR TEST QUAL: CPT

## 2020-05-09 PROCEDURE — 86664 EPSTEIN-BARR NUCLEAR ANTIGEN: CPT

## 2020-05-09 PROCEDURE — 86800 THYROGLOBULIN ANTIBODY: CPT

## 2020-05-09 PROCEDURE — 80061 LIPID PANEL: CPT

## 2020-05-09 PROCEDURE — 86663 EPSTEIN-BARR ANTIBODY: CPT

## 2020-05-09 PROCEDURE — 83550 IRON BINDING TEST: CPT

## 2020-05-09 PROCEDURE — 85025 COMPLETE CBC W/AUTO DIFF WBC: CPT

## 2020-05-09 PROCEDURE — 84443 ASSAY THYROID STIM HORMONE: CPT

## 2020-05-09 PROCEDURE — 83540 ASSAY OF IRON: CPT

## 2020-05-09 PROCEDURE — 86038 ANTINUCLEAR ANTIBODIES: CPT

## 2020-05-09 PROCEDURE — 80053 COMPREHEN METABOLIC PANEL: CPT

## 2020-05-09 PROCEDURE — 83735 ASSAY OF MAGNESIUM: CPT

## 2020-05-09 PROCEDURE — 86140 C-REACTIVE PROTEIN: CPT

## 2020-05-09 PROCEDURE — 82728 ASSAY OF FERRITIN: CPT

## 2020-05-09 PROCEDURE — 99214 OFFICE O/P EST MOD 30 MIN: CPT | Performed by: INTERNAL MEDICINE

## 2020-05-09 PROCEDURE — 36415 COLL VENOUS BLD VENIPUNCTURE: CPT

## 2020-05-09 PROCEDURE — 83036 HEMOGLOBIN GLYCOSYLATED A1C: CPT

## 2020-05-09 PROCEDURE — 86665 EPSTEIN-BARR CAPSID VCA: CPT

## 2020-05-09 PROCEDURE — 86376 MICROSOMAL ANTIBODY EACH: CPT

## 2020-05-09 PROCEDURE — 81001 URINALYSIS AUTO W/SCOPE: CPT | Performed by: INTERNAL MEDICINE

## 2020-05-09 PROCEDURE — 86235 NUCLEAR ANTIGEN ANTIBODY: CPT

## 2020-05-10 LAB — THYROPEROXIDASE AB SERPL-ACNC: <9 IU/ML (ref 0–34)

## 2020-05-11 ENCOUNTER — TELEPHONE (OUTPATIENT)
Dept: NEUROLOGY | Facility: CLINIC | Age: 55
End: 2020-05-11

## 2020-05-11 ENCOUNTER — TELEPHONE (OUTPATIENT)
Dept: INTERNAL MEDICINE CLINIC | Facility: CLINIC | Age: 55
End: 2020-05-11

## 2020-05-11 LAB
EBV NA IGG SER IA-ACNC: 231 U/ML (ref 0–17.9)
EBV VCA IGG SER IA-ACNC: 385 U/ML (ref 0–17.9)
EBV VCA IGM SER IA-ACNC: <36 U/ML (ref 0–35.9)
ENA SS-A AB SER-ACNC: <0.2 AI (ref 0–0.9)
ENA SS-B AB SER-ACNC: <0.2 AI (ref 0–0.9)
INTERPRETATION: ABNORMAL
RHEUMATOID FACT SER QL LA: NEGATIVE
RYE IGE QN: NEGATIVE

## 2020-05-12 ENCOUNTER — HOSPITAL ENCOUNTER (OUTPATIENT)
Dept: CT IMAGING | Facility: HOSPITAL | Age: 55
Discharge: HOME | End: 2020-05-12
Attending: INTERNAL MEDICINE
Payer: COMMERCIAL

## 2020-05-12 ENCOUNTER — TELEPHONE (OUTPATIENT)
Dept: INTERNAL MEDICINE CLINIC | Facility: CLINIC | Age: 55
End: 2020-05-12

## 2020-05-12 DIAGNOSIS — R31.9 HEMATURIA OF UNKNOWN CAUSE: Primary | ICD-10-CM

## 2020-05-12 DIAGNOSIS — R31.9 HEMATURIA OF UNKNOWN CAUSE: ICD-10-CM

## 2020-05-12 LAB — THYROGLOB AB SERPL-ACNC: <1 IU/ML (ref 0–0.9)

## 2020-05-12 PROCEDURE — 74176 CT ABD & PELVIS W/O CONTRAST: CPT

## 2020-05-14 ENCOUNTER — HOSPITAL ENCOUNTER (OUTPATIENT)
Dept: ULTRASOUND IMAGING | Facility: HOSPITAL | Age: 55
Discharge: HOME | End: 2020-05-14
Attending: OBSTETRICS & GYNECOLOGY
Payer: COMMERCIAL

## 2020-05-14 ENCOUNTER — HOSPITAL ENCOUNTER (OUTPATIENT)
Dept: MAMMOGRAPHY | Facility: HOSPITAL | Age: 55
Discharge: HOME | End: 2020-05-14
Attending: INTERNAL MEDICINE
Payer: COMMERCIAL

## 2020-05-14 DIAGNOSIS — Z12.31 ENCOUNTER FOR SCREENING MAMMOGRAM FOR MALIGNANT NEOPLASM OF BREAST: ICD-10-CM

## 2020-05-14 DIAGNOSIS — N95.0 PMB (POSTMENOPAUSAL BLEEDING): ICD-10-CM

## 2020-05-14 DIAGNOSIS — R10.2 PELVIC PAIN: ICD-10-CM

## 2020-05-14 PROCEDURE — 77063 BREAST TOMOSYNTHESIS BI: CPT

## 2020-05-14 PROCEDURE — 76856 US EXAM PELVIC COMPLETE: CPT

## 2020-05-14 PROCEDURE — 77067 SCR MAMMO BI INCL CAD: CPT

## 2020-05-14 PROCEDURE — 76830 TRANSVAGINAL US NON-OB: CPT

## 2020-05-15 ENCOUNTER — TELEMEDICINE (OUTPATIENT)
Dept: NEUROLOGY | Facility: CLINIC | Age: 55
End: 2020-05-15
Payer: COMMERCIAL

## 2020-05-15 ENCOUNTER — TELEPHONE (OUTPATIENT)
Dept: NEUROLOGY | Facility: CLINIC | Age: 55
End: 2020-05-15

## 2020-05-15 VITALS — HEIGHT: 66 IN | BODY MASS INDEX: 28.28 KG/M2 | WEIGHT: 176 LBS

## 2020-05-15 DIAGNOSIS — G43.709 CHRONIC MIGRAINE WITHOUT AURA WITHOUT STATUS MIGRAINOSUS, NOT INTRACTABLE: ICD-10-CM

## 2020-05-15 DIAGNOSIS — G44.319 ACUTE POST-TRAUMATIC HEADACHE, NOT INTRACTABLE: Primary | ICD-10-CM

## 2020-05-15 DIAGNOSIS — S06.9X0D MILD TRAUMATIC BRAIN INJURY, WITHOUT LOSS OF CONSCIOUSNESS, SUBSEQUENT ENCOUNTER: ICD-10-CM

## 2020-05-15 PROCEDURE — 99214 OFFICE O/P EST MOD 30 MIN: CPT | Performed by: PSYCHIATRY & NEUROLOGY

## 2020-05-15 RX ORDER — RIZATRIPTAN BENZOATE 10 MG/1
10 TABLET ORAL ONCE AS NEEDED
Qty: 9 TABLET | Refills: 0 | Status: SHIPPED | OUTPATIENT
Start: 2020-05-15 | End: 2021-03-04 | Stop reason: ALTCHOICE

## 2020-05-15 RX ORDER — DIVALPROEX SODIUM 250 MG/1
TABLET, DELAYED RELEASE ORAL
Qty: 8 TABLET | Refills: 0 | Status: SHIPPED | OUTPATIENT
Start: 2020-05-15 | End: 2020-08-11

## 2020-05-17 DIAGNOSIS — G43.709 CHRONIC MIGRAINE WITHOUT AURA WITHOUT STATUS MIGRAINOSUS, NOT INTRACTABLE: ICD-10-CM

## 2020-05-18 RX ORDER — VENLAFAXINE HYDROCHLORIDE 75 MG/1
CAPSULE, EXTENDED RELEASE ORAL
Qty: 90 CAPSULE | Refills: 0 | Status: SHIPPED | OUTPATIENT
Start: 2020-05-18 | End: 2020-11-11

## 2020-05-19 ENCOUNTER — TELEPHONE (OUTPATIENT)
Dept: INTERNAL MEDICINE CLINIC | Facility: CLINIC | Age: 55
End: 2020-05-19

## 2020-05-19 ENCOUNTER — APPOINTMENT (OUTPATIENT)
Dept: RADIOLOGY | Facility: CLINIC | Age: 55
End: 2020-05-19
Payer: COMMERCIAL
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
## 2020-05-19 PROCEDURE — 73030 X-RAY EXAM OF SHOULDER: CPT

## 2020-05-19 PROCEDURE — 71100 X-RAY EXAM RIBS UNI 2 VIEWS: CPT

## 2020-06-01 ENCOUNTER — OFFICE VISIT (OUTPATIENT)
Dept: NEUROLOGY | Facility: CLINIC | Age: 55
End: 2020-06-01
Payer: COMMERCIAL

## 2020-06-01 VITALS
HEIGHT: 66 IN | SYSTOLIC BLOOD PRESSURE: 110 MMHG | BODY MASS INDEX: 27.88 KG/M2 | WEIGHT: 173.5 LBS | TEMPERATURE: 98.7 F | DIASTOLIC BLOOD PRESSURE: 80 MMHG | HEART RATE: 70 BPM

## 2020-06-01 DIAGNOSIS — G43.709 CHRONIC MIGRAINE WITHOUT AURA WITHOUT STATUS MIGRAINOSUS, NOT INTRACTABLE: Primary | ICD-10-CM

## 2020-06-01 PROCEDURE — 3008F BODY MASS INDEX DOCD: CPT | Performed by: PHYSICIAN ASSISTANT

## 2020-06-01 PROCEDURE — 99214 OFFICE O/P EST MOD 30 MIN: CPT | Performed by: PHYSICIAN ASSISTANT

## 2020-06-01 PROCEDURE — 3079F DIAST BP 80-89 MM HG: CPT | Performed by: PHYSICIAN ASSISTANT

## 2020-06-01 PROCEDURE — 1036F TOBACCO NON-USER: CPT | Performed by: PHYSICIAN ASSISTANT

## 2020-06-01 PROCEDURE — 3074F SYST BP LT 130 MM HG: CPT | Performed by: PHYSICIAN ASSISTANT

## 2020-07-29 ENCOUNTER — TELEPHONE (OUTPATIENT)
Dept: NEUROLOGY | Facility: CLINIC | Age: 55
End: 2020-07-29

## 2020-07-29 DIAGNOSIS — G43.709 CHRONIC MIGRAINE WITHOUT AURA WITHOUT STATUS MIGRAINOSUS, NOT INTRACTABLE: Primary | ICD-10-CM

## 2020-07-29 RX ORDER — CYPROHEPTADINE HYDROCHLORIDE 4 MG/1
4 TABLET ORAL
Qty: 30 TABLET | Refills: 0 | Status: SHIPPED | OUTPATIENT
Start: 2020-07-29 | End: 2020-08-11

## 2020-07-29 RX ORDER — DEXAMETHASONE 1 MG
1 TABLET ORAL
Qty: 5 TABLET | Refills: 0 | Status: SHIPPED | OUTPATIENT
Start: 2020-07-29 | End: 2020-08-11

## 2020-07-29 NOTE — TELEPHONE ENCOUNTER
Please call patient to update her on the plan:  Starting today Decadron 1 mg in the am for 5 days  Cyproheptadine 4 mg at bedtime for 30 nights  Emgality 2 injection first month then 1 injection every 30 days  At onset of migraine, take Nurtec  Limit of 1 in 24 hours    Please have her download coupon cards for both Emgality and Nurtec  Thanks    ----- Message from Kassie Patterson MA sent at 7/29/2020  7:30 AM EDT -----  Regarding: FW: Non-Urgent Medical Question  Contact: [phone]      ----- Message -----  From: Neville Kennedy  Sent: 7/28/2020   9:07 PM EDT  To: Neurology [facility] SPECIALTY [facility] Clinical  Subject: Non-Urgent Medical Question                      Good Evening Eugene Hernandez,    My headaches have not gotten any better  Between not feeling right in the head and the fatigue from them are getting to me  I just need a break from this  Today I broke down and just started to cry  I just want to feel normal again  The hot flashes aren't any better either and now I am starting to feel gitter inside  The nausea is horrible too  Please I just want a break from this       Have a good night

## 2020-07-29 NOTE — TELEPHONE ENCOUNTER
Called patient regarding below  She wrote down instructions and verbalizes understanding  She does not want to take cyproheptadine again d/t causing her to gain weight last time she took this  Any other recommendations? Patient also asking if she gets the injections, what is the plan regarding Venlafaxine? Will she be weaned or remain on this medication?     Received PA request for Nurtec and emgality  [facility]  Key: [id number]  Submitted on CMM   Awaiting determination     Emgality   KEY: [id number]  Completed on CMM  Awaiting determination

## 2020-07-29 NOTE — TELEPHONE ENCOUNTER
We can wean her off the Venlafaxine  Ok hold the cyproheptadine but have her take decadron and start the others

## 2020-07-30 DIAGNOSIS — K21.9 GASTROESOPHAGEAL REFLUX DISEASE, ESOPHAGITIS PRESENCE NOT SPECIFIED: ICD-10-CM

## 2020-07-31 RX ORDER — DEXLANSOPRAZOLE 60 MG/1
CAPSULE, DELAYED RELEASE ORAL
Qty: 90 CAPSULE | Refills: 0 | Status: SHIPPED | OUTPATIENT
Start: 2020-07-31 | End: 2020-11-02 | Stop reason: SDUPTHER

## 2020-07-31 NOTE — TELEPHONE ENCOUNTER
Called and Left a message on pt's answering machine for a call back    Left voicemail for pharmacy stating emgality was approved    Team - make patient aware that emgality was approved through insurance  Still awaiting Nurtec determination  Additionally, to stay on venlafaxine for one month

## 2020-07-31 NOTE — TELEPHONE ENCOUNTER
Patient made aware that emgality was approved and to contact pharm  Still waiting on nurtec and aware to stay on venlafaxine for one more month  Patient verbalized understanding

## 2020-07-31 NOTE — TELEPHONE ENCOUNTER
emgality approved through 1/31/21    Johanny Galvez - prior to calling patient, do you have instruction for weaning off venlafaxine or is patient to stay on this for a while longer to see if emgality works?

## 2020-08-04 DIAGNOSIS — G43.709 CHRONIC MIGRAINE WITHOUT AURA WITHOUT STATUS MIGRAINOSUS, NOT INTRACTABLE: Primary | ICD-10-CM

## 2020-08-04 NOTE — TELEPHONE ENCOUNTER
Called patient and reviewed below  She states she was able to get Nurtec with coupon card  Told her to see if nurtec is helpful for her and if not then can try the ubrelvy but to call our office first   She states she has been taking the Cyprophetadine and completed decadron already  But she states she is not sleeping while taking the cyprophetadine  She is waking up frequently in the night and gets less than an hour of deep sleep every night  She states that last time she was on this, she was having a hard time waking up in the morning and weight gain  She states this time it is the complete opposite - being that she can't sleep now

## 2020-08-04 NOTE — TELEPHONE ENCOUNTER
It is likely from the Decadron that she can't sleep    Please have her continue to try cyproheptadine

## 2020-08-04 NOTE — TELEPHONE ENCOUNTER
Called [phone] to follow up on Nurtec PA  Spoke with Itzel  She states Ashok was denied d/t patient only trying one triptan  She will be faxing us the denial letter

## 2020-08-04 NOTE — TELEPHONE ENCOUNTER
Please have her try Ubrelvy 100 mg  Coupon card for $10 for 10 pills a month    At onset of migraine take Ubrelvy 100 mg  May repeat in 2 hours if needed    Limit of 200 mg in 24 hours

## 2020-08-10 ENCOUNTER — TELEPHONE (OUTPATIENT)
Dept: NEUROLOGY | Facility: CLINIC | Age: 55
End: 2020-08-10

## 2020-08-10 NOTE — TELEPHONE ENCOUNTER
Patient calling in  She states she is still having headaches  Patient began getting tearful on the line  She did emgality injection last monday  Did try 5 days of decadron  She was taking cyporphetadine but stopped taking this on Friday 8/7  She states she is still very fatiged  She feels her mind is a cloud  She is having a hard recalling things  She was going to ER yesterday but didn't  Took Nurtec yesterday  She states that this medication was not helpful for  She states her head is in a fog all the time  She states the jitteriness is still ongoing  She states she is very fatigued all the time  I did advise she try another Nurtec today  Patient states that since the beam on her head back in March, she concerned for a bleed   Please advise    [phone]   Ok to leave a detailed message

## 2020-08-10 NOTE — TELEPHONE ENCOUNTER
Decadron did not help  No relief from migraines  Patient reports she is not sleeping - she is wide awake  Originally thought the lack of sleep was due to steroids, but steroids are complete and she is still not sleeping  Patient is not agreeable to depakote  States has tried this in the past and had developed a "fog"  Requested an appt to further discuss  Offered in office visit tomorrow and Thursday but patient declined due to work schedule  Patient agreeable to virtual visit  Patient prefers telephone  Scheduled for 8/11/20 at 830  Notified Dulce Bhatti and Jose Mcgowan

## 2020-08-10 NOTE — TELEPHONE ENCOUNTER
Emgality takes 3-6 months to be effective  We can try Depakote again  Unfortunately, cannot order imaging through a telephone encounter  If she feels terrible and concern for a bleed, can go to ER  Did Decadron help at all? Is she sleeping?

## 2020-08-11 ENCOUNTER — HOSPITAL ENCOUNTER (OUTPATIENT)
Dept: CT IMAGING | Facility: HOSPITAL | Age: 55
Discharge: HOME | End: 2020-08-11
Payer: COMMERCIAL

## 2020-08-11 ENCOUNTER — TELEMEDICINE (OUTPATIENT)
Dept: NEUROLOGY | Facility: CLINIC | Age: 55
End: 2020-08-11
Payer: COMMERCIAL

## 2020-08-11 VITALS — BODY MASS INDEX: 27.32 KG/M2 | HEIGHT: 66 IN | WEIGHT: 170 LBS

## 2020-08-11 DIAGNOSIS — G43.709 CHRONIC MIGRAINE WITHOUT AURA WITHOUT STATUS MIGRAINOSUS, NOT INTRACTABLE: Primary | ICD-10-CM

## 2020-08-11 DIAGNOSIS — S09.90XD INJURY OF HEAD, SUBSEQUENT ENCOUNTER: ICD-10-CM

## 2020-08-11 DIAGNOSIS — R41.0 MENTAL CONFUSION: ICD-10-CM

## 2020-08-11 PROCEDURE — 70450 CT HEAD/BRAIN W/O DYE: CPT

## 2020-08-11 PROCEDURE — 99214 OFFICE O/P EST MOD 30 MIN: CPT | Performed by: PHYSICIAN ASSISTANT

## 2020-08-11 PROCEDURE — 3074F SYST BP LT 130 MM HG: CPT | Performed by: PHYSICIAN ASSISTANT

## 2020-08-11 PROCEDURE — 3008F BODY MASS INDEX DOCD: CPT | Performed by: PHYSICIAN ASSISTANT

## 2020-08-11 PROCEDURE — G1004 CDSM NDSC: HCPCS

## 2020-08-11 PROCEDURE — 3079F DIAST BP 80-89 MM HG: CPT | Performed by: PHYSICIAN ASSISTANT

## 2020-08-11 PROCEDURE — 1036F TOBACCO NON-USER: CPT | Performed by: PHYSICIAN ASSISTANT

## 2020-08-11 RX ORDER — DIVALPROEX SODIUM 250 MG/1
500 TABLET, EXTENDED RELEASE ORAL DAILY
Qty: 60 TABLET | Refills: 5 | Status: SHIPPED | OUTPATIENT
Start: 2020-08-11 | End: 2020-11-11

## 2020-08-11 RX ORDER — VENLAFAXINE HYDROCHLORIDE 37.5 MG/1
37.5 CAPSULE, EXTENDED RELEASE ORAL DAILY
Qty: 14 CAPSULE | Refills: 0 | Status: SHIPPED | OUTPATIENT
Start: 2020-08-11 | End: 2020-11-11

## 2020-08-11 NOTE — PATIENT INSTRUCTIONS
Preventive:   Continue doing magnesium oxide 400mg in am daily  Wean off the Venlafaxine  37 5 mg for 14 days then stop  Start Depakote 250 mg at bedtime for 5 nights then increase to 2 tabs (500 mg) at bedtime   Abortive: At the onset of mild headache patient's take Aleve  At onset of migraine, patient is to take Nurtec 75 mg  Limit of 1 in 24 hours  May use prochlorperazine 10 mg in addition  We will do a CT of the head to ensure no hidden pathology    Neck pain:   - We discussed the role of neck pathology and poor posture, with straightening of the normal cervical lordosis, in headaches  We discussed how tightening of the neck muscles can irritate the nerves in the occipital region of her head and cause or worsen head pain  We also discussed and demonstrated neck strengthening and relaxation exercises, as well as giving written instructions on these exercises  - We talked about the importance of good posture for improving shoulder, neck, and head pain  The patient was given visualization exercises for correcting posture, which patient will practice at home  If this simple exercise does not help improve the posture, we will consider formal physical therapy in the future  Medication overuse headaches:   - We discussed medication overuse headache [facility]) and how to avoid it in the future  It was explained that all analgesics have the potential to cause medication overuse headache [facility]) and analgesic overuse can negate the effectiveness of headache preventive measures  After successful [address] treatment, preventive medications for an underlying primary headache disorder have a greater chance for success  Avoid medications with narcotics, barbiturates, or caffeine in them as these can cause rebound headaches after very few doses and can interfere with other headache medicine efficacy  Taking any analgesics for more than 2-3 days a week can cause medication overuse headache       Reproductive age women: Should take folic acid daily when taking anti-seizure drugs especially Depakote  [place] Prescription Drug Monitoring Program report was reviewed and was appropriate      Headache management instructions  - When patient has a moderate to severe headache, they should seek rest, initiate relaxation and apply cold compresses to the head  - Maintain regular sleep schedule  Adults need at least 7-8 hours of uninterrupted a night  - Limit over the counter medications such as Tylenol, Ibuprofen, Aleve, Excedrin  (No more than 3 times a week)  - Maintain headache diary  We discussed an NANCY for a smart phone is "Migraine shelley"  - Limit caffeine to 1-2 cups 8 to 16 oz a day or less  - Avoid dietary trigger  (aged cheese, peanuts, MSG, aspartame and nitrates)  - Patient is to have regular frequent meals to prevent headache onset  - Please drink at least 64 ounces of water a day to help remain hydrated  Please call with any questions or concerns   Office number is [phone]

## 2020-08-11 NOTE — PROGRESS NOTES
Review of Systems   Constitutional: Negative  HENT: Positive for tinnitus (right ear )  Eyes: Negative  Respiratory: Negative  Cardiovascular: Negative  Gastrointestinal: Negative  Endocrine: Negative  Genitourinary: Negative  Musculoskeletal: Negative  Skin: Negative  Allergic/Immunologic: Negative  Neurological: Positive for dizziness, light-headedness and headaches (daily headache )  Hematological: Negative  Psychiatric/Behavioral: Positive for decreased concentration (brain fog ) and sleep disturbance (difficulty sleeping )

## 2020-08-14 NOTE — TELEPHONE ENCOUNTER
Jackie CERVANTES calling in stating Ubrelvy needs PA  Made her aware that patient has gotten med with coupon card

## 2020-09-04 ENCOUNTER — TRANSCRIBE ORDERS (OUTPATIENT)
Dept: URGENT CARE | Facility: CLINIC | Age: 55
End: 2020-09-04

## 2020-09-04 ENCOUNTER — LAB (OUTPATIENT)
Dept: LAB | Facility: CLINIC | Age: 55
End: 2020-09-04
Payer: COMMERCIAL

## 2020-09-04 DIAGNOSIS — R68.82 DECREASED LIBIDO: ICD-10-CM

## 2020-09-04 DIAGNOSIS — R53.82 CHRONIC FATIGUE: ICD-10-CM

## 2020-09-04 DIAGNOSIS — N95.1 SYMPTOMATIC MENOPAUSAL OR FEMALE CLIMACTERIC STATES: Primary | ICD-10-CM

## 2020-09-04 DIAGNOSIS — E03.9 HYPOTHYROIDISM, UNSPECIFIED TYPE: ICD-10-CM

## 2020-09-04 DIAGNOSIS — G43.001 MIGRAINE WITHOUT AURA AND WITH STATUS MIGRAINOSUS, NOT INTRACTABLE: ICD-10-CM

## 2020-09-04 LAB
ANION GAP SERPL CALCULATED.3IONS-SCNC: 5 MMOL/L (ref 4–13)
BASOPHILS # BLD AUTO: 0.03 THOUSANDS/ΜL (ref 0–0.1)
BASOPHILS NFR BLD AUTO: 1 % (ref 0–1)
BUN SERPL-MCNC: 18 MG/DL (ref 5–25)
CALCIUM SERPL-MCNC: 9.7 MG/DL (ref 8.3–10.1)
CHLORIDE SERPL-SCNC: 107 MMOL/L (ref 100–108)
CO2 SERPL-SCNC: 28 MMOL/L (ref 21–32)
CREAT SERPL-MCNC: 0.92 MG/DL (ref 0.6–1.3)
EOSINOPHIL # BLD AUTO: 0.16 THOUSAND/ΜL (ref 0–0.61)
EOSINOPHIL NFR BLD AUTO: 3 % (ref 0–6)
ERYTHROCYTE [DISTWIDTH] IN BLOOD BY AUTOMATED COUNT: 12.4 % (ref 11.6–15.1)
ESTRADIOL SERPL-MCNC: <11 PG/ML
FSH SERPL-ACNC: 144.2 MIU/ML
GFR SERPL CREATININE-BSD FRML MDRD: 71 ML/MIN/1.73SQ M
GLUCOSE P FAST SERPL-MCNC: 98 MG/DL (ref 65–99)
HCT VFR BLD AUTO: 43.9 % (ref 34.8–46.1)
HGB BLD-MCNC: 14 G/DL (ref 11.5–15.4)
IMM GRANULOCYTES # BLD AUTO: 0.01 THOUSAND/UL (ref 0–0.2)
IMM GRANULOCYTES NFR BLD AUTO: 0 % (ref 0–2)
INSULIN SERPL-ACNC: 7.7 MU/L (ref 3–25)
LYMPHOCYTES # BLD AUTO: 1.76 THOUSANDS/ΜL (ref 0.6–4.47)
LYMPHOCYTES NFR BLD AUTO: 32 % (ref 14–44)
MCH RBC QN AUTO: 29.9 PG (ref 26.8–34.3)
MCHC RBC AUTO-ENTMCNC: 31.9 G/DL (ref 31.4–37.4)
MCV RBC AUTO: 94 FL (ref 82–98)
MONOCYTES # BLD AUTO: 0.54 THOUSAND/ΜL (ref 0.17–1.22)
MONOCYTES NFR BLD AUTO: 10 % (ref 4–12)
NEUTROPHILS # BLD AUTO: 3.05 THOUSANDS/ΜL (ref 1.85–7.62)
NEUTS SEG NFR BLD AUTO: 54 % (ref 43–75)
NRBC BLD AUTO-RTO: 0 /100 WBCS
PLATELET # BLD AUTO: 261 THOUSANDS/UL (ref 149–390)
PMV BLD AUTO: 10.5 FL (ref 8.9–12.7)
POTASSIUM SERPL-SCNC: 4 MMOL/L (ref 3.5–5.3)
PROLACTIN SERPL-MCNC: 7.8 NG/ML
RBC # BLD AUTO: 4.68 MILLION/UL (ref 3.81–5.12)
SODIUM SERPL-SCNC: 140 MMOL/L (ref 136–145)
T4 FREE SERPL-MCNC: 1.19 NG/DL (ref 0.76–1.46)
TSH SERPL DL<=0.05 MIU/L-ACNC: 3.08 UIU/ML (ref 0.36–3.74)
WBC # BLD AUTO: 5.55 THOUSAND/UL (ref 4.31–10.16)

## 2020-09-04 PROCEDURE — 84482 T3 REVERSE: CPT

## 2020-09-04 PROCEDURE — 83036 HEMOGLOBIN GLYCOSYLATED A1C: CPT

## 2020-09-04 PROCEDURE — 83001 ASSAY OF GONADOTROPIN (FSH): CPT

## 2020-09-04 PROCEDURE — 85025 COMPLETE CBC W/AUTO DIFF WBC: CPT

## 2020-09-04 PROCEDURE — 81241 F5 GENE: CPT

## 2020-09-04 PROCEDURE — 83525 ASSAY OF INSULIN: CPT

## 2020-09-04 PROCEDURE — 84439 ASSAY OF FREE THYROXINE: CPT

## 2020-09-04 PROCEDURE — 84443 ASSAY THYROID STIM HORMONE: CPT

## 2020-09-04 PROCEDURE — 80048 BASIC METABOLIC PNL TOTAL CA: CPT

## 2020-09-04 PROCEDURE — 36415 COLL VENOUS BLD VENIPUNCTURE: CPT

## 2020-09-04 PROCEDURE — 82670 ASSAY OF TOTAL ESTRADIOL: CPT

## 2020-09-04 PROCEDURE — 82679 ASSAY OF ESTRONE: CPT

## 2020-09-04 PROCEDURE — 84146 ASSAY OF PROLACTIN: CPT

## 2020-09-05 LAB
EST. AVERAGE GLUCOSE BLD GHB EST-MCNC: 123 MG/DL
HBA1C MFR BLD: 5.9 %

## 2020-09-08 LAB — T3REVERSE SERPL-MCNC: 23.6 NG/DL (ref 9.2–24.1)

## 2020-09-09 LAB — F5 GENE MUT ANL BLD/T: ABNORMAL

## 2020-09-11 LAB — ESTRONE SERPL-MCNC: 38 PG/ML

## 2020-09-14 DIAGNOSIS — E55.9 VITAMIN D DEFICIENCY: ICD-10-CM

## 2020-09-14 RX ORDER — CHOLECALCIFEROL (VITAMIN D3) 1250 MCG
1 CAPSULE ORAL WEEKLY
Qty: 12 CAPSULE | Refills: 3 | Status: SHIPPED | OUTPATIENT
Start: 2020-09-14 | End: 2021-08-25 | Stop reason: SDUPTHER

## 2020-09-17 ENCOUNTER — OFFICE VISIT (OUTPATIENT)
Dept: OBGYN CLINIC | Facility: MEDICAL CENTER | Age: 55
End: 2020-09-17
Payer: COMMERCIAL

## 2020-09-17 VITALS
DIASTOLIC BLOOD PRESSURE: 70 MMHG | SYSTOLIC BLOOD PRESSURE: 110 MMHG | BODY MASS INDEX: 27.16 KG/M2 | HEIGHT: 66 IN | WEIGHT: 169 LBS

## 2020-09-17 DIAGNOSIS — B37.3 VAGINAL CANDIDIASIS: ICD-10-CM

## 2020-09-17 DIAGNOSIS — N95.2 VAGINAL ATROPHY: Primary | ICD-10-CM

## 2020-09-17 DIAGNOSIS — D68.51 HETEROZYGOUS FACTOR V LEIDEN MUTATION (HCC): ICD-10-CM

## 2020-09-17 PROBLEM — B37.31 VAGINAL CANDIDIASIS: Status: ACTIVE | Noted: 2020-09-17

## 2020-09-17 LAB
BV WHIFF TEST VAG QL: NEGATIVE
CLUE CELLS SPEC QL WET PREP: NEGATIVE
PH SMN: >4.5 [PH]
SL AMB POCT WET MOUNT: ABNORMAL
T VAGINALIS VAG QL WET PREP: NEGATIVE
YEAST VAG QL WET PREP: PRESENT

## 2020-09-17 PROCEDURE — 87210 SMEAR WET MOUNT SALINE/INK: CPT | Performed by: STUDENT IN AN ORGANIZED HEALTH CARE EDUCATION/TRAINING PROGRAM

## 2020-09-17 PROCEDURE — 99214 OFFICE O/P EST MOD 30 MIN: CPT | Performed by: STUDENT IN AN ORGANIZED HEALTH CARE EDUCATION/TRAINING PROGRAM

## 2020-09-17 RX ORDER — FLUCONAZOLE 150 MG/1
150 TABLET ORAL ONCE
Qty: 1 TABLET | Refills: 0 | Status: SHIPPED | OUTPATIENT
Start: 2020-09-17 | End: 2020-09-17

## 2020-09-17 NOTE — ASSESSMENT & PLAN NOTE
- Today we discussed her new results positive for Factor V leiden heterozygosity and the implications for risks related to hormone replacement therapy;

## 2020-09-17 NOTE — ASSESSMENT & PLAN NOTE
- We discussed her new results for factor V heterozygosity (ordered by her menopause medicine specialist); we discussed that transdermal and transvaginal forms of vaginal estrogen may be appropriate for her since they won't increase her risk of clotting as would oral formulations  - I offered to prescribe vaginal estrogen for her atrophic vaginitis symptoms, but she decided to defer to her menopause medicine specialist  - in the meantime I recommended Replens

## 2020-09-17 NOTE — PROGRESS NOTES
[facility] [address] [place] [address], [place], [place]    Assessment/Plan:  Vaginal atrophy  - We discussed her new results for factor V heterozygosity (ordered by her menopause medicine specialist); we discussed that transdermal and transvaginal forms of vaginal estrogen may be appropriate for her since they won't increase her risk of clotting as would oral formulations  - I offered to prescribe vaginal estrogen for her atrophic vaginitis symptoms, but she decided to defer to her menopause medicine specialist  - in the meantime I recommended Replens    Heterozygous factor V Leiden mutation ([facility] [address] )  - Today we discussed her new results positive for Factor V leiden heterozygosity and the implications for risks related to hormone replacement therapy;    Diagnoses and all orders for this visit:    Vaginal atrophy    Vaginal candidiasis  -     POCT wet mount  -     fluconazole (DIFLUCAN) 150 mg tablet; Take 1 tablet (150 mg total) by mouth once for 1 dose    Heterozygous factor V Leiden mutation [facility])          Subjective:   Nain Anderson is a 50 y o   female  CC: vaginal odor    HPI: Martin Peng is a 50year old postmenopausal woman who presents with vaginal odor  She has severe vaginal dryness and pain with intercourse  She denies postcoital bleeding  She reports severe menopausal symptoms including frequent hot flashes, sweating, and chills at night  She is meeting with a menopause/aging specialist who recently ordered a large panel of bloodwork including Factor V Leiden testing in anticipation of starting hormonal therapy  ROS: Review of Systems   Constitutional: Positive for chills and diaphoresis  Negative for appetite change, fatigue, fever and unexpected weight change  HENT: Negative for congestion and rhinorrhea  Eyes: Negative for visual disturbance  Respiratory: Negative for cough and shortness of breath  Cardiovascular: Negative for chest pain and palpitations  Gastrointestinal: Negative for abdominal distention, abdominal pain, blood in stool, constipation, diarrhea, nausea and vomiting  Endocrine: Positive for heat intolerance  Negative for cold intolerance  Genitourinary: Positive for vaginal discharge  Negative for dysuria, flank pain, hematuria, pelvic pain, urgency and vaginal bleeding  Musculoskeletal: Negative for back pain and myalgias  Skin: Negative for rash  Neurological: Negative for weakness and headaches  Hematological: Negative for adenopathy  Does not bruise/bleed easily  Psychiatric/Behavioral: Negative for dysphoric mood and suicidal ideas  The patient is nervous/anxious  PFSH: The following portions of the patient's history were reviewed and updated as appropriate: allergies, current medications, past family history, past medical history, obstetric history, gynecologic history, past social history, past surgical history and problem list        Objective:  /70   Ht 5' 6" (1 676 m)   Wt 76 7 kg (169 lb)   LMP 07/31/2018   BMI 27 28 kg/m²    Physical Exam  Constitutional:       Appearance: Normal appearance  HENT:      Head: Normocephalic and atraumatic  Cardiovascular:      Rate and Rhythm: Normal rate  Pulmonary:      Effort: Pulmonary effort is normal    Abdominal:      General: There is no distension  Tenderness: There is no abdominal tenderness  There is no guarding  Genitourinary:     Exam position: Lithotomy position  Pubic Area: No rash  Labia:         Right: No rash, tenderness or lesion  Left: No rash, tenderness or lesion  Urethra: No prolapse, urethral swelling or urethral lesion  Vagina: Vaginal discharge (yellow) present  No erythema, tenderness, bleeding or lesions  Cervix: No cervical motion tenderness, discharge, friability or erythema  Uterus: Not enlarged, not tender and no uterine prolapse  Adnexa:         Right: No mass, tenderness or fullness  Left: No mass, tenderness or fullness  Comments: Atrophic vagina  Lymphadenopathy:      Lower Body: No right inguinal adenopathy  No left inguinal adenopathy  Neurological:      Mental Status: She is alert  Psychiatric:         Mood and Affect: Mood is anxious  Behavior: Behavior is hyperactive               [place] [address] Ronny Piedra MD  [address] [place] [address]  9/17/2020 5:27 PM

## 2020-10-28 ENCOUNTER — TELEPHONE (OUTPATIENT)
Dept: NEUROLOGY | Facility: CLINIC | Age: 55
End: 2020-10-28

## 2020-11-02 ENCOUNTER — TELEPHONE (OUTPATIENT)
Dept: GASTROENTEROLOGY | Facility: CLINIC | Age: 55
End: 2020-11-02

## 2020-11-02 DIAGNOSIS — K21.9 GASTROESOPHAGEAL REFLUX DISEASE: ICD-10-CM

## 2020-11-05 ENCOUNTER — LAB (OUTPATIENT)
Dept: LAB | Facility: CLINIC | Age: 55
End: 2020-11-05
Payer: COMMERCIAL

## 2020-11-05 ENCOUNTER — TRANSCRIBE ORDERS (OUTPATIENT)
Dept: URGENT CARE | Facility: CLINIC | Age: 55
End: 2020-11-05

## 2020-11-05 DIAGNOSIS — R53.82 CHRONIC FATIGUE SYNDROME: ICD-10-CM

## 2020-11-05 DIAGNOSIS — N95.1 SYMPTOMATIC MENOPAUSAL OR FEMALE CLIMACTERIC STATES: Primary | ICD-10-CM

## 2020-11-05 DIAGNOSIS — G43.011 INTRACTABLE MIGRAINE WITHOUT AURA AND WITH STATUS MIGRAINOSUS: ICD-10-CM

## 2020-11-05 DIAGNOSIS — R68.82 DECREASED LIBIDO: ICD-10-CM

## 2020-11-05 DIAGNOSIS — E03.9 ACQUIRED HYPOTHYROIDISM: ICD-10-CM

## 2020-11-05 LAB
T3FREE SERPL-MCNC: 2.46 PG/ML (ref 2.3–4.2)
T4 FREE SERPL-MCNC: 1.22 NG/DL (ref 0.76–1.46)
TSH SERPL DL<=0.05 MIU/L-ACNC: 2.02 UIU/ML (ref 0.36–3.74)

## 2020-11-05 PROCEDURE — 84482 T3 REVERSE: CPT

## 2020-11-05 PROCEDURE — 84481 FREE ASSAY (FT-3): CPT

## 2020-11-05 PROCEDURE — 84443 ASSAY THYROID STIM HORMONE: CPT

## 2020-11-05 PROCEDURE — 84439 ASSAY OF FREE THYROXINE: CPT

## 2020-11-05 PROCEDURE — 36415 COLL VENOUS BLD VENIPUNCTURE: CPT

## 2020-11-11 ENCOUNTER — OFFICE VISIT (OUTPATIENT)
Dept: NEUROLOGY | Facility: CLINIC | Age: 55
End: 2020-11-11
Payer: COMMERCIAL

## 2020-11-11 VITALS
DIASTOLIC BLOOD PRESSURE: 69 MMHG | SYSTOLIC BLOOD PRESSURE: 128 MMHG | WEIGHT: 165 LBS | BODY MASS INDEX: 26.52 KG/M2 | HEART RATE: 67 BPM | HEIGHT: 66 IN

## 2020-11-11 DIAGNOSIS — G43.709 CHRONIC MIGRAINE WITHOUT AURA WITHOUT STATUS MIGRAINOSUS, NOT INTRACTABLE: Primary | ICD-10-CM

## 2020-11-11 PROCEDURE — 99214 OFFICE O/P EST MOD 30 MIN: CPT | Performed by: PHYSICIAN ASSISTANT

## 2020-11-11 RX ORDER — SUMATRIPTAN 100 MG/1
100 TABLET, FILM COATED ORAL AS NEEDED
Qty: 9 TABLET | Refills: 0 | Status: SHIPPED | OUTPATIENT
Start: 2020-11-11 | End: 2021-03-04 | Stop reason: ALTCHOICE

## 2020-11-11 RX ORDER — LEVOTHYROXINE SODIUM 0.07 MG/1
75 TABLET ORAL
COMMUNITY
Start: 2020-10-26 | End: 2021-05-12 | Stop reason: ALTCHOICE

## 2020-11-11 RX ORDER — FLUCONAZOLE 150 MG/1
TABLET ORAL
COMMUNITY
Start: 2020-09-17 | End: 2021-07-27

## 2020-11-12 LAB — T3REVERSE SERPL-MCNC: 20.7 NG/DL (ref 9.2–24.1)

## 2020-11-14 DIAGNOSIS — R09.89 SYMPTOMS OF UPPER RESPIRATORY INFECTION (URI): Primary | ICD-10-CM

## 2020-11-14 DIAGNOSIS — R09.89 SYMPTOMS OF UPPER RESPIRATORY INFECTION (URI): ICD-10-CM

## 2020-11-14 PROCEDURE — U0003 INFECTIOUS AGENT DETECTION BY NUCLEIC ACID (DNA OR RNA); SEVERE ACUTE RESPIRATORY SYNDROME CORONAVIRUS 2 (SARS-COV-2) (CORONAVIRUS DISEASE [COVID-19]), AMPLIFIED PROBE TECHNIQUE, MAKING USE OF HIGH THROUGHPUT TECHNOLOGIES AS DESCRIBED BY CMS-2020-01-R: HCPCS | Performed by: PHYSICIAN ASSISTANT

## 2020-11-16 LAB — SARS-COV-2 RNA SPEC QL NAA+PROBE: NOT DETECTED

## 2020-12-09 ENCOUNTER — TELEPHONE (OUTPATIENT)
Dept: NEUROLOGY | Facility: CLINIC | Age: 55
End: 2020-12-09

## 2020-12-09 DIAGNOSIS — G43.709 CHRONIC MIGRAINE WITHOUT AURA WITHOUT STATUS MIGRAINOSUS, NOT INTRACTABLE: ICD-10-CM

## 2020-12-09 RX ORDER — RIMEGEPANT SULFATE 75 MG/75MG
75 TABLET, ORALLY DISINTEGRATING ORAL AS NEEDED
Qty: 8 TABLET | Refills: 3 | Status: SHIPPED | OUTPATIENT
Start: 2020-12-09 | End: 2022-03-17

## 2020-12-09 NOTE — TELEPHONE ENCOUNTER
Please perform a new prior authorization for [facility] as patient has failed both rizatriptan and sumatriptan    Thank you

## 2020-12-09 NOTE — TELEPHONE ENCOUNTER
Insurance Information from [id number] [place] [address] 1: [id number]  PcJosefinaiss Spotted  ID: [id number]  Group: Silvia WISE on CM, awaiting determination   Key: [id number]

## 2020-12-10 ENCOUNTER — TELEPHONE (OUTPATIENT)
Dept: NEUROLOGY | Facility: CLINIC | Age: 55
End: 2020-12-10

## 2020-12-10 NOTE — TELEPHONE ENCOUNTER
Please ketan for [facility] as patient has tried and failed Rizatriptan and Sumatriptan    Thanks

## 2020-12-16 ENCOUNTER — TELEMEDICINE (OUTPATIENT)
Dept: GASTROENTEROLOGY | Facility: CLINIC | Age: 55
End: 2020-12-16
Payer: COMMERCIAL

## 2020-12-16 DIAGNOSIS — K21.9 GASTROESOPHAGEAL REFLUX DISEASE: ICD-10-CM

## 2020-12-16 DIAGNOSIS — R10.11 RUQ PAIN: ICD-10-CM

## 2020-12-16 DIAGNOSIS — K59.09 OTHER CONSTIPATION: Primary | ICD-10-CM

## 2020-12-16 PROCEDURE — 99213 OFFICE O/P EST LOW 20 MIN: CPT | Performed by: PHYSICIAN ASSISTANT

## 2020-12-16 RX ORDER — DICYCLOMINE HCL 20 MG
20 TABLET ORAL EVERY 6 HOURS
Qty: 120 TABLET | Refills: 3 | Status: SHIPPED | OUTPATIENT
Start: 2020-12-16 | End: 2022-01-19 | Stop reason: SDUPTHER

## 2020-12-22 ENCOUNTER — OCCMED (OUTPATIENT)
Dept: URGENT CARE | Facility: CLINIC | Age: 55
End: 2020-12-22
Payer: OTHER MISCELLANEOUS

## 2020-12-22 ENCOUNTER — APPOINTMENT (OUTPATIENT)
Dept: RADIOLOGY | Facility: CLINIC | Age: 55
End: 2020-12-22
Payer: OTHER MISCELLANEOUS

## 2020-12-22 DIAGNOSIS — S89.92XA INJURY OF LEFT KNEE, INITIAL ENCOUNTER: ICD-10-CM

## 2020-12-22 DIAGNOSIS — S89.92XA INJURY OF LEFT KNEE, INITIAL ENCOUNTER: Primary | ICD-10-CM

## 2020-12-22 DIAGNOSIS — S69.92XA HAND INJURY, LEFT, INITIAL ENCOUNTER: ICD-10-CM

## 2020-12-22 PROCEDURE — 90715 TDAP VACCINE 7 YRS/> IM: CPT

## 2020-12-22 PROCEDURE — 73130 X-RAY EXAM OF HAND: CPT

## 2020-12-22 PROCEDURE — 99283 EMERGENCY DEPT VISIT LOW MDM: CPT

## 2020-12-22 PROCEDURE — 73562 X-RAY EXAM OF KNEE 3: CPT

## 2020-12-22 PROCEDURE — G0382 LEV 3 HOSP TYPE B ED VISIT: HCPCS

## 2020-12-22 PROCEDURE — 90471 IMMUNIZATION ADMIN: CPT | Performed by: PHYSICIAN ASSISTANT

## 2020-12-30 ENCOUNTER — APPOINTMENT (OUTPATIENT)
Dept: URGENT CARE | Facility: CLINIC | Age: 55
End: 2020-12-30
Payer: OTHER MISCELLANEOUS

## 2020-12-30 PROCEDURE — 99214 OFFICE O/P EST MOD 30 MIN: CPT

## 2021-01-20 ENCOUNTER — APPOINTMENT (OUTPATIENT)
Dept: URGENT CARE | Facility: CLINIC | Age: 56
End: 2021-01-20
Payer: OTHER MISCELLANEOUS

## 2021-01-20 PROCEDURE — 99213 OFFICE O/P EST LOW 20 MIN: CPT

## 2021-01-28 ENCOUNTER — IMMUNIZATIONS (OUTPATIENT)
Dept: FAMILY MEDICINE CLINIC | Facility: HOSPITAL | Age: 56
End: 2021-01-28

## 2021-01-28 DIAGNOSIS — Z23 ENCOUNTER FOR IMMUNIZATION: Primary | ICD-10-CM

## 2021-01-28 PROCEDURE — 91301 SARS-COV-2 / COVID-19 MRNA VACCINE (MODERNA) 100 MCG: CPT

## 2021-01-28 PROCEDURE — 0011A SARS-COV-2 / COVID-19 MRNA VACCINE (MODERNA) 100 MCG: CPT

## 2021-01-30 DIAGNOSIS — R09.89 SYMPTOMS OF UPPER RESPIRATORY INFECTION (URI): ICD-10-CM

## 2021-01-30 DIAGNOSIS — R09.89 SYMPTOMS OF UPPER RESPIRATORY INFECTION (URI): Primary | ICD-10-CM

## 2021-01-30 PROCEDURE — U0005 INFEC AGEN DETEC AMPLI PROBE: HCPCS | Performed by: PHYSICIAN ASSISTANT

## 2021-01-30 PROCEDURE — U0003 INFECTIOUS AGENT DETECTION BY NUCLEIC ACID (DNA OR RNA); SEVERE ACUTE RESPIRATORY SYNDROME CORONAVIRUS 2 (SARS-COV-2) (CORONAVIRUS DISEASE [COVID-19]), AMPLIFIED PROBE TECHNIQUE, MAKING USE OF HIGH THROUGHPUT TECHNOLOGIES AS DESCRIBED BY CMS-2020-01-R: HCPCS | Performed by: PHYSICIAN ASSISTANT

## 2021-01-31 LAB — SARS-COV-2 RNA RESP QL NAA+PROBE: NEGATIVE

## 2021-02-12 ENCOUNTER — TELEPHONE (OUTPATIENT)
Dept: GASTROENTEROLOGY | Facility: CLINIC | Age: 56
End: 2021-02-12

## 2021-02-12 NOTE — TELEPHONE ENCOUNTER
Called patient [facility] @ [phone] talked to Vinh on 2/14/2021 @ 9:50 am  Reference # [id number]  He stated it can take up until 2/19/2021 to get a answer from the insurance to see if the medication is approved  Gave 5 samples of Dexilant 30 mg   Lot # [id number] Exp: 7/2021  Called patient and let her know I talked to her insurance, patient expressed understanding

## 2021-02-17 ENCOUNTER — LAB (OUTPATIENT)
Dept: LAB | Facility: CLINIC | Age: 56
End: 2021-02-17
Payer: COMMERCIAL

## 2021-02-17 ENCOUNTER — TRANSCRIBE ORDERS (OUTPATIENT)
Dept: URGENT CARE | Facility: CLINIC | Age: 56
End: 2021-02-17

## 2021-02-17 DIAGNOSIS — R68.82 DECREASED LIBIDO: ICD-10-CM

## 2021-02-17 DIAGNOSIS — N95.1 SYMPTOMATIC MENOPAUSAL OR FEMALE CLIMACTERIC STATES: Primary | ICD-10-CM

## 2021-02-17 DIAGNOSIS — R53.82 CHRONIC FATIGUE SYNDROME: ICD-10-CM

## 2021-02-17 DIAGNOSIS — G43.011 INTRACTABLE MIGRAINE WITHOUT AURA AND WITH STATUS MIGRAINOSUS: ICD-10-CM

## 2021-02-17 DIAGNOSIS — I51.9 MYXEDEMA HEART DISEASE: ICD-10-CM

## 2021-02-17 DIAGNOSIS — E03.9 MYXEDEMA HEART DISEASE: ICD-10-CM

## 2021-02-17 LAB
25(OH)D3 SERPL-MCNC: 75.9 NG/ML (ref 30–100)
ALBUMIN SERPL BCP-MCNC: 4.4 G/DL (ref 3.5–5)
ALP SERPL-CCNC: 187 U/L (ref 46–116)
ALT SERPL W P-5'-P-CCNC: 59 U/L (ref 12–78)
ANION GAP SERPL CALCULATED.3IONS-SCNC: 7 MMOL/L (ref 4–13)
AST SERPL W P-5'-P-CCNC: 39 U/L (ref 5–45)
BILIRUB SERPL-MCNC: 0.58 MG/DL (ref 0.2–1)
BUN SERPL-MCNC: 26 MG/DL (ref 5–25)
CALCIUM SERPL-MCNC: 9.8 MG/DL (ref 8.3–10.1)
CHLORIDE SERPL-SCNC: 105 MMOL/L (ref 100–108)
CO2 SERPL-SCNC: 29 MMOL/L (ref 21–32)
CREAT SERPL-MCNC: 0.77 MG/DL (ref 0.6–1.3)
EST. AVERAGE GLUCOSE BLD GHB EST-MCNC: 114 MG/DL
GFR SERPL CREATININE-BSD FRML MDRD: 88 ML/MIN/1.73SQ M
GLUCOSE P FAST SERPL-MCNC: 89 MG/DL (ref 65–99)
HBA1C MFR BLD: 5.6 %
INSULIN SERPL-ACNC: 8.5 MU/L (ref 3–25)
POTASSIUM SERPL-SCNC: 4.1 MMOL/L (ref 3.5–5.3)
PROT SERPL-MCNC: 7.7 G/DL (ref 6.4–8.2)
SODIUM SERPL-SCNC: 141 MMOL/L (ref 136–145)
T3FREE SERPL-MCNC: 2.24 PG/ML (ref 2.3–4.2)
T4 FREE SERPL-MCNC: 1.29 NG/DL (ref 0.76–1.46)
TSH SERPL DL<=0.05 MIU/L-ACNC: 1.04 UIU/ML (ref 0.36–3.74)

## 2021-02-17 PROCEDURE — 84481 FREE ASSAY (FT-3): CPT

## 2021-02-17 PROCEDURE — 84439 ASSAY OF FREE THYROXINE: CPT

## 2021-02-17 PROCEDURE — 84482 T3 REVERSE: CPT

## 2021-02-17 PROCEDURE — 84443 ASSAY THYROID STIM HORMONE: CPT

## 2021-02-17 PROCEDURE — 36415 COLL VENOUS BLD VENIPUNCTURE: CPT

## 2021-02-17 PROCEDURE — 82306 VITAMIN D 25 HYDROXY: CPT

## 2021-02-17 PROCEDURE — 83525 ASSAY OF INSULIN: CPT

## 2021-02-17 PROCEDURE — 80053 COMPREHEN METABOLIC PANEL: CPT

## 2021-02-17 PROCEDURE — 83036 HEMOGLOBIN GLYCOSYLATED A1C: CPT

## 2021-02-22 LAB — T3REVERSE SERPL-MCNC: 31 NG/DL (ref 9.2–24.1)

## 2021-02-26 ENCOUNTER — IMMUNIZATIONS (OUTPATIENT)
Dept: FAMILY MEDICINE CLINIC | Facility: HOSPITAL | Age: 56
End: 2021-02-26

## 2021-02-26 DIAGNOSIS — Z23 ENCOUNTER FOR IMMUNIZATION: Primary | ICD-10-CM

## 2021-02-26 PROCEDURE — 91301 SARS-COV-2 / COVID-19 MRNA VACCINE (MODERNA) 100 MCG: CPT

## 2021-02-26 PROCEDURE — 0012A SARS-COV-2 / COVID-19 MRNA VACCINE (MODERNA) 100 MCG: CPT

## 2021-03-01 ENCOUNTER — TELEPHONE (OUTPATIENT)
Dept: GASTROENTEROLOGY | Facility: CLINIC | Age: 56
End: 2021-03-01

## 2021-03-01 NOTE — TELEPHONE ENCOUNTER
Dexilant 60 MG Capsule Approved 4/2/21 to 4/2/22 as per [employer]  [phone]   (Documentation from insurance scanned in chart )

## 2021-03-04 ENCOUNTER — OFFICE VISIT (OUTPATIENT)
Dept: INTERNAL MEDICINE CLINIC | Facility: CLINIC | Age: 56
End: 2021-03-04
Payer: COMMERCIAL

## 2021-03-04 VITALS
DIASTOLIC BLOOD PRESSURE: 78 MMHG | SYSTOLIC BLOOD PRESSURE: 130 MMHG | BODY MASS INDEX: 25.11 KG/M2 | TEMPERATURE: 97.3 F | WEIGHT: 156.25 LBS | HEIGHT: 66 IN

## 2021-03-04 DIAGNOSIS — R59.0 LYMPHADENOPATHY, AXILLARY: ICD-10-CM

## 2021-03-04 DIAGNOSIS — Z00.00 ANNUAL PHYSICAL EXAM: Primary | ICD-10-CM

## 2021-03-04 DIAGNOSIS — E78.5 HYPERLIPIDEMIA, UNSPECIFIED HYPERLIPIDEMIA TYPE: ICD-10-CM

## 2021-03-04 PROCEDURE — 99396 PREV VISIT EST AGE 40-64: CPT | Performed by: INTERNAL MEDICINE

## 2021-03-04 NOTE — PATIENT INSTRUCTIONS

## 2021-03-04 NOTE — PROGRESS NOTES
ADULT ANNUAL [facility] INTERNAL MEDICINE AT [facility]    NAME: Lanre Crain  AGE: 50 y o  SEX: female  : 1965     DATE: 3/4/2021     Assessment and Plan:     Problem List Items Addressed This Visit        Immune and Lymphatic    Lymphadenopathy, axillary    Relevant Orders    US axilla       Other    Hyperlipidemia (Chronic)    Relevant Orders    Lipid panel    Annual physical exam - Primary      Pt will schedule Ultrasound of axilla - unclear if related to covid vaccine she had end of January and Feb but she did have increase size of area after second dose last week It is not fully resolved and unclear how long has been present   Rx for flp  She is due for routine Mammo in May and looks like due for Gyn - she will call to schedule (last pap )  Annual/prn  She is seeing homeopathic advisor regarding hormonal levels and has followup upcoming for recent labs     Immunizations and preventive care screenings were discussed with patient today  Appropriate education was printed on patient's after visit summary  Counseling:  · Exercise: the importance of regular exercise/physical activity was discussed  Recommend exercise 3-5 times per week for at least 30 minutes  Return in about 1 year (around 3/4/2022), or if symptoms worsen or fail to improve, for Recheck  Chief Complaint:     Chief Complaint   Patient presents with    Annual Exam      History of Present Illness:     Adult Annual Physical   Patient here for a comprehensive physical exam  The patient reports problems - left axilla tender area palpable raised area She is unsure if started after covid vaccine in January but it did increase in size after second shot last week     She is seeing homeopathic advisor for hormonal levels due to ongoing fatigue and had recent labs awaiting followup She is on different supplements thru him   Diet and Physical Activity  · Diet/Nutrition: well balanced diet    · Exercise: walking  Depression Screening  PHQ-9 Depression Screening    PHQ-9:   Frequency of the following problems over the past two weeks:      Little interest or pleasure in doing things: 0 - not at all  Feeling down, depressed, or hopeless: 0 - not at all  PHQ-2 Score: 0       General Health  · Sleep: gets 4-6 hours of sleep on average  · Hearing: normal - bilateral   · Vision: no vision problems and goes for regular eye exams  · Dental: regular dental visits  /GYN Health  · Patient is: postmenopausal  · Last menstrual period: years  · Contraceptive method: barrier methods, postmenopausal      Review of Systems:     Review of Systems   Constitutional: Positive for fatigue  Negative for chills and fever  HENT: Negative  Eyes: Negative for visual disturbance  Respiratory: Negative for cough, chest tightness and shortness of breath  Cardiovascular: Negative for chest pain, palpitations and leg swelling  Gastrointestinal: Negative for abdominal distention and abdominal pain  Genitourinary: Negative for difficulty urinating, dysuria and flank pain  Musculoskeletal: Negative for arthralgias  Skin: Negative for color change, pallor and rash  Neurological: Negative for dizziness, light-headedness and headaches  Hematological: Positive for adenopathy  Psychiatric/Behavioral: Negative for sleep disturbance  The patient is not nervous/anxious         Past Medical History:     Past Medical History:   Diagnosis Date    Abnormal TSH 10/18/2018    Ataxia     Blood in urine     Disease of thyroid gland 10/31/2018    GERD (gastroesophageal reflux disease)     Irritable bowel syndrome     Migraine     Mild cervical dysplasia     Pituitary adenoma ([facility] [address] )     Pregnancy     1     Thyroiditis 10/31/2018    Uterine leiomyoma       Past Surgical History:     Past Surgical History:   Procedure Laterality Date    ABDOMINAL SURGERY      CHOLECYSTECTOMY      COLONOSCOPY      COLPOSCOPY      onset: 2/4/08    ESOPHAGOGASTRODUODENOSCOPY      LAPAROSCOPY      KS COLONOSCOPY FLX DX W/COLLJ SPEC WHEN PFRMD N/A 2/16/2018    Procedure: COLONOSCOPY;  Surgeon: Juan Antonio Dos Santos DO;  Location: [facility];  Service: Gastroenterology    KS ESOPHAGOGASTRODUODENOSCOPY TRANSORAL DIAGNOSTIC N/A 3/19/2019    Procedure: ESOPHAGOGASTRODUODENOSCOPY (EGD);   Surgeon: Kirsty García MD;  Location: [facility];  Service: Gastroenterology    TUBAL LIGATION        Social History:     E-Cigarette/Vaping    E-Cigarette Use Never User      E-Cigarette/Vaping Substances    Nicotine No     THC No     CBD No     Flavoring No     Other No     Unknown No      Social History     Socioeconomic History    Marital status: /Civil Union     Spouse name: None    Number of children: None    Years of education: None    Highest education level: None   Occupational History    None   Social Needs    Financial resource strain: None    Food insecurity     Worry: None     Inability: None    Transportation needs     Medical: None     Non-medical: None   Tobacco Use    Smoking status: Never Smoker    Smokeless tobacco: Never Used   Substance and Sexual Activity    Alcohol use: No    Drug use: No    Sexual activity: Yes     Partners: Male     Birth control/protection: Female Sterilization   Lifestyle    Physical activity     Days per week: None     Minutes per session: None    Stress: None   Relationships    Social connections     Talks on phone: None     Gets together: None     Attends Gnosticism service: None     Active member of club or organization: None     Attends meetings of clubs or organizations: None     Relationship status: None    Intimate partner violence     Fear of current or ex partner: None     Emotionally abused: None     Physically abused: None     Forced sexual activity: None   Other Topics Concern    None   Social History Narrative    Always uses sunscreen    Caffeine use    Dental care, regularly    Lives independently with spouse    Uses seatbelts      Family History:     Family History   Problem Relation Age of Onset    Hyperlipidemia Mother     Hypertension Mother     Hashimoto's thyroiditis Mother     Breast cancer Paternal Grandmother 43    Ovarian cancer Paternal Grandmother     Heart disease Family     Diabetes Family     Thyroid disease Family     Hypertension Brother     Hashimoto's thyroiditis Brother     Breast cancer Paternal Aunt 43    Ovarian cancer Paternal Aunt     Lung cancer Father     Cancer Father         unknown primary; metastasis     No Known Problems Sister     No Known Problems Daughter     Diabetes Maternal Grandmother     No Known Problems Maternal Grandfather     Bone cancer Paternal Grandfather     No Known Problems Maternal Aunt     Prostate cancer Paternal Uncle       Current Medications:     Current Outpatient Medications   Medication Sig Dispense Refill    Alpha-Lipoic Acid 100 MG TABS Take by mouth 2 (two) times a day      Ascorbic Acid (VITAMIN C) 1000 MG tablet Take 1,000 mg by mouth daily      Cholecalciferol (Vitamin D3) 1 25 MG (35785 UT) CAPS Take 1 capsule (50,000 Units total) by mouth once a week 12 capsule 3    dexlansoprazole (Dexilant) 60 MG capsule TAKE ONE CAPSULE BY MOUTH DAILY 90 capsule 3    dicyclomine (BENTYL) 20 mg tablet Take 1 tablet (20 mg total) by mouth every 6 (six) hours 120 tablet 3    diphenhydrAMINE (BENADRYL) 25 mg capsule Take 50 mg by mouth as needed (migraines)       Galcanezumab-gnlm 120 MG/ML SOAJ Inject 120 mg under the skin every 30 (thirty) days 1 pen 11    ibuprofen (MOTRIN) 200 mg tablet Take 600 mg by mouth as needed for mild pain      ketorolac (TORADOL) 10 mg tablet 1 tabs at onset of migraine, t i d  P r n  Take with food/milk/antacid   10 tablet 0    levothyroxine 75 mcg tablet Take 75 mcg by mouth daily in the early morning       Magnesium 400 MG CAPS Take 400 mg by mouth daily      Naproxen Sodium (ALEVE) 220 MG CAPS Take 2 capsules by mouth as needed      NON FORMULARY 2 (two) times a day truadapt      Omega-3 Fatty Acids (FISH OIL) 1,000 mg Take 1,000 mg by mouth daily      ondansetron (ZOFRAN-ODT) 8 mg disintegrating tablet Take 1 tablet (8 mg total) by mouth every 8 (eight) hours as needed for nausea or vomiting 20 tablet 0    Probiotic Product (ALIGN) 4 MG CAPS Take 1 capsule (4 mg total) by mouth daily 30 capsule 0    Rimegepant Sulfate (Nurtec) 75 MG TBDP Take 75 mg by mouth as needed (migraine) 8 tablet 3    selenium 50 MCG TABS Take 100 mcg by mouth daily      fluconazole (DIFLUCAN) 150 mg tablet       levothyroxine 50 mcg tablet Take 1 tablet (50 mcg total) by mouth daily (Patient not taking: Reported on 11/11/2020) 90 tablet 3     No current facility-administered medications for this visit  Allergies: Allergies   Allergen Reactions    Sulfamethoxazole-Trimethoprim Other (See Comments)     lightheaded      Physical Exam:     /78   Temp (!) 97 3 °F (36 3 °C) (Temporal)   Ht 5' 6" (1 676 m)   Wt 70 9 kg (156 lb 4 oz)   LMP 07/31/2018   BMI 25 22 kg/m²     Physical Exam  Vitals signs reviewed  Constitutional:       General: She is not in acute distress  Appearance: Normal appearance  She is normal weight  She is not ill-appearing or toxic-appearing  HENT:      Head: Normocephalic and atraumatic  Right Ear: Tympanic membrane, ear canal and external ear normal       Left Ear: Tympanic membrane, ear canal and external ear normal       Nose: Nose normal       Mouth/Throat:      Mouth: Mucous membranes are dry  Eyes:      General: No scleral icterus  Extraocular Movements: Extraocular movements intact  Conjunctiva/sclera: Conjunctivae normal       Pupils: Pupils are equal, round, and reactive to light  Neck:      Musculoskeletal: Normal range of motion and neck supple  Muscular tenderness present  No neck rigidity        Comments: Tenderness and firm well circumcumscribed area left axilla No warmth no redness  Cardiovascular:      Rate and Rhythm: Normal rate and regular rhythm  Pulses: Normal pulses  Heart sounds: Normal heart sounds  Pulmonary:      Effort: Pulmonary effort is normal  No respiratory distress  Breath sounds: Normal breath sounds  No wheezing  Abdominal:      General: Bowel sounds are normal  There is no distension  Palpations: Abdomen is soft  Tenderness: There is no abdominal tenderness  Musculoskeletal:      Right lower leg: No edema  Left lower leg: No edema  Lymphadenopathy:      Cervical: No cervical adenopathy  Skin:     General: Skin is dry  Coloration: Skin is not pale  Findings: No erythema  Neurological:      General: No focal deficit present  Mental Status: She is alert and oriented to person, place, and time  Mental status is at baseline  Cranial Nerves: No cranial nerve deficit  Sensory: No sensory deficit  Motor: No weakness  Coordination: Coordination normal    Psychiatric:         Mood and Affect: Mood normal          Behavior: Behavior normal          Thought Content:  Thought content normal          Judgment: Judgment normal           Vladislav Beltrán DO  [facility] INTERNAL MEDICINE AT [facility]

## 2021-03-08 ENCOUNTER — HOSPITAL ENCOUNTER (OUTPATIENT)
Dept: ULTRASOUND IMAGING | Facility: HOSPITAL | Age: 56
Discharge: HOME | End: 2021-03-08
Attending: INTERNAL MEDICINE
Payer: COMMERCIAL

## 2021-03-08 DIAGNOSIS — R59.0 LYMPHADENOPATHY, AXILLARY: ICD-10-CM

## 2021-03-08 PROCEDURE — 76882 US LMTD JT/FCL EVL NVASC XTR: CPT

## 2021-04-07 ENCOUNTER — TELEPHONE (OUTPATIENT)
Dept: NEUROLOGY | Facility: CLINIC | Age: 56
End: 2021-04-07

## 2021-04-07 NOTE — TELEPHONE ENCOUNTER
Pt called and states that [facility] needs PA   she states that she was using coupon card  med is effective for her        PA completed on [facility]  [place]

## 2021-04-15 ENCOUNTER — TRANSCRIBE ORDERS (OUTPATIENT)
Dept: URGENT CARE | Facility: CLINIC | Age: 56
End: 2021-04-15

## 2021-04-15 ENCOUNTER — APPOINTMENT (OUTPATIENT)
Dept: LAB | Facility: CLINIC | Age: 56
End: 2021-04-15
Payer: COMMERCIAL

## 2021-04-15 DIAGNOSIS — Z00.8 HEALTH EXAMINATION IN POPULATION SURVEY: Primary | ICD-10-CM

## 2021-04-15 LAB
CHOLEST SERPL-MCNC: 217 MG/DL (ref 50–200)
HDLC SERPL-MCNC: 61 MG/DL
LDLC SERPL CALC-MCNC: 143 MG/DL (ref 0–100)
NONHDLC SERPL-MCNC: 156 MG/DL
TRIGL SERPL-MCNC: 63 MG/DL

## 2021-04-15 PROCEDURE — 36415 COLL VENOUS BLD VENIPUNCTURE: CPT

## 2021-04-15 PROCEDURE — 80061 LIPID PANEL: CPT

## 2021-05-07 ENCOUNTER — TELEPHONE (OUTPATIENT)
Dept: NEUROLOGY | Facility: CLINIC | Age: 56
End: 2021-05-07

## 2021-05-07 NOTE — TELEPHONE ENCOUNTER
----- Message from [address] [place] [address],[address]  Frisian sent at 5/7/2021  9:41 AM EDT -----  Regarding: Prescription Question  Contact: [phone]  Good Morning Lynyuliana Oviedo    I just realized today that I forgot to take my emagility injection on the 3rd of May  I called the pharmacy at [place] and they said I need a Prior Authorization  Can you please get me a prior authorization  I'm  just finally  free from my migraines since my last Covid Vaccination and I do this  Can I take this medication late? I might not get this medication until Monday or Tuesday cause I need a prior Authorization     Thank You   See you on Wednesday   Lanre

## 2021-05-07 NOTE — TELEPHONE ENCOUNTER
[facility] called for additional info  Additional info given  [employer] Approved for 6 months    she will fax approval letter

## 2021-05-12 ENCOUNTER — OFFICE VISIT (OUTPATIENT)
Dept: NEUROLOGY | Facility: CLINIC | Age: 56
End: 2021-05-12
Payer: COMMERCIAL

## 2021-05-12 VITALS
DIASTOLIC BLOOD PRESSURE: 61 MMHG | BODY MASS INDEX: 24.43 KG/M2 | HEIGHT: 66 IN | TEMPERATURE: 98.5 F | WEIGHT: 152 LBS | HEART RATE: 68 BPM | SYSTOLIC BLOOD PRESSURE: 101 MMHG

## 2021-05-12 DIAGNOSIS — G43.709 CHRONIC MIGRAINE WITHOUT AURA WITHOUT STATUS MIGRAINOSUS, NOT INTRACTABLE: Primary | ICD-10-CM

## 2021-05-12 PROCEDURE — 99215 OFFICE O/P EST HI 40 MIN: CPT | Performed by: PHYSICIAN ASSISTANT

## 2021-05-12 NOTE — PROGRESS NOTES
[facility]  PATIENT:  Amparo Cantor  MRN:  [id number]  :  1965  DATE OF SERVICE:  2021      Assessment/Plan:     Chronic migraine without aura without status migrainosus, not intractable  Preventive:   Continue doing magnesium oxide 400mg in am daily  Continue Emgality 1 pen every 30 days  Abortive: At the onset of mild headache patient's take Aleve  At onset of migraine, patient is to take Nurtec 75 mg  Limit of 1 in 24 hours  May use prochlorperazine 10 mg in addition  Problem List Items Addressed This Visit        Cardiovascular and Mediastinum    Chronic migraine without aura without status migrainosus, not intractable - Primary     Preventive:   Continue doing magnesium oxide 400mg in am daily  Continue Emgality 1 pen every 30 days  Abortive: At the onset of mild headache patient's take Aleve  At onset of migraine, patient is to take Nurtec 75 mg  Limit of 1 in 24 hours  May use prochlorperazine 10 mg in addition  History of Present Illness: We had the pleasure of evaluating Lanre Crain in neurological follow up  today for headaches  As you know,  she is a 50 y o   right handed female  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       Patient has been feeling better since last visit  Has been seeing a provider for her hormones and has been following a specific diet as well as adding supplements  Still having hot flashes but does feel less fatigued and more like herself  Has less migraines on Emgality  Has not had one since September        Patient has Elissa 21 Dog on 2019  Martir Nicely continue to have intermittent insomnia   Gets up around 5 am   Patient states her headaches have improved slightly since she had her head injury   She never took the Depakote that was prescribed by Dr Amparo Cantor  Patient has been using CBD for sleep    Has been helping her but she is getting some slight headaches in the am     QTc 2017 416 ms     Balance problem:  May of 2017 she states she woke up with balance issues and was falling to the left  She states she could not catch her balance and this lasted for 10 minutes  Did have a work up done MRI head ad MRA head and neck which was negative         Migraine headaches without aura: What medications do you take or have you taken for your headaches?    PREVENTIVE:   magnesium oxide 400 mg   Topamax (tingling and mental fogginess)  Venlafaxine  Cyproheptadine (weight gain)     ABORTIVE:  motrin, tylenol, ketorolac, aleve  Decadron  olanzapine  Rizatriptan, sumitritptan  Non-Medical/Alternative Treatments used in the past for headaches: Chiropractic adjustment  Headache are worse if the patient:  no  Headache trigger: menstruation     Any warning prior to headache and how long do they last -  none      What is your current pain level - 1-2/10     How often do the headaches occur -   2018: March: 3 headaches, April: 4 headaches, May: 13 headache (decadron and olanzapine given),  - none, July- 4 headaches, Au headache, September: 3 headaches, October: one (needed message), November: 5 headaches, December: 7 headache (one headaches lasted for 4 day)  2019: none, Feb: two mild headaches 4-5 and took aleve  May and  almost daily  July has been better-last rizatriptan was end of  5  Sept 4  Oct 0  Nov 0 so far  2020-3  Feb 2-3   Mar 5-6  Apr 4  May 6  Has not had a migraine since 2020  Has stayed consistent with 4-5 migraines the week before [facility]   2021 terrible  Mar terrible  April-may better      What time of the day do the headaches start - usually when she wakes up in the middle of the night or in the morning  How long do the headaches last -  Would last all day  Are you ever headache free - yes  Where are they located - frontal   What is the intensity of pain - 5-6/10  Describe your usual headache - Throbbing, Pressure, aching, dull  Associated symptoms:   · Decrease of appetite, nausea   · Photophobia, phonophobia  -    light-headed or dizzy         Feels off balanced  · prefer to be alone and in a dark room, unable to work     Number of days missed per month because of headaches:  Work (or school) days: 0  Social or Family activities: 0      What time of the year do headaches occur more frequently?   no  Have you seen someone else for headaches or pain? No  Have you had trigger point injection performed and how often? No  Have you had Botox injection performed and how often? No   Have you had epidural injections or transforaminal injections performed? No     Have you used CBD or THC for your headaches and how often? Yes, CBD to help sleep  Are you current pregnant or planning on getting pregnant? No, post-menopausal  Have you ever had any Brain imaging? yes      6/3/2017 MRI brain  Normal unenhanced MRI of the brain   Small bilateral mastoid effusions, left side greater than right      6/3/2017 MRA head  Congenital variants   Otherwise, intact [place] [facility] [place]      6/3/2017 MRA carotids  Minimal atherosclerotic disease involving the proximal left internal carotid artery   No evidence of flow-limiting stenosis   No evidence of aneurysm, vascular malformation or vascular cut off      8/11/2020 CT Head  No acute intracranial abnormality      I personally reviewed these images      Reviewed old notes from physician seen in the past- see above HPI for summary of previous encounters      Past Medical History:   Diagnosis Date    Abnormal TSH 10/18/2018    Ataxia     Blood in urine     Disease of thyroid gland 10/31/2018    GERD (gastroesophageal reflux disease)     Irritable bowel syndrome     Migraine     Mild cervical dysplasia     Pituitary adenoma ([facility] [address] )     Pregnancy     1     Thyroiditis 10/31/2018    Uterine leiomyoma        Patient Active Problem List   Diagnosis    Ambulatory dysfunction    Hyperlipidemia    Other insomnia    Abnormal uterine bleeding (AUB)    Suspected sleep apnea    Hypersomnia    Anxiety    Hot flashes    Ataxia    Atopic dermatitis    Other constipation    Gastroparesis    Hamstring tendonitis of left thigh    Iliotibial band syndrome of left side    Lumbar disc herniation with radiculopathy    Menopausal disorder    Piriformis syndrome, left    Thyroid nodule    Vitamin D deficiency    Throat pain    Other specified hypothyroidism    Nausea    RUQ pain    Sphincter of Oddi dysfunction    Gastritis    Chronic migraine without aura without status migrainosus, not intractable    Annual physical exam    Vaginal atrophy    Heterozygous factor V Leiden mutation (HCC)    Vaginal candidiasis    Lymphadenopathy, axillary       Medications:      Current Outpatient Medications   Medication Sig Dispense Refill    Alpha-Lipoic Acid 100 MG TABS Take 100 mg by mouth 2 (two) times a day       Ascorbic Acid (VITAMIN C) 1000 MG tablet Take 1,000 mg by mouth daily      Cholecalciferol (Vitamin D3) 1 25 MG (25291 UT) CAPS Take 1 capsule (50,000 Units total) by mouth once a week 12 capsule 3    dexlansoprazole (Dexilant) 60 MG capsule TAKE ONE CAPSULE BY MOUTH DAILY 90 capsule 3    dicyclomine (BENTYL) 20 mg tablet Take 1 tablet (20 mg total) by mouth every 6 (six) hours (Patient taking differently: Take 20 mg by mouth every 6 (six) hours as needed ) 120 tablet 3    diphenhydrAMINE (BENADRYL) 25 mg capsule Take 50 mg by mouth as needed (migraines)       [place], Marianne sinensis, (DONG QUAI PO) Take 1 tablet by mouth daily      Galcanezumab-gnlm 120 MG/ML SOAJ Inject 120 mg under the skin every 30 (thirty) days 1 pen 11    ibuprofen (MOTRIN) 200 mg tablet Take 600 mg by mouth as needed for mild pain      ketorolac (TORADOL) 10 mg tablet 1 tabs at onset of migraine, t i d  P r n  Take with food/milk/antacid   10 tablet 0    levothyroxine 50 mcg tablet Take 1 tablet (50 mcg total) by mouth daily 90 tablet 3    Magnesium 400 MG CAPS Take 400 mg by mouth daily      Naproxen Sodium (ALEVE) 220 MG CAPS Take 2 capsules by mouth as needed      NON FORMULARY Take 1 tablet by mouth 2 (two) times a day truadapt       Omega-3 Fatty Acids (FISH OIL) 1,000 mg Take 1,000 mg by mouth daily      ondansetron (ZOFRAN-ODT) 8 mg disintegrating tablet Take 1 tablet (8 mg total) by mouth every 8 (eight) hours as needed for nausea or vomiting 20 tablet 0    Probiotic Product (ALIGN) 4 MG CAPS Take 1 capsule (4 mg total) by mouth daily 30 capsule 0    Rimegepant Sulfate (Nurtec) 75 MG TBDP Take 75 mg by mouth as needed (migraine) 8 tablet 3    selenium 50 MCG TABS Take 100 mcg by mouth daily      fluconazole (DIFLUCAN) 150 mg tablet       patient supplied medication Take 1 each by mouth daily bergamot bpf       No current facility-administered medications for this visit  Allergies:       Allergies   Allergen Reactions    Sulfamethoxazole-Trimethoprim Other (See Comments)     lightheaded       Family History:     Family History   Problem Relation Age of Onset    Hyperlipidemia Mother     Hypertension Mother     Hashimoto's thyroiditis Mother     Breast cancer Paternal Grandmother 43    Ovarian cancer Paternal Grandmother     Heart disease Family     Diabetes Family     Thyroid disease Family     Hypertension Brother     Hashimoto's thyroiditis Brother     Breast cancer Paternal Aunt 43    Ovarian cancer Paternal Deena Lover     Lung cancer Father     Cancer Father         unknown primary; metastasis     No Known Problems Sister     No Known Problems Daughter     Diabetes Maternal Grandmother     No Known Problems Maternal Grandfather     Bone cancer Paternal Grandfather     No Known Problems Maternal Aunt     Prostate cancer Paternal Uncle        Social History:     Social History     Socioeconomic History    Marital status: /Civil Union     Spouse name: Not on file    Number of children: Not on file    Years of education: Not on file    Highest education level: Not on file   Occupational History    Not on file   Social Needs    Financial resource strain: Not on file    Food insecurity     Worry: Not on file     Inability: Not on file    Transportation needs     Medical: Not on file     Non-medical: Not on file   Tobacco Use    Smoking status: Never Smoker    Smokeless tobacco: Never Used   Substance and Sexual Activity    Alcohol use: No    Drug use: No    Sexual activity: Yes     Partners: Male     Birth control/protection: Female Sterilization   Lifestyle    Physical activity     Days per week: Not on file     Minutes per session: Not on file    Stress: Not on file   Relationships    Social connections     Talks on phone: Not on file     Gets together: Not on file     Attends Voodoo service: Not on file     Active member of club or organization: Not on file     Attends meetings of clubs or organizations: Not on file     Relationship status: Not on file    Intimate partner violence     Fear of current or ex partner: Not on file     Emotionally abused: Not on file     Physically abused: Not on file     Forced sexual activity: Not on file   Other Topics Concern    Not on file   Social History Narrative    Always uses sunscreen    Caffeine use    Dental care, regularly    Lives independently with spouse    Uses seatbelts      I have reviewed the patient's medical, social and surgical history as well as medications in detail and updated the computerized patient record        Objective:   Physical Exam:                                                                   Vitals:            /61 (BP Location: Left arm, Patient Position: Sitting, Cuff Size: Standard)   Pulse 68   Temp 98 5 °F (36 9 °C) (Temporal)   Ht 5' 6" (1 676 m)   Wt 68 9 kg (152 lb)   LMP 07/31/2018   BMI 24 53 kg/m²   BP Readings from Last 3 Encounters:   05/12/21 101/61   03/04/21 130/78 11/11/20 128/69     Pulse Readings from Last 3 Encounters:   05/12/21 68   11/11/20 67   06/01/20 70            CONSTITUTIONAL: Well developed, well nourished, well groomed  No dysmorphic features  Eyes:  EOM normal      Neck:  Normal ROM, neck supple  HEENT:  Normocephalic atraumatic  Chest:  Respirations regular and unlabored  Psychiatric:  Normal behavior and appropriate affect      MENTAL STATUS  Orientation: Alert and oriented x 3  Fund of knowledge: Intact  MOTOR (Upper and lower extremities)   Bulk/tone/abnormal movement: Normal muscle bulk and tone  COORDINATION   Station/Gait: Normal baseline gait  Review of Systems:   Review of Systems  Constitutional: Negative  HENT: Negative  Eyes: Negative  Respiratory: Negative  Cardiovascular: Negative  Gastrointestinal: Negative  Endocrine: Negative  Genitourinary: Negative  Musculoskeletal: Negative  Skin: Negative  Allergic/Immunologic: Negative  Neurological: Positive for headaches  Hematological: Negative  Psychiatric/Behavioral: Negative  I personally reviewed the ROS entered by the MA    I spent 25 minutes in face-to-face discussion regarding  the pathophysiology of her current symptoms and further plan, as well as counseling, educating, and coordinating the patient's care including diagnostic results, impression, and recommendations, risks and benefits of treatment, instructions for disease self management, treatment instructions and follow up requirements and spent 15 minutes non-face to face    Author:  Douglas Suggs PA-C 5/12/2021 3:41 PM

## 2021-05-12 NOTE — PATIENT INSTRUCTIONS
Preventive:   Continue doing magnesium oxide 400mg in am daily  Continue Emgality 1 pen every 30 days  Abortive: At the onset of mild headache patient's take Aleve  At onset of migraine, patient is to take Nurtec 75 mg  Limit of 1 in 24 hours  May use prochlorperazine 10 mg in addition  Neck pain:   - We discussed the role of neck pathology and poor posture, with straightening of the normal cervical lordosis, in headaches  We discussed how tightening of the neck muscles can irritate the nerves in the occipital region of her head and cause or worsen head pain  We also discussed and demonstrated neck strengthening and relaxation exercises, as well as giving written instructions on these exercises  - We talked about the importance of good posture for improving shoulder, neck, and head pain  The patient was given visualization exercises for correcting posture, which patient will practice at home  If this simple exercise does not help improve the posture, we will consider formal physical therapy in the future  Medication overuse headaches:   - We discussed medication overuse headache [facility]) and how to avoid it in the future  It was explained that all analgesics have the potential to cause medication overuse headache [facility]) and analgesic overuse can negate the effectiveness of headache preventive measures  After successful [address] treatment, preventive medications for an underlying primary headache disorder have a greater chance for success  Avoid medications with narcotics, barbiturates, or caffeine in them as these can cause rebound headaches after very few doses and can interfere with other headache medicine efficacy  Taking any analgesics for more than 2-3 days a week can cause medication overuse headache  Reproductive age women: Should take folic acid daily when taking anti-seizure drugs especially Depakote       [place] Prescription Drug Monitoring Program report was reviewed and was appropriate Headache management instructions  - When patient has a moderate to severe headache, they should seek rest, initiate relaxation and apply cold compresses to the head  - Maintain regular sleep schedule  Adults need at least 7-8 hours of uninterrupted a night  - Limit over the counter medications such as Tylenol, Ibuprofen, Aleve, Excedrin  (No more than 3 times a week)  - Maintain headache diary  We discussed an NANCY for a smart phone is "Migraine shelley"  - Limit caffeine to 1-2 cups 8 to 16 oz a day or less  - Avoid dietary trigger  (aged cheese, peanuts, MSG, aspartame and nitrates)  - Patient is to have regular frequent meals to prevent headache onset  - Please drink at least 64 ounces of water a day to help remain hydrated  Please call with any questions or concerns   Office number is [phone]

## 2021-05-24 ENCOUNTER — TRANSCRIBE ORDERS (OUTPATIENT)
Dept: URGENT CARE | Facility: CLINIC | Age: 56
End: 2021-05-24

## 2021-05-24 ENCOUNTER — APPOINTMENT (OUTPATIENT)
Dept: LAB | Facility: CLINIC | Age: 56
End: 2021-05-24
Payer: COMMERCIAL

## 2021-05-24 DIAGNOSIS — N95.1 SYMPTOMATIC MENOPAUSAL OR FEMALE CLIMACTERIC STATES: Primary | ICD-10-CM

## 2021-05-24 DIAGNOSIS — E03.9 ACQUIRED HYPOTHYROIDISM: ICD-10-CM

## 2021-05-24 DIAGNOSIS — E78.5 HYPERLIPIDEMIA, UNSPECIFIED HYPERLIPIDEMIA TYPE: ICD-10-CM

## 2021-05-24 DIAGNOSIS — R68.82 DECREASED LIBIDO: ICD-10-CM

## 2021-05-24 DIAGNOSIS — G43.011 INTRACTABLE MIGRAINE WITHOUT AURA AND WITH STATUS MIGRAINOSUS: ICD-10-CM

## 2021-05-24 DIAGNOSIS — R53.82 CHRONIC FATIGUE: ICD-10-CM

## 2021-05-24 LAB
ALBUMIN SERPL BCP-MCNC: 4.1 G/DL (ref 3.5–5)
ALP SERPL-CCNC: 179 U/L (ref 46–116)
ALT SERPL W P-5'-P-CCNC: 42 U/L (ref 12–78)
ANION GAP SERPL CALCULATED.3IONS-SCNC: 2 MMOL/L (ref 4–13)
AST SERPL W P-5'-P-CCNC: 35 U/L (ref 5–45)
BILIRUB SERPL-MCNC: 0.37 MG/DL (ref 0.2–1)
BUN SERPL-MCNC: 19 MG/DL (ref 5–25)
CALCIUM SERPL-MCNC: 9.5 MG/DL (ref 8.3–10.1)
CHLORIDE SERPL-SCNC: 107 MMOL/L (ref 100–108)
CO2 SERPL-SCNC: 31 MMOL/L (ref 21–32)
CREAT SERPL-MCNC: 0.86 MG/DL (ref 0.6–1.3)
GFR SERPL CREATININE-BSD FRML MDRD: 77 ML/MIN/1.73SQ M
GLUCOSE P FAST SERPL-MCNC: 98 MG/DL (ref 65–99)
POTASSIUM SERPL-SCNC: 3.9 MMOL/L (ref 3.5–5.3)
PROT SERPL-MCNC: 7.3 G/DL (ref 6.4–8.2)
SODIUM SERPL-SCNC: 140 MMOL/L (ref 136–145)
T3FREE SERPL-MCNC: 2.32 PG/ML (ref 2.3–4.2)
T4 FREE SERPL-MCNC: 1.02 NG/DL (ref 0.76–1.46)
T4 SERPL-MCNC: 9.6 UG/DL (ref 4.7–13.3)
TSH SERPL DL<=0.05 MIU/L-ACNC: 2.06 UIU/ML (ref 0.36–3.74)

## 2021-05-24 PROCEDURE — 84436 ASSAY OF TOTAL THYROXINE: CPT

## 2021-05-24 PROCEDURE — 84443 ASSAY THYROID STIM HORMONE: CPT

## 2021-05-24 PROCEDURE — 36415 COLL VENOUS BLD VENIPUNCTURE: CPT

## 2021-05-24 PROCEDURE — 80053 COMPREHEN METABOLIC PANEL: CPT

## 2021-05-24 PROCEDURE — 84481 FREE ASSAY (FT-3): CPT

## 2021-05-24 PROCEDURE — 84439 ASSAY OF FREE THYROXINE: CPT

## 2021-05-28 ENCOUNTER — TELEPHONE (OUTPATIENT)
Dept: INTERNAL MEDICINE CLINIC | Facility: CLINIC | Age: 56
End: 2021-05-28

## 2021-05-28 NOTE — TELEPHONE ENCOUNTER
Patient asking if you can review her CMP results from feb and may, she is concerned about her elevated alk phosphatase levels

## 2021-05-28 NOTE — TELEPHONE ENCOUNTER
That is a nonspecific finding usually associated with joint issues/arthritis and her level is mildly elevated even on last rx

## 2021-06-07 ENCOUNTER — OFFICE VISIT (OUTPATIENT)
Dept: GYNECOLOGY | Facility: CLINIC | Age: 56
End: 2021-06-07
Payer: COMMERCIAL

## 2021-06-07 VITALS
HEART RATE: 78 BPM | DIASTOLIC BLOOD PRESSURE: 72 MMHG | WEIGHT: 155 LBS | HEIGHT: 65 IN | SYSTOLIC BLOOD PRESSURE: 102 MMHG | BODY MASS INDEX: 25.83 KG/M2

## 2021-06-07 DIAGNOSIS — Z01.419 ENCOUNTER FOR GYNECOLOGICAL EXAMINATION WITH PAPANICOLAOU SMEAR OF CERVIX: ICD-10-CM

## 2021-06-07 DIAGNOSIS — Z01.411 ENCOUNTER FOR GYNECOLOGICAL EXAMINATION (GENERAL) (ROUTINE) WITH ABNORMAL FINDINGS: Primary | ICD-10-CM

## 2021-06-07 DIAGNOSIS — N95.1 VASOMOTOR SYMPTOMS DUE TO MENOPAUSE: ICD-10-CM

## 2021-06-07 DIAGNOSIS — N95.2 VAGINAL ATROPHY: ICD-10-CM

## 2021-06-07 DIAGNOSIS — N94.10 DYSPAREUNIA, FEMALE: ICD-10-CM

## 2021-06-07 DIAGNOSIS — Z12.31 SCREENING MAMMOGRAM, ENCOUNTER FOR: ICD-10-CM

## 2021-06-07 PROCEDURE — 87624 HPV HI-RISK TYP POOLED RSLT: CPT | Performed by: OBSTETRICS & GYNECOLOGY

## 2021-06-07 PROCEDURE — G0145 SCR C/V CYTO,THINLAYER,RESCR: HCPCS | Performed by: OBSTETRICS & GYNECOLOGY

## 2021-06-07 PROCEDURE — 99386 PREV VISIT NEW AGE 40-64: CPT | Performed by: OBSTETRICS & GYNECOLOGY

## 2021-06-07 NOTE — PROGRESS NOTES
Assessment/Plan:    Patient advised to schedule follow up with Dr Erma Talavera as soon as it's convenient for her to discuss VM sx and GSM  Given information on [facility] to determine coverage of genetic testing  Recommended monthly SBE, annual CBE and annual screening mammo  ASCCP guidelines reviewed and pap with cotesting done today; this low risk patient was advised she meets criteria to d/c pap screening at age 74  Colonoscopy noted to be up to date  Kegel exercises as instructed  RTO in one year for routine annual gyn exam or sooner PRN  Diagnoses and all orders for this visit:    Encounter for gynecological examination (general) (routine) with abnormal findings    Screening mammogram, encounter for  -     Mammo screening bilateral w 3d & cad; Future    Encounter for gynecological examination with Papanicolaou smear of cervix  -     Liquid-based pap, screening    Vasomotor symptoms due to menopause    Vaginal atrophy    Dyspareunia, female    Other orders  -     Cancel: Ambulatory referral to Gastroenterology; Future        Subjective:      Patient ID: Miesha Romo is a 50 y o  female  This new patient presents for routine annual gyn exam    No menses for 3 years  She follows with Dr Erma Talavera for hormonal health  Of note, on work up with Dr Erma Talavera, patient is positive for Factor V  Patient reports that hot flashes are worsening in the last month as well as vaginal dryness, pain and significant dyspareunia  All of these symptoms are very troublesome to patient  Patient also has hx of migraine without aura which have improved with Emgality  She had left axillary lymphadenopathy confirmed with ultrasound following COVID vaccination  She is due for mammogram at this time and will be able to reevaluate axilla at that time  Family hx of PGM ovarian cancer and breast cancer, age 40/ Paternal aunt ovarian and breast cancer, age 43   Father  of cancer within 12 weeks of diagnosis and patient states primary source was not established  She reports having genetic testing soon after her father's death about 20 years ago  She denies  bleeding or spotting, pelvic pain, breast concerns, abnormal discharge, bowel/bladder dysfunction, depression/anx  , sexually active and is monogamous  Denies STI concerns  No hx of STIs  Patient believes last pap was in 2016  Mammography up to date and normal, 5/14/20  Colonoscopy done in 2018 per patient  The following portions of the patient's history were reviewed and updated as appropriate: allergies, current medications, past family history, past medical history, past social history, past surgical history and problem list     Review of Systems   Constitutional: Negative  Respiratory: Negative  Cardiovascular: Negative  Gastrointestinal: Negative  Endocrine: Negative  Genitourinary: Positive for dyspareunia  Negative for dysuria, frequency, pelvic pain, urgency, vaginal bleeding, vaginal discharge and vaginal pain  Musculoskeletal: Negative  Skin: Negative  Neurological: Negative  Psychiatric/Behavioral: Negative  Objective:      /72 (BP Location: Left arm, Patient Position: Sitting, Cuff Size: Standard)   Pulse 78   Ht 5' 5" (1 651 m)   Wt 70 3 kg (155 lb)   LMP 07/31/2018   BMI 25 79 kg/m²          Physical Exam  Vitals signs and nursing note reviewed  Exam conducted with a chaperone present  Constitutional:       Appearance: Normal appearance  She is well-developed  HENT:      Head: Normocephalic and atraumatic  Neck:      Musculoskeletal: Normal range of motion and neck supple  Thyroid: No thyroid mass or thyromegaly  Cardiovascular:      Rate and Rhythm: Normal rate and regular rhythm  Heart sounds: Normal heart sounds  Pulmonary:      Effort: Pulmonary effort is normal       Breath sounds: Normal breath sounds     Chest:      Breasts: Breasts are symmetrical          Right: No inverted nipple, mass, nipple discharge, skin change or tenderness  Left: No inverted nipple, mass, nipple discharge, skin change or tenderness  Abdominal:      General: Bowel sounds are normal       Palpations: Abdomen is soft  Tenderness: There is no abdominal tenderness  Hernia: There is no hernia in the left inguinal area or right inguinal area  Genitourinary:     General: Normal vulva  Exam position: Supine  Pubic Area: No rash  Labia:         Right: No rash, tenderness, lesion or injury  Left: No rash, tenderness, lesion or injury  Urethra: No prolapse, urethral pain, urethral swelling or urethral lesion  Vagina: Normal  No signs of injury and foreign body  No vaginal discharge, erythema, tenderness, bleeding, lesions or prolapsed vaginal walls  Cervix: No cervical motion tenderness, discharge, friability, lesion, erythema, cervical bleeding or eversion  Uterus: Not deviated, not enlarged, not fixed, not tender and no uterine prolapse  Adnexa:         Right: No mass, tenderness or fullness  Left: No mass, tenderness or fullness  Rectum: No external hemorrhoid  Comments: Urethra normal without lesions  No bladder tenderness  Severe VVA  Musculoskeletal: Normal range of motion  Lymphadenopathy:      Lower Body: No right inguinal adenopathy  No left inguinal adenopathy  Skin:     General: Skin is warm and dry  Neurological:      Mental Status: She is alert and oriented to person, place, and time  Psychiatric:         Mood and Affect: Mood is anxious  Speech: Speech normal          Behavior: Behavior normal  Behavior is cooperative

## 2021-06-09 LAB
HPV HR 12 DNA CVX QL NAA+PROBE: NEGATIVE
HPV16 DNA CVX QL NAA+PROBE: NEGATIVE
HPV18 DNA CVX QL NAA+PROBE: NEGATIVE

## 2021-06-10 ENCOUNTER — TELEPHONE (OUTPATIENT)
Dept: SURGICAL ONCOLOGY | Facility: CLINIC | Age: 56
End: 2021-06-10

## 2021-06-10 NOTE — TELEPHONE ENCOUNTER
New Patient Encounter    New Patient Intake Form   Patient Details:  Matilda Travis  1965  [id number]    Background Information:  [id number] starts by opening a telephone encounter and gathering the following information   Who is calling to schedule? If not self, relationship to patient? Patient   Referring Provider Karthik Pineda   What is the diagnosis? Blood clot/ factor V liden   Is this Cancer or Non-Cancer? Non-Cancer   Is this diagnosis confirmed? Yes   When was the diagnosis? ongoing   Is there a confirmed diagnosis from a biopsy/tissue reviewed by pathology? No   Were outside slides requested? No   Is patient aware of diagnosis? Yes   Is there a personal history and what kind? No   Is there a family history and what kind? No   Reason for visit? New Diagnosis   Have you had any imaging or labs done? If so: when, where? yes  [facility]   Are records in Smart Panel? yes   If patient has a prior history of cancer were old records obtained? NA   Was the patient told to bring a disk? No   Does the patient smoke or Vape? No   If yes, how many packs or cartridges per day? na   Scheduling Information:   Preferred [place]:  [facility]     Are there any dates/time the patient cannot be seen? na   Miscellaneous: na   After completing the above information, please route to Financial Counselor and the appropriate Nurse Navigator for review

## 2021-06-11 LAB
LAB AP GYN PRIMARY INTERPRETATION: NORMAL
Lab: NORMAL

## 2021-06-14 ENCOUNTER — DOCUMENTATION (OUTPATIENT)
Dept: GYNECOLOGY | Facility: CLINIC | Age: 56
End: 2021-06-14

## 2021-06-14 NOTE — PROGRESS NOTES
Prior authorization started for pt for Braca testing sent letter and last clinical notes with family history on it  Faxed to [phone]

## 2021-07-06 ENCOUNTER — HOSPITAL ENCOUNTER (OUTPATIENT)
Dept: MAMMOGRAPHY | Facility: HOSPITAL | Age: 56
Discharge: HOME | End: 2021-07-06
Payer: COMMERCIAL

## 2021-07-06 VITALS — HEIGHT: 65 IN | WEIGHT: 155 LBS | BODY MASS INDEX: 25.83 KG/M2

## 2021-07-06 DIAGNOSIS — Z12.31 SCREENING MAMMOGRAM, ENCOUNTER FOR: ICD-10-CM

## 2021-07-06 PROCEDURE — 77063 BREAST TOMOSYNTHESIS BI: CPT

## 2021-07-06 PROCEDURE — 77067 SCR MAMMO BI INCL CAD: CPT

## 2021-07-22 ENCOUNTER — TELEPHONE (OUTPATIENT)
Dept: INTERNAL MEDICINE CLINIC | Facility: CLINIC | Age: 56
End: 2021-07-22

## 2021-07-22 ENCOUNTER — DOCUMENTATION (OUTPATIENT)
Dept: GYNECOLOGY | Facility: CLINIC | Age: 56
End: 2021-07-22

## 2021-07-22 ENCOUNTER — OFFICE VISIT (OUTPATIENT)
Dept: INTERNAL MEDICINE CLINIC | Facility: CLINIC | Age: 56
End: 2021-07-22
Payer: COMMERCIAL

## 2021-07-22 VITALS
DIASTOLIC BLOOD PRESSURE: 78 MMHG | HEART RATE: 78 BPM | SYSTOLIC BLOOD PRESSURE: 124 MMHG | TEMPERATURE: 97.2 F | OXYGEN SATURATION: 98 % | BODY MASS INDEX: 25.34 KG/M2 | WEIGHT: 152.13 LBS | HEIGHT: 65 IN

## 2021-07-22 DIAGNOSIS — L30.9 DERMATITIS: Primary | ICD-10-CM

## 2021-07-22 PROCEDURE — 99213 OFFICE O/P EST LOW 20 MIN: CPT | Performed by: INTERNAL MEDICINE

## 2021-07-22 RX ORDER — METHYLPREDNISOLONE 4 MG/1
TABLET ORAL
Qty: 21 EACH | Refills: 0 | Status: SHIPPED | OUTPATIENT
Start: 2021-07-22 | End: 2022-03-10 | Stop reason: ALTCHOICE

## 2021-07-22 RX ORDER — LIOTHYRONINE SODIUM 5 UG/1
5 TABLET ORAL DAILY
COMMUNITY
Start: 2021-06-14

## 2021-07-22 NOTE — TELEPHONE ENCOUNTER
Pt rash on chest,elbow crease (both), started Monday,itchy,raised red bumps,some blistery,some not, use cortisme cream, took Benydrl,Zirtec, not going away, please advise, sched appt if possible

## 2021-07-22 NOTE — PROGRESS NOTES
L/m for pt her insurance is covering her Jarad Scout and BRACA 2 analysis  Code [id number]  notre where she is going for testing todlher to call if she has any questions

## 2021-07-22 NOTE — PROGRESS NOTES
Assessment/Plan:      Diagnoses and all orders for this visit:    Dermatitis  -     methylPREDNISolone 4 MG tablet therapy pack; Use as directed on package  Pt will start rx today Take with food Stop using Bath and Body work cream  Limit temperature of shower water     Other orders  -     liothyronine (CYTOMEL) 5 mcg tablet    Annual/prn         Patient ID: Allison Obregon is a 49 y o  female  HPI   Pt has rash on b/l antecubital area and upper chest Heat makes it worse She has a migraine this AM (she has hx and has rx at home) She states the rash is itchy She has had more stress recently due to staffing at work and her Mom  Is now here to visit She used benadryl as well as tried lotion from [employer] but no relief She does sweat in warmer temps    Review of Systems   Constitutional: Positive for activity change  Negative for chills and fever  Respiratory: Negative for cough  Gastrointestinal: Positive for nausea  Negative for diarrhea  Neurological: Positive for dizziness and headaches  Past Medical History:   Diagnosis Date    Abnormal TSH 10/18/2018    Ataxia     Blood in urine     Disease of thyroid gland 10/31/2018    GERD (gastroesophageal reflux disease)     Irritable bowel syndrome     Migraine     Mild cervical dysplasia     Pituitary adenoma ([facility] [address] )     Pregnancy     1     Thyroiditis 10/31/2018    Uterine leiomyoma      Past Surgical History:   Procedure Laterality Date    ABDOMINAL SURGERY      CHOLECYSTECTOMY      COLONOSCOPY      COLPOSCOPY      onset: 08    ESOPHAGOGASTRODUODENOSCOPY      LAPAROSCOPY      AR COLONOSCOPY FLX DX W/COLLJ SPEC WHEN PFRMD N/A 2018    Procedure: COLONOSCOPY;  Surgeon: Louretta Apgar, DO;  [facility];  Service: Gastroenterology    AR ESOPHAGOGASTRODUODENOSCOPY TRANSORAL DIAGNOSTIC N/A 3/19/2019    Procedure: ESOPHAGOGASTRODUODENOSCOPY (EGD);   Surgeon: Ramesh Torres MD;  [facility];  Service: Gastroenterology  TUBAL LIGATION       Social History     Socioeconomic History    Marital status: /Civil Union     Spouse name: Not on file    Number of children: Not on file    Years of education: Not on file    Highest education level: Not on file   Occupational History    Not on file   Tobacco Use    Smoking status: Never Smoker    Smokeless tobacco: Never Used   Vaping Use    Vaping Use: Never used   Substance and Sexual Activity    Alcohol use: No    Drug use: No    Sexual activity: Yes     Partners: Male     Birth control/protection: Female Sterilization   Other Topics Concern    Not on file   Social History Narrative    Always uses sunscreen    Caffeine use    Dental care, regularly    Lives independently with spouse    Uses seatbelts     Social Determinants of Health     Financial Resource Strain:     Difficulty of Paying Living Expenses:    Food Insecurity:     Worried About Running Out of Food in the Last Year:     [address] in the Last Year:    Transportation Needs:     Lack of Transportation (Medical):  Lack of Transportation (Non-Medical):    Physical Activity:     Days of Exercise per Week:     Minutes of Exercise per Session:    Stress:     Feeling of Stress :    Social Connections:     Frequency of Communication with Friends and Family:     Frequency of Social Gatherings with Friends and Family:     Attends [employer] Services:     Active Member of Clubs or Organizations:     Attends Club or Organization Meetings:     Marital Status:    Intimate Partner Violence:     Fear of Current or Ex-Partner:     Emotionally Abused:     Physically Abused:     Sexually Abused: Allergies   Allergen Reactions    Sulfamethoxazole-Trimethoprim Other (See Comments)     lightheaded     BMI Counseling: Body mass index is 25 31 kg/m²  The BMI is above normal  Nutrition recommendations include moderation in carbohydrate intake and increasing intake of lean protein   Exercise recommendations include exercising 3-5 times per week  No pharmacotherapy was ordered  Visit Vitals  /78   Pulse 78   Temp (!) 97 2 °F (36 2 °C) (Temporal)   Ht 5' 5" (1 651 m)   Wt 69 kg (152 lb 2 oz)   LMP 07/31/2018   SpO2 98%   BMI 25 31 kg/m²   OB Status Postmenopausal   Smoking Status Never Smoker   BSA 1 76 m²        Physical Exam  Vitals reviewed  Constitutional:       General: She is not in acute distress  Appearance: Normal appearance  She is not ill-appearing, toxic-appearing or diaphoretic  HENT:      Head: Normocephalic and atraumatic  Right Ear: Tympanic membrane, ear canal and external ear normal  There is no impacted cerumen  Left Ear: Tympanic membrane, ear canal and external ear normal  There is no impacted cerumen  Nose: Rhinorrhea present  Mouth/Throat:      Mouth: Mucous membranes are dry  Eyes:      General: No scleral icterus  Extraocular Movements: Extraocular movements intact  Conjunctiva/sclera: Conjunctivae normal       Pupils: Pupils are equal, round, and reactive to light  Cardiovascular:      Rate and Rhythm: Normal rate  Pulmonary:      Effort: Pulmonary effort is normal  No respiratory distress  Breath sounds: Normal breath sounds  No wheezing  Abdominal:      General: Bowel sounds are normal  There is no distension  Palpations: Abdomen is soft  Musculoskeletal:      Right lower leg: No edema  Left lower leg: No edema  Lymphadenopathy:      Cervical: No cervical adenopathy  Skin:     General: Skin is dry  Coloration: Skin is pale  Skin is not jaundiced  Comments: Has migraine this AM    Neurological:      Mental Status: She is alert and oriented to person, place, and time  Mental status is at baseline  Cranial Nerves: No cranial nerve deficit  Sensory: No sensory deficit     Psychiatric:         Mood and Affect: Mood normal          Behavior: Behavior normal          Thought Content: Thought content normal          Judgment: Judgment normal

## 2021-07-27 ENCOUNTER — CONSULT (OUTPATIENT)
Dept: HEMATOLOGY ONCOLOGY | Facility: CLINIC | Age: 56
End: 2021-07-27
Payer: COMMERCIAL

## 2021-07-27 VITALS
HEART RATE: 80 BPM | OXYGEN SATURATION: 98 % | TEMPERATURE: 98 F | WEIGHT: 151 LBS | RESPIRATION RATE: 18 BRPM | HEIGHT: 65 IN | DIASTOLIC BLOOD PRESSURE: 74 MMHG | BODY MASS INDEX: 25.16 KG/M2 | SYSTOLIC BLOOD PRESSURE: 126 MMHG

## 2021-07-27 DIAGNOSIS — D68.51 HETEROZYGOUS FACTOR V LEIDEN MUTATION (HCC): Primary | ICD-10-CM

## 2021-07-27 DIAGNOSIS — N95.9 MENOPAUSAL DISORDER: ICD-10-CM

## 2021-07-27 PROCEDURE — 99215 OFFICE O/P EST HI 40 MIN: CPT | Performed by: INTERNAL MEDICINE

## 2021-07-27 NOTE — PROGRESS NOTES
[place]  1965  [facility]M [facility] [address] [facility]Y [address]  [address] [facility] [address] [phone]    Chief Complaint   Patient presents with    Consult           Oncology History    No history exists  History of Present Illness:  Patient has a history of migraines for many years  Since menopause these have worsened substantially  Additionally she has significant menopausal symptoms, hot flashes, vaginal dryness, dyspareunia, etcetera  Her mother has history of DVT/PE and was found to have factor 5 Leiden, heterozygous  She has been started on topical DHEA intravaginally  Consideration had been undertaken for bioidentical estrogen replacement  Review of Systems   Constitutional: Negative for appetite change, diaphoresis, fatigue and fever  HENT: Negative for sinus pain  Eyes: Negative for discharge  Respiratory: Negative for cough and shortness of breath  Cardiovascular: Negative for chest pain  Gastrointestinal: Negative for abdominal pain, constipation and diarrhea  Endocrine: Negative for cold intolerance  Genitourinary: Negative for difficulty urinating and hematuria  Musculoskeletal: Negative for joint swelling  Skin: Negative for rash  Allergic/Immunologic: Negative for environmental allergies  Neurological: Negative for dizziness and headaches  Hematological: Negative for adenopathy  Psychiatric/Behavioral: Negative for agitation         Patient Active Problem List   Diagnosis    Ambulatory dysfunction    Hyperlipidemia    Other insomnia    Abnormal uterine bleeding (AUB)    Suspected sleep apnea    Hypersomnia    Anxiety    Hot flashes    Ataxia    Atopic dermatitis    Other constipation    Gastroparesis    Hamstring tendonitis of left thigh    Iliotibial band syndrome of left side    Lumbar disc herniation with radiculopathy    Menopausal disorder    Piriformis syndrome, left    Thyroid nodule  Vitamin D deficiency    Throat pain    Other specified hypothyroidism    Nausea    RUQ pain    Sphincter of Oddi dysfunction    Gastritis    Chronic migraine without aura without status migrainosus, not intractable    Annual physical exam    Vaginal atrophy    Heterozygous factor V Leiden mutation ([facility] [address] )    Vaginal candidiasis    Lymphadenopathy, axillary     Past Medical History:   Diagnosis Date    Abnormal TSH 10/18/2018    Ataxia     Blood in urine     Disease of thyroid gland 10/31/2018    GERD (gastroesophageal reflux disease)     Irritable bowel syndrome     Migraine     Mild cervical dysplasia     [facility]     Pregnancy     1     Thyroiditis 10/31/2018    Uterine leiomyoma      Past Surgical History:   Procedure Laterality Date    ABDOMINAL SURGERY      CHOLECYSTECTOMY      COLONOSCOPY      COLPOSCOPY      onset: 2/4/08    ESOPHAGOGASTRODUODENOSCOPY      LAPAROSCOPY      [place] COLONOSCOPY FLX DX W/COLLJ SPEC WHEN PFRMD N/A 2/16/2018    Procedure: COLONOSCOPY;  Surgeon: Sandy Macias DO;  [facility];  Service: Gastroenterology    MI ESOPHAGOGASTRODUODENOSCOPY TRANSORAL DIAGNOSTIC N/A 3/19/2019    Procedure: ESOPHAGOGASTRODUODENOSCOPY (EGD);   Surgeon: Chris Healy MD;  [facility];  Service: Gastroenterology    TUBAL LIGATION       Family History   Problem Relation Age of Onset    Hyperlipidemia Mother     Hypertension Mother     Hashimoto's thyroiditis Mother     Breast cancer Paternal Grandmother 43    Ovarian cancer Paternal Grandmother     Heart disease Family     Diabetes Family     Thyroid disease Family     Hypertension Brother     Hashimoto's thyroiditis Brother     Breast cancer Paternal Aunt 43    Ovarian cancer Paternal [de-identified]     Lung cancer Father     Cancer Father         unknown primary; metastasis     No Known Problems Sister     No Known Problems Daughter     Diabetes Maternal Grandmother     No Known Problems Maternal Grandfather     Bone cancer Paternal Grandfather     No Known Problems Maternal Aunt     Prostate cancer Paternal Uncle      Social History     Socioeconomic History    Marital status: /Civil Union     Spouse name: Not on file    Number of children: Not on file    Years of education: Not on file    Highest education level: Not on file   Occupational History    Not on file   Tobacco Use    Smoking status: Never Smoker    Smokeless tobacco: Never Used   Vaping Use    Vaping Use: Never used   Substance and Sexual Activity    Alcohol use: No    Drug use: No    Sexual activity: Yes     Partners: Male     Birth control/protection: Female Sterilization   Other Topics Concern    Not on file   Social History Narrative    Always uses sunscreen    Caffeine use    Dental care, regularly    Lives independently with spouse    Uses seatbelts     Social Determinants of Health     Financial Resource Strain:     Difficulty of Paying Living Expenses:    Food Insecurity:     Worried About Running Out of Food in the Last Year:     [address] in the Last Year:    Transportation Needs:     Lack of Transportation (Medical):      Lack of Transportation (Non-Medical):    Physical Activity:     Days of Exercise per Week:     Minutes of Exercise per Session:    Stress:     Feeling of Stress :    Social Connections:     Frequency of Communication with Friends and Family:     Frequency of Social Gatherings with Friends and Family:     Attends Religion Services:     Active Member of Clubs or Organizations:     Attends Club or Organization Meetings:     Marital Status:    Intimate Partner Violence:     Fear of Current or Ex-Partner:     Emotionally Abused:     Physically Abused:     Sexually Abused:        Current Outpatient Medications:     Alpha-Lipoic Acid 100 MG TABS, Take 100 mg by mouth 2 (two) times a day , Disp: , Rfl:     Ascorbic Acid (VITAMIN C) 1000 MG tablet, Take 1,000 mg by mouth daily, Disp: , Rfl:     Cholecalciferol (Vitamin D3) 1 25 MG (21483 UT) CAPS, Take 1 capsule (50,000 Units total) by mouth once a week, Disp: 12 capsule, Rfl: 3    dexlansoprazole (Dexilant) 60 MG capsule, TAKE ONE CAPSULE BY MOUTH DAILY, Disp: 90 capsule, Rfl: 3    dicyclomine (BENTYL) 20 mg tablet, Take 1 tablet (20 mg total) by mouth every 6 (six) hours (Patient taking differently: Take 20 mg by mouth every 6 (six) hours as needed ), Disp: 120 tablet, Rfl: 3    diphenhydrAMINE (BENADRYL) 25 mg capsule, Take 50 mg by mouth as needed (migraines) , Disp: , Rfl:     Dong Quai, Marianne sinensis, (DONG QUAI PO), Take 1 tablet by mouth daily, Disp: , Rfl:     Galcanezumab-gnlm 120 MG/ML SOAJ, Inject 120 mg under the skin every 30 (thirty) days, Disp: 1 pen, Rfl: 11    ibuprofen (MOTRIN) 200 mg tablet, Take 600 mg by mouth as needed for mild pain, Disp: , Rfl:     ketorolac (TORADOL) 10 mg tablet, 1 tabs at onset of migraine, t i d  P r n   Take with food/milk/antacid , Disp: 10 tablet, Rfl: 0    levothyroxine 50 mcg tablet, Take 1 tablet (50 mcg total) by mouth daily, Disp: 90 tablet, Rfl: 3    liothyronine (CYTOMEL) 5 mcg tablet, , Disp: , Rfl:     Magnesium 400 MG CAPS, Take 400 mg by mouth daily, Disp: , Rfl:     methylPREDNISolone 4 MG tablet therapy pack, Use as directed on package, Disp: 21 each, Rfl: 0    Naproxen Sodium (ALEVE) 220 MG CAPS, Take 2 capsules by mouth as needed, Disp: , Rfl:     NON FORMULARY, Take 1 tablet by mouth 2 (two) times a day truadapt , Disp: , Rfl:     Omega-3 Fatty Acids (FISH OIL) 1,000 mg, Take 1,000 mg by mouth daily, Disp: , Rfl:     ondansetron (ZOFRAN-ODT) 8 mg disintegrating tablet, Take 1 tablet (8 mg total) by mouth every 8 (eight) hours as needed for nausea or vomiting, Disp: 20 tablet, Rfl: 0    patient supplied medication, Take 1 each by mouth daily bergamot bpf, Disp: , Rfl:     Rimegepant Sulfate (Nurtec) 75 MG TBDP, Take 75 mg by mouth as needed (migraine), Disp: 8 tablet, Rfl: 3    selenium 50 MCG TABS, Take 100 mcg by mouth daily, Disp: , Rfl:     Probiotic Product (ALIGN) 4 MG CAPS, Take 1 capsule (4 mg total) by mouth daily (Patient not taking: Reported on 7/22/2021), Disp: 30 capsule, Rfl: 0  Allergies   Allergen Reactions    Sulfamethoxazole-Trimethoprim Other (See Comments)     lightheaded     Vitals:    07/27/21 1522   BP: 126/74   Pulse: 80   Resp: 18   Temp: 98 °F (36 7 °C)   SpO2: 98%       Physical Exam  Constitutional:       Appearance: She is well-developed  HENT:      Head: Normocephalic and atraumatic  Eyes:      Pupils: Pupils are equal, round, and reactive to light  Cardiovascular:      Rate and Rhythm: Normal rate  Heart sounds: No murmur heard  Pulmonary:      Effort: No respiratory distress  Breath sounds: No wheezing or rales  Abdominal:      General: There is no distension  Palpations: Abdomen is soft  Tenderness: There is no abdominal tenderness  There is no rebound  Musculoskeletal:         General: No tenderness  Cervical back: Neck supple  Lymphadenopathy:      Cervical: No cervical adenopathy  Skin:     General: Skin is warm  Findings: No rash  Neurological:      Mental Status: She is alert and oriented to person, place, and time  Deep Tendon Reflexes: Reflexes normal    Psychiatric:         Thought Content: Thought content normal            Labs:  CBC, Coags, BMP, Mg, Phos     Imaging  Mammo screening bilateral w 3d & cad    Result Date: 7/7/2021  Narrative: DIAGNOSIS: Screening mammogram, encounter for TECHNIQUE: Digital screening mammography was performed  Computer Aided Detection (CAD) analyzed all applicable images  COMPARISONS: Prior breast imaging dated: 05/14/2020, 01/28/2019, 12/05/2017, 11/02/2016, and 06/11/2015 RELEVANT HISTORY: Family Breast Cancer History: History of breast cancer in Paternal Grandmother, Paternal Aunt   Family Medical History: Family medical history includes breast cancer in 2 relatives (paternal aunt, paternal grandmother) and ovarian cancer in 2 relatives (paternal aunt, paternal grandmother)  Personal History: No known relevant hormone history  No known relevant surgical history  No known relevant medical history  The patient is scheduled in a reminder system for screening mammography  8-10% of cancers will be missed on mammography  Management of a palpable abnormality must be based on clinical grounds  Patients will be notified of their results via letter from our facility  Accredited by [employer] of [employer] and FDA  RISK ASSESSMENT: 5 Year Tyrer-Cuzick: 1 92 % 10 Year Tyrer-Cuzick: 3 86 % Lifetime Tyrer-Cuzick: 11 6 % TISSUE DENSITY: There are scattered areas of fibroglandular density  INDICATION: Licha Travis is a 50 y o  female presenting for screening mammography  FINDINGS: There are no suspicious masses, grouped microcalcifications or areas of architectural distortion  The skin and nipple areolar complex are unremarkable  Impression: No mammographic evidence of malignancy  ASSESSMENT/BI-RADS CATEGORY: Left: 1 - Negative Right: 1 - Negative Overall: 1 - Negative RECOMMENDATION:      - Routine screening mammogram in 1 year for both breasts  Workstation ID: [id number]     I reviewed the above laboratory and imaging data  Discussion/Summary:  In summary, this is a 73-year-old female history of menopausal symptoms and factor 5 Leiden mutation  We reviewed that advancing age is a risk factor for thrombosis  Factor 5 Leiden mutation is associated with relative risk of roughly 3-4  This is likely compounded with estrogen supplementation  We reviewed that while estrogen products vary, any estrogen containing product including natural estrogen compounds are associated with an increased risk of thrombosis  Therefore they are relatively contraindicated    I note that she is on a supplement, jimenez Garcia which is essentially a phyto estrogen  We reviewed that the above statement applies to this medication as well  She had some concerns about the possibility of contribution of estrogen containing compounds to malignancy  There is moderate family history of breast and ovarian cancer  She has previously had BRCA testing and tells me this was negative  It is repeated recently under gyn  We reviewed that DHEA or even intravaginal/vaginal topical estrogen would be associated with little if any noticeable increase in thrombotic risk due to minimal absorption  Additionally Brisdelle could be considered for hot flashes  I reviewed the above considerations at length with the patient and her mother  They voiced understanding and agreement  I have not set up a follow-up appointment at this time but remain available if any questions or problems arise in the future

## 2021-07-27 NOTE — PROGRESS NOTES
[facility] H[facility]ATOLOGY ONCOLOGY SPECIALISTS [facility] [address] [place] [address]  [phone]    Joshrachid Crain,1965, [id number]  07/27/21    Discussion:   ***    I discussed the above with the patient  The patient *** voiced understanding and agreement   ______________________________________________________________________    Chief Complaint   Patient presents with    Consult       HPI:  Oncology History    No history exists  Interval History:  ***  {performance status:35001}    Review of Systems   Constitutional: Negative for appetite change, diaphoresis, fatigue and fever  HENT: Negative for sinus pain  Eyes: Negative for discharge  Respiratory: Negative for cough and shortness of breath  Cardiovascular: Negative for chest pain  Gastrointestinal: Negative for abdominal pain, constipation and diarrhea  Endocrine: Negative for cold intolerance  Genitourinary: Negative for difficulty urinating and hematuria  Musculoskeletal: Negative for joint swelling  Skin: Negative for rash  Allergic/Immunologic: Negative for environmental allergies  Neurological: Negative for dizziness and headaches  Hematological: Negative for adenopathy  Psychiatric/Behavioral: Negative for agitation         Past Medical History:   Diagnosis Date    Abnormal TSH 10/18/2018    Ataxia     Blood in urine     Disease of thyroid gland 10/31/2018    GERD (gastroesophageal reflux disease)     Irritable bowel syndrome     Migraine     Mild cervical dysplasia     Pituitary adenoma ([address] )     Pregnancy     1     Thyroiditis 10/31/2018    Uterine leiomyoma      Patient Active Problem List   Diagnosis    Ambulatory dysfunction    Hyperlipidemia    Other insomnia    Abnormal uterine bleeding (AUB)    Suspected sleep apnea    Hypersomnia    Anxiety    Hot flashes    Ataxia    Atopic dermatitis    Other constipation    Gastroparesis    Hamstring tendonitis of left thigh    Iliotibial band syndrome of left side    Lumbar disc herniation with radiculopathy    Menopausal disorder    Piriformis syndrome, left    Thyroid nodule    Vitamin D deficiency    Throat pain    Other specified hypothyroidism    Nausea    RUQ pain    Sphincter of Oddi dysfunction    Gastritis    Chronic migraine without aura without status migrainosus, not intractable    Annual physical exam    Vaginal atrophy    Heterozygous factor V Leiden mutation ([facility] [address] )    Vaginal candidiasis    Lymphadenopathy, axillary       Current Outpatient Medications:     Alpha-Lipoic Acid 100 MG TABS, Take 100 mg by mouth 2 (two) times a day , Disp: , Rfl:     Ascorbic Acid (VITAMIN C) 1000 MG tablet, Take 1,000 mg by mouth daily, Disp: , Rfl:     Cholecalciferol (Vitamin D3) 1 25 MG (78859 UT) CAPS, Take 1 capsule (50,000 Units total) by mouth once a week, Disp: 12 capsule, Rfl: 3    dexlansoprazole (Dexilant) 60 MG capsule, TAKE ONE CAPSULE BY MOUTH DAILY, Disp: 90 capsule, Rfl: 3    dicyclomine (BENTYL) 20 mg tablet, Take 1 tablet (20 mg total) by mouth every 6 (six) hours (Patient taking differently: Take 20 mg by mouth every 6 (six) hours as needed ), Disp: 120 tablet, Rfl: 3    diphenhydrAMINE (BENADRYL) 25 mg capsule, Take 50 mg by mouth as needed (migraines) , Disp: , Rfl:     Dong Quai, Marianne sinensis, (DONG QUAI PO), Take 1 tablet by mouth daily, Disp: , Rfl:     Galcanezumab-gnlm 120 MG/ML SOAJ, Inject 120 mg under the skin every 30 (thirty) days, Disp: 1 pen, Rfl: 11    ibuprofen (MOTRIN) 200 mg tablet, Take 600 mg by mouth as needed for mild pain, Disp: , Rfl:     ketorolac (TORADOL) 10 mg tablet, 1 tabs at onset of migraine, t i d  P r n   Take with food/milk/antacid , Disp: 10 tablet, Rfl: 0    levothyroxine 50 mcg tablet, Take 1 tablet (50 mcg total) by mouth daily, Disp: 90 tablet, Rfl: 3    liothyronine (CYTOMEL) 5 mcg tablet, , Disp: , Rfl:     Magnesium 400 MG CAPS, Take 400 mg by mouth daily, Disp: , Rfl:     methylPREDNISolone 4 MG tablet therapy pack, Use as directed on package, Disp: 21 each, Rfl: 0    Naproxen Sodium (ALEVE) 220 MG CAPS, Take 2 capsules by mouth as needed, Disp: , Rfl:     NON FORMULARY, Take 1 tablet by mouth 2 (two) times a day truadapt , Disp: , Rfl:     Omega-3 Fatty Acids (FISH OIL) 1,000 mg, Take 1,000 mg by mouth daily, Disp: , Rfl:     ondansetron (ZOFRAN-ODT) 8 mg disintegrating tablet, Take 1 tablet (8 mg total) by mouth every 8 (eight) hours as needed for nausea or vomiting, Disp: 20 tablet, Rfl: 0    patient supplied medication, Take 1 each by mouth daily bergamot bpf, Disp: , Rfl:     Rimegepant Sulfate (Nurtec) 75 MG TBDP, Take 75 mg by mouth as needed (migraine), Disp: 8 tablet, Rfl: 3    selenium 50 MCG TABS, Take 100 mcg by mouth daily, Disp: , Rfl:     Probiotic Product (ALIGN) 4 MG CAPS, Take 1 capsule (4 mg total) by mouth daily (Patient not taking: Reported on 7/22/2021), Disp: 30 capsule, Rfl: 0  Allergies   Allergen Reactions    Sulfamethoxazole-Trimethoprim Other (See Comments)     lightheaded     Past Surgical History:   Procedure Laterality Date    ABDOMINAL SURGERY      CHOLECYSTECTOMY      COLONOSCOPY      COLPOSCOPY      onset: 2/4/08    ESOPHAGOGASTRODUODENOSCOPY      LAPAROSCOPY      LA COLONOSCOPY FLX DX W/COLLJ SPEC WHEN PFRMD N/A 2/16/2018    Procedure: COLONOSCOPY;  Surgeon: Maribel Mitchell DO;  [facility];  Service: Gastroenterology    LA ESOPHAGOGASTRODUODENOSCOPY TRANSORAL DIAGNOSTIC N/A 3/19/2019    Procedure: ESOPHAGOGASTRODUODENOSCOPY (EGD);   Surgeon: Jose De Jesus Pereira MD;  [facility];  Service: Gastroenterology    TUBAL LIGATION       Social History     Objective:  Vitals:    07/27/21 1522   BP: 126/74   BP Location: Right arm   Patient Position: Sitting   Pulse: 80   Resp: 18   Temp: 98 °F (36 7 °C)   TempSrc: Tympanic   SpO2: 98%   Weight: 68 5 kg (151 lb)   Height: 5' 5" (1 651 m)     Physical Exam  Constitutional:       Appearance: She is well-developed  HENT:      Head: Normocephalic and atraumatic  Eyes:      Pupils: Pupils are equal, round, and reactive to light  Cardiovascular:      Rate and Rhythm: Normal rate  Heart sounds: No murmur heard  Pulmonary:      Effort: No respiratory distress  Breath sounds: No wheezing or rales  Abdominal:      General: There is no distension  Palpations: Abdomen is soft  Tenderness: There is no abdominal tenderness  There is no rebound  Musculoskeletal:         General: No tenderness  Cervical back: Neck supple  Lymphadenopathy:      Cervical: No cervical adenopathy  Skin:     General: Skin is warm  Findings: No rash  Neurological:      Mental Status: She is alert and oriented to person, place, and time  Deep Tendon Reflexes: Reflexes normal    Psychiatric:         Thought Content: Thought content normal            Labs: I personally reviewed the labs and imaging pertinent to this patient care

## 2021-08-04 ENCOUNTER — TELEPHONE (OUTPATIENT)
Dept: INTERNAL MEDICINE CLINIC | Facility: CLINIC | Age: 56
End: 2021-08-04

## 2021-08-04 DIAGNOSIS — J32.9 SINUSITIS, UNSPECIFIED CHRONICITY, UNSPECIFIED LOCATION: Primary | ICD-10-CM

## 2021-08-04 RX ORDER — AZITHROMYCIN 250 MG/1
TABLET, FILM COATED ORAL
Qty: 6 TABLET | Refills: 0 | Status: SHIPPED | OUTPATIENT
Start: 2021-08-04 | End: 2021-08-08

## 2021-08-04 NOTE — TELEPHONE ENCOUNTER
Patient has c/o sinus pressure and shannan  Lo grade fever yesterday  Slight cough, slight sore throat  Patient had covid vaccine  Her mom was sick last week       All-sulfa    [address]

## 2021-08-08 ENCOUNTER — APPOINTMENT (OUTPATIENT)
Dept: LAB | Facility: HOSPITAL | Age: 56
End: 2021-08-08
Payer: COMMERCIAL

## 2021-08-10 ENCOUNTER — TELEPHONE (OUTPATIENT)
Dept: INTERNAL MEDICINE CLINIC | Facility: CLINIC | Age: 56
End: 2021-08-10

## 2021-08-10 NOTE — TELEPHONE ENCOUNTER
Patient called 8/04 with sinus symptoms and was started on zpack  She did go into work on Friday  Patient then noticed she had lost her sense of smell on Sunday along with her cold symptoms  She did a home test which was positive  She tested positive for covid on 8/08 with a lab test as well  Her employer told her she needs a note before she can return to work  She is asking how long she needs to be out based on when her symptoms started? She has some fatigue, some headache, afebrile  She is still stuffy so unsure if her sense of smell is returned yet

## 2021-08-10 NOTE — TELEPHONE ENCOUNTER
If still symptomatic I would recommend 10 day from onset of sxs which was 8/4   Would setup virtual visit for sick visit slot tomorrow at [facility] but I would say based on sxs soonest would be after 8/14   Loss of taste/smell cna take longer to return and would not be symptom to prevent return to work

## 2021-08-10 NOTE — TELEPHONE ENCOUNTER
Patient aware of instructions  Please call patient to schedule virtual visit for 10am tomorrow    Thank you

## 2021-08-11 ENCOUNTER — TELEMEDICINE (OUTPATIENT)
Dept: INTERNAL MEDICINE CLINIC | Facility: CLINIC | Age: 56
End: 2021-08-11
Payer: COMMERCIAL

## 2021-08-11 ENCOUNTER — TELEPHONE (OUTPATIENT)
Dept: HEMATOLOGY ONCOLOGY | Facility: CLINIC | Age: 56
End: 2021-08-11

## 2021-08-11 DIAGNOSIS — U07.1 COVID-19: Primary | ICD-10-CM

## 2021-08-11 DIAGNOSIS — G43.709 CHRONIC MIGRAINE WITHOUT AURA WITHOUT STATUS MIGRAINOSUS, NOT INTRACTABLE: ICD-10-CM

## 2021-08-11 PROBLEM — R10.11 RUQ PAIN: Status: RESOLVED | Noted: 2019-03-12 | Resolved: 2021-08-11

## 2021-08-11 PROBLEM — R07.0 THROAT PAIN: Status: RESOLVED | Noted: 2018-09-10 | Resolved: 2021-08-11

## 2021-08-11 PROBLEM — R11.0 NAUSEA: Status: RESOLVED | Noted: 2019-03-12 | Resolved: 2021-08-11

## 2021-08-11 PROCEDURE — 99213 OFFICE O/P EST LOW 20 MIN: CPT | Performed by: INTERNAL MEDICINE

## 2021-08-11 NOTE — PROGRESS NOTES
COVID-19 Outpatient Progress Note    Assessment/Plan:    Problem List Items Addressed This Visit        Cardiovascular and Mediastinum    Chronic migraine without aura without status migrainosus, not intractable       Other    COVID-19 - Primary       Pt acute sxs resolved has lingering loss of taste and smell and chronic sinus congestion   RTW Friday 8/13 and note sent to [facility] manager   Increase fluids, rest  If loss of taste and smell linger, pt aware of ENT referral option  Disposition:     I recommended self-quarantine for 10 days and to watch for symptoms until 14 days after exposure  If patient were to develop symptoms, they should self isolate and call our office for further guidance  I have spent 15 minutes directly with the patient  Greater than 50% of this time was spent in counseling/coordination of care regarding: diagnostic results, prognosis, instructions for management and patient and family education  Verification of patient location:    Patient is located in the following state in which I hold an active license [place]    Encounter provider Jos Rai DO    Provider located at [address] [place] [id number]    Recent Visits  Date Type Provider Dept   08/10/21 Telephone [place], [place] [facility] At [facility]   08/04/21 Telephone [place] [facility] Internal Med At [address] [place] [address] recent visits within past 7 days and meeting all other requirements  Today's Visits  Date Type Provider Dept   08/11/21 Telemedicine Jos Rai DO [facility] Internal [facility] At [address] [place] [address] today's visits and meeting all other requirements  Future Appointments  No visits were found meeting these conditions  Showing future appointments within next 150 days and meeting all other requirements     This virtual check-in was done via [email or web address] and patient was informed that this is a secure, HIPAA-compliant platform   She agrees to proceed  Patient agrees to participate in a virtual check in via telephone or video visit instead of presenting to the office to address urgent/immediate medical needs  Patient is aware this is a billable service  After connecting through [facility], the patient was identified by name and date of birth  Cam Bosworth was informed that this was a telemedicine visit and that the exam was being conducted confidentially over secure lines  My office door was closed  No one else was in the room  Lanre Crain acknowledged consent and understanding of privacy and security of the telemedicine visit  I informed the patient that I have reviewed her record in Epic and presented the opportunity for her to ask any questions regarding the visit today  The patient agreed to participate  Subjective:   Cam Bosworth is a 49 y o  female who is concerned about COVID-19  Patient's symptoms include fatigue, nasal congestion, anosmia and loss of taste       Date of symptom onset: 8/1/2021  Date of exposure: 7/26/2021    Exposure:   Contact with a person who is under investigation (PUI) for or who is positive for COVID-19 within the last 14 days?: Yes    Hospitalized recently for fever and/or lower respiratory symptoms?: No      Currently a healthcare worker that is involved in direct patient care?: No      Works in a special setting where the risk of COVID-19 transmission may be high? (this may include long-term care, correctional and [facility] facilities; homeless shelters; assisted-living facilities and group homes ): No      Resident in a special setting where the risk of COVID-19 transmission may be high? (this may include long-term care, correctional and [facility] facilities; homeless shelters; assisted-living facilities and group homes ): No      Lab Results   Component Value Date    SARSCOV2 Positive (A) 08/08/2021    Emir Cortez Not Detected 11/14/2020     Past Medical History:   Diagnosis Date    Abnormal TSH 10/18/2018    Ataxia     Blood in urine     Disease of thyroid gland 10/31/2018    GERD (gastroesophageal reflux disease)     Irritable bowel syndrome     Migraine     Mild cervical dysplasia     Pituitary adenoma ([address] )     Pregnancy     1     Thyroiditis 10/31/2018    Uterine leiomyoma      Past Surgical History:   Procedure Laterality Date    ABDOMINAL SURGERY      CHOLECYSTECTOMY      COLONOSCOPY      COLPOSCOPY      onset: 2/4/08    ESOPHAGOGASTRODUODENOSCOPY      LAPAROSCOPY      VT COLONOSCOPY FLX DX W/COLLJ SPEC WHEN PFRMD N/A 2/16/2018    Procedure: COLONOSCOPY;  Surgeon: Patricia Fay DO;  [facility];  Service: Gastroenterology    VT ESOPHAGOGASTRODUODENOSCOPY TRANSORAL DIAGNOSTIC N/A 3/19/2019    Procedure: ESOPHAGOGASTRODUODENOSCOPY (EGD); Surgeon: Kristina Dorsey MD;  [facility];  Service: Gastroenterology    TUBAL LIGATION       Current Outpatient Medications   Medication Sig Dispense Refill    Alpha-Lipoic Acid 100 MG TABS Take 100 mg by mouth 2 (two) times a day       Ascorbic Acid (VITAMIN C) 1000 MG tablet Take 1,000 mg by mouth daily      Cholecalciferol (Vitamin D3) 1 25 MG (58421 UT) CAPS Take 1 capsule (50,000 Units total) by mouth once a week 12 capsule 3    dexlansoprazole (Dexilant) 60 MG capsule TAKE ONE CAPSULE BY MOUTH DAILY 90 capsule 3    dicyclomine (BENTYL) 20 mg tablet Take 1 tablet (20 mg total) by mouth every 6 (six) hours (Patient taking differently: Take 20 mg by mouth every 6 (six) hours as needed ) 120 tablet 3    diphenhydrAMINE (BENADRYL) 25 mg capsule Take 50 mg by mouth as needed (migraines)       [place] Quai, Marianne sinensis, (DONG QUAI PO) Take 1 tablet by mouth daily      Emgality 120 MG/ML SOAJ INJECT 120MG UNDER THE SKIN EVERY 30 DAYS 1 mL 11    ibuprofen (MOTRIN) 200 mg tablet Take 600 mg by mouth as needed for mild pain      ketorolac (TORADOL) 10 mg tablet 1 tabs at onset of migraine, t i d  P r n   Take with food/milk/antacid  10 tablet 0    levothyroxine 50 mcg tablet Take 1 tablet (50 mcg total) by mouth daily 90 tablet 3    liothyronine (CYTOMEL) 5 mcg tablet       Magnesium 400 MG CAPS Take 400 mg by mouth daily      methylPREDNISolone 4 MG tablet therapy pack Use as directed on package 21 each 0    Naproxen Sodium (ALEVE) 220 MG CAPS Take 2 capsules by mouth as needed      NON FORMULARY Take 1 tablet by mouth 2 (two) times a day truadapt       Omega-3 Fatty Acids (FISH OIL) 1,000 mg Take 1,000 mg by mouth daily      ondansetron (ZOFRAN-ODT) 8 mg disintegrating tablet Take 1 tablet (8 mg total) by mouth every 8 (eight) hours as needed for nausea or vomiting 20 tablet 0    patient supplied medication Take 1 each by mouth daily bergamot bpf      Rimegepant Sulfate (Nurtec) 75 MG TBDP Take 75 mg by mouth as needed (migraine) 8 tablet 3    selenium 50 MCG TABS Take 100 mcg by mouth daily       No current facility-administered medications for this visit  Allergies   Allergen Reactions    Sulfamethoxazole-Trimethoprim Other (See Comments)     lightheaded       Review of Systems   Constitutional: Positive for fatigue  HENT: Positive for congestion  Objective: There were no vitals filed for this visit  Physical Exam  Constitutional:       General: She is not in acute distress  Appearance: Normal appearance  She is not ill-appearing, toxic-appearing or diaphoretic  HENT:      Nose: Congestion present  Pulmonary:      Effort: Pulmonary effort is normal  No respiratory distress  Breath sounds: Normal breath sounds  No wheezing  Skin:     Coloration: Skin is not pale  Findings: No erythema  Neurological:      General: No focal deficit present  Mental Status: She is alert and oriented to person, place, and time  Mental status is at baseline  Cranial Nerves: No cranial nerve deficit     Psychiatric:         Mood and Affect: Mood normal          Behavior: Behavior normal          Thought Content: Thought content normal          Judgment: Judgment normal          VIRTUAL VISIT DISCLAIMER    Lanre Crain verbally agrees to participate in [address] [place] [address]  Pt is aware that [address] [place] [address] could be limited without vital signs or the ability to perform a full hands-on physical exam  Lanre Alvares understands she or the provider may request at any time to terminate the video visit and request the patient to seek care or treatment in person

## 2021-08-11 NOTE — TELEPHONE ENCOUNTER
Patient is requesting the 7/27 office visit key with Dr Bindu Lim be emailed to [email or web address]

## 2021-08-18 DIAGNOSIS — E55.9 VITAMIN D DEFICIENCY: Primary | ICD-10-CM

## 2021-08-19 RX ORDER — ERGOCALCIFEROL 1.25 MG/1
CAPSULE ORAL
Qty: 12 CAPSULE | Refills: 0 | Status: SHIPPED | OUTPATIENT
Start: 2021-08-19 | End: 2022-08-07

## 2021-08-25 DIAGNOSIS — E55.9 VITAMIN D DEFICIENCY: ICD-10-CM

## 2021-08-25 RX ORDER — CHOLECALCIFEROL (VITAMIN D3) 1250 MCG
1 CAPSULE ORAL WEEKLY
Qty: 12 CAPSULE | Refills: 3 | Status: SHIPPED | OUTPATIENT
Start: 2021-08-25

## 2021-09-14 ENCOUNTER — DOCUMENTATION (OUTPATIENT)
Dept: GYNECOLOGY | Facility: CLINIC | Age: 56
End: 2021-09-14

## 2021-09-15 ENCOUNTER — TELEPHONE (OUTPATIENT)
Dept: GYNECOLOGY | Facility: CLINIC | Age: 56
End: 2021-09-15

## 2021-09-15 ENCOUNTER — APPOINTMENT (OUTPATIENT)
Dept: LAB | Facility: HOSPITAL | Age: 56
End: 2021-09-15
Payer: COMMERCIAL

## 2021-09-15 DIAGNOSIS — R53.82 CHRONIC FATIGUE SYNDROME: ICD-10-CM

## 2021-09-15 DIAGNOSIS — E03.9 MYXEDEMA HEART DISEASE: ICD-10-CM

## 2021-09-15 DIAGNOSIS — N95.1 SYMPTOMATIC MENOPAUSAL OR FEMALE CLIMACTERIC STATES: ICD-10-CM

## 2021-09-15 DIAGNOSIS — I51.9 MYXEDEMA HEART DISEASE: ICD-10-CM

## 2021-09-15 DIAGNOSIS — R68.82 DECREASED LIBIDO: ICD-10-CM

## 2021-09-15 DIAGNOSIS — G43.011 INTRACTABLE MIGRAINE WITHOUT AURA AND WITH STATUS MIGRAINOSUS: ICD-10-CM

## 2021-09-15 LAB
25(OH)D3 SERPL-MCNC: 71.2 NG/ML (ref 30–100)
ALBUMIN SERPL BCP-MCNC: 4.2 G/DL (ref 3.5–5)
ALP SERPL-CCNC: 117 U/L (ref 46–116)
ALT SERPL W P-5'-P-CCNC: 21 U/L (ref 12–78)
ANION GAP SERPL CALCULATED.3IONS-SCNC: 8 MMOL/L (ref 4–13)
AST SERPL W P-5'-P-CCNC: 26 U/L (ref 5–45)
BASOPHILS # BLD AUTO: 0.03 THOUSANDS/ΜL (ref 0–0.1)
BASOPHILS NFR BLD AUTO: 1 % (ref 0–1)
BILIRUB SERPL-MCNC: 0.67 MG/DL (ref 0.2–1)
BUN SERPL-MCNC: 19 MG/DL (ref 5–25)
CALCIUM SERPL-MCNC: 9 MG/DL (ref 8.3–10.1)
CHLORIDE SERPL-SCNC: 103 MMOL/L (ref 100–108)
CHOLEST SERPL-MCNC: 208 MG/DL (ref 50–200)
CO2 SERPL-SCNC: 30 MMOL/L (ref 21–32)
CREAT SERPL-MCNC: 0.93 MG/DL (ref 0.6–1.3)
EOSINOPHIL # BLD AUTO: 0.16 THOUSAND/ΜL (ref 0–0.61)
EOSINOPHIL NFR BLD AUTO: 3 % (ref 0–6)
ERYTHROCYTE [DISTWIDTH] IN BLOOD BY AUTOMATED COUNT: 12.2 % (ref 11.6–15.1)
EST. AVERAGE GLUCOSE BLD GHB EST-MCNC: 111 MG/DL
FSH SERPL-ACNC: 116.1 MIU/ML
GFR SERPL CREATININE-BSD FRML MDRD: 70 ML/MIN/1.73SQ M
GLUCOSE P FAST SERPL-MCNC: 93 MG/DL (ref 65–99)
HBA1C MFR BLD: 5.5 %
HCT VFR BLD AUTO: 42.2 % (ref 34.8–46.1)
HDLC SERPL-MCNC: 60 MG/DL
HGB BLD-MCNC: 13.5 G/DL (ref 11.5–15.4)
IMM GRANULOCYTES # BLD AUTO: 0.01 THOUSAND/UL (ref 0–0.2)
IMM GRANULOCYTES NFR BLD AUTO: 0 % (ref 0–2)
INSULIN SERPL-ACNC: 8.5 MU/L (ref 3–25)
LDLC SERPL CALC-MCNC: 135 MG/DL (ref 0–100)
LYMPHOCYTES # BLD AUTO: 1.76 THOUSANDS/ΜL (ref 0.6–4.47)
LYMPHOCYTES NFR BLD AUTO: 37 % (ref 14–44)
MCH RBC QN AUTO: 29.7 PG (ref 26.8–34.3)
MCHC RBC AUTO-ENTMCNC: 32 G/DL (ref 31.4–37.4)
MCV RBC AUTO: 93 FL (ref 82–98)
MONOCYTES # BLD AUTO: 0.45 THOUSAND/ΜL (ref 0.17–1.22)
MONOCYTES NFR BLD AUTO: 10 % (ref 4–12)
NEUTROPHILS # BLD AUTO: 2.32 THOUSANDS/ΜL (ref 1.85–7.62)
NEUTS SEG NFR BLD AUTO: 49 % (ref 43–75)
NONHDLC SERPL-MCNC: 148 MG/DL
NRBC BLD AUTO-RTO: 0 /100 WBCS
PLATELET # BLD AUTO: 256 THOUSANDS/UL (ref 149–390)
PMV BLD AUTO: 9.6 FL (ref 8.9–12.7)
POTASSIUM SERPL-SCNC: 4 MMOL/L (ref 3.5–5.3)
PROT SERPL-MCNC: 7.5 G/DL (ref 6.4–8.2)
RBC # BLD AUTO: 4.54 MILLION/UL (ref 3.81–5.12)
SODIUM SERPL-SCNC: 141 MMOL/L (ref 136–145)
T3FREE SERPL-MCNC: 2.32 PG/ML (ref 2.3–4.2)
T4 FREE SERPL-MCNC: 1.09 NG/DL (ref 0.76–1.46)
T4 SERPL-MCNC: 9.6 UG/DL (ref 4.7–13.3)
TRIGL SERPL-MCNC: 64 MG/DL
TSH SERPL DL<=0.05 MIU/L-ACNC: 1.94 UIU/ML (ref 0.36–3.74)
VIT B12 SERPL-MCNC: 404 PG/ML (ref 100–900)
WBC # BLD AUTO: 4.73 THOUSAND/UL (ref 4.31–10.16)

## 2021-09-15 PROCEDURE — 82607 VITAMIN B-12: CPT

## 2021-09-15 PROCEDURE — 83036 HEMOGLOBIN GLYCOSYLATED A1C: CPT

## 2021-09-15 PROCEDURE — 82306 VITAMIN D 25 HYDROXY: CPT

## 2021-09-15 PROCEDURE — 84436 ASSAY OF TOTAL THYROXINE: CPT

## 2021-09-15 PROCEDURE — 80053 COMPREHEN METABOLIC PANEL: CPT

## 2021-09-15 PROCEDURE — 84439 ASSAY OF FREE THYROXINE: CPT

## 2021-09-15 PROCEDURE — 84481 FREE ASSAY (FT-3): CPT

## 2021-09-15 PROCEDURE — 83525 ASSAY OF INSULIN: CPT

## 2021-09-15 PROCEDURE — 85025 COMPLETE CBC W/AUTO DIFF WBC: CPT

## 2021-09-15 PROCEDURE — 36415 COLL VENOUS BLD VENIPUNCTURE: CPT

## 2021-09-15 PROCEDURE — 84443 ASSAY THYROID STIM HORMONE: CPT

## 2021-09-15 PROCEDURE — 83001 ASSAY OF GONADOTROPIN (FSH): CPT

## 2021-09-15 PROCEDURE — 80061 LIPID PANEL: CPT

## 2021-09-15 NOTE — TELEPHONE ENCOUNTER
Pt was informed there was not enough DNA with sample to do proper test  She will be coming in next week  Make sure she goes over required amount   So she doesn't have to do this again   Just make sure case number is on the vial no need to fill out all the paperwork again just sent the specimen  Per [facility] at [facility]

## 2021-09-27 ENCOUNTER — APPOINTMENT (OUTPATIENT)
Dept: LAB | Facility: CLINIC | Age: 56
End: 2021-09-27
Payer: COMMERCIAL

## 2021-09-27 ENCOUNTER — TELEPHONE (OUTPATIENT)
Dept: FAMILY MEDICINE CLINIC | Facility: CLINIC | Age: 56
End: 2021-09-27

## 2021-09-27 ENCOUNTER — CLINICAL SUPPORT (OUTPATIENT)
Dept: URGENT CARE | Facility: CLINIC | Age: 56
End: 2021-09-27
Payer: COMMERCIAL

## 2021-09-27 ENCOUNTER — APPOINTMENT (OUTPATIENT)
Dept: RADIOLOGY | Facility: CLINIC | Age: 56
End: 2021-09-27
Payer: COMMERCIAL

## 2021-09-27 DIAGNOSIS — R07.89 ATYPICAL CHEST PAIN: Primary | ICD-10-CM

## 2021-09-27 DIAGNOSIS — R07.89 ATYPICAL CHEST PAIN: ICD-10-CM

## 2021-09-27 LAB — D DIMER PPP FEU-MCNC: 0.41 UG/ML FEU

## 2021-09-27 PROCEDURE — 93005 ELECTROCARDIOGRAM TRACING: CPT

## 2021-09-27 PROCEDURE — 85379 FIBRIN DEGRADATION QUANT: CPT

## 2021-09-27 PROCEDURE — 71046 X-RAY EXAM CHEST 2 VIEWS: CPT

## 2021-09-27 PROCEDURE — 36415 COLL VENOUS BLD VENIPUNCTURE: CPT

## 2021-09-27 NOTE — TELEPHONE ENCOUNTER
Patient aware, please add to schedule for 7:15 appt with [facility] Tuesday

## 2021-09-27 NOTE — TELEPHONE ENCOUNTER
Patient with left sided chest pain since she had covid  States discomfort radiates up to her neck  Denies SOB  As the day progresses the chest pain bothers her more  Asking for appt  Refusing ER since she doesn't feel emergency, pain has been ongoing for a month

## 2021-09-27 NOTE — TELEPHONE ENCOUNTER
I ordered cxr, d dimer and ekg - have patient go today for testing and then schedule for 7;15 am tomorrow appt

## 2021-09-28 ENCOUNTER — OFFICE VISIT (OUTPATIENT)
Dept: FAMILY MEDICINE CLINIC | Facility: CLINIC | Age: 56
End: 2021-09-28
Payer: COMMERCIAL

## 2021-09-28 VITALS — DIASTOLIC BLOOD PRESSURE: 72 MMHG | SYSTOLIC BLOOD PRESSURE: 130 MMHG

## 2021-09-28 DIAGNOSIS — R07.9 CHEST PAIN, UNSPECIFIED TYPE: Primary | ICD-10-CM

## 2021-09-28 DIAGNOSIS — Z86.16 HISTORY OF COVID-19: ICD-10-CM

## 2021-09-28 LAB
ATRIAL RATE: 63 BPM
P AXIS: 60 DEGREES
PR INTERVAL: 198 MS
QRS AXIS: 79 DEGREES
QRSD INTERVAL: 96 MS
QT INTERVAL: 394 MS
QTC INTERVAL: 403 MS
T WAVE AXIS: 60 DEGREES
VENTRICULAR RATE: 63 BPM

## 2021-09-28 PROCEDURE — 93010 ELECTROCARDIOGRAM REPORT: CPT | Performed by: INTERNAL MEDICINE

## 2021-09-28 PROCEDURE — 99213 OFFICE O/P EST LOW 20 MIN: CPT | Performed by: INTERNAL MEDICINE

## 2021-09-28 NOTE — PROGRESS NOTES
Assessment/Plan:      Diagnoses and all orders for this visit:    Chest pain, unspecified type  -     Stress test only, exercise; Future  Pt states EKG was normal per tech Report pending  Setup exercise stress test  History of COVID-19  Sxs do not seem delayed from prior covid There is a palpable tenderness so advised moist heat/topical analgesi  Ddimer and cxr normal  Tylenol bid             Patient ID: Kendra Graff is a 49 y o  female  HPI   Pt with hx of mild covid (she is vaccinated) and has anterior palpable chest discomfort No sob She started walking no lifting No cough Has pnd from chronic sinus issues Not taking any daily rx No heartburn Sxs are intermittent and do radiate at times to the neck and shoulder area   No direct trauma  Review of Systems   Constitutional: Negative for chills and fever  HENT: Positive for postnasal drip and rhinorrhea  Respiratory: Negative for cough and shortness of breath  Cardiovascular: Positive for chest pain  Negative for palpitations and leg swelling  Gastrointestinal: Negative for abdominal distention and abdominal pain  Neurological: Negative for dizziness, light-headedness and headaches  Psychiatric/Behavioral: Negative for sleep disturbance  The patient is not nervous/anxious          Past Medical History:   Diagnosis Date    Abnormal TSH 10/18/2018    Ataxia     Blood in urine     Disease of thyroid gland 10/31/2018    GERD (gastroesophageal reflux disease)     Irritable bowel syndrome     Migraine     Mild cervical dysplasia     Pituitary adenoma ([address]     Pregnancy     1     Thyroiditis 10/31/2018    Uterine leiomyoma      Past Surgical History:   Procedure Laterality Date    ABDOMINAL SURGERY      CHOLECYSTECTOMY      COLONOSCOPY      COLPOSCOPY      onset: 2/4/08    ESOPHAGOGASTRODUODENOSCOPY      LAPAROSCOPY      OR COLONOSCOPY FLX DX W/COLLJ SPEC WHEN PFRMD N/A 2/16/2018    Procedure: COLONOSCOPY;  Surgeon: Wendy Grant DO;  [facility];  Service: Gastroenterology    GA ESOPHAGOGASTRODUODENOSCOPY TRANSORAL DIAGNOSTIC N/A 3/19/2019    Procedure: ESOPHAGOGASTRODUODENOSCOPY (EGD); Surgeon: Meaghan Hayes MD;  [facility];  Service: Gastroenterology    TUBAL LIGATION       Social History     Socioeconomic History    Marital status: /Civil Union     Spouse name: Not on file    Number of children: Not on file    Years of education: Not on file    Highest education level: Not on file   Occupational History    Not on file   Tobacco Use    Smoking status: Never Smoker    Smokeless tobacco: Never Used   Vaping Use    Vaping Use: Never used   Substance and Sexual Activity    Alcohol use: No    Drug use: No    Sexual activity: Yes     Partners: Male     Birth control/protection: Female Sterilization   Other Topics Concern    Not on file   Social History Narrative    Always uses sunscreen    Caffeine use    Dental care, regularly    Lives independently with spouse    Uses seatbelts     Social Determinants of Health     Financial Resource Strain:     Difficulty of Paying Living Expenses:    Food Insecurity:     Worried About Running Out of Food in the Last Year:     [address] in the Last Year:    Transportation Needs:     Lack of Transportation (Medical):  Lack of Transportation (Non-Medical):    Physical Activity:     Days of Exercise per Week:     Minutes of Exercise per Session:    Stress:     Feeling of Stress :    Social Connections:     Frequency of Communication with Friends and Family:     Frequency of Social Gatherings with Friends and Family:     Attends [employer] Services:     Active Member of Clubs or Organizations:     Attends Club or Organization Meetings:     Marital Status:    Intimate Partner Violence:     Fear of Current or Ex-Partner:     Emotionally Abused:     Physically Abused:     Sexually Abused:       Allergies   Allergen Reactions    Sulfamethoxazole-Trimethoprim Other (See Comments)     lightheaded           Visit Vitals  /72   LMP 07/31/2018   OB Status Postmenopausal   Smoking Status Never Smoker          Physical Exam  Vitals reviewed  Constitutional:       General: She is not in acute distress  Appearance: Normal appearance  She is not ill-appearing, toxic-appearing or diaphoretic  HENT:      Head: Normocephalic and atraumatic  Nose: Rhinorrhea present  Cardiovascular:      Rate and Rhythm: Normal rate and regular rhythm  Pulses: Normal pulses  Heart sounds: Normal heart sounds  No murmur heard  Pulmonary:      Effort: Pulmonary effort is normal  No respiratory distress  Breath sounds: Normal breath sounds  No wheezing  Abdominal:      General: Bowel sounds are normal       Palpations: Abdomen is soft  Musculoskeletal:         General: Tenderness present  Right lower leg: No edema  Left lower leg: No edema  Comments: Tenderness left upper anterior chest area    Skin:     General: Skin is dry  Coloration: Skin is not jaundiced or pale  Neurological:      General: No focal deficit present  Mental Status: She is alert and oriented to person, place, and time  Mental status is at baseline  Cranial Nerves: No cranial nerve deficit  Sensory: No sensory deficit  Psychiatric:         Mood and Affect: Mood normal          Behavior: Behavior normal          Thought Content:  Thought content normal          Judgment: Judgment normal

## 2021-09-30 ENCOUNTER — TELEPHONE (OUTPATIENT)
Dept: FAMILY MEDICINE CLINIC | Facility: CLINIC | Age: 56
End: 2021-09-30

## 2021-09-30 DIAGNOSIS — J02.9 PHARYNGITIS, UNSPECIFIED ETIOLOGY: Primary | ICD-10-CM

## 2021-09-30 NOTE — TELEPHONE ENCOUNTER
I can place the lab orders I would suspect more likely viral but if her sxs intesify would consider taking the rx

## 2021-09-30 NOTE — TELEPHONE ENCOUNTER
She as a sore throat and swollen glands  She went to urgent care and was negative for strep  She was ordered an antibiotic, she does not know if she should take it  She feels she should be tested for sadia barr and mono     After she had covid, she has this sore throat off and on

## 2021-10-04 ENCOUNTER — APPOINTMENT (OUTPATIENT)
Dept: LAB | Facility: CLINIC | Age: 56
End: 2021-10-04
Payer: COMMERCIAL

## 2021-10-04 DIAGNOSIS — J02.9 PHARYNGITIS, UNSPECIFIED ETIOLOGY: ICD-10-CM

## 2021-10-04 LAB
BASOPHILS # BLD AUTO: 0.02 THOUSANDS/ΜL (ref 0–0.1)
BASOPHILS NFR BLD AUTO: 0 % (ref 0–1)
EOSINOPHIL # BLD AUTO: 0.11 THOUSAND/ΜL (ref 0–0.61)
EOSINOPHIL NFR BLD AUTO: 2 % (ref 0–6)
ERYTHROCYTE [DISTWIDTH] IN BLOOD BY AUTOMATED COUNT: 12.2 % (ref 11.6–15.1)
HCT VFR BLD AUTO: 41.8 % (ref 34.8–46.1)
HGB BLD-MCNC: 13.4 G/DL (ref 11.5–15.4)
IMM GRANULOCYTES # BLD AUTO: 0.01 THOUSAND/UL (ref 0–0.2)
IMM GRANULOCYTES NFR BLD AUTO: 0 % (ref 0–2)
LYMPHOCYTES # BLD AUTO: 1.19 THOUSANDS/ΜL (ref 0.6–4.47)
LYMPHOCYTES NFR BLD AUTO: 26 % (ref 14–44)
MCH RBC QN AUTO: 30.2 PG (ref 26.8–34.3)
MCHC RBC AUTO-ENTMCNC: 32.1 G/DL (ref 31.4–37.4)
MCV RBC AUTO: 94 FL (ref 82–98)
MONOCYTES # BLD AUTO: 0.46 THOUSAND/ΜL (ref 0.17–1.22)
MONOCYTES NFR BLD AUTO: 10 % (ref 4–12)
NEUTROPHILS # BLD AUTO: 2.83 THOUSANDS/ΜL (ref 1.85–7.62)
NEUTS SEG NFR BLD AUTO: 62 % (ref 43–75)
NRBC BLD AUTO-RTO: 0 /100 WBCS
PLATELET # BLD AUTO: 218 THOUSANDS/UL (ref 149–390)
PMV BLD AUTO: 10.3 FL (ref 8.9–12.7)
RBC # BLD AUTO: 4.44 MILLION/UL (ref 3.81–5.12)
WBC # BLD AUTO: 4.62 THOUSAND/UL (ref 4.31–10.16)

## 2021-10-04 PROCEDURE — 86308 HETEROPHILE ANTIBODY SCREEN: CPT

## 2021-10-04 PROCEDURE — 86664 EPSTEIN-BARR NUCLEAR ANTIGEN: CPT

## 2021-10-04 PROCEDURE — 85025 COMPLETE CBC W/AUTO DIFF WBC: CPT

## 2021-10-04 PROCEDURE — 86663 EPSTEIN-BARR ANTIBODY: CPT

## 2021-10-04 PROCEDURE — 36415 COLL VENOUS BLD VENIPUNCTURE: CPT

## 2021-10-04 PROCEDURE — 86665 EPSTEIN-BARR CAPSID VCA: CPT

## 2021-10-05 LAB
EBV NA IGG SER IA-ACNC: 190 U/ML (ref 0–17.9)
EBV VCA IGG SER IA-ACNC: 334 U/ML (ref 0–17.9)
EBV VCA IGM SER IA-ACNC: <36 U/ML (ref 0–35.9)
HETEROPH AB SER QL: NEGATIVE
INTERPRETATION: ABNORMAL

## 2021-11-04 ENCOUNTER — TELEPHONE (OUTPATIENT)
Dept: NEUROLOGY | Facility: CLINIC | Age: 56
End: 2021-11-04

## 2021-12-02 ENCOUNTER — APPOINTMENT (OUTPATIENT)
Dept: LAB | Facility: CLINIC | Age: 56
End: 2021-12-02
Payer: COMMERCIAL

## 2021-12-29 ENCOUNTER — TELEPHONE (OUTPATIENT)
Dept: GASTROENTEROLOGY | Facility: CLINIC | Age: 56
End: 2021-12-29

## 2021-12-29 NOTE — TELEPHONE ENCOUNTER
Can you please a new script for dexlansoprazole (Dexilant) 60 MG capsule  To her Pharmacy      Eben Lee

## 2022-01-14 RX ORDER — FLUCONAZOLE 150 MG/1
TABLET ORAL
COMMUNITY
Start: 2021-10-18 | End: 2022-07-11 | Stop reason: ALTCHOICE

## 2022-01-19 ENCOUNTER — OFFICE VISIT (OUTPATIENT)
Dept: GASTROENTEROLOGY | Facility: CLINIC | Age: 57
End: 2022-01-19
Payer: COMMERCIAL

## 2022-01-19 VITALS
WEIGHT: 156.6 LBS | TEMPERATURE: 97 F | BODY MASS INDEX: 26.09 KG/M2 | DIASTOLIC BLOOD PRESSURE: 67 MMHG | SYSTOLIC BLOOD PRESSURE: 102 MMHG | HEIGHT: 65 IN

## 2022-01-19 DIAGNOSIS — K59.00 CONSTIPATION, UNSPECIFIED CONSTIPATION TYPE: ICD-10-CM

## 2022-01-19 DIAGNOSIS — K21.9 GASTROESOPHAGEAL REFLUX DISEASE WITHOUT ESOPHAGITIS: Primary | ICD-10-CM

## 2022-01-19 DIAGNOSIS — R10.11 RUQ PAIN: ICD-10-CM

## 2022-01-19 PROCEDURE — 99214 OFFICE O/P EST MOD 30 MIN: CPT | Performed by: INTERNAL MEDICINE

## 2022-01-19 RX ORDER — DICYCLOMINE HCL 20 MG
20 TABLET ORAL EVERY 6 HOURS PRN
Qty: 60 TABLET | Refills: 1 | Status: SHIPPED | OUTPATIENT
Start: 2022-01-19

## 2022-01-19 NOTE — PROGRESS NOTES
[facility] - Outpatient Follow-up Note  Lanre Crain 49 y o  female MRN: [id number]  Encounter: [id number]          ASSESSMENT AND PLAN:  82-year-old female with history of chronic migraine, hypothyroidism who presents for follow-up evaluation  1  Gastroesophageal reflux disease without esophagitis  She has history of chronic GERD with symptoms currently controlled on Dexilant  2019 endoscopy showed small hiatal hernia, 5 polyps with unremarkable biopsies  She will continue her PPI and dietary/lifestyle anti-reflux measures    2  RUQ pain  She has intermittent right upper quadrant abdominal pain  She is status post cholecystectomy  This is likely functional in etiology and can be triggered by certain food tries to avoid she will continue to use Bentyl as needed for relief of her symptoms  - dicyclomine (BENTYL) 20 mg tablet; Take 1 tablet (20 mg total) by mouth every 6 (six) hours as needed (abdominal pain)  Dispense: 60 tablet; Refill: 1    3  Constipation, unspecified constipation type  She has intermittent constipation  2018 colonoscopy was normal   We discussed constipation management today including high-fiber diet, fiber supplementation  I recommend that she add a fiber supplement with goal of 25 g per day  Follow-up in 6 months    ______________________________________________________________________    SUBJECTIVE:  82-year-old female with history of chronic migraine, hypothyroidism who presents for follow-up evaluation  She was last seen in the GI office December 2020 for history of constipation, GERD and right upper quadrant abdominal pain  Interval history:  He she continues to take Dexilant daily at night and has good control of her reflux symptoms  She tries to adhere to dietary/lifestyle anti-reflux measures  He continues to have intermittent constipation with [facility] type 1 stool    She was using MiraLax but she felt the stool was becoming too soft and mushy with it  She has intermittent abdominal cramping for which she uses Bentyl with good relief of her symptoms  2019  EGD showed a small hiatal hernia, small fundic gland polyps and reflux of bile allergy showed gastritis negative for H pylori and foveolar hyperplasia, normal duodenal biopsies  2018 colonoscopy was normal she was recommended to repeat in 10 years  REVIEW OF SYSTEMS IS OTHERWISE NEGATIVE  Ten point review of systems negative other than stated as per HPI    Historical Information   Past Medical History:   Diagnosis Date    Abnormal TSH 10/18/2018    Ataxia     Blood in urine     Disease of thyroid gland 10/31/2018    GERD (gastroesophageal reflux disease)     Irritable bowel syndrome     Migraine     Mild cervical dysplasia     Pituitary adenoma ([facility] [address] )     Pregnancy     1     Thyroiditis 10/31/2018    Uterine leiomyoma      Past Surgical History:   Procedure Laterality Date    ABDOMINAL SURGERY      CHOLECYSTECTOMY      COLONOSCOPY      COLPOSCOPY      onset: 2/4/08    ESOPHAGOGASTRODUODENOSCOPY      LAPAROSCOPY      NC COLONOSCOPY FLX DX W/COLLJ SPEC WHEN PFRMD N/A 2/16/2018    Procedure: COLONOSCOPY;  Surgeon: Lashanda Lott DO;  [facility];  Service: Gastroenterology    NC ESOPHAGOGASTRODUODENOSCOPY TRANSORAL DIAGNOSTIC N/A 3/19/2019    Procedure: ESOPHAGOGASTRODUODENOSCOPY (EGD);   Surgeon: Rose Bloom MD;  [facility];  Service: Gastroenterology    TUBAL LIGATION       Social History   Social History     Substance and Sexual Activity   Alcohol Use No     Social History     Substance and Sexual Activity   Drug Use No     Social History     Tobacco Use   Smoking Status Never Smoker   Smokeless Tobacco Never Used     Family History   Problem Relation Age of Onset    Hyperlipidemia Mother     Hypertension Mother     Hashimoto's thyroiditis Mother     Breast cancer Paternal Grandmother 43    Ovarian cancer Paternal Grandmother  Heart disease Family     Diabetes Family     Thyroid disease Family     Hypertension Brother     Hashimoto's thyroiditis Brother     Breast cancer Paternal Aunt 43    Ovarian cancer Paternal Aunt     Lung cancer Father     Cancer Father         unknown primary; metastasis     No Known Problems Sister     No Known Problems Daughter     Diabetes Maternal Grandmother     No Known Problems Maternal Grandfather     Bone cancer Paternal Grandfather     No Known Problems Maternal Aunt     Prostate cancer Paternal Uncle        Meds/Allergies       Current Outpatient Medications:     Alpha-Lipoic Acid 100 MG TABS    Ascorbic Acid (VITAMIN C) 1000 MG tablet    Cholecalciferol (Vitamin D3) 1 25 MG (06539 UT) CAPS    dexlansoprazole (Dexilant) 60 MG capsule    dicyclomine (BENTYL) 20 mg tablet    diphenhydrAMINE (BENADRYL) 25 mg capsule    Dong Quai, Marianne sinensis, (DONG QUAI PO)    Emgality 120 MG/ML SOAJ    ergocalciferol (VITAMIN D2) 50,000 units    fluconazole (DIFLUCAN) 150 mg tablet    ibuprofen (MOTRIN) 200 mg tablet    ketorolac (TORADOL) 10 mg tablet    levothyroxine 50 mcg tablet    liothyronine (CYTOMEL) 5 mcg tablet    Magnesium 400 MG CAPS    methylPREDNISolone 4 MG tablet therapy pack    Naproxen Sodium (ALEVE) 220 MG CAPS    NON FORMULARY    Omega-3 Fatty Acids (FISH OIL) 1,000 mg    ondansetron (ZOFRAN-ODT) 8 mg disintegrating tablet    patient supplied medication    Rimegepant Sulfate (Nurtec) 75 MG TBDP    selenium 50 MCG TABS    Allergies   Allergen Reactions    Sulfamethoxazole-Trimethoprim Other (See Comments)     lightheaded           Objective     Blood pressure 102/67, temperature (!) 97 °F (36 1 °C), temperature source Tympanic, height 5' 5" (1 651 m), weight 71 kg (156 lb 9 6 oz), last menstrual period 07/31/2018  Body mass index is 26 06 kg/m²        PHYSICAL EXAM:      General Appearance:   Alert, cooperative, no distress   HEENT:   Normocephalic, atraumatic, anicteric  Neck:  Supple, symmetrical, trachea midline   Lungs:   Clear to auscultation bilaterally; no rales, rhonchi or wheezing; respirations unlabored    Heart[de-identified]   Regular rate and rhythm; no murmur, rub, or gallop  Abdomen:   Soft, non-tender, non-distended; normal bowel sounds; no masses, no organomegaly    Genitalia:   Deferred    Rectal:   Deferred    Extremities:  No cyanosis, clubbing or edema    Pulses:  2+ and symmetric    Skin:  No jaundice, rashes, or lesions    Lymph nodes:  No palpable cervical lymphadenopathy        Lab Results:   No visits with results within 1 Day(s) from this visit  Latest known visit with results is:   Transcribe Orders on 12/02/2021   Component Date Value    TSH 3RD GENERATON 12/02/2021 2 010     T3, Free 12/02/2021 2 65     Free T4 12/02/2021 0 96     T3, Reverse 12/02/2021 15 5          Radiology Results:   No results found

## 2022-01-19 NOTE — PATIENT INSTRUCTIONS
High Fiber Diet   AMBULATORY CARE:   A high-fiber diet  includes foods that have a high amount of fiber  Fiber is the part of fruits, vegetables, and grains that is not broken down by your body  Fiber keeps your bowel movements regular  Fiber can also help lower your cholesterol level, control blood sugar in people with diabetes, and relieve constipation  Fiber can also help you control your weight because it helps you feel full faster  Most adults should eat 25 to 35 grams of fiber each day  Talk to your dietitian or healthcare provider about the amount of fiber you need  Good sources of fiber:       · Foods with at least 4 grams of fiber per serving:      ? ? to ½ cup of high-fiber cereal (check the nutrition label on the box)    ? ½ cup of blackberries or raspberries    ? 4 dried prunes    ? 1 cooked artichoke    ? ½ cup of cooked legumes, such as lentils, or red, kidney, and parker beans    · Foods with 1 to 3 grams of fiber per serving:      ? 1 slice of whole-wheat, pumpernickel, or rye bread    ? ½ cup of cooked brown rice    ? 4 whole-wheat crackers    ? 1 cup of oatmeal    ? ½ cup of cereal with 1 to 3 grams of fiber per serving (check the nutrition label on the box)    ? 1 small piece of fruit, such as an apple, banana, pear, kiwi, or orange    ? 3 dates    ? ½ cup of canned apricots, fruit cocktail, peaches, or pears    ? ½ cup of raw or cooked vegetables, such as carrots, cauliflower, cabbage, spinach, squash, or corn    Ways that you can increase fiber in your diet:   · Choose brown or wild rice instead of white rice  · Use whole wheat flour in recipes instead of white or all-purpose flour  · Add beans and peas to casseroles or soups  · Choose fresh fruit and vegetables with peels or skins on instead of juices  Other diet guidelines to follow:   · Add fiber to your diet slowly  You may have abdominal discomfort, bloating, and gas if you add fiber to your diet too quickly       · Drink plenty of liquids as you add fiber to your diet  You may have nausea or develop constipation if you do not drink enough water  Ask how much liquid to drink each day and which liquids are best for you  © Copyright [employer] 2021 Information is for End User's use only and may not be sold, redistributed or otherwise used for commercial purposes  All illustrations and images included in CareNotes® are the copyrighted property of A D A M , Inc  or [facility]   The above information is an  only  It is not intended as medical advice for individual conditions or treatments  Talk to your doctor, nurse or pharmacist before following any medical regimen to see if it is safe and effective for you  6 month follow up appointment  in 7/01/2022 @ 2:30 pm with Josephine Romberg  Patient  expressed understanding

## 2022-03-03 ENCOUNTER — APPOINTMENT (OUTPATIENT)
Dept: LAB | Facility: CLINIC | Age: 57
End: 2022-03-03
Payer: COMMERCIAL

## 2022-03-10 ENCOUNTER — OFFICE VISIT (OUTPATIENT)
Dept: FAMILY MEDICINE CLINIC | Facility: CLINIC | Age: 57
End: 2022-03-10
Payer: COMMERCIAL

## 2022-03-10 VITALS
DIASTOLIC BLOOD PRESSURE: 78 MMHG | HEIGHT: 65 IN | TEMPERATURE: 97.9 F | HEART RATE: 76 BPM | WEIGHT: 155 LBS | BODY MASS INDEX: 25.83 KG/M2 | RESPIRATION RATE: 18 BRPM | SYSTOLIC BLOOD PRESSURE: 126 MMHG

## 2022-03-10 DIAGNOSIS — Z78.0 POSTMENOPAUSAL: ICD-10-CM

## 2022-03-10 DIAGNOSIS — G43.709 CHRONIC MIGRAINE WITHOUT AURA WITHOUT STATUS MIGRAINOSUS, NOT INTRACTABLE: Primary | ICD-10-CM

## 2022-03-10 DIAGNOSIS — E03.8 OTHER SPECIFIED HYPOTHYROIDISM: ICD-10-CM

## 2022-03-10 DIAGNOSIS — Z12.31 SCREENING MAMMOGRAM FOR BREAST CANCER: ICD-10-CM

## 2022-03-10 DIAGNOSIS — K21.9 GASTROESOPHAGEAL REFLUX DISEASE WITHOUT ESOPHAGITIS: ICD-10-CM

## 2022-03-10 PROBLEM — N95.2 VAGINAL ATROPHY: Status: RESOLVED | Noted: 2020-09-17 | Resolved: 2022-03-10

## 2022-03-10 PROCEDURE — 99214 OFFICE O/P EST MOD 30 MIN: CPT | Performed by: INTERNAL MEDICINE

## 2022-03-10 NOTE — PROGRESS NOTES
Assessment/Plan:         Diagnoses and all orders for this visit:    Chronic migraine without aura without status migrainosus, not intractable  Pt has Neurology followup  Encouraged adequate hydration, magnesium supplement and discussed riboflavin and co enzyme q10 additionally  Gastroesophageal reflux disease without esophagitis  Recent Gi note reviewed Pt doing ok at this time and is watching diet moreso  Other specified hypothyroidism  Recent thyroid levels wnl   Continue current rx       Rto summer/annual     Patient ID: Mira Renee is a 49 y o  female  HPI  Pt doing ok other than headaches increased recently Since covid has tired more easily and headaches but work also some stress contributing She is sleeping ok and has neurology followup as she is on emgality No chest pain or sob Back sxs ok at this time -not limiting   Pt has followup with homeopathic specialist she has been seeing upcoming     Review of Systems   Constitutional: Positive for fatigue  Negative for chills and fever  HENT: Negative  Eyes: Negative for visual disturbance  Respiratory: Negative for cough and shortness of breath  Cardiovascular: Negative for chest pain, palpitations and leg swelling  Gastrointestinal: Negative  Genitourinary: Negative  Neurological: Positive for weakness and headaches  Psychiatric/Behavioral: Negative for sleep disturbance  The patient is not nervous/anxious          Past Medical History:   Diagnosis Date    Abnormal TSH 10/18/2018    Ataxia     Blood in urine     Disease of thyroid gland 10/31/2018    GERD (gastroesophageal reflux disease)     Irritable bowel syndrome     Migraine     Mild cervical dysplasia     Pituitary adenoma ([address] )     Pregnancy     1     Thyroiditis 10/31/2018    Uterine leiomyoma      Past Surgical History:   Procedure Laterality Date    ABDOMINAL SURGERY      CHOLECYSTECTOMY      COLONOSCOPY      COLPOSCOPY      onset: 2/4/08    ESOPHAGOGASTRODUODENOSCOPY      LAPAROSCOPY      HI COLONOSCOPY FLX DX W/COLLJ SPEC WHEN PFRMD N/A 2/16/2018    Procedure: COLONOSCOPY;  Surgeon: Donovan Wooten DO;  [facility];  Service: Gastroenterology    HI ESOPHAGOGASTRODUODENOSCOPY TRANSORAL DIAGNOSTIC N/A 3/19/2019    Procedure: ESOPHAGOGASTRODUODENOSCOPY (EGD); Surgeon: Tavia Burger MD;  [facility];  Service: Gastroenterology    TUBAL LIGATION       Social History     Socioeconomic History    Marital status: /Civil Union     Spouse name: Not on file    Number of children: Not on file    Years of education: Not on file    Highest education level: Not on file   Occupational History    Not on file   Tobacco Use    Smoking status: Never Smoker    Smokeless tobacco: Never Used   Vaping Use    Vaping Use: Never used   Substance and Sexual Activity    Alcohol use: No    Drug use: No    Sexual activity: Yes     Partners: Male     Birth control/protection: Female Sterilization   Other Topics Concern    Not on file   Social History Narrative    Always uses sunscreen    Caffeine use    Dental care, regularly    Lives independently with spouse    Uses seatbelts     Social Determinants of Health     Financial Resource Strain: Not on file   Food Insecurity: Not on file   Transportation Needs: Not on file   Physical Activity: Not on file   Stress: Not on file   Social Connections: Not on file   Intimate Partner Violence: Not on file   Housing Stability: Not on file     Allergies   Allergen Reactions    Sulfamethoxazole-Trimethoprim Other (See Comments)     lightheaded     BMI Counseling: Body mass index is 25 79 kg/m²  The BMI is above normal  Nutrition recommendations include consuming healthier snacks, moderation in carbohydrate intake and increasing intake of lean protein  Exercise recommendations include exercising 3-5 times per week  Rationale for BMI follow-up plan is due to patient being overweight or obese  Depression Screening and Follow-up Plan: Patient was screened for depression during today's encounter  They screened negative with a PHQ-2 score of 0           /78   Pulse 76   Temp 97 9 °F (36 6 °C) (Temporal)   Resp 18   Ht 5' 5" (1 651 m)   Wt 70 3 kg (155 lb)   LMP 07/31/2018   BMI 25 79 kg/m²          Physical Exam  Vitals reviewed  Constitutional:       Appearance: Normal appearance  HENT:      Head: Normocephalic and atraumatic  Right Ear: External ear normal       Left Ear: External ear normal       Nose: Nose normal    Eyes:      General: No scleral icterus  Extraocular Movements: Extraocular movements intact  Conjunctiva/sclera: Conjunctivae normal       Pupils: Pupils are equal, round, and reactive to light  Cardiovascular:      Rate and Rhythm: Normal rate and regular rhythm  Pulses: Normal pulses  Pulmonary:      Effort: Pulmonary effort is normal  No respiratory distress  Breath sounds: Normal breath sounds  No wheezing  Abdominal:      General: Bowel sounds are normal       Palpations: Abdomen is soft  Musculoskeletal:      Cervical back: Normal range of motion and neck supple  Right lower leg: No edema  Left lower leg: No edema  Skin:     General: Skin is dry  Neurological:      General: No focal deficit present  Mental Status: She is alert and oriented to person, place, and time  Mental status is at baseline  Psychiatric:         Mood and Affect: Mood normal          Behavior: Behavior normal          Thought Content:  Thought content normal          Judgment: Judgment normal

## 2022-03-19 ENCOUNTER — APPOINTMENT (EMERGENCY)
Dept: CT IMAGING | Facility: HOSPITAL | Age: 57
End: 2022-03-19
Payer: COMMERCIAL

## 2022-03-19 ENCOUNTER — APPOINTMENT (EMERGENCY)
Dept: RADIOLOGY | Facility: HOSPITAL | Age: 57
End: 2022-03-19
Payer: COMMERCIAL

## 2022-03-19 ENCOUNTER — HOSPITAL ENCOUNTER (EMERGENCY)
Facility: HOSPITAL | Age: 57
Discharge: HOME | End: 2022-03-19
Attending: EMERGENCY MEDICINE
Payer: COMMERCIAL

## 2022-03-19 VITALS
DIASTOLIC BLOOD PRESSURE: 69 MMHG | RESPIRATION RATE: 17 BRPM | OXYGEN SATURATION: 100 % | HEART RATE: 84 BPM | SYSTOLIC BLOOD PRESSURE: 130 MMHG | WEIGHT: 155 LBS | BODY MASS INDEX: 25.79 KG/M2 | TEMPERATURE: 98.4 F

## 2022-03-19 DIAGNOSIS — S00.83XA FACIAL HEMATOMA, INITIAL ENCOUNTER: Primary | ICD-10-CM
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
## 2022-03-19 DIAGNOSIS — S09.90XA CLOSED HEAD INJURY, INITIAL ENCOUNTER: ICD-10-CM

## 2022-03-19 DIAGNOSIS — S80.12XA HEMATOMA OF LEFT LOWER LEG: ICD-10-CM

## 2022-03-19 PROCEDURE — 99284 EMERGENCY DEPT VISIT MOD MDM: CPT

## 2022-03-19 PROCEDURE — 70486 CT MAXILLOFACIAL W/O DYE: CPT

## 2022-03-19 PROCEDURE — 70450 CT HEAD/BRAIN W/O DYE: CPT

## 2022-03-19 PROCEDURE — 99284 EMERGENCY DEPT VISIT MOD MDM: CPT | Performed by: EMERGENCY MEDICINE

## 2022-03-19 PROCEDURE — 73590 X-RAY EXAM OF LOWER LEG: CPT

## 2022-03-19 PROCEDURE — 96372 THER/PROPH/DIAG INJ SC/IM: CPT

## 2022-03-19 RX ORDER — ONDANSETRON 4 MG/1
4 TABLET, ORALLY DISINTEGRATING ORAL ONCE
Status: COMPLETED | OUTPATIENT
Start: 2022-03-19 | End: 2022-03-19

## 2022-03-19 RX ORDER — METOCLOPRAMIDE 10 MG/1
10 TABLET ORAL ONCE
Status: COMPLETED | OUTPATIENT
Start: 2022-03-19 | End: 2022-03-19

## 2022-03-19 RX ORDER — ACETAMINOPHEN 325 MG/1
975 TABLET ORAL ONCE
Status: COMPLETED | OUTPATIENT
Start: 2022-03-19 | End: 2022-03-19

## 2022-03-19 RX ORDER — KETOROLAC TROMETHAMINE 30 MG/ML
15 INJECTION, SOLUTION INTRAMUSCULAR; INTRAVENOUS ONCE
Status: COMPLETED | OUTPATIENT
Start: 2022-03-19 | End: 2022-03-19

## 2022-03-19 RX ORDER — DIPHENHYDRAMINE HCL 25 MG
25 TABLET ORAL ONCE
Status: COMPLETED | OUTPATIENT
Start: 2022-03-19 | End: 2022-03-19

## 2022-03-19 RX ADMIN — METOCLOPRAMIDE 10 MG: 10 TABLET ORAL at 16:51

## 2022-03-19 RX ADMIN — ONDANSETRON 4 MG: 4 TABLET, ORALLY DISINTEGRATING ORAL at 15:30

## 2022-03-19 RX ADMIN — ACETAMINOPHEN 975 MG: 325 TABLET ORAL at 15:29

## 2022-03-19 RX ADMIN — DIPHENHYDRAMINE HCL 25 MG: 25 TABLET, COATED ORAL at 16:51

## 2022-03-19 RX ADMIN — KETOROLAC TROMETHAMINE 15 MG: 30 INJECTION, SOLUTION INTRAMUSCULAR at 16:51

## 2022-03-19 NOTE — DISCHARGE INSTRUCTIONS
Tylenol and/or Ibuprofen/Naproxen as needed for headaches/pains  Ice to areas- on for 20 minutes/off for 20 minutes at a time    Return to ER if you develop any new or worsening symptoms including but not limited to new injuries, confusion, numbness, weakness, intractable pain, vomiting, etc

## 2022-03-21 DIAGNOSIS — G43.009 MIGRAINE WITHOUT AURA AND WITHOUT STATUS MIGRAINOSUS, NOT INTRACTABLE: ICD-10-CM

## 2022-03-21 RX ORDER — KETOROLAC TROMETHAMINE 10 MG/1
TABLET, FILM COATED ORAL
Qty: 10 TABLET | Refills: 0 | Status: SHIPPED | OUTPATIENT
Start: 2022-03-21

## 2022-03-23 ENCOUNTER — TELEPHONE (OUTPATIENT)
Dept: FAMILY MEDICINE CLINIC | Facility: CLINIC | Age: 57
End: 2022-03-23

## 2022-03-23 ENCOUNTER — OFFICE VISIT (OUTPATIENT)
Dept: FAMILY MEDICINE CLINIC | Facility: CLINIC | Age: 57
End: 2022-03-23
Payer: COMMERCIAL

## 2022-03-23 VITALS
RESPIRATION RATE: 18 BRPM | DIASTOLIC BLOOD PRESSURE: 76 MMHG | HEIGHT: 65 IN | SYSTOLIC BLOOD PRESSURE: 132 MMHG | TEMPERATURE: 98.2 F | WEIGHT: 154.2 LBS | BODY MASS INDEX: 25.69 KG/M2 | HEART RATE: 74 BPM

## 2022-03-23 DIAGNOSIS — F07.81 POSTCONCUSSIVE SYNDROME: ICD-10-CM

## 2022-03-23 DIAGNOSIS — S05.11XD: ICD-10-CM
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
## 2022-03-23 PROCEDURE — 99215 OFFICE O/P EST HI 40 MIN: CPT | Performed by: INTERNAL MEDICINE

## 2022-03-23 NOTE — LETTER
March 23, 2022     Patient: Donte Rosales   YOB: 1965   Date of Visit: 3/23/2022       To Whom it May Concern:    Sadia Bro is under my professional care  She was seen in my office on 3/23/2022  She may return to work on Monday, March 28,2022  If you have any questions or concerns, please don't hesitate to call           Sincerely,          Boris Weaver, DO
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
## 2022-04-04 ENCOUNTER — TELEPHONE (OUTPATIENT)
Dept: FAMILY MEDICINE CLINIC | Facility: CLINIC | Age: 57
End: 2022-04-04

## 2022-04-04 ENCOUNTER — HOSPITAL ENCOUNTER (OUTPATIENT)
Dept: NON INVASIVE DIAGNOSTICS | Facility: HOSPITAL | Age: 57
Discharge: HOME | End: 2022-04-04
Payer: COMMERCIAL

## 2022-04-04 ENCOUNTER — APPOINTMENT (OUTPATIENT)
Dept: LAB | Facility: HOSPITAL | Age: 57
End: 2022-04-04
Payer: COMMERCIAL

## 2022-04-04 DIAGNOSIS — T14.8XXA HEMATOMA: ICD-10-CM

## 2022-04-04 DIAGNOSIS — M79.605 LEFT LEG PAIN: Primary | ICD-10-CM

## 2022-04-04 DIAGNOSIS — T14.8XXA HEMATOMA: Primary | ICD-10-CM

## 2022-04-04 DIAGNOSIS — M79.605 LEFT LEG PAIN: ICD-10-CM

## 2022-04-04 LAB
BASOPHILS # BLD AUTO: 0.02 THOUSANDS/ΜL (ref 0–0.1)
BASOPHILS NFR BLD AUTO: 0 % (ref 0–1)
EOSINOPHIL # BLD AUTO: 0.09 THOUSAND/ΜL (ref 0–0.61)
EOSINOPHIL NFR BLD AUTO: 1 % (ref 0–6)
ERYTHROCYTE [DISTWIDTH] IN BLOOD BY AUTOMATED COUNT: 12.1 % (ref 11.6–15.1)
HCT VFR BLD AUTO: 39.6 % (ref 34.8–46.1)
HGB BLD-MCNC: 13 G/DL (ref 11.5–15.4)
IMM GRANULOCYTES # BLD AUTO: 0.02 THOUSAND/UL (ref 0–0.2)
IMM GRANULOCYTES NFR BLD AUTO: 0 % (ref 0–2)
LYMPHOCYTES # BLD AUTO: 0.96 THOUSANDS/ΜL (ref 0.6–4.47)
LYMPHOCYTES NFR BLD AUTO: 15 % (ref 14–44)
MCH RBC QN AUTO: 30.7 PG (ref 26.8–34.3)
MCHC RBC AUTO-ENTMCNC: 32.8 G/DL (ref 31.4–37.4)
MCV RBC AUTO: 94 FL (ref 82–98)
MONOCYTES # BLD AUTO: 0.49 THOUSAND/ΜL (ref 0.17–1.22)
MONOCYTES NFR BLD AUTO: 8 % (ref 4–12)
NEUTROPHILS # BLD AUTO: 4.84 THOUSANDS/ΜL (ref 1.85–7.62)
NEUTS SEG NFR BLD AUTO: 76 % (ref 43–75)
NRBC BLD AUTO-RTO: 0 /100 WBCS
PLATELET # BLD AUTO: 222 THOUSANDS/UL (ref 149–390)
PMV BLD AUTO: 9.8 FL (ref 8.9–12.7)
RBC # BLD AUTO: 4.23 MILLION/UL (ref 3.81–5.12)
WBC # BLD AUTO: 6.42 THOUSAND/UL (ref 4.31–10.16)

## 2022-04-04 PROCEDURE — 93971 EXTREMITY STUDY: CPT | Performed by: SURGERY

## 2022-04-04 PROCEDURE — 36415 COLL VENOUS BLD VENIPUNCTURE: CPT

## 2022-04-04 PROCEDURE — 85025 COMPLETE CBC W/AUTO DIFF WBC: CPT

## 2022-04-04 PROCEDURE — 93971 EXTREMITY STUDY: CPT
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
## 2022-04-04 NOTE — TELEPHONE ENCOUNTER
[employer] from [facility] vascular lab said pt's VAS of her left leg came back negative for thrombus

## 2022-04-04 NOTE — TELEPHONE ENCOUNTER
Pt not having back pain  Her pain is the back of left thigh  Starts at buttocks and goes all the way down behind left knee  She is in constant pain and is concerned she may have a blood clot  Wants to know if you consider ordering a D-dimer?
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
## 2022-04-04 NOTE — TELEPHONE ENCOUNTER
Pt made aware that [employer] was unable to reach [facility] to schedule STAT US and they will call her with appt time once they reach them    Central scheduling also made aware that this is through auto which they will send msg to they auth team

## 2022-05-03 ENCOUNTER — TELEPHONE (OUTPATIENT)
Dept: ADMINISTRATIVE | Facility: OTHER | Age: 57
End: 2022-05-03

## 2022-05-03 NOTE — TELEPHONE ENCOUNTER
Upon review of the In Basket request we Please submit an IT ticket to review this Lumen's issue further  Your request will now be closed  Contact the [email or web address] with any further questions  Thank you "    Any additional questions or concerns should be emailed to the Practice Liaisons via [email or web address] email, please do not reply via In Basket      Thank you  Sandra Greene

## 2022-05-03 NOTE — TELEPHONE ENCOUNTER
----- Message from Louis Bates sent at 5/3/2022 10:44 AM EDT -----  Pt had a colonoscopy by Dr Shakira West on 2/16/2018, Epic documentation in chart, pls apply to our panel for completion

## 2022-05-11 ENCOUNTER — TELEMEDICINE (OUTPATIENT)
Dept: NEUROLOGY | Facility: CLINIC | Age: 57
End: 2022-05-11
Payer: COMMERCIAL

## 2022-05-11 DIAGNOSIS — S05.11XD: ICD-10-CM

## 2022-05-11 DIAGNOSIS — G43.709 CHRONIC MIGRAINE WITHOUT AURA WITHOUT STATUS MIGRAINOSUS, NOT INTRACTABLE: ICD-10-CM

## 2022-05-11 DIAGNOSIS — N95.9 MENOPAUSAL DISORDER: ICD-10-CM

## 2022-05-11 DIAGNOSIS — G43.009 MIGRAINE WITHOUT AURA AND WITHOUT STATUS MIGRAINOSUS, NOT INTRACTABLE: Primary | ICD-10-CM

## 2022-05-11 DIAGNOSIS — G44.319 ACUTE POST-TRAUMATIC HEADACHE, NOT INTRACTABLE: ICD-10-CM

## 2022-05-11 PROBLEM — G44.309 POST-TRAUMATIC HEADACHE: Status: ACTIVE | Noted: 2022-05-11

## 2022-05-11 PROCEDURE — 99215 OFFICE O/P EST HI 40 MIN: CPT | Performed by: PHYSICIAN ASSISTANT

## 2022-05-11 RX ORDER — OLANZAPINE 2.5 MG/1
2.5 TABLET ORAL
Qty: 5 TABLET | Refills: 0 | Status: SHIPPED | OUTPATIENT
Start: 2022-05-11 | End: 2022-07-11 | Stop reason: ALTCHOICE

## 2022-05-11 RX ORDER — DEXAMETHASONE 2 MG/1
2 TABLET ORAL
Qty: 5 TABLET | Refills: 0 | Status: SHIPPED | OUTPATIENT
Start: 2022-05-11 | End: 2022-07-11 | Stop reason: ALTCHOICE

## 2022-05-11 RX ORDER — ONDANSETRON 8 MG/1
8 TABLET, ORALLY DISINTEGRATING ORAL EVERY 8 HOURS PRN
Qty: 20 TABLET | Refills: 0 | Status: SHIPPED | OUTPATIENT
Start: 2022-05-11

## 2022-05-11 RX ORDER — GALCANEZUMAB 120 MG/ML
120 INJECTION, SOLUTION SUBCUTANEOUS
Qty: 1 ML | Refills: 11 | Status: SHIPPED | OUTPATIENT
Start: 2022-05-11

## 2022-05-11 NOTE — PATIENT INSTRUCTIONS
Preventive:   Continue doing magnesium oxide 400mg in am daily  Continue Emgality 1 pen every 30 days  Abortive: At the onset of mild headache patient's take Aleve  At onset of migraine, patient is to take Nurtec 75 mg  Limit of 1 in 24 hours  May use prochlorperazine 10 mg in addition      Decadron 2 mg for 5 days and olanzapine 2 5 mg at bedtime
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
## 2022-05-11 NOTE — ASSESSMENT & PLAN NOTE
Discussed that this can take some time to improve and this can worsen migraines  Patient verbalized understanding    Will do decadron and olanzapine for 5 days

## 2022-06-13 ENCOUNTER — TELEPHONE (OUTPATIENT)
Dept: FAMILY MEDICINE CLINIC | Facility: CLINIC | Age: 57
End: 2022-06-13

## 2022-06-13 NOTE — TELEPHONE ENCOUNTER
Pt just wants to let you know she tested + for Covid, but will be returning to work tomorrow  She is feeling ok

## 2022-06-21 ENCOUNTER — TELEPHONE (OUTPATIENT)
Dept: GASTROENTEROLOGY | Facility: CLINIC | Age: 57
End: 2022-06-21

## 2022-06-21 DIAGNOSIS — K21.9 GASTROESOPHAGEAL REFLUX DISEASE WITHOUT ESOPHAGITIS: Primary | ICD-10-CM

## 2022-06-21 RX ORDER — OMEPRAZOLE 40 MG/1
40 CAPSULE, DELAYED RELEASE ORAL DAILY
Qty: 90 CAPSULE | Refills: 3 | Status: SHIPPED | OUTPATIENT
Start: 2022-06-21

## 2022-06-21 NOTE — TELEPHONE ENCOUNTER
Patients GI provider:  Dr Ivy Luther    Number to return call: [phone]    Reason for call: Pharmacy calling stating dexlansoprazole is too expensive for patient  Please send an alternative  Scheduled procedure/appointment date if applicable: Appt   7/1/22

## 2022-06-23 NOTE — PROGRESS NOTES
Assessment/Plan:    Will check TVU and notify pt of results  Recommended monthly SBE, annual CBE and annual screening mammo  ASCCP guidelines reviewed and pap with cotesting noted to be up to date; this low risk patient was advised she meets criteria to d/c pap screening at age 74  No pap done today  Colonoscopy noted to be up to date  The patient denies STI risk factors and declines testing at this time  Reviewed diet/activity recommendations Calcium 3031-6616 mg and Vit D 600-1000 IU daily  Discussed postmenopausal considerations and symptoms to report  Kegel exercises as instructed  RTO in one year for routine annual gyn exam or sooner PRN  Diagnoses and all orders for this visit:    Encounter for gynecological examination (general) (routine) without abnormal findings    Pelvic pain  -     US pelvis complete w transvaginal; Future    Dyspareunia in female  -     US pelvis complete w transvaginal; Future        Subjective:      Patient ID: Janae Ashby is a 49 y o  female  This new patient presents for routine annual gyn exam    No menses for 4 years  Patient has dyspareunia  Has improved with vaginal hormonal cream, but continues with pain with deep penetration when initiating sex, but it does resolve on its own after a few minutes during sex  She follows with Dr Zaina Young for hormonal health  Of note, on work up with Dr Zaina Young, patient is positive for Factor V  Patient also has hx of migraine without aura which have improved with Emgality  Family hx of PGM ovarian cancer and breast cancer, age 40/ Paternal aunt ovarian and breast cancer, age 43  Father  of cancer within 12 weeks of diagnosis and patient states primary source was not established  She needs to repeat genetic testing  She denies  bleeding or spotting, pelvic pain, breast concerns, abnormal discharge, bowel/bladder dysfunction, depression/anx  , sexually active and is monogamous  Denies STI concerns    No hx of STIs  Last pap/HPV 6/7/21  Mammography up to date and normal, 7/6/21  Colonoscopy done in 2018 per patient  DXA 6/28/22  The following portions of the patient's history were reviewed and updated as appropriate: allergies, current medications, past family history, past medical history, past social history, past surgical history and problem list     Review of Systems   Constitutional: Negative  Respiratory: Negative  Cardiovascular: Negative  Gastrointestinal: Negative  Endocrine: Negative  Genitourinary: Negative for dyspareunia, dysuria, frequency, pelvic pain, urgency, vaginal bleeding, vaginal discharge and vaginal pain  Musculoskeletal: Negative  Skin: Negative  Neurological: Negative  Psychiatric/Behavioral: Negative  Objective:      /62   Pulse 85   Ht 5' 4 75" (1 645 m)   Wt 73 1 kg (161 lb 3 2 oz)   LMP 07/31/2018   BMI 27 03 kg/m²          Physical Exam  Vitals and nursing note reviewed  Exam conducted with a chaperone present  Constitutional:       Appearance: Normal appearance  She is well-developed  HENT:      Head: Normocephalic and atraumatic  Neck:      Thyroid: No thyroid mass or thyromegaly  Cardiovascular:      Rate and Rhythm: Normal rate and regular rhythm  Heart sounds: Normal heart sounds  Pulmonary:      Effort: Pulmonary effort is normal       Breath sounds: Normal breath sounds  Chest:   Breasts: Breasts are symmetrical       Right: No inverted nipple, mass, nipple discharge, skin change or tenderness  Left: No inverted nipple, mass, nipple discharge, skin change or tenderness  Abdominal:      General: Bowel sounds are normal       Palpations: Abdomen is soft  Tenderness: There is no abdominal tenderness  Hernia: There is no hernia in the left inguinal area or right inguinal area  Genitourinary:     General: Normal vulva  Exam position: Supine  Pubic Area: No rash         Labia: Right: No rash, tenderness, lesion or injury  Left: No rash, tenderness, lesion or injury  Urethra: No prolapse, urethral pain, urethral swelling or urethral lesion  Vagina: Normal  No signs of injury and foreign body  No vaginal discharge, erythema, tenderness, bleeding, lesions or prolapsed vaginal walls  Cervix: No cervical motion tenderness, discharge, friability, lesion, erythema, cervical bleeding or eversion  Uterus: Not deviated, not enlarged, not fixed, not tender and no uterine prolapse  Adnexa:         Right: No mass, tenderness or fullness  Left: No mass, tenderness or fullness  Rectum: No external hemorrhoid  Comments: Urethra normal without lesions  No bladder tenderness  Musculoskeletal:         General: Normal range of motion  Cervical back: Normal range of motion and neck supple  Lymphadenopathy:      Lower Body: No right inguinal adenopathy  No left inguinal adenopathy  Skin:     General: Skin is warm and dry  Neurological:      Mental Status: She is alert and oriented to person, place, and time  Psychiatric:         Speech: Speech normal          Behavior: Behavior normal  Behavior is cooperative

## 2022-06-28 ENCOUNTER — HOSPITAL ENCOUNTER (OUTPATIENT)
Dept: BONE DENSITY | Facility: HOSPITAL | Age: 57
Discharge: HOME | End: 2022-06-28
Attending: INTERNAL MEDICINE
Payer: COMMERCIAL

## 2022-06-28 DIAGNOSIS — Z78.0 POSTMENOPAUSAL: ICD-10-CM

## 2022-06-28 PROCEDURE — 77080 DXA BONE DENSITY AXIAL: CPT

## 2022-06-29 ENCOUNTER — ANNUAL EXAM (OUTPATIENT)
Dept: GYNECOLOGY | Facility: CLINIC | Age: 57
End: 2022-06-29
Payer: COMMERCIAL

## 2022-06-29 VITALS
HEART RATE: 85 BPM | DIASTOLIC BLOOD PRESSURE: 62 MMHG | BODY MASS INDEX: 26.86 KG/M2 | HEIGHT: 65 IN | SYSTOLIC BLOOD PRESSURE: 102 MMHG | WEIGHT: 161.2 LBS

## 2022-06-29 DIAGNOSIS — Z01.419 ENCOUNTER FOR GYNECOLOGICAL EXAMINATION (GENERAL) (ROUTINE) WITHOUT ABNORMAL FINDINGS: Primary | ICD-10-CM

## 2022-06-29 DIAGNOSIS — N94.10 DYSPAREUNIA IN FEMALE: ICD-10-CM

## 2022-06-29 DIAGNOSIS — R10.2 PELVIC PAIN: ICD-10-CM

## 2022-06-29 PROCEDURE — S0612 ANNUAL GYNECOLOGICAL EXAMINA: HCPCS | Performed by: OBSTETRICS & GYNECOLOGY

## 2022-07-01 ENCOUNTER — OFFICE VISIT (OUTPATIENT)
Dept: GASTROENTEROLOGY | Facility: CLINIC | Age: 57
End: 2022-07-01
Payer: COMMERCIAL

## 2022-07-01 VITALS
OXYGEN SATURATION: 97 % | WEIGHT: 159.8 LBS | DIASTOLIC BLOOD PRESSURE: 68 MMHG | HEART RATE: 80 BPM | BODY MASS INDEX: 26.62 KG/M2 | SYSTOLIC BLOOD PRESSURE: 101 MMHG | RESPIRATION RATE: 16 BRPM | TEMPERATURE: 98.7 F | HEIGHT: 65 IN

## 2022-07-01 DIAGNOSIS — R10.11 RUQ PAIN: Primary | ICD-10-CM

## 2022-07-01 DIAGNOSIS — K59.00 CONSTIPATION, UNSPECIFIED CONSTIPATION TYPE: ICD-10-CM

## 2022-07-01 DIAGNOSIS — K21.9 GASTROESOPHAGEAL REFLUX DISEASE, UNSPECIFIED WHETHER ESOPHAGITIS PRESENT: ICD-10-CM

## 2022-07-01 PROCEDURE — 99214 OFFICE O/P EST MOD 30 MIN: CPT | Performed by: NURSE PRACTITIONER

## 2022-07-01 NOTE — PATIENT INSTRUCTIONS
Complete U/S  Follow up 3 months      Fiber gummies   Try to eat a high fiber diet - up to 25g daily

## 2022-07-01 NOTE — PROGRESS NOTES
[facility] - Outpatient Follow-up Note  Lanre Crain 49 y o  female MRN: [id number]  Encounter: [id number]          ASSESSMENT AND PLAN:      1  RUQ pain    Patient reports that she continues with intermittent right upper quadrant pain that is sometimes very bothersome  She states the pain will come on and last a couple days  Patient is status post cholecystectomy  Pain may be functional in nature and related to certain foods however will order ultrasound to further evaluate other sources of pain  - US abdomen complete; Future    2  Gastroesophageal reflux disease, unspecified whether esophagitis present    Patient has a history of GERD and has been taking Dexilant in the evening for many years however states that her insurance will no longer cover this and will be starting omeprazole 40 mg daily shortly  She states that she has tried this and multiple other PPIs in the past with little relief  Her last EGD was in 2019 that revealed a small hiatal hernia and 5 polyps with unremarkable biopsies  Patient reports that she has nausea most evenings but does also report they are often related to hot flashes and migraines  If symptoms worsen or continue after starting omeprazole, could consider repeat EGD  - omeprazole 40 mg daily    3  Constipation, unspecified constipation type    Patient reports that some days she will have a couple bowel movements and then will often not go for a couple days  She states she has tried MiraLax in the past that resulted in loose stools  Discussed a high-fiber diet with the use of supplements such as fiber gummies or Benefiber  Patient verbalizes understanding     - high-fiber diet, 25 g daily with the use of supplements    Will see patient back in 3 months or sooner if needed    ______________________________________________________________________    SUBJECTIVE:  Daniel Peter is a 49year old female that presents today for a follow-up of GERD, RUQ pain and constipation  Patient reports that she has nausea most nights but does states that it is often with hot flashes  She also gets migraines an uses Zofran for nausea at those times  Patient's last EGD was in 2019 that revealed a small hiatal hernia and 5 polyps with unremarkable biopsies  Patient has been taking Dexilant every evening for many years  She states that her insurance will not cover the Dexilant any longer and she is being switched to omeprazole 40 mg daily  She does report trying this in the past with little relief  Patient reports continuing with intermittent right upper quadrant abdominal pain  She states when the pain comes on it will last a couple days  Patient reports that her bowel movements vary  Some days she will go a couple times in the day and then will not go for a couple days  She states that she had taken MiraLax in the past but this caused her to have loose stools  She reports certain food gives her lower abdominal cramping in which she takes Bentyl with good relief  Patient's last colonoscopy was in 2018 and this was normal       REVIEW OF SYSTEMS:  Review of Systems   HENT: Negative for trouble swallowing  Gastrointestinal: Positive for abdominal pain (RUQ), constipation and nausea  Negative for abdominal distention, anal bleeding, blood in stool, diarrhea, rectal pain and vomiting  All other systems reviewed and are negative          Historical Information   Past Medical History:   Diagnosis Date    Abnormal TSH 10/18/2018    Ataxia     Blood in urine     Disease of thyroid gland 10/31/2018    GERD (gastroesophageal reflux disease)     Irritable bowel syndrome     Migraine     Mild cervical dysplasia     Pituitary adenoma ([facility] [address] )     Pregnancy     1     Thyroiditis 10/31/2018    Uterine leiomyoma      Past Surgical History:   Procedure Laterality Date    ABDOMINAL SURGERY      CHOLECYSTECTOMY      COLONOSCOPY      COLPOSCOPY      onset: 2/4/08    ESOPHAGOGASTRODUODENOSCOPY      LAPAROSCOPY      [place] COLONOSCOPY FLX DX W/COLLJ SPEC WHEN PFRMD N/A 2/16/2018    Procedure: COLONOSCOPY;  Surgeon: Ellen Perrin DO;  [facility]: [facility];  Service: Gastroenterology    [place] ESOPHAGOGASTRODUODENOSCOPY TRANSORAL DIAGNOSTIC N/A 3/19/2019    Procedure: ESOPHAGOGASTRODUODENOSCOPY (EGD);   Surgeon: Keegan Gary MD;  [facility]: [facility];  Service: Gastroenterology    TUBAL LIGATION       Social History   Social History     Substance and Sexual Activity   Alcohol Use No     Social History     Substance and Sexual Activity   Drug Use No     Social History     Tobacco Use   Smoking Status Never Smoker   Smokeless Tobacco Never Used     Family History   Problem Relation Age of Onset    Hyperlipidemia Mother     Hypertension Mother     Hashimoto's thyroiditis Mother     Breast cancer Paternal Grandmother 43    Ovarian cancer Paternal Grandmother     Heart disease Family     Diabetes Family     Thyroid disease Family     Hypertension Brother     Hashimoto's thyroiditis Brother     Breast cancer Paternal Aunt 43    Ovarian cancer Paternal Aunt     Lung cancer Father     Cancer Father         unknown primary; metastasis     No Known Problems Sister     No Known Problems Daughter     Diabetes Maternal Grandmother     No Known Problems Maternal Grandfather     Bone cancer Paternal Grandfather     No Known Problems Maternal Aunt     Prostate cancer Paternal Uncle        Meds/Allergies       Current Outpatient Medications:     Alpha-Lipoic Acid 100 MG TABS    Ascorbic Acid (VITAMIN C) 1000 MG tablet    Cholecalciferol (Vitamin D3) 1 25 MG (80670 UT) CAPS    dicyclomine (BENTYL) 20 mg tablet    diphenhydrAMINE (BENADRYL) 25 mg capsule    ergocalciferol (VITAMIN D2) 50,000 units    fluconazole (DIFLUCAN) 150 mg tablet    Galcanezumab-gnlm (Emgality) 120 MG/ML SOAJ    ibuprofen (MOTRIN) 200 mg tablet    ketorolac (TORADOL) 10 mg tablet   levothyroxine 50 mcg tablet    liothyronine (CYTOMEL) 5 mcg tablet    Magnesium 400 MG CAPS    Naproxen Sodium 220 MG CAPS    NON FORMULARY    Nurtec 75 MG TBDP    OLANZapine (ZyPREXA) 2 5 mg tablet    Omega-3 Fatty Acids (FISH OIL) 1,000 mg    omeprazole (PriLOSEC) 40 MG capsule    ondansetron (ZOFRAN-ODT) 8 mg disintegrating tablet    patient supplied medication    selenium 50 MCG TABS    dexamethasone (DECADRON) 2 mg tablet    Allergies   Allergen Reactions    Sulfamethoxazole-Trimethoprim Other (See Comments)     lightheaded           Objective     Blood pressure 101/68, pulse 80, temperature 98 7 °F (37 1 °C), temperature source Tympanic, resp  rate 16, height 5' 5" (1 651 m), weight 72 5 kg (159 lb 12 8 oz), last menstrual period 07/31/2018, SpO2 97 %  Body mass index is 26 59 kg/m²  PHYSICAL EXAM:      General Appearance:   Alert, cooperative, no distress   HEENT:   Normocephalic, atraumatic, anicteric  Neck:  Supple, symmetrical, trachea midline   Lungs:   Clear to auscultation bilaterally; no rales, rhonchi or wheezing; respirations unlabored    Heart[de-identified]   Regular rate and rhythm; no murmur, rub, or gallop  Abdomen:   Soft, RUQ tender, non-distended; normal bowel sounds; no masses, no organomegaly    Genitalia:   Deferred    Rectal:   Deferred    Extremities:  No cyanosis, clubbing or edema    Skin:  No jaundice, rashes, or lesions             Lab Results:   No visits with results within 1 Day(s) from this visit     Latest known visit with results is:   Appointment on 04/04/2022   Component Date Value    WBC 04/04/2022 6 42     RBC 04/04/2022 4 23     Hemoglobin 04/04/2022 13 0     Hematocrit 04/04/2022 39 6     MCV 04/04/2022 94     [address] 04/04/2022 30 7     MCHC 04/04/2022 32 8     RDW 04/04/2022 12 1     MPV 04/04/2022 9 8     Platelets 64/49/8592 222     nRBC 04/04/2022 0     Neutrophils Relative 04/04/2022 76 (A)    Immat GRANS % 04/04/2022 0     Lymphocytes Relative 04/04/2022 15     Monocytes Relative 04/04/2022 8     Eosinophils Relative 04/04/2022 1     Basophils Relative 04/04/2022 0     Neutrophils Absolute 04/04/2022 4 84     Immature Grans Absolute 04/04/2022 0 02     Lymphocytes Absolute 04/04/2022 0 96     Monocytes Absolute 04/04/2022 0 49     Eosinophils Absolute 04/04/2022 0 09     Basophils Absolute 04/04/2022 0 02          Radiology Results:   DXA bone density spine hip and pelvis    Result Date: 6/28/2022  Narrative: DXA SCAN CLINICAL HISTORY:  61-year-old female  Menopause at age 48  OTHER RISK FACTORS:  PPI therapy  PHARMACOLOGIC THERAPY FOR OSTEOPOROSIS:  None  TECHNIQUE: Bone densitometry was performed using a [employer] A  bone densitometer  Regions of interest appear properly placed  COMPARISON: There are no prior DXA studies performed on this unit for comparison  RESULTS: LUMBAR SPINE L1-L4 : BMD  0 851  gm/cm2 T-score -1 8 LEFT  TOTAL HIP: BMD:  0 780  gm/cm2 T-score:  -1 3 LEFT  FEMORAL NECK: BMD:  0 647  gm/cm2 T score: -1 8     Impression: 1  Low bone mass (osteopenia)  2   The 10 year risk of hip fracture is 0 7% with the 10 year risk of major osteoporotic fracture being 7 3% as calculated by the [facility]/WHO fracture risk assessment tool (FRAX)  3   The current NOF guidelines recommend treating patients with a T-score of -2 5 or less in the lumbar spine or hips, or in post-menopausal women and men over the age of 48 with low bone mass (osteopenia) and a FRAX 10 year risk score of >3% for hip fracture and/or >20% for major osteoporotic fracture  4   The NOF recommends follow-up DXA in 1-2 years after initiating therapy for osteoporosis and every 2 years thereafter  More frequent evaluation is appropriate for patients with conditions associated with rapid bone loss, such as glucocorticoid therapy   The interval between DXA screenings may be longer for individuals without major risk factors and initial T-score in the normal or upper low bone mass range  The FRAX algorithm has certain limitations: -FRAX has not been validated in patients currently or previously treated with pharmacotherapy for osteoporosis  In such patients, clinical judgment must be exercised in interpreting FRAX scores  -Prior hip, vertebral and humeral fragility fractures appear to confer greater risk of subsequent fracture than fractures at other sites (this is especially true for individuals with severe vertebral fractures), but quantification of this incremental risk is not possible with FRAX  -FRAX underestimates fracture risk in patients with history of multiple fragility fractures  -FRAX may underestimate fracture risk in patients with history of frequent falls  -It is not appropriate to use FRAX to monitor treatment response   WHO CLASSIFICATION: Normal (a T-score of -1 0 or higher) Low bone mineral density (a T-score of less than -1 0 but higher than -2 5) Osteoporosis (a T-score of -2 5 or less) Severe osteoporosis (a T-score of -2 5 or less with a fragility fracture) Workstation performed: [id number]

## 2022-07-03 ENCOUNTER — APPOINTMENT (OUTPATIENT)
Dept: LAB | Facility: HOSPITAL | Age: 57
End: 2022-07-03

## 2022-07-03 DIAGNOSIS — Z00.8 HEALTH EXAMINATION IN POPULATION SURVEY: ICD-10-CM

## 2022-07-03 LAB
CHOLEST SERPL-MCNC: 198 MG/DL
EST. AVERAGE GLUCOSE BLD GHB EST-MCNC: 111 MG/DL
HBA1C MFR BLD: 5.5 %
HDLC SERPL-MCNC: 55 MG/DL
LDLC SERPL CALC-MCNC: 131 MG/DL (ref 0–100)
NONHDLC SERPL-MCNC: 143 MG/DL
TRIGL SERPL-MCNC: 58 MG/DL

## 2022-07-03 PROCEDURE — 80061 LIPID PANEL: CPT

## 2022-07-03 PROCEDURE — 36415 COLL VENOUS BLD VENIPUNCTURE: CPT

## 2022-07-03 PROCEDURE — 83036 HEMOGLOBIN GLYCOSYLATED A1C: CPT

## 2022-07-11 ENCOUNTER — OFFICE VISIT (OUTPATIENT)
Dept: FAMILY MEDICINE CLINIC | Facility: CLINIC | Age: 57
End: 2022-07-11
Payer: COMMERCIAL

## 2022-07-11 VITALS
RESPIRATION RATE: 18 BRPM | HEIGHT: 65 IN | SYSTOLIC BLOOD PRESSURE: 118 MMHG | TEMPERATURE: 97.7 F | DIASTOLIC BLOOD PRESSURE: 74 MMHG | WEIGHT: 157 LBS | BODY MASS INDEX: 26.16 KG/M2 | HEART RATE: 76 BPM

## 2022-07-11 DIAGNOSIS — Z00.00 ANNUAL PHYSICAL EXAM: Primary | ICD-10-CM

## 2022-07-11 DIAGNOSIS — E78.2 MIXED HYPERLIPIDEMIA: ICD-10-CM

## 2022-07-11 PROCEDURE — 99386 PREV VISIT NEW AGE 40-64: CPT | Performed by: INTERNAL MEDICINE

## 2022-07-11 NOTE — PATIENT INSTRUCTIONS
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
## 2022-08-02 ENCOUNTER — HOSPITAL ENCOUNTER (OUTPATIENT)
Dept: MAMMOGRAPHY | Facility: HOSPITAL | Age: 57
Discharge: HOME | End: 2022-08-02
Attending: INTERNAL MEDICINE
Payer: COMMERCIAL

## 2022-08-02 VITALS — WEIGHT: 156.97 LBS | BODY MASS INDEX: 26.15 KG/M2 | HEIGHT: 65 IN

## 2022-08-02 DIAGNOSIS — Z12.31 ENCOUNTER FOR SCREENING MAMMOGRAM FOR MALIGNANT NEOPLASM OF BREAST: ICD-10-CM

## 2022-08-02 DIAGNOSIS — Z12.31 SCREENING MAMMOGRAM FOR BREAST CANCER: ICD-10-CM

## 2022-08-02 PROCEDURE — 77063 BREAST TOMOSYNTHESIS BI: CPT

## 2022-08-02 PROCEDURE — 77067 SCR MAMMO BI INCL CAD: CPT

## 2022-09-02 ENCOUNTER — APPOINTMENT (OUTPATIENT)
Dept: LAB | Facility: HOSPITAL | Age: 57
End: 2022-09-02
Payer: COMMERCIAL

## 2022-09-02 DIAGNOSIS — E03.9 HYPOTHYROIDISM, UNSPECIFIED TYPE: ICD-10-CM

## 2022-09-02 DIAGNOSIS — N95.1 SYMPTOMATIC MENOPAUSAL OR FEMALE CLIMACTERIC STATES: ICD-10-CM

## 2022-09-02 LAB
25(OH)D3 SERPL-MCNC: 85.1 NG/ML (ref 30–100)
ALBUMIN SERPL BCP-MCNC: 4.2 G/DL (ref 3.5–5)
ALP SERPL-CCNC: 120 U/L (ref 46–116)
ALT SERPL W P-5'-P-CCNC: 22 U/L (ref 12–78)
ANION GAP SERPL CALCULATED.3IONS-SCNC: 5 MMOL/L (ref 4–13)
AST SERPL W P-5'-P-CCNC: 30 U/L (ref 5–45)
BILIRUB SERPL-MCNC: 0.28 MG/DL (ref 0.2–1)
BUN SERPL-MCNC: 15 MG/DL (ref 5–25)
CALCIUM SERPL-MCNC: 9.4 MG/DL (ref 8.3–10.1)
CHLORIDE SERPL-SCNC: 104 MMOL/L (ref 96–108)
CO2 SERPL-SCNC: 31 MMOL/L (ref 21–32)
CORTIS SERPL-MCNC: 19.1 UG/DL
CREAT SERPL-MCNC: 0.98 MG/DL (ref 0.6–1.3)
GFR SERPL CREATININE-BSD FRML MDRD: 65 ML/MIN/1.73SQ M
GLUCOSE P FAST SERPL-MCNC: 110 MG/DL (ref 65–99)
POTASSIUM SERPL-SCNC: 4.1 MMOL/L (ref 3.5–5.3)
PROT SERPL-MCNC: 7.6 G/DL (ref 6.4–8.4)
SODIUM SERPL-SCNC: 140 MMOL/L (ref 135–147)
T3FREE SERPL-MCNC: 2.53 PG/ML (ref 2.3–4.2)
T4 FREE SERPL-MCNC: 1.01 NG/DL (ref 0.76–1.46)
TSH SERPL DL<=0.05 MIU/L-ACNC: 3.13 UIU/ML (ref 0.45–4.5)
VIT B12 SERPL-MCNC: 337 PG/ML (ref 100–900)

## 2022-09-02 PROCEDURE — 82306 VITAMIN D 25 HYDROXY: CPT

## 2022-09-02 PROCEDURE — 84439 ASSAY OF FREE THYROXINE: CPT

## 2022-09-02 PROCEDURE — 84443 ASSAY THYROID STIM HORMONE: CPT

## 2022-09-02 PROCEDURE — 84403 ASSAY OF TOTAL TESTOSTERONE: CPT

## 2022-09-02 PROCEDURE — 82607 VITAMIN B-12: CPT

## 2022-09-02 PROCEDURE — 36415 COLL VENOUS BLD VENIPUNCTURE: CPT

## 2022-09-02 PROCEDURE — 82533 TOTAL CORTISOL: CPT

## 2022-09-02 PROCEDURE — 82679 ASSAY OF ESTRONE: CPT

## 2022-09-02 PROCEDURE — 80053 COMPREHEN METABOLIC PANEL: CPT

## 2022-09-02 PROCEDURE — 84481 FREE ASSAY (FT-3): CPT

## 2022-09-02 PROCEDURE — 82627 DEHYDROEPIANDROSTERONE: CPT

## 2022-09-02 PROCEDURE — 84402 ASSAY OF FREE TESTOSTERONE: CPT

## 2022-09-03 LAB — DHEA-S SERPL-MCNC: 86.6 UG/DL (ref 29.4–220.5)

## 2022-09-04 LAB
TESTOST FREE SERPL-MCNC: 5.6 PG/ML (ref 0–4.2)
TESTOST SERPL-MCNC: 19 NG/DL (ref 4–50)

## 2022-09-08 LAB — ESTRONE SERPL-MCNC: 60 PG/ML

## 2022-09-14 ENCOUNTER — DOCUMENTATION (OUTPATIENT)
Dept: GYNECOLOGY | Facility: CLINIC | Age: 57
End: 2022-09-14

## 2022-09-26 ENCOUNTER — HOSPITAL ENCOUNTER (OUTPATIENT)
Dept: ULTRASOUND IMAGING | Facility: HOSPITAL | Age: 57
Discharge: HOME | End: 2022-09-26
Payer: COMMERCIAL

## 2022-09-26 DIAGNOSIS — R10.2 PELVIC PAIN: ICD-10-CM

## 2022-09-26 DIAGNOSIS — R10.11 RUQ PAIN: ICD-10-CM

## 2022-09-26 DIAGNOSIS — N94.10 DYSPAREUNIA IN FEMALE: ICD-10-CM

## 2022-09-26 PROCEDURE — 76856 US EXAM PELVIC COMPLETE: CPT

## 2022-09-26 PROCEDURE — 76830 TRANSVAGINAL US NON-OB: CPT

## 2022-09-26 PROCEDURE — 76700 US EXAM ABDOM COMPLETE: CPT

## 2022-10-03 ENCOUNTER — OFFICE VISIT (OUTPATIENT)
Dept: GASTROENTEROLOGY | Facility: CLINIC | Age: 57
End: 2022-10-03
Payer: COMMERCIAL

## 2022-10-03 VITALS
RESPIRATION RATE: 16 BRPM | HEART RATE: 69 BPM | WEIGHT: 160.8 LBS | OXYGEN SATURATION: 99 % | BODY MASS INDEX: 26.79 KG/M2 | DIASTOLIC BLOOD PRESSURE: 80 MMHG | TEMPERATURE: 97 F | HEIGHT: 65 IN | SYSTOLIC BLOOD PRESSURE: 116 MMHG

## 2022-10-03 DIAGNOSIS — R10.11 RUQ PAIN: ICD-10-CM

## 2022-10-03 DIAGNOSIS — K76.0 HEPATIC STEATOSIS: Primary | ICD-10-CM

## 2022-10-03 DIAGNOSIS — K21.9 GASTROESOPHAGEAL REFLUX DISEASE WITHOUT ESOPHAGITIS: ICD-10-CM

## 2022-10-03 PROCEDURE — 99214 OFFICE O/P EST MOD 30 MIN: CPT | Performed by: NURSE PRACTITIONER

## 2022-10-03 NOTE — PROGRESS NOTES
[facility] - Outpatient Follow-up Note  Lanre Crain 56 y o  female MRN: [id number]  Encounter: [id number]          ASSESSMENT AND PLAN:      1  Hepatic steatosis    Patient recently underwent ultrasound that revealed a mild fatty liver  Her most recent lab work did reveal an elevated alkaline phosphatase which is noted to be elevated in the past   Given imaging and elevated alkaline phosphatase, discussed further workup to evaluate for etiology related to autoimmune, metabolic or infectious cause  Patient is agreeable  - Alpha-1-antitrypsin; Future  - STEPH w/Reflex; Future  - Antimitochondrial antibody; Future  - Anti-smooth muscle antibody, IgG; Future  - Ceruloplasmin; Future  - Chronic Hepatitis Panel; Future  - Hepatic function panel; Future  - Iron Panel (Includes Ferritin, Iron Sat%, Iron, and TIBC); Future    2  RUQ pain    Patient has a history of right upper quadrant discomfort  She states that it is fairly constant but does worsen with certain foods such as fried or higher fat foods  Recent ultrasound revealed a mild fatty liver but otherwise normal   Suspect that her right upper quadrant pain may be functional in nature  - continue Bentyl as needed    3  Gastroesophageal reflux disease without esophagitis    Patient has a history of GERD  She was recently switched to omeprazole 40 mg daily from [address] [place] [address]  She states that is on a she watches what she eats and does not eat too late at night, her GERD is fairly well controlled  Her last EGD was in 2019 that revealed a small hiatal hernia and 5 polyps with unremarkable biopsies  - continue omeprazole 40 mg daily   - continue GERD diet lifestyle modifications     Will see patient back in 6 months or sooner if needed      ______________________________________________________________________    SUBJECTIVE:  Susana Emerson is a 81-year-old female that presents today for a follow-up of GERD, right upper quadrant pain and constipation  Patient recently underwent an ultrasound for right upper quadrant pain that revealed a mild fatty liver  Most recent metabolic panel revealed an elevated alkaline phosphatase that has been elevated in the past   Patient reports that seems to be worse when she eats higher fat foods  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  Patient also has a history of GERD in which she had previously taken Dexilant however was recently changed to omeprazole 40 mg daily  Her last EGD was in 2019 that revealed a small hiatal hernia and 5 polyps with unremarkable biopsies  Patient reports today that she tries to watch her diet and not eat late at night and states that the omeprazole has been controlling her GERD fairly well  Patient reports that she is typically having a daily bowel movement that is not difficult to pass  Her last colonoscopy was in 2018 and this was normal         REVIEW OF SYSTEMS:  Review of Systems   HENT: Negative for trouble swallowing  Gastrointestinal: Positive for abdominal pain (Right upper quadrant)  Negative for abdominal distention, anal bleeding, blood in stool, constipation, diarrhea, nausea and vomiting  All other systems reviewed and are negative          Historical Information   Past Medical History:   Diagnosis Date    Abnormal TSH 10/18/2018    Ataxia     Blood in urine     Disease of thyroid gland 10/31/2018    GERD (gastroesophageal reflux disease)     Irritable bowel syndrome     Migraine     Mild cervical dysplasia     Pituitary adenoma ([address] )     Pregnancy     1     Thyroiditis 10/31/2018    Uterine leiomyoma      Past Surgical History:   Procedure Laterality Date    ABDOMINAL SURGERY      CHOLECYSTECTOMY      COLONOSCOPY      COLPOSCOPY      onset: 2/4/08    ESOPHAGOGASTRODUODENOSCOPY      LAPAROSCOPY      OR COLONOSCOPY FLX DX W/COLLJ SPEC WHEN PFRMD N/A 2/16/2018    Procedure: COLONOSCOPY;  Surgeon: Nancy Molina DO;  Location: [facility];  Service: Gastroenterology    WI ESOPHAGOGASTRODUODENOSCOPY TRANSORAL DIAGNOSTIC N/A 3/19/2019    Procedure: ESOPHAGOGASTRODUODENOSCOPY (EGD);   Surgeon: Link Lopez MD;  Location: [facility];  Service: Gastroenterology    TUBAL LIGATION       Social History   Social History     Substance and Sexual Activity   Alcohol Use No     Social History     Substance and Sexual Activity   Drug Use No     Social History     Tobacco Use   Smoking Status Never Smoker   Smokeless Tobacco Never Used     Family History   Problem Relation Age of Onset    Hyperlipidemia Mother     Hypertension Mother     Hashimoto's thyroiditis Mother     Breast cancer Paternal Grandmother 43    Ovarian cancer Paternal Grandmother     Heart disease Family     Diabetes Family     Thyroid disease Family     Hypertension Brother     Hashimoto's thyroiditis Brother     Breast cancer Paternal Aunt 43    Ovarian cancer Paternal Aunt     Lung cancer Father     Cancer Father         unknown primary; metastasis     No Known Problems Sister     No Known Problems Daughter     Diabetes Maternal Grandmother     No Known Problems Maternal Grandfather     Bone cancer Paternal Grandfather     No Known Problems Maternal Aunt     Prostate cancer Paternal Uncle        Meds/Allergies       Current Outpatient Medications:     Alpha-Lipoic Acid 100 MG TABS    Ascorbic Acid (VITAMIN C) 1000 MG tablet    Cholecalciferol (Vitamin D3) 1 25 MG (36961 UT) CAPS    dicyclomine (BENTYL) 20 mg tablet    diphenhydrAMINE (BENADRYL) 25 mg capsule    ergocalciferol (VITAMIN D2) 50,000 units    Galcanezumab-gnlm (Emgality) 120 MG/ML SOAJ    ketorolac (TORADOL) 10 mg tablet    levothyroxine 50 mcg tablet    liothyronine (CYTOMEL) 5 mcg tablet    Magnesium 400 MG CAPS    Naproxen Sodium 220 MG CAPS    NON FORMULARY    Nurtec 75 MG TBDP    Omega-3 Fatty Acids (FISH OIL) 1,000 mg   omeprazole (PriLOSEC) 40 MG capsule    ondansetron (ZOFRAN-ODT) 8 mg disintegrating tablet    patient supplied medication    selenium 50 MCG TABS    Allergies   Allergen Reactions    Sulfamethoxazole-Trimethoprim Other (See Comments)     lightheaded           Objective     Blood pressure 116/80, pulse 69, temperature (!) 97 °F (36 1 °C), temperature source Tympanic, resp  rate 16, height 5' 5" (1 651 m), weight 72 9 kg (160 lb 12 8 oz), last menstrual period 07/31/2018, SpO2 99 %  Body mass index is 26 76 kg/m²  PHYSICAL EXAM:      General Appearance:   Alert, cooperative, no distress   HEENT:   Normocephalic, atraumatic, anicteric  Neck:  Supple, symmetrical, trachea midline   Lungs:   Clear to auscultation bilaterally; no rales, rhonchi or wheezing; respirations unlabored    Heart[de-identified]   Regular rate and rhythm; no murmur, rub, or gallop  Abdomen:   Soft, right upper quadrant tender, non-distended; normal bowel sounds; no masses, no organomegaly    Genitalia:   Deferred    Rectal:   Deferred    Extremities:  No cyanosis, clubbing or edema    Skin:  No jaundice, rashes, or lesions             Lab Results:   No visits with results within 1 Day(s) from this visit  Latest known visit with results is:   Appointment on 09/02/2022   Component Date Value    Sodium 09/02/2022 140     Potassium 09/02/2022 4 1     Chloride 09/02/2022 104     CO2 09/02/2022 31     ANION GAP 09/02/2022 5     BUN 09/02/2022 15     Creatinine 09/02/2022 0 98     Glucose, Fasting 09/02/2022 110 (A)    Calcium 09/02/2022 9 4     AST 09/02/2022 30     ALT 09/02/2022 22     Alkaline Phosphatase 09/02/2022 120 (A)    Total Protein 09/02/2022 7 6     Albumin 09/02/2022 4 2     Total Bilirubin 09/02/2022 0 28     eGFR 09/02/2022 65     DHEA-SO4 09/02/2022 86 6     Estrone 09/02/2022 60     TSH 3RD GENERATON 09/02/2022 3  131     T3, Free 09/02/2022 2 53     Free T4 09/02/2022 1 01     Testosterone, Free 09/02/2022 5 6 (A)    TESTOSTERONE TOTAL 09/02/2022 19     Vit D, 25-Hydroxy 09/02/2022 85 1     Vitamin B-12 09/02/2022 337     Cortisol, Random 09/02/2022 19 1          Radiology Results:   US abdomen complete    Result Date: 9/27/2022  Narrative: ABDOMEN ULTRASOUND, COMPLETE INDICATION:   R10 11: Right upper quadrant pain  COMPARISON:  5/5/2017 TECHNIQUE:   Real-time ultrasound of the abdomen was performed with a curvilinear transducer with both volumetric sweeps and still imaging techniques  FINDINGS: PANCREAS:  Visualized portions of the pancreas are within normal limits  AORTA AND IVC:  Visualized portions are normal for patient age  LIVER: Size:  Within normal range  The liver measures 11 4 cm in the midclavicular line  Contour:  Surface contour is smooth  Parenchyma:  Mild increased echogenicity relative to right renal cortex  No liver mass identified  Limited imaging of the main portal vein shows it to be patent and hepatopetal  BILIARY: Gallbladder surgically removed  No intrahepatic biliary dilatation  CBD measures 4 0 mm  No choledocholithiasis  KIDNEY: Right kidney measures 10 5 x 5 1 x 4 9  cm  Volume 139 5 mL Kidney within normal limits  Left kidney measures 10 3 x 5 8 x 4 5 cm  Volume 142 5 mL Kidney within normal limits  SPLEEN: Measures 11 2 x 11 0 x 3 5 cm  Volume 224 4 mL Within normal limits  ASCITES:  None  Impression: Hepatic mild steatosis Cholecystectomy Workstation performed: [id number]     US pelvis complete w transvaginal    Result Date: 9/28/2022  Narrative: PELVIC ULTRASOUND, COMPLETE INDICATION:  The patient is 56years old postmenopausal female  Pelvic pain and dyspareunia  COMPARISON: Pelvic ultrasound 5/14/2020  TECHNIQUE:   Transabdominal pelvic ultrasound was performed in sagittal and transverse planes with a curvilinear transducer  Additional transvaginal imaging was performed to better evaluate the endometrium and ovaries    Imaging included volumetric sweeps as well as traditional still imaging technique  FINDINGS: UTERUS: The uterus is anteverted in position, measuring 6 1 x 3 0 x 3 6 cm  The uterus has a normal contour and echotexture  Nabothian cysts present, cervix otherwise within normal limits  ENDOMETRIUM:  The endometrial echo complex has an AP caliber of 2 0 mm  Its appearance is within normal limits for age and cycle and shows no filling defects    OVARIES/ADNEXA: Right ovary:  2 4 x 1 5 x 1 3 cm  2 4 mL No suspicious right ovarian abnormality  Doppler flow within normal limits  Left ovary:  2 3 x 1 4 x 1 0 cm  1 7 mL No suspicious left ovarian abnormality  Doppler flow within normal limits  No suspicious adnexal mass or loculated collections  There is no free fluid       Impression:  Normal  Workstation performed: [id number]

## 2022-10-30 ENCOUNTER — APPOINTMENT (OUTPATIENT)
Dept: LAB | Facility: HOSPITAL | Age: 57
End: 2022-10-30

## 2022-10-30 DIAGNOSIS — K76.0 HEPATIC STEATOSIS: ICD-10-CM

## 2022-10-30 LAB
ALBUMIN SERPL BCP-MCNC: 4.2 G/DL (ref 3.5–5)
ALP SERPL-CCNC: 102 U/L (ref 46–116)
ALT SERPL W P-5'-P-CCNC: 24 U/L (ref 12–78)
AST SERPL W P-5'-P-CCNC: 24 U/L (ref 5–45)
BILIRUB DIRECT SERPL-MCNC: 0.09 MG/DL (ref 0–0.2)
BILIRUB SERPL-MCNC: 0.52 MG/DL (ref 0.2–1)
FERRITIN SERPL-MCNC: 42 NG/ML (ref 8–388)
IRON SATN MFR SERPL: 48 % (ref 15–50)
IRON SERPL-MCNC: 171 UG/DL (ref 50–170)
PROT SERPL-MCNC: 7.5 G/DL (ref 6.4–8.4)
TIBC SERPL-MCNC: 354 UG/DL (ref 250–450)

## 2022-10-31 LAB
HBV CORE AB SER QL: NORMAL
HBV CORE IGM SER QL: NORMAL
HBV SURFACE AG SER QL: NORMAL
HCV AB SER QL: NORMAL
RYE IGE QN: NEGATIVE

## 2022-11-01 LAB
A1AT SERPL-MCNC: 149 MG/DL (ref 101–187)
ACTIN IGG SERPL-ACNC: 3 UNITS (ref 0–19)
CERULOPLASMIN SERPL-MCNC: 21.1 MG/DL (ref 19–39)
MITOCHONDRIA M2 IGG SER-ACNC: <20 UNITS (ref 0–20)

## 2022-11-15 ENCOUNTER — PATIENT MESSAGE (OUTPATIENT)
Dept: NEUROLOGY | Facility: CLINIC | Age: 57
End: 2022-11-15

## 2022-11-15 DIAGNOSIS — G43.011 INTRACTABLE MIGRAINE WITHOUT AURA AND WITH STATUS MIGRAINOSUS: ICD-10-CM

## 2022-11-16 RX ORDER — DEXAMETHASONE 2 MG/1
2 TABLET ORAL
Qty: 5 TABLET | Refills: 0 | Status: SHIPPED | OUTPATIENT
Start: 2022-11-16

## 2022-11-16 RX ORDER — KETOROLAC TROMETHAMINE 10 MG/1
TABLET, FILM COATED ORAL
Qty: 10 TABLET | Refills: 0 | Status: SHIPPED | OUTPATIENT
Start: 2022-11-16

## 2022-11-16 RX ORDER — PROCHLORPERAZINE MALEATE 10 MG
10 TABLET ORAL EVERY 6 HOURS PRN
Qty: 10 TABLET | Refills: 0 | Status: SHIPPED | OUTPATIENT
Start: 2022-11-16

## 2022-11-16 RX ORDER — DIVALPROEX SODIUM 500 MG/1
500 TABLET, EXTENDED RELEASE ORAL DAILY
Qty: 30 TABLET | Refills: 1 | Status: SHIPPED | OUTPATIENT
Start: 2022-11-16

## 2022-11-16 NOTE — PATIENT COMMUNICATION
Hi, this is Renita Lux  My date of birth is [birth year]  My phone number is [phone]  I did message Sally Jena about my headaches coming back actually every day  And I just viewed her message saying that she's going to put me on decadron and other stuff  I know she's done that in the past for 5 days  The last time I did that, I think that was in July I had the jitterness and like, it just made me like jittery and stuff like that  I will not take that stuff again  So I don't know what she wants to do, but I will not take that or the other stuff that she prescribed  That's why I got off all that  So if somebody to call me back because she's thinking that she's out of the office, that she's not going to be there for a week  I appreciate it  Elvie [phone]  Thanks, bye  Called pt and states that she refused to try decadron and depakote for 5 days bec she took this in the past and it made her jittery  Pt is agreeable to take Prochlorperazine every 8 hours for the next 3 doses then as needed  Toradol 10 mg now and then every 8 hours as needed but only 3 doses in a week  If the Nurtec is not helping, try Reyvow 100 mg  Limit of 1 in 24 hours      FYI

## 2022-11-18 ENCOUNTER — PATIENT MESSAGE (OUTPATIENT)
Dept: NEUROLOGY | Facility: CLINIC | Age: 57
End: 2022-11-18

## 2022-11-18 ENCOUNTER — TELEPHONE (OUTPATIENT)
Dept: NEUROLOGY | Facility: CLINIC | Age: 57
End: 2022-11-18

## 2022-12-01 NOTE — TELEPHONE ENCOUNTER
Called PA dept to check status  Spoke amy Oliveira and states that PA is still under review  No additional info needed at this time       Awaiting determination

## 2022-12-05 ENCOUNTER — TELEPHONE (OUTPATIENT)
Dept: NEUROLOGY | Facility: CLINIC | Age: 57
End: 2022-12-05

## 2022-12-05 NOTE — TELEPHONE ENCOUNTER
Please let patient know the Reyvow was denied  She would have to choose between Nurtec and Reyvow at this time  Only can pick one    Thanks

## 2022-12-05 NOTE — TELEPHONE ENCOUNTER
Called and advised pt of the below  She verbalized clear understanding and agreeable stopping Nurtec and try Reyvow  Will initiate redetermination    Called PA dept [phone], spoke w/ Abe Chow and advised that pt will be stopping Nurtec  Urgent redetermination initiated via phone  Answered all clinical questions  72 hr turnaround   Case #[id number]    Awaiting determination

## 2022-12-05 NOTE — TELEPHONE ENCOUNTER
PA denied-Message from plan: PA Case: [id number], Status: Denied, Denial Rationale: Your medication request has been denied as it does not appear to meet medically necessary requirements  Plan rules require clinical parameters (diagnosis, lab values, test results, physical exam findings etc ) be met for medical necessity approval  Information submitted does not indicate required parameter results were met  Coverage for Reyvow is denied  It does not meet medical necessity  Comfortlucille Harrison has been requested for the treatment of acute migraines  The information provided by your prescriber does not meet [facility]'s guideline  The guideline used is: Acute Migraine Agents Prior Authorization with Quantity Limit  Information provided does not show that the policy requirements have been met  The requirement(s) not met are: You will NOT be using the requested agent in combination with another acute migraine therapy (i e , 5HT-1F, acute use CGRP, Elyxyb, ergotamine, triptan) AND Medication overuse headache has been ruled out AND You do NOT have any FDA labeled contraindications to the requested agent  Therefore, coverage for Reyvow is denied  Questions? Contact [phone]  Are you discontinuing Nurtec?

## 2022-12-09 NOTE — TELEPHONE ENCOUNTER
Called PA dept to check status of redetermination  Spoke w/ Kendra Johnston and states that redetermination is still in process   Turnaround time no later than 12/13/22

## 2022-12-11 ENCOUNTER — APPOINTMENT (OUTPATIENT)
Dept: LAB | Facility: HOSPITAL | Age: 57
End: 2022-12-11
Attending: INTERNAL MEDICINE

## 2022-12-11 DIAGNOSIS — I51.9 MYXEDEMA HEART DISEASE: ICD-10-CM

## 2022-12-11 DIAGNOSIS — E78.2 MIXED HYPERLIPIDEMIA: ICD-10-CM

## 2022-12-11 DIAGNOSIS — R53.82 CHRONIC FATIGUE: ICD-10-CM

## 2022-12-11 DIAGNOSIS — G43.001 MIGRAINE WITHOUT AURA AND WITH STATUS MIGRAINOSUS, NOT INTRACTABLE: ICD-10-CM

## 2022-12-11 DIAGNOSIS — R68.82 DECREASED LIBIDO: ICD-10-CM

## 2022-12-11 DIAGNOSIS — E03.9 MYXEDEMA HEART DISEASE: ICD-10-CM

## 2022-12-11 DIAGNOSIS — N95.1 SYMPTOMATIC MENOPAUSAL OR FEMALE CLIMACTERIC STATES: ICD-10-CM

## 2022-12-11 LAB
CHOLEST SERPL-MCNC: 213 MG/DL
HDLC SERPL-MCNC: 62 MG/DL
LDLC SERPL CALC-MCNC: 139 MG/DL (ref 0–100)
NONHDLC SERPL-MCNC: 151 MG/DL
T3FREE SERPL-MCNC: 2.7 PG/ML (ref 2.3–4.2)
T4 FREE SERPL-MCNC: 1.26 NG/DL (ref 0.76–1.46)
TESTOST SERPL-MCNC: 24 NG/DL
TRIGL SERPL-MCNC: 61 MG/DL
TSH SERPL DL<=0.05 MIU/L-ACNC: 0.76 UIU/ML (ref 0.45–4.5)

## 2022-12-15 LAB — T3REVERSE SERPL-MCNC: 21.5 NG/DL (ref 9.2–24.1)

## 2023-01-01 NOTE — TELEPHONE ENCOUNTER
December 5, 2022  Lanre Goodwin  to [address] Team [address] (supporting Car[employer] Estrella [place])      11:52 AM  Alyssa Ivy   I need a new authorization for my emagility  [employer] asked me to reach out to you     Thank you
[facility] PA completed on [facility]  Key: [id number]
PA has been approved through 6/5/23
Yes

## 2023-03-09 ENCOUNTER — OFFICE VISIT (OUTPATIENT)
Dept: GASTROENTEROLOGY | Facility: CLINIC | Age: 58
End: 2023-03-09

## 2023-03-09 VITALS
OXYGEN SATURATION: 98 % | DIASTOLIC BLOOD PRESSURE: 70 MMHG | WEIGHT: 160 LBS | BODY MASS INDEX: 26.66 KG/M2 | TEMPERATURE: 97.1 F | HEIGHT: 65 IN | SYSTOLIC BLOOD PRESSURE: 102 MMHG | HEART RATE: 82 BPM

## 2023-03-09 DIAGNOSIS — K83.4 SPHINCTER OF ODDI DYSFUNCTION: ICD-10-CM

## 2023-03-09 DIAGNOSIS — R10.11 RUQ PAIN: Primary | ICD-10-CM

## 2023-03-09 DIAGNOSIS — K31.84 GASTROPARESIS: ICD-10-CM

## 2023-03-09 DIAGNOSIS — K21.9 GASTROESOPHAGEAL REFLUX DISEASE WITHOUT ESOPHAGITIS: ICD-10-CM

## 2023-03-09 DIAGNOSIS — K59.00 CONSTIPATION, UNSPECIFIED CONSTIPATION TYPE: ICD-10-CM

## 2023-03-09 DIAGNOSIS — K76.0 HEPATIC STEATOSIS: ICD-10-CM

## 2023-03-09 NOTE — PROGRESS NOTES
[facility] - Outpatient Follow-up Note  Lanre COHOA [place] 56 y o  female MRN: [id number]  Encounter: [id number]    ASSESSMENT AND PLAN:      1  RUQ pain  2  Sphincter of Oddi dysfunction  3  Gastroesophageal reflux disease without esophagitis  4  Gastroparesis     Patient with history of intermittent right upper quadrant pain and heartburn/dyspepsia/nausea symptoms  She shares a distant history of sphincter of Oddi dysfunction which required ERCP and sphincterotomy  Her symptoms at times are exacerbated by oral intake  We discussed continuing on PPI daily  Continue with small frequent meals low in saturated fats and difficult to digest fibrous foods  Continue with diet and lifestyle modifications for GERD  4  Hepatic steatosis    Patient was noted to have fatty liver on recent ultrasound  Suspect NAFLD given no significant EtOH history  LFTs have been normal in the past with the exception of a modest elevation in ALP, which has since resolved  Testing for chronic viral hepatitis, autoimmune, and genetic causes for chronic liver disease were negative  We will consider ultrasound elastography in the future  NAFLD fibrosis score -1 20, F0 to F2    Discussed recommendations in regards to fatty liver includin  Strict control of contributing comorbidities (obesity, prediabetes/diabetes, hypertension, and hypertriglyceridemia)  2  Weight loss of approx 10-15% of patient's current body weight over a period of 6-12 months through low fat diet and cardiovascular exercise as tolerated  3  Limiting alcohol consumption, preferably complete abstinence  4  Monitor hepatic function every 6 months with routine labs  5  Constipation, unspecified constipation type    Patient with intermittent constipation and abdominal discomfort, typically triggered by oral intake  Her symptoms sound most consistent with an IBS-C diagnosis    She did have a colonoscopy back in 2018 which was normal   Recommend she continue on MiraLAX regularly, either daily to every other day  She can titrate/taper dose as needed  She did endorse a scant amount of "pink" on the wipe x 1 since last OV  Discussed that if he develops BRBPR or melena, would recommend colonoscopy earlier than previously recommended (originally planned for 2028)  We will follow-up with patient in approximately 3 months   ______________________________________________________________________    SUBJECTIVE: Patient is a 56 y o  female who presents today for follow-up regarding RUQ pain  Hx of GERD, sphincter of Oddi dysfunction s/p ERCP with sphincterotomy, hypothyroidism, IBS, pituitary adenoma  Hx of CCY  Pt is new to me  She previously followed with Genevieve Prior last in 10/2022  She was having intermittent right upper quadrant pain at this time  Seem to be worse with certain oral intake  She also had a history of GERD for which she was previously on Dexilant though this was no longer being covered by insurance and she was swapped to omeprazole 40 mg     03/09/23:     Patient still does have some intermittent right upper quadrant pain, though is infrequent in nature  Her GI symptoms in general are typically triggered by certain oral intake  She tends to have sensitivity to several food groups  Will almost assuredly cause her a m  symptoms  Depending on what she eats, she can have some nausea tho she also notes eating later in the evening ugh no emesis  Some breakthrough symptoms of heartburn depending on oral intake  No dysphagia or odynophagia  No unintentional weight loss  She is not taking the Bentyl regularly,   Unsure if it helped with the abdominal pain  Pertaining to her bowel habits, she notes she does deal with constipation at times  She endorses a sense of incomplete evacuation  She has a bowel movement every other day to every few days    At times she will have a hard firm bowel movement, followed by urgent loose stool, then several days in between bowel movements  She did notice a scant amount of pink on the wipe after a hard bowel movement  No large-volume hematochezia or melena  No first-degree relative with colon cancer  No family history of liver disease  No regular EtOH intake  No yellowing of eyes or skin  No abdominal swelling or edema  Etoh: none   Tobacco: none   NSAIDs: none     09/2022: RUQ US: mild hepatic steatosis, cholecystectomy   10/2022: STEPH negative, A1 , AMA <20, ASMA 3, ceruloplasmin 21 1, hep B surface antigen nonreactive, hep C antibody nonreactive, hep B core IgM nonreactive, hep B core total antibody nonreactive, iron 171, TSAT 48, TIBC 354, ferritin 42, T  bili 0 52, , AST 24, ALT 24, albumin 4 2  05/2014: GES: T-1/2  = 141 minutes    Endoscopic history:   EGD: 03/2019: 1 cm sliding hiatal hernia, gastric fundic gland polyps, normal esophagus and duodenum  A  Duodenum, biopsy: Duodenal mucosa with no significant pathologic abnormalities  No villous atrophy or increased intraepithelial lymphocytes identified  B  Stomach, antrum and body, biopsy: Mild chronic inactive antral and oxyntic gastritis  No Helicobacter pylori organisms identified on H&E stain  C  Stomach, polyp, biopsy: Mild chronic inactive antral and oxyntic gastritis with foveolar hyperplasia    No Helicobacter pylori organisms identified on H&E stain  Colon: 02/2018: Normal colonoscopy; repeat 10 years    Review of Systems   Per HPI    Historical Information   Past Medical History:   Diagnosis Date   • Abnormal TSH 10/18/2018   • Ataxia    • Blood in urine    • Disease of thyroid gland 10/31/2018   • GERD (gastroesophageal reflux disease)    • Irritable bowel syndrome    • Migraine    • Mild cervical dysplasia    • Pituitary adenoma ([facility] [address] )    • Pregnancy     1    • Thyroiditis 10/31/2018   • Uterine leiomyoma      Past Surgical History:   Procedure Laterality Date   • ABDOMINAL SURGERY     • CHOLECYSTECTOMY     • COLONOSCOPY     • COLPOSCOPY      onset: 2/4/08   • ESOPHAGOGASTRODUODENOSCOPY     • LAPAROSCOPY     • [place] COLONOSCOPY FLX DX W/COLLJ SPEC WHEN PFRMD N/A 2/16/2018    Procedure: COLONOSCOPY;  Surgeon: Emily Smiley DO;  Location: [facility];  Service: Gastroenterology   • HI ESOPHAGOGASTRODUODENOSCOPY TRANSORAL DIAGNOSTIC N/A 3/19/2019    Procedure: ESOPHAGOGASTRODUODENOSCOPY (EGD);   Surgeon: Ramandeep Molina MD;  Location: [facility];  Service: Gastroenterology   • TUBAL LIGATION       Social History   Social History     Substance and Sexual Activity   Alcohol Use No     Social History     Substance and Sexual Activity   Drug Use No     Social History     Tobacco Use   Smoking Status Never   Smokeless Tobacco Never     Family History   Problem Relation Age of Onset   • Hyperlipidemia Mother    • Hypertension Mother    • Hashimoto's thyroiditis Mother    • Breast cancer Paternal Grandmother 43   • Ovarian cancer Paternal Grandmother    • Heart disease Family    • Diabetes Family    • Thyroid disease Family    • Hypertension Brother    • Hashimoto's thyroiditis Brother    • Breast cancer Paternal Aunt 37   • Ovarian cancer Paternal Aunt    • Lung cancer Father    • Cancer Father         unknown primary; metastasis    • No Known Problems Sister    • No Known Problems Daughter    • Diabetes Maternal Grandmother    • No Known Problems Maternal Grandfather    • Bone cancer Paternal Grandfather    • No Known Problems Maternal Aunt    • Prostate cancer Paternal Uncle        Meds/Allergies       Current Outpatient Medications:   •  Alpha-Lipoic Acid 100 MG TABS  •  Ascorbic Acid (VITAMIN C) 1000 MG tablet  •  Cholecalciferol (Vitamin D3) 1 25 MG (35428 UT) CAPS  •  dexamethasone (DECADRON) 2 mg tablet  •  dicyclomine (BENTYL) 20 mg tablet  •  diphenhydrAMINE (BENADRYL) 25 mg capsule  •  divalproex sodium (DEPAKOTE ER) 500 mg 24 hr tablet  •  ergocalciferol (VITAMIN D2) 50,000 units  •  Galcanezumab-[facility] (Emgality) 120 MG/ML SOAJ  •  ketorolac (TORADOL) 10 mg tablet  •  Lasmiditan Succinate (REYVOW) 100 MG tablet  •  levothyroxine 50 mcg tablet  •  liothyronine (CYTOMEL) 5 mcg tablet  •  Magnesium 400 MG CAPS  •  Naproxen Sodium 220 MG CAPS  •  NON FORMULARY  •  Nurtec 75 MG TBDP  •  Omega-3 Fatty Acids (FISH OIL) 1,000 mg  •  omeprazole (PriLOSEC) 40 MG capsule  •  ondansetron (ZOFRAN-ODT) 8 mg disintegrating tablet  •  patient supplied medication  •  prochlorperazine (COMPAZINE) 10 mg tablet  •  selenium 50 MCG TABS    Allergies   Allergen Reactions   • Sulfamethoxazole-Trimethoprim Other (See Comments)     lightheaded     Objective     Last menstrual period 07/31/2018  There is no height or weight on file to calculate BMI  Physical Exam  Vitals and nursing note reviewed  Constitutional:       Appearance: Normal appearance  HENT:      Head: Normocephalic and atraumatic  Eyes:      General: No scleral icterus  Conjunctiva/sclera: Conjunctivae normal    Cardiovascular:      Rate and Rhythm: Normal rate  Pulmonary:      Effort: Pulmonary effort is normal  No respiratory distress  Abdominal:      General: Abdomen is flat  Bowel sounds are normal  There is no distension  Palpations: Abdomen is soft  Tenderness: There is no abdominal tenderness  There is no guarding or rebound  Skin:     General: Skin is warm and dry  Coloration: Skin is not jaundiced  Neurological:      General: No focal deficit present  Mental Status: She is alert and oriented to person, place, and time  Psychiatric:         Mood and Affect: Mood normal          Behavior: Behavior normal        Lab Results:   No visits with results within 1 Day(s) from this visit     Latest known visit with results is:   Appointment on 12/11/2022   Component Date Value   • Cholesterol 12/11/2022 213 (H)    • Triglycerides 12/11/2022 61    • HDL, Direct 12/11/2022 62    • LDL Calculated 12/11/2022 139 (H)    • Non-HDL-Chol (CHOL-HDL) 12/11/2022 151    • TSH 3RD GENERATON 12/11/2022 0 760    • T3, Free 12/11/2022 2 70    • Free T4 12/11/2022 1 26    • T3, Reverse 12/11/2022 21 5    • Testosterone 12/11/2022 24 0      Radiology Results:   No results found  Jorje Martinez PA-C    **Please note:  Dictation voice to text software may have been used in the creation of this record  Occasional wrong word or “sound alike” substitutions may have occurred due to the inherent limitations of voice recognition software  Read the chart carefully and recognize, using context, where substitutions have occurred  **

## 2023-03-09 NOTE — PATIENT INSTRUCTIONS
Please stay on omeprazole daily  Continue with diet and lifestyle modifications for GERD  Stay on miralax daily to every other day  Avoid straining and constipation  Okay for Bentyl use as needed for abd cramping       3 month follow up appointment made

## 2023-03-20 ENCOUNTER — APPOINTMENT (OUTPATIENT)
Dept: LAB | Facility: HOSPITAL | Age: 58
End: 2023-03-20

## 2023-03-20 DIAGNOSIS — G43.001 MIGRAINE WITHOUT AURA AND WITH STATUS MIGRAINOSUS, NOT INTRACTABLE: ICD-10-CM

## 2023-03-20 DIAGNOSIS — N95.1 MENOPAUSAL STATE: ICD-10-CM

## 2023-03-20 DIAGNOSIS — R53.82 CHRONIC FATIGUE: ICD-10-CM

## 2023-03-20 DIAGNOSIS — R68.82 DECREASED LIBIDO: ICD-10-CM

## 2023-03-20 DIAGNOSIS — E03.9 HYPOTHYROIDISM, UNSPECIFIED TYPE: ICD-10-CM

## 2023-03-20 LAB
EST. AVERAGE GLUCOSE BLD GHB EST-MCNC: 114 MG/DL
HBA1C MFR BLD: 5.6 %
T3FREE SERPL-MCNC: 2.19 PG/ML (ref 2.3–4.2)
T4 FREE SERPL-MCNC: 1.06 NG/DL (ref 0.76–1.46)
TSH SERPL DL<=0.05 MIU/L-ACNC: 1.59 UIU/ML (ref 0.45–4.5)

## 2023-03-21 LAB
THYROGLOB AB SERPL-ACNC: <1 IU/ML (ref 0–0.9)
THYROPEROXIDASE AB SERPL-ACNC: <9 IU/ML (ref 0–34)

## 2023-05-22 ENCOUNTER — APPOINTMENT (OUTPATIENT)
Dept: LAB | Facility: HOSPITAL | Age: 58
End: 2023-05-22

## 2023-05-22 DIAGNOSIS — R68.82 DECREASED LIBIDO: ICD-10-CM

## 2023-05-22 DIAGNOSIS — E03.9 MYXEDEMA HEART DISEASE: ICD-10-CM

## 2023-05-22 DIAGNOSIS — G43.001 MIGRAINE WITHOUT AURA AND WITH STATUS MIGRAINOSUS, NOT INTRACTABLE: ICD-10-CM

## 2023-05-22 DIAGNOSIS — I51.9 MYXEDEMA HEART DISEASE: ICD-10-CM

## 2023-05-22 DIAGNOSIS — R53.82 CHRONIC FATIGUE: ICD-10-CM

## 2023-05-22 DIAGNOSIS — N95.1 SYMPTOMATIC MENOPAUSAL OR FEMALE CLIMACTERIC STATES: ICD-10-CM

## 2023-05-22 LAB
CORTIS SERPL-MCNC: 22.9 UG/DL
PROGEST SERPL-MCNC: 1.32 NG/ML

## 2023-05-24 LAB
DHEA-S SERPL-MCNC: 74.3 UG/DL (ref 29.4–220.5)
ESTRADIOL SERPL-MCNC: <5 PG/ML
TESTOST FREE SERPL-MCNC: 5.7 PG/ML (ref 0–4.2)
TESTOST SERPL-MCNC: 21 NG/DL (ref 4–50)

## 2023-05-25 LAB — ESTRONE SERPL-MCNC: 59 PG/ML

## 2023-05-27 ENCOUNTER — APPOINTMENT (OUTPATIENT)
Dept: LAB | Facility: HOSPITAL | Age: 58
End: 2023-05-27

## 2023-05-27 DIAGNOSIS — N95.1 SYMPTOMATIC MENOPAUSAL OR FEMALE CLIMACTERIC STATES: ICD-10-CM

## 2023-05-27 DIAGNOSIS — R53.82 CHRONIC FATIGUE: ICD-10-CM

## 2023-05-27 DIAGNOSIS — E03.9 MYXEDEMA HEART DISEASE: ICD-10-CM

## 2023-05-27 DIAGNOSIS — G43.001 MIGRAINE WITHOUT AURA AND WITH STATUS MIGRAINOSUS, NOT INTRACTABLE: ICD-10-CM

## 2023-05-27 DIAGNOSIS — R68.82 DECREASED LIBIDO: ICD-10-CM

## 2023-05-27 DIAGNOSIS — I51.9 MYXEDEMA HEART DISEASE: ICD-10-CM

## 2023-05-27 LAB
ERYTHROCYTE [DISTWIDTH] IN BLOOD BY AUTOMATED COUNT: 12 % (ref 11.6–15.1)
FERRITIN SERPL-MCNC: 29 NG/ML (ref 11–307)
HCT VFR BLD AUTO: 40.4 % (ref 34.8–46.1)
HGB BLD-MCNC: 13.5 G/DL (ref 11.5–15.4)
IRON SERPL-MCNC: 72 UG/DL (ref 50–170)
MCH RBC QN AUTO: 31.4 PG (ref 26.8–34.3)
MCHC RBC AUTO-ENTMCNC: 33.4 G/DL (ref 31.4–37.4)
MCV RBC AUTO: 94 FL (ref 82–98)
PLATELET # BLD AUTO: 240 THOUSANDS/UL (ref 149–390)
PMV BLD AUTO: 9.3 FL (ref 8.9–12.7)
RBC # BLD AUTO: 4.3 MILLION/UL (ref 3.81–5.12)
T3FREE SERPL-MCNC: 2.59 PG/ML (ref 2.5–3.9)
T4 FREE SERPL-MCNC: 1.13 NG/DL (ref 0.61–1.12)
TSH SERPL DL<=0.05 MIU/L-ACNC: 2.62 UIU/ML (ref 0.45–4.5)
WBC # BLD AUTO: 4.31 THOUSAND/UL (ref 4.31–10.16)

## 2023-05-28 LAB — B BURGDOR IGG+IGM SER-ACNC: <0.2 AI

## 2023-05-30 LAB
EBV NA IGG SER IA-ACNC: 200 U/ML (ref 0–17.9)
EBV VCA IGG SER IA-ACNC: 360 U/ML (ref 0–17.9)
EBV VCA IGM SER IA-ACNC: <36 U/ML (ref 0–35.9)
INTERPRETATION: ABNORMAL

## 2023-05-30 RX ORDER — LEVOTHYROXINE SODIUM 0.07 MG/1
TABLET ORAL
COMMUNITY
Start: 2023-04-26

## 2023-06-01 ENCOUNTER — OFFICE VISIT (OUTPATIENT)
Dept: GASTROENTEROLOGY | Facility: CLINIC | Age: 58
End: 2023-06-01

## 2023-06-01 VITALS
WEIGHT: 164 LBS | BODY MASS INDEX: 27.32 KG/M2 | HEIGHT: 65 IN | DIASTOLIC BLOOD PRESSURE: 78 MMHG | TEMPERATURE: 97.9 F | HEART RATE: 93 BPM | SYSTOLIC BLOOD PRESSURE: 118 MMHG

## 2023-06-01 DIAGNOSIS — K31.84 GASTROPARESIS: ICD-10-CM

## 2023-06-01 DIAGNOSIS — K21.9 GASTROESOPHAGEAL REFLUX DISEASE WITHOUT ESOPHAGITIS: Primary | ICD-10-CM

## 2023-06-01 DIAGNOSIS — R10.11 RUQ PAIN: ICD-10-CM

## 2023-06-01 DIAGNOSIS — K59.00 CONSTIPATION, UNSPECIFIED CONSTIPATION TYPE: ICD-10-CM

## 2023-06-01 DIAGNOSIS — K76.0 HEPATIC STEATOSIS: ICD-10-CM

## 2023-06-01 DIAGNOSIS — R11.0 NAUSEA: ICD-10-CM

## 2023-06-01 RX ORDER — FAMOTIDINE 40 MG/1
40 TABLET, FILM COATED ORAL
Qty: 30 TABLET | Refills: 3 | Status: SHIPPED | OUTPATIENT
Start: 2023-06-01

## 2023-06-01 NOTE — PROGRESS NOTES
[facility] - Outpatient Follow-up Note  Lanre Sotosh 56 y o  female MRN: [id number]  Encounter: [id number]    ASSESSMENT AND PLAN:      1  Gastroesophageal reflux disease without esophagitis  2  RUQ pain  3  Gastroparesis  4  Nausea    Patient with a history of GERD, gastroparesis, and sphincter of Oddi dysfunction who has been noticing worsening nausea and abdominal pain over the past couple of weeks without any known precipitants  Differential diagnosis includes gastritis, peptic ulcer disease, flare of her GERD/gastroparesis, irritable bowel syndrome, infectious pathology, other etiology  At this time, given treatment refractory symptoms, recommend the following:   - temporary escalation of acid suppression therapy with adding in nightly H2B  Counseled pt to take PPI QAM and H2B QHS if this works for her  - recommend diet and lifestyle modifications for GERD  - recommend bland diet for now and small frequent meals low in saturated fats and difficult to digest fibrous foods  Encouraged excellent hydration    - okay for PRN usage of anti-emetics as needed  - reviewed with pt given treatment refractory symptoms which have been progressive in nature, will proceed with repeat EGD now to evaluate for intra-luminal pathology  - we reviewed okay for use of anti-spasmodic and IB Arnaud for symptomatic relief of abdominal pain as well  The patient was counseled on possible risks of endoscopic procedure to include but not limited to bleeding, infection, and perforation  The patient demonstrates understanding and is in agreement with proceeding with endoscopic evaluation as planned  - famotidine (PEPCID) 40 MG tablet; Take 1 tablet (40 mg total) by mouth daily at bedtime  Dispense: 30 tablet; Refill: 3  - EGD; Future    5  Hepatic steatosis    Reviewed diagnosis once again and possible sequelae of disease  Repeat LFTs now    We discussed proceeding with elastography to evaluate for any underlying fibrosis  Discussed recommendations in regards to fatty liver includin  Strict control of contributing comorbidities (obesity, prediabetes/diabetes, hypertension, and hypertriglyceridemia)  2  Weight loss of approx 10-15% of patient's current body weight over a period of 6-12 months through low fat diet and cardiovascular exercise as tolerated  3  Limiting alcohol consumption, preferably complete abstinence  4  Monitor hepatic function every 6 months with routine labs  - US elastography/UGAP; Future  - Comprehensive metabolic panel; Future    6  Constipation, unspecified constipation type    Patient notes a persistent sense of incomplete evacuation despite use of fiber supplementation  We encouraged excellent hydration today and also encouraged her to initiate MiraLAX which have been beneficial in the past   She can titrate/taper to effect until she finds a regimen that works well for her  No alarm features that would warrant a colonoscopy at this time, she is technically due for repeat colonoscopy for surveillance in   We will follow up with pt after endoscopic evaluation  ______________________________________________________________________    SUBJECTIVE: Patient is a 56 y o  female who presents today for follow-up regarding RUQ pain  Pmhx sig for GERD, sphincter of Oddi dysfunction s/p ERCP with sphincterotomy, hypothyroidism, IBS, pituitary adenoma  Hx of CCY  Patient was last evaluated in 2023  At that time, she was complaining of some intermittent right upper quadrant pain, though it was infrequent in nature and she was managing with diet and lifestyle modifications  She was taking PPI daily with adequate relief of symptoms  She was also taking MiraLAX for her history of constipation  2023:     Pt is not doing well today  She started on milk thistle some time ago to help with overall gut health, and initially thought it to be helpful  However, for the past few weeks, has been dealing with recurrent nausea, early satiety, diffuse abdominal pain/cramping and at times severe sharp debilitating abd pain  She is uncertain of any obvious precipitants, did have large quantities of food at [facility] though is uncertain if this may have caused/worsened symptoms  She denies emesis or dysphagia/odynophagia  No abnormal weight loss, has been gaining weight  Taking PPI in the evening and has required zofran BID  No change in HA frequency  No fevers  Also dealing with profound fatigue  Pertaining to bowels, is taking fiber balls to help keep regular, though still having a sense of incomplete evacuation  Had trouble with compliance with use of miralax and has not been taking  Denies BRBPR or melena  No diarrhea or nocturnal BMs  No sig abd pain or rectal pain related to defecation  05/2023: Hb 13 5, MCV 94, Plt 240, ferritin 29, iron 72, TSH 2 621   09/2022: RUQ US: mild hepatic steatosis, cholecystectomy   10/2022: STEPH negative, A1 , AMA <20, ASMA 3, ceruloplasmin 21 1, hep B surface antigen nonreactive, hep C antibody nonreactive, hep B core IgM nonreactive, hep B core total antibody nonreactive, iron 171, TSAT 48, TIBC 354, ferritin 42, T  bili 0 52, , AST 24, ALT 24, albumin 4 2  05/2014: GES: T-1/2  = 141 minutes     Endoscopic history:   EGD: 03/2019: 1 cm sliding hiatal hernia, gastric fundic gland polyps, normal esophagus and duodenum  A  Duodenum, biopsy: Duodenal mucosa with no significant pathologic abnormalities  No villous atrophy or increased intraepithelial lymphocytes identified  B  Stomach, antrum and body, biopsy: Mild chronic inactive antral and oxyntic gastritis  No Helicobacter pylori organisms identified on H&E stain  C  Stomach, polyp, biopsy: Mild chronic inactive antral and oxyntic gastritis with foveolar hyperplasia    No Helicobacter pylori organisms identified on H&E stain  Colon: 02/2018: Normal colonoscopy; repeat 10 years    Review of Systems   Otherwise Per HPI    Historical Information   Past Medical History:   Diagnosis Date   • Abnormal TSH 10/18/2018   • Ataxia    • Blood in urine    • Disease of thyroid gland 10/31/2018   • GERD (gastroesophageal reflux disease)    • Irritable bowel syndrome    • Migraine    • Mild cervical dysplasia    • Pituitary adenoma ([facility] [address] )    • Pregnancy     1    • Thyroiditis 10/31/2018   • Uterine leiomyoma      Past Surgical History:   Procedure Laterality Date   • ABDOMINAL SURGERY     • CHOLECYSTECTOMY     • COLONOSCOPY     • COLPOSCOPY      onset: 2/4/08   • ESOPHAGOGASTRODUODENOSCOPY     • LAPAROSCOPY     • [place] COLONOSCOPY FLX DX W/COLLJ SPEC WHEN PFRMD N/A 2/16/2018    Procedure: COLONOSCOPY;  Surgeon: Eva Ford DO;  [facility]: [facility];  Service: Gastroenterology   • [place] ESOPHAGOGASTRODUODENOSCOPY TRANSORAL DIAGNOSTIC N/A 3/19/2019    Procedure: ESOPHAGOGASTRODUODENOSCOPY (EGD);   Surgeon: Angelo Haque MD;  Location: [facility];  Service: Gastroenterology   • TUBAL LIGATION       Social History   Social History     Substance and Sexual Activity   Alcohol Use No     Social History     Substance and Sexual Activity   Drug Use No     Social History     Tobacco Use   Smoking Status Never   Smokeless Tobacco Never     Family History   Problem Relation Age of Onset   • Hyperlipidemia Mother    • Hypertension Mother    • Hashimoto's thyroiditis Mother    • Breast cancer Paternal Grandmother 43   • Ovarian cancer Paternal Grandmother    • Heart disease Family    • Diabetes Family    • Thyroid disease Family    • Hypertension Brother    • Hashimoto's thyroiditis Brother    • Breast cancer Paternal Aunt 37   • Ovarian cancer Paternal [de-identified]    • Lung cancer Father    • Cancer Father         unknown primary; metastasis    • No Known Problems Sister    • No Known Problems Daughter    • Diabetes Maternal Grandmother    • No Known Problems Maternal Grandfather    • Bone cancer Paternal "Grandfather    • No Known Problems Maternal Aunt    • Prostate cancer Paternal Uncle        Meds/Allergies       Current Outpatient Medications:   •  Alpha-Lipoic Acid 100 MG TABS  •  Ascorbic Acid (VITAMIN C) 1000 MG tablet  •  Cholecalciferol (Vitamin D3) 1 25 MG (97191 UT) CAPS  •  dexamethasone (DECADRON) 2 mg tablet  •  dicyclomine (BENTYL) 20 mg tablet  •  diphenhydrAMINE (BENADRYL) 25 mg capsule  •  divalproex sodium (DEPAKOTE ER) 500 mg 24 hr tablet  •  ergocalciferol (VITAMIN D2) 50,000 units  •  Galcanezumab-gnlm (Emgality) 120 MG/ML SOAJ  •  ketorolac (TORADOL) 10 mg tablet  •  Lasmiditan Succinate (REYVOW) 100 MG tablet  •  levothyroxine 50 mcg tablet  •  levothyroxine 75 mcg tablet  •  liothyronine (CYTOMEL) 5 mcg tablet  •  Magnesium 400 MG CAPS  •  Naproxen Sodium 220 MG CAPS  •  NON FORMULARY  •  Nurtec 75 MG TBDP  •  Omega-3 Fatty Acids (FISH OIL) 1,000 mg  •  omeprazole (PriLOSEC) 40 MG capsule  •  ondansetron (ZOFRAN-ODT) 8 mg disintegrating tablet  •  patient supplied medication  •  prochlorperazine (COMPAZINE) 10 mg tablet  •  selenium 50 MCG TABS    Allergies   Allergen Reactions   • Sulfamethoxazole-Trimethoprim Other (See Comments)     lightheaded     Objective     Blood pressure 118/78, pulse 93, temperature 97 9 °F (36 6 °C), height 5' 5\" (1 651 m), weight 74 4 kg (164 lb), last menstrual period 01/01/2018  Body mass index is 27 29 kg/m²  Physical Exam  Vitals and nursing note reviewed  Constitutional:       Appearance: Normal appearance  HENT:      Head: Normocephalic and atraumatic  Eyes:      General: No scleral icterus  Conjunctiva/sclera: Conjunctivae normal    Cardiovascular:      Rate and Rhythm: Normal rate  Pulmonary:      Effort: Pulmonary effort is normal  No respiratory distress  Abdominal:      General: Abdomen is flat  Bowel sounds are normal  There is no distension  Palpations: Abdomen is soft  There is no mass  Tenderness:  There is abdominal " tenderness (generalized)  There is no guarding  Skin:     General: Skin is warm and dry  Coloration: Skin is not jaundiced  Neurological:      General: No focal deficit present  Mental Status: She is alert and oriented to person, place, and time  Psychiatric:         Mood and Affect: Mood normal          Behavior: Behavior normal        Lab Results:   No visits with results within 1 Day(s) from this visit  Latest known visit with results is:   Appointment on 05/27/2023   Component Date Value   • TSH 3RD GENERATON 05/27/2023 2 621    • Free T4 05/27/2023 1 13 (H)    • T3, Free 05/27/2023 2 59    • EBV VCA IgG 05/27/2023 360 0 (H)    • EBV VCA IgM 05/27/2023 <36 0    • EBV Nuclear Ag Ab 05/27/2023 200 0 (H)    • INTERPRETATION 05/27/2023 Comment    • Ferritin 05/27/2023 29    • Iron 05/27/2023 72    • WBC 05/27/2023 4 31    • RBC 05/27/2023 4 30    • Hemoglobin 05/27/2023 13 5    • Hematocrit 05/27/2023 40 4    • MCV 05/27/2023 94    • MCH 05/27/2023 31 4    • MCHC 05/27/2023 33 4    • RDW 05/27/2023 12 0    • Platelets 06/78/6274 240    • MPV 05/27/2023 9 3    • Lyme Total Antibodies 05/27/2023 <0 2      Radiology Results:   No results found  Abilio Mathews PA-C    **Please note:  Dictation voice to text software may have been used in the creation of this record  Occasional wrong word or “sound alike” substitutions may have occurred due to the inherent limitations of voice recognition software  Read the chart carefully and recognize, using context, where substitutions have occurred  **

## 2023-06-01 NOTE — PATIENT INSTRUCTIONS
Because you are having treatment refractory symptoms, I do think we should adjust the medication and proceed with another endoscopy  Please continue on the omeprazole daily, typically this tends to work the best in the morning before breakfast, though if you take it at a different time of day and it works for you you can continue that regimen  We will add in nightly famotidine/Pepcid at 40 mg  Continue with small quantity frequent meals throughout throughout the day  Okay to continue with the Zofran as needed for the nausea  You can look into IBgard, which is a natural supplement that tends to help with abdominal bloating and discomfort  It is concentrated peppermint oil  For the constipation and sense of incomplete evacuation, try to take MiraLAX somewhat regularly, whether its half capful a day, or a dose every other day, to help you have a more complete bowel movement  For the finding of fatty liver, we are going to repeat your liver tests now and obtain an elastography for additional evaluation  Scheduled date of EGD(as of today):6/2/2023  Physician performing EGD: Dr Gunnar Clement  Location of EGD:[facility]  Instructions reviewed with patient by:  Aide Stephenson  Clearances: N/A    She will call and schedule her ELASTOGRAPHY,   FOLLOW UP APPOINTMENT MADE IN [place]

## 2023-06-02 ENCOUNTER — ANESTHESIA EVENT (OUTPATIENT)
Dept: PERIOP | Facility: HOSPITAL | Age: 58
End: 2023-06-02

## 2023-06-02 ENCOUNTER — ANESTHESIA (OUTPATIENT)
Dept: PERIOP | Facility: HOSPITAL | Age: 58
End: 2023-06-02

## 2023-06-02 ENCOUNTER — HOSPITAL ENCOUNTER (OUTPATIENT)
Dept: PERIOP | Facility: HOSPITAL | Age: 58
Setting detail: OUTPATIENT SURGERY
End: 2023-06-02
Payer: COMMERCIAL

## 2023-06-02 VITALS
OXYGEN SATURATION: 98 % | RESPIRATION RATE: 18 BRPM | TEMPERATURE: 97.4 F | HEIGHT: 65 IN | WEIGHT: 164.02 LBS | BODY MASS INDEX: 27.33 KG/M2 | SYSTOLIC BLOOD PRESSURE: 104 MMHG | HEART RATE: 80 BPM | DIASTOLIC BLOOD PRESSURE: 60 MMHG

## 2023-06-02 DIAGNOSIS — R11.0 NAUSEA: ICD-10-CM

## 2023-06-02 DIAGNOSIS — K21.9 GASTROESOPHAGEAL REFLUX DISEASE WITHOUT ESOPHAGITIS: ICD-10-CM

## 2023-06-02 PROCEDURE — 88305 TISSUE EXAM BY PATHOLOGIST: CPT | Performed by: PATHOLOGY

## 2023-06-02 RX ORDER — PROPOFOL 10 MG/ML
INJECTION, EMULSION INTRAVENOUS AS NEEDED
Status: DISCONTINUED | OUTPATIENT
Start: 2023-06-02 | End: 2023-06-02

## 2023-06-02 RX ORDER — HYDROMORPHONE HCL IN WATER/PF 6 MG/30 ML
0.2 PATIENT CONTROLLED ANALGESIA SYRINGE INTRAVENOUS
Status: CANCELLED | OUTPATIENT
Start: 2023-06-02

## 2023-06-02 RX ORDER — SODIUM CHLORIDE, SODIUM LACTATE, POTASSIUM CHLORIDE, CALCIUM CHLORIDE 600; 310; 30; 20 MG/100ML; MG/100ML; MG/100ML; MG/100ML
100 INJECTION, SOLUTION INTRAVENOUS CONTINUOUS
Status: CANCELLED | OUTPATIENT
Start: 2023-06-02

## 2023-06-02 RX ORDER — FENTANYL CITRATE/PF 50 MCG/ML
25 SYRINGE (ML) INJECTION
Status: DISCONTINUED | OUTPATIENT
Start: 2023-06-02 | End: 2023-06-06 | Stop reason: HOSPADM

## 2023-06-02 RX ORDER — LIDOCAINE HYDROCHLORIDE 20 MG/ML
INJECTION, SOLUTION EPIDURAL; INFILTRATION; INTRACAUDAL; PERINEURAL AS NEEDED
Status: DISCONTINUED | OUTPATIENT
Start: 2023-06-02 | End: 2023-06-02

## 2023-06-02 RX ORDER — SODIUM CHLORIDE, SODIUM LACTATE, POTASSIUM CHLORIDE, CALCIUM CHLORIDE 600; 310; 30; 20 MG/100ML; MG/100ML; MG/100ML; MG/100ML
INJECTION, SOLUTION INTRAVENOUS CONTINUOUS PRN
Status: DISCONTINUED | OUTPATIENT
Start: 2023-06-02 | End: 2023-06-02

## 2023-06-02 RX ORDER — FENTANYL CITRATE/PF 50 MCG/ML
25 SYRINGE (ML) INJECTION
Status: CANCELLED | OUTPATIENT
Start: 2023-06-02

## 2023-06-02 RX ORDER — LIDOCAINE HYDROCHLORIDE 10 MG/ML
0.5 INJECTION, SOLUTION EPIDURAL; INFILTRATION; INTRACAUDAL; PERINEURAL ONCE AS NEEDED
Status: CANCELLED | OUTPATIENT
Start: 2023-06-02

## 2023-06-02 RX ORDER — LIDOCAINE HYDROCHLORIDE 10 MG/ML
0.5 INJECTION, SOLUTION EPIDURAL; INFILTRATION; INTRACAUDAL; PERINEURAL ONCE AS NEEDED
Status: DISCONTINUED | OUTPATIENT
Start: 2023-06-02 | End: 2023-06-06 | Stop reason: HOSPADM

## 2023-06-02 RX ORDER — ONDANSETRON 2 MG/ML
4 INJECTION INTRAMUSCULAR; INTRAVENOUS ONCE AS NEEDED
Status: CANCELLED | OUTPATIENT
Start: 2023-06-02

## 2023-06-02 RX ORDER — ONDANSETRON 2 MG/ML
4 INJECTION INTRAMUSCULAR; INTRAVENOUS ONCE AS NEEDED
Status: COMPLETED | OUTPATIENT
Start: 2023-06-02 | End: 2023-06-02

## 2023-06-02 RX ORDER — HYDROMORPHONE HCL IN WATER/PF 6 MG/30 ML
0.2 PATIENT CONTROLLED ANALGESIA SYRINGE INTRAVENOUS
Status: DISCONTINUED | OUTPATIENT
Start: 2023-06-02 | End: 2023-06-06 | Stop reason: HOSPADM

## 2023-06-02 RX ADMIN — PROPOFOL 20 MG: 10 INJECTION, EMULSION INTRAVENOUS at 09:03

## 2023-06-02 RX ADMIN — PROPOFOL 50 MG: 10 INJECTION, EMULSION INTRAVENOUS at 09:00

## 2023-06-02 RX ADMIN — PROPOFOL 150 MG: 10 INJECTION, EMULSION INTRAVENOUS at 08:59

## 2023-06-02 RX ADMIN — ONDANSETRON 4 MG: 2 INJECTION INTRAMUSCULAR; INTRAVENOUS at 09:26

## 2023-06-02 RX ADMIN — SODIUM CHLORIDE, SODIUM LACTATE, POTASSIUM CHLORIDE, AND CALCIUM CHLORIDE: .6; .31; .03; .02 INJECTION, SOLUTION INTRAVENOUS at 08:54

## 2023-06-02 RX ADMIN — LIDOCAINE HYDROCHLORIDE 100 MG: 20 INJECTION, SOLUTION EPIDURAL; INFILTRATION; INTRACAUDAL; PERINEURAL at 08:59

## 2023-06-02 RX ADMIN — PROPOFOL 20 MG: 10 INJECTION, EMULSION INTRAVENOUS at 09:01

## 2023-06-02 NOTE — ANESTHESIA PREPROCEDURE EVALUATION
"Procedure:  EGD    Relevant Problems   CARDIO   (+) Chest pain   (+) Chronic migraine without aura without status migrainosus, not intractable   (+) Hyperlipidemia   (+) Migraine without aura and without status migrainosus, not intractable      ENDO   (+) Other specified hypothyroidism      GI/HEPATIC   (+) Gastroesophageal reflux disease without esophagitis   (+) Sphincter of Oddi dysfunction      MUSCULOSKELETAL   (+) Hamstring tendonitis of left thigh   (+) Piriformis syndrome, left   (+) Sphincter of Oddi dysfunction      NEURO/PSYCH   (+) Anxiety   (+) Chronic migraine without aura without status migrainosus, not intractable   (+) Migraine without aura and without status migrainosus, not intractable   (+) Post-traumatic headache      Other   (+) Lymphadenopathy, axillary      Lab Results   Component Value Date    BUN 15 09/02/2022    CREATININE 0 98 09/02/2022    EGFR 65 09/02/2022    GLUCOSE 104 06/07/2015    K 4 1 09/02/2022    SODIUM 140 09/02/2022     Lab Results   Component Value Date    HGBA1C 5 6 03/20/2023       Lab Results   Component Value Date    HGB 13 5 05/27/2023    HGB 13 0 04/04/2022    HGB 13 6 03/03/2022     05/27/2023     04/04/2022     03/03/2022     Lab Results   Component Value Date    WBC 4 31 05/27/2023       Lab Results   Component Value Date    CREATININE 0 98 09/02/2022    CREATININE 0 88 03/03/2022    CREATININE 0 93 09/15/2021       Lab Results   Component Value Date    INR 0 90 06/02/2017     Lab Results   Component Value Date    PTT 31 06/02/2017       No results found for: \"LACTATE\"                    Physical Exam    Airway    Mallampati score: II  TM Distance: >3 FB       Dental   No notable dental hx     Cardiovascular      Pulmonary      Other Findings        Anesthesia Plan  ASA Score- 2     Anesthesia Type- IV sedation with anesthesia with ASA Monitors           Additional Monitors:   Airway Plan:     Comment: PITA Holliday , have " personally seen and evaluated the patient prior to anesthetic care  I have reviewed the pre-anesthetic record, and other medical records if appropriate to the anesthetic care  If a CRNA is involved in the case, I have reviewed the CRNA assessment, if present, and agree  Risks/benefits and alternatives discussed with patient including possible PONV, sore throat, and possibility of rare anesthetic and surgical emergencies          Plan Factors-    Chart reviewed  EKG reviewed  Imaging results reviewed  Existing labs reviewed  Patient summary reviewed  Patient instructed to abstain from smoking on day of procedure  Obstructive sleep apnea risk education given perioperatively  Induction-     Postoperative Plan-     Informed Consent- Anesthetic plan and risks discussed with patient  I personally reviewed this patient with the CRNA  Discussed and agreed on the Anesthesia Plan with the CRNA  Eliseo Momin

## 2023-06-02 NOTE — H&P
H&P EXAM - Outpatient Endoscopy   Lanre Crain 56 y o  female MRN: [id number]    [address] [place] [address]   Encounter: [id number]      History and Physical - [facility]  Lanre Crain 56 y o  female MRN: [id number]                  HPI: Phoenix Jeffrey is a 56y o  year old female who presents for nausea, vomiting, abdominal pain      REVIEW OF SYSTEMS: Per the HPI, and otherwise unremarkable  Historical Information   Past Medical History:   Diagnosis Date   • Abnormal TSH 10/18/2018   • Ataxia    • Blood in urine    • Disease of thyroid gland 10/31/2018   • GERD (gastroesophageal reflux disease)    • Irritable bowel syndrome    • Migraine    • Mild cervical dysplasia    • Pituitary adenoma ([address] )    • Pregnancy     1    • Thyroiditis 10/31/2018   • Uterine leiomyoma      Past Surgical History:   Procedure Laterality Date   • ABDOMINAL SURGERY     • CHOLECYSTECTOMY     • COLONOSCOPY     • COLPOSCOPY      onset: 2/4/08   • ESOPHAGOGASTRODUODENOSCOPY     • LAPAROSCOPY     • VA COLONOSCOPY FLX DX W/COLLJ SPEC WHEN PFRMD N/A 2/16/2018    Procedure: COLONOSCOPY;  Surgeon: Melida Bahena DO;  Location: [facility];  Service: Gastroenterology   • VA ESOPHAGOGASTRODUODENOSCOPY TRANSORAL DIAGNOSTIC N/A 3/19/2019    Procedure: ESOPHAGOGASTRODUODENOSCOPY (EGD);   Surgeon: Soraya Seymour MD;  Location: [facility];  Service: Gastroenterology   • TUBAL LIGATION       Social History   Social History     Substance and Sexual Activity   Alcohol Use No     Social History     Substance and Sexual Activity   Drug Use No     Social History     Tobacco Use   Smoking Status Never   Smokeless Tobacco Never     Family History   Problem Relation Age of Onset   • Hyperlipidemia Mother    • Hypertension Mother    • Hashimoto's thyroiditis Mother    • Breast cancer Paternal Grandmother 43   • Ovarian cancer Paternal Grandmother    • Heart disease Family    • Diabetes Family    • Thyroid disease Family "  • Hypertension Brother    • Hashimoto's thyroiditis Brother    • Breast cancer Paternal Aunt 43   • Ovarian cancer Paternal Aunt    • Lung cancer Father    • Cancer Father         unknown primary; metastasis    • No Known Problems Sister    • No Known Problems Daughter    • Diabetes Maternal Grandmother    • No Known Problems Maternal Grandfather    • Bone cancer Paternal Grandfather    • No Known Problems Maternal Aunt    • Prostate cancer Paternal Uncle        Meds/Allergies     (Not in a hospital admission)      Allergies   Allergen Reactions   • Sulfamethoxazole-Trimethoprim Other (See Comments)     lightheaded       Objective     /80   Pulse 94   Temp 97 7 °F (36 5 °C) (Tympanic)   Resp 18   Ht 5' 5\" (1 651 m)   Wt 74 4 kg (164 lb 0 4 oz)   LMP 01/01/2018   SpO2 99%   BMI 27 29 kg/m²       PHYSICAL EXAM    Gen: NAD  CV: RRR  CHEST: Clear  ABD: soft, NT/ND  EXT: no edema      ASSESSMENT/PLAN:  This is a 56y o  year old female here for egd, and she is stable and optimized for her procedure              "

## 2023-06-02 NOTE — ANESTHESIA POSTPROCEDURE EVALUATION
Post-Op Assessment Note    CV Status:  Stable    Pain management: satisfactory to patient     Mental Status:  Sleepy   Hydration Status:  Stable   PONV Controlled:  None   Airway Patency:  Patent      Post Op Vitals Reviewed: Yes      Staff: CRNA         No notable events documented      BP   86/53   Temp   97 4   Pulse  84   Resp   15   SpO2 100

## 2023-06-07 PROCEDURE — 88305 TISSUE EXAM BY PATHOLOGIST: CPT | Performed by: PATHOLOGY

## 2023-06-17 DIAGNOSIS — K21.9 GASTROESOPHAGEAL REFLUX DISEASE WITHOUT ESOPHAGITIS: ICD-10-CM

## 2023-06-17 RX ORDER — OMEPRAZOLE 40 MG/1
CAPSULE, DELAYED RELEASE ORAL
Qty: 90 CAPSULE | Refills: 0 | Status: SHIPPED | OUTPATIENT
Start: 2023-06-17

## 2023-06-29 NOTE — PROGRESS NOTES
Assessment/Plan:    Will continue care with Dr Jaida Benjamin  Recommended monthly SBE, annual CBE and annual screening mammo  ASCCP guidelines reviewed and pap with cotesting noted to be up to date; this low risk patient was advised she meets criteria to d/c pap screening at age 74  No pap done  Colonoscopy noted to be up to date    Reviewed diet/activity recommendations Calcium 1200 mg and Vit D 600-1000 IU daily  Discussed postmenopausal considerations and symptoms to report  Kegel exercises as instructed  RTO in one year for routine annual gyn exam or sooner PRN  Diagnoses and all orders for this visit:    Encounter for gynecological examination (general) (routine) without abnormal findings    Screening mammogram for breast cancer  -     Mammo screening bilateral w 3d & cad; Future        Subjective:      Patient ID: Neeraj Zapata is a 56 y o  female  This patient presents for routine annual gyn exam    Patient has dyspareunia  Has improved with vaginal hormonal cream, but continues with pain with deep penetration when initiating sex, but it does resolve on its own after a few minutes during sex  She follows with Dr Jaida Benjamin for hormonal health  Of note, on work up with Dr Jaida Benjamin, patient is positive for Factor V  Patient also has hx of migraine without aura which have improved with Emgality      Family hx of PGM ovarian cancer and breast cancer, age 40/ Paternal aunt ovarian and breast cancer, age 43  Father  of cancer within 12 weeks of diagnosis and patient states primary source was not established  Have discussed genetic testing in the past    She denies  bleeding or spotting, pelvic pain, breast concerns, abnormal discharge, bowel/bladder dysfunction, depression/anx  , sexually active and is monogamous  Denies STI concerns  No hx of STIs  Last pap/HPV 21  Mammography up to date and normal, 22  Colonoscopy done in 2018 per patient  DXA 22, osteopenia        The "following portions of the patient's history were reviewed and updated as appropriate: allergies, current medications, past family history, past medical history, past social history, past surgical history and problem list     Review of Systems   Constitutional: Negative  Respiratory: Negative  Cardiovascular: Negative  Gastrointestinal: Negative  Endocrine: Negative  Genitourinary: Negative for dysuria, frequency, pelvic pain, urgency, vaginal bleeding, vaginal discharge and vaginal pain  Musculoskeletal: Negative  Skin: Negative  Neurological: Negative  Psychiatric/Behavioral: Negative  Objective:      /60   Ht 5' 5\" (1 651 m)   Wt 73 kg (161 lb)   LMP 01/01/2018   BMI 26 79 kg/m²          Physical Exam  Vitals and nursing note reviewed  Exam conducted with a chaperone present  Constitutional:       Appearance: Normal appearance  She is well-developed  HENT:      Head: Normocephalic and atraumatic  Neck:      Thyroid: No thyroid mass or thyromegaly  Cardiovascular:      Rate and Rhythm: Normal rate and regular rhythm  Heart sounds: Normal heart sounds  Pulmonary:      Effort: Pulmonary effort is normal       Breath sounds: Normal breath sounds  Chest:   Breasts:     Breasts are symmetrical       Right: No inverted nipple, mass, nipple discharge, skin change or tenderness  Left: No inverted nipple, mass, nipple discharge, skin change or tenderness  Abdominal:      General: Bowel sounds are normal       Palpations: Abdomen is soft  Tenderness: There is no abdominal tenderness  Hernia: There is no hernia in the left inguinal area or right inguinal area  Genitourinary:     General: Normal vulva  Exam position: Supine  Pubic Area: No rash  Labia:         Right: No rash, tenderness, lesion or injury  Left: No rash, tenderness, lesion or injury         Urethra: No prolapse, urethral pain, urethral swelling or urethral " lesion  Vagina: Normal  No signs of injury and foreign body  No vaginal discharge, erythema, tenderness, bleeding, lesions or prolapsed vaginal walls  Cervix: No cervical motion tenderness, discharge, friability, lesion, erythema, cervical bleeding or eversion  Uterus: Not deviated, not enlarged, not fixed, not tender and no uterine prolapse  Adnexa:         Right: No mass, tenderness or fullness  Left: No mass, tenderness or fullness  Rectum: No external hemorrhoid  Comments: Urethra normal without lesions  No bladder tenderness  Musculoskeletal:         General: Normal range of motion  Cervical back: Normal range of motion and neck supple  Lymphadenopathy:      Lower Body: No right inguinal adenopathy  No left inguinal adenopathy  Skin:     General: Skin is warm and dry  Neurological:      Mental Status: She is alert and oriented to person, place, and time  Psychiatric:         Speech: Speech normal          Behavior: Behavior normal  Behavior is cooperative

## 2023-06-30 ENCOUNTER — ANNUAL EXAM (OUTPATIENT)
Dept: GYNECOLOGY | Facility: CLINIC | Age: 58
End: 2023-06-30
Payer: COMMERCIAL

## 2023-06-30 VITALS
DIASTOLIC BLOOD PRESSURE: 60 MMHG | WEIGHT: 161 LBS | BODY MASS INDEX: 26.82 KG/M2 | HEIGHT: 65 IN | SYSTOLIC BLOOD PRESSURE: 100 MMHG

## 2023-06-30 DIAGNOSIS — Z01.419 ENCOUNTER FOR GYNECOLOGICAL EXAMINATION (GENERAL) (ROUTINE) WITHOUT ABNORMAL FINDINGS: Primary | ICD-10-CM

## 2023-06-30 DIAGNOSIS — Z12.31 SCREENING MAMMOGRAM FOR BREAST CANCER: ICD-10-CM

## 2023-07-02 ENCOUNTER — APPOINTMENT (OUTPATIENT)
Dept: LAB | Facility: HOSPITAL | Age: 58
End: 2023-07-02

## 2023-07-02 ENCOUNTER — APPOINTMENT (OUTPATIENT)
Dept: LAB | Facility: HOSPITAL | Age: 58
End: 2023-07-02
Payer: COMMERCIAL

## 2023-07-02 DIAGNOSIS — K76.0 HEPATIC STEATOSIS: ICD-10-CM

## 2023-07-02 DIAGNOSIS — Z00.8 HEALTH EXAMINATION IN POPULATION SURVEY: ICD-10-CM

## 2023-07-02 LAB
ALBUMIN SERPL BCP-MCNC: 4.3 G/DL (ref 3.5–5)
ALP SERPL-CCNC: 86 U/L (ref 34–104)
ALT SERPL W P-5'-P-CCNC: 15 U/L (ref 7–52)
ANION GAP SERPL CALCULATED.3IONS-SCNC: 7 MMOL/L
AST SERPL W P-5'-P-CCNC: 20 U/L (ref 13–39)
BILIRUB SERPL-MCNC: 0.36 MG/DL (ref 0.2–1)
BUN SERPL-MCNC: 15 MG/DL (ref 5–25)
CALCIUM SERPL-MCNC: 9.7 MG/DL (ref 8.4–10.2)
CHLORIDE SERPL-SCNC: 104 MMOL/L (ref 96–108)
CHOLEST SERPL-MCNC: 204 MG/DL
CO2 SERPL-SCNC: 28 MMOL/L (ref 21–32)
CREAT SERPL-MCNC: 1.04 MG/DL (ref 0.6–1.3)
EST. AVERAGE GLUCOSE BLD GHB EST-MCNC: 120 MG/DL
GFR SERPL CREATININE-BSD FRML MDRD: 60 ML/MIN/1.73SQ M
GLUCOSE P FAST SERPL-MCNC: 106 MG/DL (ref 65–99)
HBA1C MFR BLD: 5.8 %
HDLC SERPL-MCNC: 57 MG/DL
LDLC SERPL CALC-MCNC: 131 MG/DL (ref 0–100)
NONHDLC SERPL-MCNC: 147 MG/DL
POTASSIUM SERPL-SCNC: 4.5 MMOL/L (ref 3.5–5.3)
PROT SERPL-MCNC: 6.8 G/DL (ref 6.4–8.4)
SODIUM SERPL-SCNC: 139 MMOL/L (ref 135–147)
TRIGL SERPL-MCNC: 78 MG/DL

## 2023-07-02 PROCEDURE — 80053 COMPREHEN METABOLIC PANEL: CPT

## 2023-07-02 PROCEDURE — 80061 LIPID PANEL: CPT

## 2023-07-02 PROCEDURE — 36415 COLL VENOUS BLD VENIPUNCTURE: CPT

## 2023-07-02 PROCEDURE — 83036 HEMOGLOBIN GLYCOSYLATED A1C: CPT

## 2023-07-07 ENCOUNTER — TELEPHONE (OUTPATIENT)
Dept: NEUROLOGY | Facility: CLINIC | Age: 58
End: 2023-07-07

## 2023-07-07 NOTE — TELEPHONE ENCOUNTER
Received vm form 11:52am- hi my name is dorinne welsh. My phone, my phone number is [phone]. I do need a prior auth for my emgality. I know the pharmacy said that they reached out to you a couple of days ago, but so far they didn't get that approval. So if [facility] could [facility]. So I can get my emgality filled, i appreciate it. Thanks bye.   ---------------------------------------  [facility] PA  on     Emgality PA completed on [facility]  Key: [facility]   Urgent request    Called pt. Made her aware that PA was submitted urgently. States that she is due on  for injection. Made her aware that pharm can try and process over the weekend or Monday and if approved will go through  Advised we can call her once we hear back from insurance.    [phone] to leave detailed message

## 2023-07-11 ENCOUNTER — HOSPITAL ENCOUNTER (OUTPATIENT)
Dept: ULTRASOUND IMAGING | Facility: HOSPITAL | Age: 58
Discharge: HOME | End: 2023-07-11

## 2023-07-11 DIAGNOSIS — K76.0 HEPATIC STEATOSIS: ICD-10-CM

## 2023-07-12 ENCOUNTER — HOSPITAL ENCOUNTER (OUTPATIENT)
Dept: ULTRASOUND IMAGING | Facility: HOSPITAL | Age: 58
Discharge: HOME | End: 2023-07-12
Payer: COMMERCIAL

## 2023-07-12 DIAGNOSIS — K76.0 HEPATIC STEATOSIS: ICD-10-CM

## 2023-07-12 PROCEDURE — 76981 USE PARENCHYMA: CPT

## 2023-07-14 ENCOUNTER — OFFICE VISIT (OUTPATIENT)
Dept: FAMILY MEDICINE CLINIC | Facility: CLINIC | Age: 58
End: 2023-07-14
Payer: COMMERCIAL

## 2023-07-14 VITALS
HEART RATE: 70 BPM | HEIGHT: 65 IN | SYSTOLIC BLOOD PRESSURE: 118 MMHG | WEIGHT: 161 LBS | RESPIRATION RATE: 18 BRPM | DIASTOLIC BLOOD PRESSURE: 72 MMHG | BODY MASS INDEX: 26.82 KG/M2 | TEMPERATURE: 97.7 F

## 2023-07-14 DIAGNOSIS — E03.8 OTHER SPECIFIED HYPOTHYROIDISM: ICD-10-CM

## 2023-07-14 DIAGNOSIS — G43.709 CHRONIC MIGRAINE WITHOUT AURA WITHOUT STATUS MIGRAINOSUS, NOT INTRACTABLE: Primary | ICD-10-CM

## 2023-07-14 DIAGNOSIS — K59.09 OTHER CONSTIPATION: ICD-10-CM

## 2023-07-14 PROBLEM — U07.1 COVID-19: Status: RESOLVED | Noted: 2021-08-11 | Resolved: 2023-07-14

## 2023-07-14 PROBLEM — Z86.16 HISTORY OF COVID-19: Status: RESOLVED | Noted: 2021-09-28 | Resolved: 2023-07-14

## 2023-07-14 PROBLEM — R07.9 CHEST PAIN: Status: RESOLVED | Noted: 2021-09-28 | Resolved: 2023-07-14

## 2023-07-14 PROBLEM — G44.309 POST-TRAUMATIC HEADACHE: Status: RESOLVED | Noted: 2022-05-11 | Resolved: 2023-07-14

## 2023-07-14 PROBLEM — R29.818 SUSPECTED SLEEP APNEA: Status: RESOLVED | Noted: 2018-05-17 | Resolved: 2023-07-14
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
## 2023-07-14 PROBLEM — R59.0 LYMPHADENOPATHY, AXILLARY: Status: RESOLVED | Noted: 2021-03-04 | Resolved: 2023-07-14

## 2023-07-14 PROBLEM — S05.11XD: Status: RESOLVED | Noted: 2022-03-23 | Resolved: 2023-07-14

## 2023-07-14 PROCEDURE — 99214 OFFICE O/P EST MOD 30 MIN: CPT | Performed by: INTERNAL MEDICINE

## 2023-07-14 NOTE — PROGRESS NOTES
Name: Amy Mendez      : 1965      MRN: [id number]  Encounter Provider: Arianna Triana DO  Encounter Date: 2023   Encounter department: [address]     1. Chronic migraine without aura without status migrainosus, not intractable  Sxs fairly managed and are better than they were on current regimen Pt does follow with Neurology     2. Other constipation  Pt is working with GI and Miralax is beneficial and following with GI - has appt upcoming     3. Other specified hypothyroidism  Stable on current rx      BMI Counseling: Body mass index is 26.79 kg/m². The BMI is above normal. Nutrition recommendations include consuming healthier snacks and increasing intake of lean protein. Rationale for BMI follow-up plan is due to patient being overweight or obese. Depression Screening and Follow-up Plan: Patient was screened for depression during today's encounter. They screened negative with a PHQ-2 score of 0. Annual/prn   Subjective      HPI   Pt doing ok overall Is working with GI and sxs are better than they were Migraines are better managed on current regimen Pt is trying to fit in exercise but schedule hectic at times She had episode of fatigue that was limiting few weeks ago but sxs have stabilized now She is staying hydrated   Review of Systems   Constitutional: Negative for chills and fever. HENT: Negative. Eyes: Negative for visual disturbance. Respiratory: Negative for cough and shortness of breath. Cardiovascular: Negative for chest pain, palpitations and leg swelling. Gastrointestinal: Negative for abdominal distention and abdominal pain. Genitourinary: Negative. Musculoskeletal: Positive for arthralgias. Neurological: Negative for dizziness, light-headedness and headaches. Psychiatric/Behavioral: Negative for sleep disturbance. The patient is not nervous/anxious.         Current Outpatient Medications on File Prior to Visit Medication Sig   • Alpha-Lipoic Acid 100 MG TABS Take 100 mg by mouth 2 (two) times a day    • Ascorbic Acid (VITAMIN C) 1000 MG tablet Take 1,000 mg by mouth daily   • Cholecalciferol (Vitamin D3) 1.25 MG (99777 UT) CAPS Take 1 capsule (50,000 Units total) by mouth once a week   • dicyclomine (BENTYL) 20 mg tablet Take 1 tablet (20 mg total) by mouth every 6 (six) hours as needed (abdominal pain)   • diphenhydrAMINE (BENADRYL) 25 mg capsule Take 50 mg by mouth as needed (migraines)    • Emgality 120 MG/ML SOAJ INJECT 120MG SUBCUTANEOUSLY EVERY 30 DAYS   • famotidine (PEPCID) 40 MG tablet Take 1 tablet (40 mg total) by mouth daily at bedtime   • ketorolac (TORADOL) 10 mg tablet 1 tabs at onset of migraine, t.i.d. P.r.n. Take with food/milk/antacid. • Lasmiditan Succinate (REYVOW) 100 MG tablet Take 1 tablet (100mg)  one time as needed for migraine. Do not use more than one dose per day, or more than 8 doses per month   • levothyroxine 75 mcg tablet    • liothyronine (CYTOMEL) 5 mcg tablet Take 5 mcg by mouth in the morning. • Magnesium 400 MG CAPS Take 400 mg by mouth daily   • Naproxen Sodium 220 MG CAPS Take 2 capsules by mouth as needed   • NON FORMULARY Take 1 tablet by mouth 2 (two) times a day truadapt   • Omega-3 Fatty Acids (FISH OIL) 1,000 mg Take 1,000 mg by mouth daily   • omeprazole (PriLOSEC) 40 MG capsule TAKE ONE CAPSULE BY MOUTH DAILY   • ondansetron (ZOFRAN-ODT) 8 mg disintegrating tablet Take 1 tablet (8 mg total) by mouth every 8 (eight) hours as needed for nausea or vomiting   • patient supplied medication Take 1 each by mouth daily bergamot bpf   • selenium 50 MCG TABS Take 50 mcg by mouth in the morning.    • ergocalciferol (VITAMIN D2) 50,000 units TAKE ONE CAPSULE BY MOUTH ONCE WEEKLY (Patient not taking: Reported on 3/9/2023)   • prochlorperazine (COMPAZINE) 10 mg tablet Take 1 tablet (10 mg total) by mouth every 6 (six) hours as needed (migraine) (Patient not taking: Reported on 7/14/2023)   • [DISCONTINUED] dexamethasone (DECADRON) 2 mg tablet Take 1 tablet (2 mg total) by mouth daily with breakfast (Patient not taking: Reported on 6/30/2023)   • [DISCONTINUED] divalproex sodium (DEPAKOTE ER) 500 mg 24 hr tablet Take 1 tablet (500 mg total) by mouth daily (Patient not taking: Reported on 3/9/2023)   • [DISCONTINUED] levothyroxine 50 mcg tablet Take 1 tablet (50 mcg total) by mouth daily (Patient not taking: Reported on 6/30/2023)   • [DISCONTINUED] Nurtec 75 MG TBDP DISSOLVE 1 TABLET IN MOUTH AS NEEDED FOR MIGRAINE (Patient not taking: Reported on 6/30/2023)       Objective     /72   Pulse 70   Temp 97.7 °F (36.5 °C) (Temporal)   Resp 18   Ht 5' 5" (1.651 m)   Wt 73 kg (161 lb)   LMP 01/01/2018   BMI 26.79 kg/m²     Physical Exam  Vitals reviewed. Constitutional:       General: She is not in acute distress. Appearance: Normal appearance. She is not ill-appearing, toxic-appearing or diaphoretic. HENT:      Head: Normocephalic and atraumatic. Right Ear: External ear normal.      Left Ear: External ear normal.      Nose: Nose normal.      Mouth/Throat:      Mouth: Mucous membranes are moist.   Eyes:      General: No scleral icterus. Extraocular Movements: Extraocular movements intact. Conjunctiva/sclera: Conjunctivae normal.      Pupils: Pupils are equal, round, and reactive to light. Cardiovascular:      Rate and Rhythm: Normal rate. Pulses: Normal pulses. Heart sounds: Normal heart sounds. Pulmonary:      Effort: Pulmonary effort is normal. No respiratory distress. Breath sounds: Normal breath sounds. No wheezing. Abdominal:      General: Bowel sounds are normal. There is no distension. Palpations: Abdomen is soft. Tenderness: There is no abdominal tenderness. Musculoskeletal:      Cervical back: Normal range of motion and neck supple. Right lower leg: No edema. Left lower leg: No edema.    Lymphadenopathy: Cervical: No cervical adenopathy. Skin:     General: Skin is warm and dry. Coloration: Skin is not jaundiced or pale. Neurological:      General: No focal deficit present. Mental Status: She is alert and oriented to person, place, and time. Mental status is at baseline. Cranial Nerves: No cranial nerve deficit. Sensory: No sensory deficit. Psychiatric:         Mood and Affect: Mood normal.         Behavior: Behavior normal.         Thought Content:  Thought content normal.         Judgment: Judgment normal.       Lisa Verma,

## 2023-07-15 ENCOUNTER — PATIENT MESSAGE (OUTPATIENT)
Dept: NEUROLOGY | Facility: CLINIC | Age: 58
End: 2023-07-15

## 2023-07-17 DIAGNOSIS — G43.009 MIGRAINE WITHOUT AURA AND WITHOUT STATUS MIGRAINOSUS, NOT INTRACTABLE: Primary | ICD-10-CM

## 2023-07-17 RX ORDER — RIMEGEPANT SULFATE 75 MG/75MG
75 TABLET, ORALLY DISINTEGRATING ORAL AS NEEDED
Qty: 16 TABLET | Refills: 11 | Status: SHIPPED | OUTPATIENT
Start: 2023-07-17
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
## 2023-07-25 ENCOUNTER — TELEPHONE (OUTPATIENT)
Dept: NEUROLOGY | Facility: CLINIC | Age: 58
End: 2023-07-25

## 2023-07-25 DIAGNOSIS — G43.009 MIGRAINE WITHOUT AURA AND WITHOUT STATUS MIGRAINOSUS, NOT INTRACTABLE: ICD-10-CM

## 2023-07-25 NOTE — TELEPHONE ENCOUNTER
per jamir moe from 7/15   Lanre Hand  to [address] [place] [address] Clinical Team 5 (supporting Leeann Jacobs)          7/15/23  2:58 PM  [facility] all is well with you. I was wondering if I could get my nurtec refilled. When I take the reyvow it knocks me out but it works. I use nurtec when I need to still function.    Layton Pulido PA completed on [facility]  Key: [id number]  Urgent request     jamir message sent to pt

## 2023-07-25 NOTE — TELEPHONE ENCOUNTER
----- Message from Rico Crain sent at 7/24/2023  8:07 AM EDT -----  Regarding: [facility]   Contact: [phone]  Good morning Hilda Presley     I talked to [address] this morning. They sent a message over on the 17 to get a prior authorization. Even if it does not go through they need that so they can use the coupon so they can refill my prescription. Any questions please reach out. Hope all is well with you.    Layton Alcazar

## 2023-07-28 NOTE — TELEPHONE ENCOUNTER
Called and advised pt of the below. She verbalized clear understanding. Pt states that someone from the pharmacy told her that even if denied, they can use the savings card. Called Katie, spoke to ERIN and made aware of the denial and above. She was not able to process through just the savings card. Called [phone] and left a detailed message on pt's answering machine of the above and for a call back if any questions or if she wants her script sent to [facility].

## 2023-07-28 NOTE — TELEPHONE ENCOUNTER
Please let her know.   If she can't get it filled at [facility] we can send to [facility]

## 2023-07-28 NOTE — TELEPHONE ENCOUNTER
nurtec denied as they need confirmation that it will not be used in combp with another acute migraine therapy.     pt is also using reyvow    Please advise

## 2023-07-29 DIAGNOSIS — E55.9 VITAMIN D DEFICIENCY: ICD-10-CM

## 2023-07-30 RX ORDER — ERGOCALCIFEROL 1.25 MG/1
CAPSULE ORAL
Qty: 12 CAPSULE | Refills: 0 | Status: SHIPPED | OUTPATIENT
Start: 2023-07-30

## 2023-07-31 NOTE — TELEPHONE ENCOUNTER
7/28 4:08    Pt left a VM returning our call. Transcribed VM:   Hi, somebody just try to call me. I'm just calling you back. My name is Stephanie Allison. My phone number is [phone]. Thank you.

## 2023-08-01 RX ORDER — RIMEGEPANT SULFATE 75 MG/75MG
75 TABLET, ORALLY DISINTEGRATING ORAL AS NEEDED
Qty: 16 TABLET | Refills: 11 | Status: SHIPPED | OUTPATIENT
Start: 2023-08-01

## 2023-08-01 NOTE — TELEPHONE ENCOUNTER
Called pt and advised of the below. States that she needs to take Nurtec prn. Requesting script be sent to [facility]    rx entered.  Pls review and sign off    thanks

## 2023-08-07 ENCOUNTER — OFFICE VISIT (OUTPATIENT)
Dept: GASTROENTEROLOGY | Facility: CLINIC | Age: 58
End: 2023-08-07
Payer: COMMERCIAL

## 2023-08-07 VITALS
TEMPERATURE: 97.6 F | WEIGHT: 162 LBS | HEIGHT: 65 IN | DIASTOLIC BLOOD PRESSURE: 68 MMHG | HEART RATE: 56 BPM | OXYGEN SATURATION: 98 % | BODY MASS INDEX: 26.99 KG/M2 | SYSTOLIC BLOOD PRESSURE: 118 MMHG

## 2023-08-07 DIAGNOSIS — K31.84 GASTROPARESIS: ICD-10-CM

## 2023-08-07 DIAGNOSIS — K76.0 HEPATIC STEATOSIS: ICD-10-CM

## 2023-08-07 DIAGNOSIS — K21.9 GASTROESOPHAGEAL REFLUX DISEASE WITHOUT ESOPHAGITIS: Primary | ICD-10-CM

## 2023-08-07 DIAGNOSIS — R11.0 NAUSEA: ICD-10-CM

## 2023-08-07 DIAGNOSIS — K59.00 CONSTIPATION, UNSPECIFIED CONSTIPATION TYPE: ICD-10-CM

## 2023-08-07 DIAGNOSIS — Z12.11 SCREENING FOR COLON CANCER: ICD-10-CM

## 2023-08-07 PROCEDURE — 99214 OFFICE O/P EST MOD 30 MIN: CPT | Performed by: PHYSICIAN ASSISTANT

## 2023-08-07 RX ORDER — FAMOTIDINE 40 MG/1
40 TABLET, FILM COATED ORAL
Qty: 90 TABLET | Refills: 3 | Status: SHIPPED | OUTPATIENT
Start: 2023-08-07

## 2023-08-07 RX ORDER — OMEPRAZOLE 40 MG/1
40 CAPSULE, DELAYED RELEASE ORAL DAILY
Qty: 90 CAPSULE | Refills: 3 | Status: SHIPPED | OUTPATIENT
Start: 2023-08-07
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
## 2023-08-07 NOTE — PATIENT INSTRUCTIONS
Continue on omeprazole in the morning. Continue on famotidine/pepcid at night. Continue with small frequent meals. I will give you low-dose Linzess to try. This may help you to have more complete evacuation and better stool caliber, those please be cautioned that it may give you diarrhea initially. I would give it at least a weeks trial and take 1 in the morning every day. It is challenging because typically fiber helps to add bulk and formed to stool, though in the past you have had side effects to this. You have tried MiraLAX, and the other over-the-counter medications are either not recommended for chronic use or lack of efficacy. You can also try something like colace which is a stool softener.

## 2023-08-14 NOTE — PROGRESS NOTES
Established Patient Progress Note       Chief Complaint   Patient presents with    Thyroid Problem        History of Present Illness:     Marcela Ayala is a 48 y o  female with a history of hyperthyroidism due to Thyroiditis  Scan showed low uptake consistent with thyroiditis  Hyperthyroid symptoms resolved and she developed hypothyroidism  She had severe fatigue  She has been started on levothyroxine about 1 month ago and fatigue is improving  She does continue with hot flashes and difficulty sleeping  Weight has been stable  She had  Thyroid ultrasound which showed  Colloid cysts stable compared to 2016  She has FH hypothyroidism in mother and brother  For Vitamin D Deficiency, she is taking supplements       Patient Active Problem List   Diagnosis    Ambulatory dysfunction    Hyperlipidemia    Migraine without aura and without status migrainosus, not intractable    Other insomnia    Abnormal uterine bleeding (AUB)    Suspected sleep apnea    Hypersomnia    Anxiety    Hot flashes    Ataxia    Atopic dermatitis    Constipation    Gastroparesis    Hamstring tendonitis of left thigh    Iliotibial band syndrome of left side    Lumbar disc herniation with radiculopathy    Menopausal disorder    Piriformis syndrome, left    Thyroid nodule    Vitamin D deficiency    Throat pain    Abnormal TSH    Thyroiditis    Other specified hypothyroidism      Past Medical History:   Diagnosis Date    Ataxia     Blood in urine     GERD (gastroesophageal reflux disease)     Hyperlipidemia     Irritable bowel syndrome     Mild cervical dysplasia     Pituitary adenoma (HCC)     Pregnancy     1     Uterine leiomyoma       Past Surgical History:   Procedure Laterality Date    ABDOMINAL SURGERY      CHOLECYSTECTOMY      COLONOSCOPY      COLPOSCOPY      onset: 2/4/08    ESOPHAGOGASTRODUODENOSCOPY      LAPAROSCOPY      [place] COLONOSCOPY FLX DX W/COLLJ SPEC WHEN PFRMD N/A 2/16/2018 Procedure: COLONOSCOPY;  Surgeon: Nancy Molina DO;  Location: [facility];  Service: Gastroenterology    TUBAL LIGATION        Family History   Problem Relation Age of Onset    Hyperlipidemia Mother     Hypertension Mother     Hashimoto's thyroiditis Mother     Breast cancer Paternal Grandmother     Heart disease Family     Diabetes Family     Thyroid disease Family     Hypertension Brother     Hashimoto's thyroiditis Brother      Social History   Substance Use Topics    Smoking status: Never Smoker    Smokeless tobacco: Never Used    Alcohol use No     Allergies   Allergen Reactions    Bactrim [Sulfamethoxazole-Trimethoprim] Other (See Comments)     lightheaded       Current Outpatient Prescriptions:     Ascorbic Acid (VITAMIN C) 1000 MG tablet, Take 1,000 mg by mouth daily, Disp: , Rfl:     Cholecalciferol (VITAMIN D3) 50758 units CAPS, Take 1 capsule (50,000 Units total) by mouth once a week, Disp: 12 capsule, Rfl: 3    dexlansoprazole (DEXILANT) 60 MG capsule, Take 1 capsule (60 mg total) by mouth daily for 90 days, Disp: 90 capsule, Rfl: 2    levothyroxine 50 mcg tablet, Take 1 tablet (50 mcg total) by mouth daily, Disp: 30 tablet, Rfl: 4    Magnesium 400 MG CAPS, Take 400 mg by mouth daily, Disp: , Rfl:     Omega-3 Fatty Acids (FISH OIL) 1,000 mg, Take 1,000 mg by mouth daily, Disp: , Rfl:     ondansetron (ZOFRAN-ODT) 8 mg disintegrating tablet, Take by mouth every 8 (eight) hours as needed, Disp: , Rfl:     Review of Systems   Constitutional: Positive for fatigue  Negative for activity change, appetite change, chills, diaphoresis, fever and unexpected weight change  HENT: Negative for trouble swallowing and voice change  Eyes: Negative for visual disturbance  Respiratory: Negative for shortness of breath  Cardiovascular: Negative for chest pain and palpitations  Gastrointestinal: Negative for abdominal pain, constipation and diarrhea     Endocrine: Negative for cold intolerance, heat intolerance, polydipsia, polyphagia and polyuria  Genitourinary: Negative for frequency and menstrual problem  Musculoskeletal: Negative for arthralgias and myalgias  Skin: Negative for rash  Allergic/Immunologic: Negative for food allergies  Neurological: Positive for headaches  Negative for dizziness and tremors  Hematological: Negative for adenopathy  Psychiatric/Behavioral: Positive for sleep disturbance  All other systems reviewed and are negative  Physical Exam:  Body mass index is 26 79 kg/m²  /90   Pulse 62   Ht 5' 6" (1 676 m)   Wt 75 3 kg (166 lb)   BMI 26 79 kg/m²    Wt Readings from Last 3 Encounters:   12/12/18 75 3 kg (166 lb)   10/31/18 74 8 kg (164 lb 12 8 oz)   10/18/18 73 8 kg (162 lb 9 6 oz)       Physical Exam   Constitutional: She is oriented to person, place, and time  She appears well-developed and well-nourished  No distress  HENT:   Head: Normocephalic and atraumatic  Eyes: Pupils are equal, round, and reactive to light  Conjunctivae are normal    Neck: Normal range of motion  Neck supple  No thyromegaly present  Cardiovascular: Normal rate, regular rhythm and normal heart sounds  Pulmonary/Chest: Effort normal and breath sounds normal  No respiratory distress  She has no wheezes  She has no rales  Abdominal: Soft  Bowel sounds are normal  She exhibits no distension  There is no tenderness  Musculoskeletal: Normal range of motion  She exhibits no edema  Neurological: She is alert and oriented to person, place, and time  Skin: Skin is warm and dry  Psychiatric: She has a normal mood and affect  Vitals reviewed        Labs:     Component      Latest Ref Rng & Units 9/13/2018 9/18/2018 10/31/2018   Total CK      26 - 192 U/L 59     Free T4      0 76 - 1 46 ng/dL 2 55 (H)  0 65 (L)   THYROID MICROSOMAL ANTIBODY      0 - 34 IU/mL  13    THYROGLOBULIN AB      0 0 - 0 9 IU/mL  <1 0    TSH 3RD GENERATON      0 358 - 3 740 uIU/mL 13 900 (H)   Vit D, 25-Hydroxy      30 0 - 100 0 ng/mL   68 8   THYROID STIMULATING IMMUNOGLOBULIN      0 00 - 0 55 IU/L   <0 10   T3, Total      0 60 - 1 80 ng/mL   1 00     Component      Latest Ref Rng & Units 11/14/2018   Total CK      26 - 192 U/L    Free T4      0 76 - 1 46 ng/dL 0 73 (L)   THYROID MICROSOMAL ANTIBODY      0 - 34 IU/mL    THYROGLOBULIN AB      0 0 - 0 9 IU/mL    TSH 3RD GENERATON      0 358 - 3 740 uIU/mL 9 310 (H)   Vit D, 25-Hydroxy      30 0 - 100 0 ng/mL    THYROID STIMULATING IMMUNOGLOBULIN      0 00 - 0 55 IU/L    T3, Total      0 60 - 1 80 ng/mL        Impression & Plan:    Problem List Items Addressed This Visit     Thyroid nodule - Primary     Stable colloid cyst, doesn't need follow up         Vitamin D deficiency     Continue Supplements  Thyroiditis     Hyperthyroid phase has resolved and she was started on levothyroxine for symptomatic/persistent hypothyroidism  Other specified hypothyroidism     Continue levothyroxine  She has order to repeat lab testing in the next few weeks after starting medication  No orders of the defined types were placed in this encounter  There are no Patient Instructions on file for this visit  Discussed with the patient and all questioned fully answered  She will call me if any problems arise  Follow-up appointment in 6 months       Counseled patient on diagnostic results, prognosis, risk and benefit of treatment options, instruction for management, importance of treatment compliance, Risk  factor reduction and impressions      Girma Ponce PA-C Area M Indication Text: Tumors in this location are included in Area M (cheek, forehead, scalp, neck, jawline and pretibial skin).  Mohs surgery is indicated for tumors in these anatomic locations.

## 2023-09-12 ENCOUNTER — TELEPHONE (OUTPATIENT)
Dept: NEUROLOGY | Facility: CLINIC | Age: 58
End: 2023-09-12

## 2023-09-12 DIAGNOSIS — H54.7 VISION LOSS: ICD-10-CM

## 2023-09-12 DIAGNOSIS — G43.011 INTRACTABLE MIGRAINE WITHOUT AURA AND WITH STATUS MIGRAINOSUS: Primary | ICD-10-CM

## 2023-09-12 NOTE — TELEPHONE ENCOUNTER
Patient called had very bad headache all last week. For a few seconds, patient couldn't see this happened Saturday. She is a patient of Ranjit Charles, last seen 5/11/22.   Please call patient at [phone]

## 2023-09-13 RX ORDER — DIVALPROEX SODIUM 500 MG/1
500 TABLET, EXTENDED RELEASE ORAL DAILY
Qty: 5 TABLET | Refills: 0 | Status: SHIPPED | OUTPATIENT
Start: 2023-09-13

## 2023-09-13 RX ORDER — DEXAMETHASONE 1 MG
1 TABLET ORAL DAILY PRN
Qty: 5 TABLET | Refills: 0 | Status: SHIPPED | OUTPATIENT
Start: 2023-09-13

## 2023-09-13 NOTE — TELEPHONE ENCOUNTER
Pt made aware of below. States that she saw an eye dr at [facility] in April to get her eyes check for glasses. States that she does not get the eye drops in her eyes as it causes a major migraine.       She will schedule with ophthalmology

## 2023-09-13 NOTE — TELEPHONE ENCOUNTER
Called pt. States that every morning she is waking up with headaches. This has been for the last couple months but tries to weather through it. States that Saturday evening, Everything was black and closed her eyes and fell asleep. When she woke up she was able to see again. This is the first time that this happened. It was both eyes. She had a migraine when this occurred. Her migraine at that time wasn't that bad as she took reyvow around 10:30am.  It was about 4/10. States that she knows she should have went to the ER but she didn't. Using reyvow and nurtec for abortive   Taking nurtec once a week   Really bad with taking meds, states that she would just rather deal with migraine than take medication  emgality-due the other day but forgot to pick it up. She will inject today.       Please advise  [phone] to leave detailed message or ok to send to send [employer] message

## 2023-09-13 NOTE — TELEPHONE ENCOUNTER
Let's see if we can break her cycle  Decadron 1 mg for 5 days  Depakote 500 mg for 5 nights    Needs to go to ophthalmologist, referral placed.   If happens again, needs to go to ER immediately

## 2023-09-14 DIAGNOSIS — G43.011 INTRACTABLE MIGRAINE WITHOUT AURA AND WITH STATUS MIGRAINOSUS: ICD-10-CM

## 2023-09-14 NOTE — TELEPHONE ENCOUNTER
Patient called and left vm returning call from our office     Called patient, made her aware that her reyvow was sent to the pharmacy per her request. Patient verbalized understanding, no further questions at this time

## 2023-09-14 NOTE — TELEPHONE ENCOUNTER
received vm from 9/13 at 1:53pm-Hi, this is radha ramirez. I did talk to Kunal Anthony today and I forgot to tell her that I do need a refill on my revo. So if somebody could refill my revo, i appreciate it. My phone number is [phone]. My name is radha ramirez. My date of birth is [birth year].  Thank you.  ------------------------------------------  Left message for pt confirming vm was received    Refill for reyvow entered, please sign off

## 2023-10-13 ENCOUNTER — HOSPITAL ENCOUNTER (OUTPATIENT)
Dept: MAMMOGRAPHY | Facility: HOSPITAL | Age: 58
Discharge: HOME | End: 2023-10-13
Payer: COMMERCIAL

## 2023-10-13 ENCOUNTER — TELEPHONE (OUTPATIENT)
Dept: NEUROLOGY | Facility: CLINIC | Age: 58
End: 2023-10-13

## 2023-10-13 DIAGNOSIS — Z12.31 SCREENING MAMMOGRAM FOR BREAST CANCER: ICD-10-CM

## 2023-10-13 DIAGNOSIS — G43.011 INTRACTABLE MIGRAINE WITHOUT AURA AND WITH STATUS MIGRAINOSUS: Primary | ICD-10-CM

## 2023-10-13 PROCEDURE — 77063 BREAST TOMOSYNTHESIS BI: CPT

## 2023-10-13 PROCEDURE — 77067 SCR MAMMO BI INCL CAD: CPT

## 2023-10-13 NOTE — TELEPHONE ENCOUNTER
Patient called in and would like a callback from the nurse team. Patient states that headaches are worse and they have not subsided

## 2023-10-17 NOTE — TELEPHONE ENCOUNTER
Chart reviewed:  Last seen 5/11/22  Per telenotes-  Migraine headaches without aura: What medications do you take or have you taken for your headaches? PREVENTIVE:   magnesium oxide 400 mg   Topamax (tingling and mental fogginess)  Venlafaxine  Cyproheptadine (weight gain)     ABORTIVE:  motrin, tylenol, ketorolac, aleve  Decadron  olanzapine  Rizatriptan, sumitriptan    Called pt and advised of the below. PT reports ongoing worsening HA since she loss her vision on 9/9/23. That night, she just closed her eyes and went to sleep. The next morning, her vision came back. States that she was trying to deal w/ the worsening HA but noticing on her left side where her eyebrow is, sometimes it gets swollen above her eyebrow. Some days are worse than others. States that she has ophthalmology appt on 11/7/23. She just don't want to try new meds. She wants to figure out what is causing her worsening HA. Current meds  Nurtec prn. Almost out of it and her ins will not pay for it. Advised that script was sent to [facility]. Pt verbalized understanding and states that she will call [facility] when she gets home to schedule delivery. She takes Reyvow prn when she's home    States that she tried and failed what was listed on her office notes. She tried 5 day course of depakote and decadron the end of sept. She had some relief but HA returns. It did not last long.      OVL scheduled 10/31/23 at 1 pm in [facility] office     Cb [phone], ok to leave a detailed message

## 2023-10-18 RX ORDER — DEXAMETHASONE 4 MG/1
TABLET ORAL
Qty: 10 TABLET | Refills: 0 | Status: SHIPPED | OUTPATIENT
Start: 2023-10-18

## 2023-10-18 RX ORDER — OLANZAPINE 5 MG/1
5 TABLET ORAL
Qty: 10 TABLET | Refills: 0 | Status: SHIPPED | OUTPATIENT
Start: 2023-10-18

## 2023-10-18 NOTE — TELEPHONE ENCOUNTER
Take Decadron 4 mg--2 tabs for 2 days then 1 tab for 3-5 days (until headache breaks)  Olanzapine 5 mg for 10 nights  We will discuss further at her visit

## 2023-10-30 DIAGNOSIS — E55.9 VITAMIN D DEFICIENCY: ICD-10-CM

## 2023-10-30 RX ORDER — ERGOCALCIFEROL 1.25 MG/1
CAPSULE ORAL
Qty: 12 CAPSULE | Refills: 0 | Status: SHIPPED | OUTPATIENT
Start: 2023-10-30

## 2023-10-31 ENCOUNTER — OFFICE VISIT (OUTPATIENT)
Dept: NEUROLOGY | Facility: CLINIC | Age: 58
End: 2023-10-31
Payer: COMMERCIAL

## 2023-10-31 ENCOUNTER — TELEPHONE (OUTPATIENT)
Dept: NEUROLOGY | Facility: CLINIC | Age: 58
End: 2023-10-31

## 2023-10-31 VITALS
HEIGHT: 65 IN | TEMPERATURE: 98.7 F | OXYGEN SATURATION: 97 % | WEIGHT: 160.2 LBS | BODY MASS INDEX: 26.69 KG/M2 | DIASTOLIC BLOOD PRESSURE: 77 MMHG | HEART RATE: 74 BPM | SYSTOLIC BLOOD PRESSURE: 119 MMHG

## 2023-10-31 DIAGNOSIS — G43.709 CHRONIC MIGRAINE WITHOUT AURA WITHOUT STATUS MIGRAINOSUS, NOT INTRACTABLE: Primary | ICD-10-CM

## 2023-10-31 DIAGNOSIS — G43.011 INTRACTABLE MIGRAINE WITHOUT AURA AND WITH STATUS MIGRAINOSUS: ICD-10-CM

## 2023-10-31 DIAGNOSIS — G43.009 MIGRAINE WITHOUT AURA AND WITHOUT STATUS MIGRAINOSUS, NOT INTRACTABLE: ICD-10-CM

## 2023-10-31 DIAGNOSIS — H53.133 SUDDEN VISUAL LOSS OF BOTH EYES: ICD-10-CM

## 2023-10-31 PROCEDURE — 99215 OFFICE O/P EST HI 40 MIN: CPT | Performed by: PHYSICIAN ASSISTANT

## 2023-10-31 RX ORDER — DIVALPROEX SODIUM 500 MG/1
500 TABLET, EXTENDED RELEASE ORAL DAILY
Qty: 10 TABLET | Refills: 0 | Status: SHIPPED | OUTPATIENT
Start: 2023-10-31

## 2023-10-31 RX ORDER — CELECOXIB 120 MG/4.8ML
120 LIQUID ORAL AS NEEDED
Qty: 28.8 ML | Refills: 11 | Status: SHIPPED | OUTPATIENT
Start: 2023-10-31

## 2023-10-31 RX ORDER — DEXAMETHASONE 4 MG/1
TABLET ORAL
Qty: 10 TABLET | Refills: 0 | Status: SHIPPED | OUTPATIENT
Start: 2023-10-31

## 2023-10-31 NOTE — ASSESSMENT & PLAN NOTE
Due to sudden loss of vision in both eyes we will proceed with MRI of the brain as patient did not have any imaging nor evaluation after this event. She also has had worsening of her migraines since then.   Therefore, we need to rule out any structural abnormalities which could be causing this event along with increasing headaches

## 2023-10-31 NOTE — ASSESSMENT & PLAN NOTE
Preventive:   Continue doing magnesium oxide 400mg in am daily. Continue Emgality 1 pen every 30 days--continue until Vyepti  We will authorize for Vyepti 100 mg IV every 3 months at infusion center. If you don't hear from us in 1-2 weeks, please reach out    Abortive: At the onset of mild headache patient's take Aleve. At onset of migraine, patient is to take Nurtec 75 mg. Limit of 1 in 24 hours  In addition if this isn't effective use Elyxyb 1 vial.  Limit of 1 in 24 hours  May use prochlorperazine 10 mg in addition. IF needed may use Reyvow 100 mg. Limit of 1 in 24 hours. Do not drive within 8 hours (may take on same day as Nurtec)    Decadron 4 mg--take 2 tabs for 2 days then 1 tab for 3-5 days.   You may stop when your headache breaks  In addition, take Depakote 500 mg at bedtime on the nights of your Decadron
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
## 2023-10-31 NOTE — PATIENT INSTRUCTIONS
Preventive:   Continue doing magnesium oxide 400mg in am daily. Continue Emgality 1 pen every 30 days--continue until Vyepti  We will authorize for Vyepti 100 mg IV every 3 months at infusion center. If you don't hear from us in 1-2 weeks, please reach out    Abortive: At the onset of mild headache patient's take Aleve. At onset of migraine, patient is to take Nurtec 75 mg. Limit of 1 in 24 hours  In addition if this isn't effective use Elyxyb 1 vial.  Limit of 1 in 24 hours  May use prochlorperazine 10 mg in addition. IF needed may use Reyvow 100 mg. Limit of 1 in 24 hours. Do not drive within 8 hours (may take on same day as Nurtec)    Decadron 4 mg--take 2 tabs for 2 days then 1 tab for 3-5 days. You may stop when your headache breaks  In addition, take Depakote 500 mg at bedtime on the nights of your Decadron    Bun and creatinine  MRI brain     Medication overuse headaches:   - We discussed medication overuse headache [facility]) and how to avoid it in the future. It was explained that all analgesics have the potential to cause medication overuse headache [facility]) and analgesic overuse can negate the effectiveness of headache preventive measures. After successful [address] treatment, preventive medications for an underlying primary headache disorder have a greater chance for success. Avoid medications with narcotics, barbiturates, or caffeine in them as these can cause rebound headaches after very few doses and can interfere with other headache medicine efficacy. Taking any analgesics for more than 2-3 days a week can cause medication overuse headache. Reproductive age women: Should take folic acid daily when taking anti-seizure drugs especially Depakote.      [place] Prescription Drug Monitoring Program report was reviewed and was appropriate      Headache management instructions  - When patient has a moderate to severe headache, they should seek rest, initiate relaxation and apply cold compresses to the head.   - Maintain regular sleep schedule. Adults need at least 7-8 hours of uninterrupted a night. - Limit over the counter medications such as Tylenol, Ibuprofen, Aleve, Excedrin. (No more than 3 times a week). - Maintain headache diary. We discussed an NANCY for a smart phone is "Migraine shelley"  - Limit caffeine to 1-2 cups 8 to 16 oz a day or less. - Avoid dietary trigger. (aged cheese, peanuts, MSG, aspartame and nitrates). - Patient is to have regular frequent meals to prevent headache onset. - Please drink at least 64 ounces of water a day to help remain hydrated. Please call with any questions or concerns.  Office number is [phone]

## 2023-10-31 NOTE — PROGRESS NOTES
Review of Systems   Constitutional:  Negative for appetite change, fatigue and fever. HENT: Negative. Negative for hearing loss, tinnitus, trouble swallowing and voice change. Eyes:  Negative for photophobia, pain and visual disturbance. Lost vision- 9/16/23. Left side eye socket swollen   Respiratory: Negative. Negative for shortness of breath. Cardiovascular: Negative. Negative for palpitations. Gastrointestinal: Negative. Negative for nausea and vomiting. Endocrine: Negative. Negative for cold intolerance. Genitourinary: Negative. Negative for dysuria, frequency and urgency. Musculoskeletal:  Negative for back pain, gait problem, myalgias and neck pain. Skin: Negative. Negative for rash. Allergic/Immunologic: Negative. Neurological:  Positive for headaches (same). Negative for dizziness, tremors, seizures, syncope, facial asymmetry, speech difficulty, weakness, light-headedness and numbness. Hematological: Negative. Does not bruise/bleed easily. Psychiatric/Behavioral: Negative. Negative for confusion, hallucinations and sleep disturbance. All other systems reviewed and are negative.

## 2023-11-03 ENCOUNTER — APPOINTMENT (OUTPATIENT)
Dept: LAB | Facility: HOSPITAL | Age: 58
End: 2023-11-03
Payer: COMMERCIAL

## 2023-11-03 DIAGNOSIS — E03.9 MYXEDEMA HEART DISEASE: ICD-10-CM

## 2023-11-03 DIAGNOSIS — N95.1 SYMPTOMATIC MENOPAUSAL OR FEMALE CLIMACTERIC STATES: ICD-10-CM

## 2023-11-03 DIAGNOSIS — I51.9 MYXEDEMA HEART DISEASE: ICD-10-CM

## 2023-11-03 DIAGNOSIS — R53.83 FATIGUE, UNSPECIFIED TYPE: ICD-10-CM

## 2023-11-03 DIAGNOSIS — G43.001 MIGRAINE WITHOUT AURA AND WITH STATUS MIGRAINOSUS, NOT INTRACTABLE: ICD-10-CM

## 2023-11-03 LAB
T3FREE SERPL-MCNC: 3.37 PG/ML (ref 2.5–3.9)
T4 FREE SERPL-MCNC: 1.02 NG/DL (ref 0.61–1.12)
TSH SERPL DL<=0.05 MIU/L-ACNC: 1.96 UIU/ML (ref 0.45–4.5)

## 2023-11-03 PROCEDURE — 86800 THYROGLOBULIN ANTIBODY: CPT

## 2023-11-03 PROCEDURE — 84432 ASSAY OF THYROGLOBULIN: CPT

## 2023-11-03 PROCEDURE — 36415 COLL VENOUS BLD VENIPUNCTURE: CPT

## 2023-11-03 PROCEDURE — 84481 FREE ASSAY (FT-3): CPT

## 2023-11-03 PROCEDURE — 84439 ASSAY OF FREE THYROXINE: CPT

## 2023-11-03 PROCEDURE — 86376 MICROSOMAL ANTIBODY EACH: CPT

## 2023-11-03 PROCEDURE — 84443 ASSAY THYROID STIM HORMONE: CPT

## 2023-11-04 LAB
THYROGLOB AB SERPL-ACNC: <1 IU/ML (ref 0–0.9)
THYROGLOB SERPL-MCNC: 0.8 NG/ML (ref 1.5–38.5)
THYROPEROXIDASE AB SERPL-ACNC: 10 IU/ML (ref 0–34)

## 2023-11-06 NOTE — TELEPHONE ENCOUNTER
received vm from 11/3 at 10:27am- hi, my name is [facility] and I'm calling from [employer] patient support program. The reason for my call, we received a enrollment form for a mutual patient. Patients. First name is sahra sandoval . Last name is ashley. Date of birth, 1965. The enrollment form we received for this patient was missing the diagnosis code which is required for us to complete enrollment and do benefits investigation. If you could please provide us with the diagnosis code on the enrollment form and fax it back to us at the fax number of [phone]. We would greatly appreciate it. Thank you and have a wonderful day bye.  -----------------------------------------  Edita-can you please add dx code and also re-write pt's  on form as rep gave a different . Jalen want to make sure they have the correct  on file.     Please fax updated form and scan into chart and send back to clinical

## 2023-11-07 ENCOUNTER — NEW PATIENT (OUTPATIENT)
Dept: URBAN - METROPOLITAN AREA CLINIC 6 | Facility: CLINIC | Age: 58
End: 2023-11-07

## 2023-11-07 DIAGNOSIS — H25.813: ICD-10-CM

## 2023-11-07 DIAGNOSIS — G43.109: ICD-10-CM

## 2023-11-07 PROCEDURE — 92004 COMPRE OPH EXAM NEW PT 1/>: CPT

## 2023-11-07 PROCEDURE — 92083 EXTENDED VISUAL FIELD XM: CPT

## 2023-11-07 ASSESSMENT — VISUAL ACUITY
OS_CC: 20/20
OD_CC: 20/25

## 2023-11-07 ASSESSMENT — TONOMETRY
OD_IOP_MMHG: 10
OS_IOP_MMHG: 10

## 2023-11-08 ENCOUNTER — TELEPHONE (OUTPATIENT)
Dept: NEUROLOGY | Facility: CLINIC | Age: 58
End: 2023-11-08

## 2023-11-11 ENCOUNTER — APPOINTMENT (OUTPATIENT)
Dept: LAB | Facility: HOSPITAL | Age: 58
End: 2023-11-11
Payer: COMMERCIAL

## 2023-11-11 DIAGNOSIS — H53.133 SUDDEN VISUAL LOSS OF BOTH EYES: ICD-10-CM

## 2023-11-11 LAB
BUN SERPL-MCNC: 17 MG/DL (ref 5–25)
CREAT SERPL-MCNC: 0.91 MG/DL (ref 0.6–1.3)
GFR SERPL CREATININE-BSD FRML MDRD: 70 ML/MIN/1.73SQ M

## 2023-11-11 PROCEDURE — 84520 ASSAY OF UREA NITROGEN: CPT

## 2023-11-11 PROCEDURE — 36415 COLL VENOUS BLD VENIPUNCTURE: CPT

## 2023-11-11 PROCEDURE — 82565 ASSAY OF CREATININE: CPT

## 2023-11-15 ENCOUNTER — HOSPITAL ENCOUNTER (OUTPATIENT)
Dept: MRI IMAGING | Facility: HOSPITAL | Age: 58
Discharge: HOME | End: 2023-11-15
Payer: COMMERCIAL

## 2023-11-15 DIAGNOSIS — H53.133 SUDDEN VISUAL LOSS OF BOTH EYES: ICD-10-CM

## 2023-11-15 PROCEDURE — G1004 CDSM NDSC: HCPCS

## 2023-11-15 PROCEDURE — 70553 MRI BRAIN STEM W/O & W/DYE: CPT

## 2023-11-15 PROCEDURE — A9585 GADOBUTROL INJECTION: HCPCS | Performed by: PHYSICIAN ASSISTANT

## 2023-11-15 RX ORDER — GADOBUTROL 604.72 MG/ML
7 INJECTION INTRAVENOUS
Status: COMPLETED | OUTPATIENT
Start: 2023-11-15 | End: 2023-11-15

## 2023-11-15 RX ADMIN — GADOBUTROL 7 ML: 604.72 INJECTION INTRAVENOUS at 16:19

## 2023-11-17 ENCOUNTER — HOSPITAL ENCOUNTER (OUTPATIENT)
Dept: ULTRASOUND IMAGING | Facility: HOSPITAL | Age: 58
Discharge: HOME | End: 2023-11-17
Payer: COMMERCIAL

## 2023-11-17 DIAGNOSIS — R53.82 CHRONIC FATIGUE, UNSPECIFIED: ICD-10-CM

## 2023-11-17 DIAGNOSIS — G43.001 MIGRAINE WITHOUT AURA, NOT INTRACTABLE, WITH STATUS MIGRAINOSUS: ICD-10-CM

## 2023-11-17 DIAGNOSIS — E05.90 HYPERTHYROIDISM: ICD-10-CM

## 2023-11-17 DIAGNOSIS — N95.1 MENOPAUSAL AND FEMALE CLIMACTERIC STATES: ICD-10-CM

## 2023-11-17 PROCEDURE — 76536 US EXAM OF HEAD AND NECK: CPT

## 2023-11-21 ENCOUNTER — PREP FOR PROCEDURE (OUTPATIENT)
Dept: NEUROLOGY | Facility: CLINIC | Age: 58
End: 2023-11-21

## 2023-11-21 DIAGNOSIS — G43.709 CHRONIC MIGRAINE WITHOUT AURA WITHOUT STATUS MIGRAINOSUS, NOT INTRACTABLE: Primary | ICD-10-CM

## 2023-11-21 RX ORDER — SODIUM CHLORIDE 9 MG/ML
20 INJECTION, SOLUTION INTRAVENOUS ONCE
OUTPATIENT
Start: 2023-11-27

## 2023-11-26 ENCOUNTER — APPOINTMENT (EMERGENCY)
Dept: CT IMAGING | Facility: HOSPITAL | Age: 58
End: 2023-11-26
Payer: COMMERCIAL

## 2023-11-26 ENCOUNTER — APPOINTMENT (EMERGENCY)
Dept: RADIOLOGY | Facility: HOSPITAL | Age: 58
End: 2023-11-26
Payer: COMMERCIAL

## 2023-11-26 ENCOUNTER — HOSPITAL ENCOUNTER (EMERGENCY)
Facility: HOSPITAL | Age: 58
Discharge: HOME | End: 2023-11-26
Attending: EMERGENCY MEDICINE
Payer: COMMERCIAL

## 2023-11-26 VITALS
SYSTOLIC BLOOD PRESSURE: 149 MMHG | OXYGEN SATURATION: 97 % | BODY MASS INDEX: 26.67 KG/M2 | TEMPERATURE: 98.3 F | WEIGHT: 160.27 LBS | RESPIRATION RATE: 19 BRPM | HEART RATE: 67 BPM | DIASTOLIC BLOOD PRESSURE: 74 MMHG

## 2023-11-26 DIAGNOSIS — E87.6 HYPOKALEMIA: ICD-10-CM

## 2023-11-26 DIAGNOSIS — R79.89 POSITIVE D DIMER: ICD-10-CM

## 2023-11-26 DIAGNOSIS — R07.9 CHEST PAIN: Primary | ICD-10-CM

## 2023-11-26 LAB
ALBUMIN SERPL BCP-MCNC: 4.6 G/DL (ref 3.5–5)
ALP SERPL-CCNC: 101 U/L (ref 34–104)
ALT SERPL W P-5'-P-CCNC: 15 U/L (ref 7–52)
ANION GAP SERPL CALCULATED.3IONS-SCNC: 11 MMOL/L
APTT PPP: 29 SECONDS (ref 23–37)
AST SERPL W P-5'-P-CCNC: 21 U/L (ref 13–39)
BASOPHILS # BLD AUTO: 0.03 THOUSANDS/ÂΜL (ref 0–0.1)
BASOPHILS NFR BLD AUTO: 1 % (ref 0–1)
BILIRUB SERPL-MCNC: 0.6 MG/DL (ref 0.2–1)
BUN SERPL-MCNC: 14 MG/DL (ref 5–25)
CALCIUM SERPL-MCNC: 9.6 MG/DL (ref 8.4–10.2)
CARDIAC TROPONIN I PNL SERPL HS: 3 NG/L
CHLORIDE SERPL-SCNC: 105 MMOL/L (ref 96–108)
CO2 SERPL-SCNC: 24 MMOL/L (ref 21–32)
CREAT SERPL-MCNC: 0.94 MG/DL (ref 0.6–1.3)
D DIMER PPP FEU-MCNC: 0.74 UG/ML FEU
EOSINOPHIL # BLD AUTO: 0.09 THOUSAND/ÂΜL (ref 0–0.61)
EOSINOPHIL NFR BLD AUTO: 2 % (ref 0–6)
ERYTHROCYTE [DISTWIDTH] IN BLOOD BY AUTOMATED COUNT: 11.8 % (ref 11.6–15.1)
GFR SERPL CREATININE-BSD FRML MDRD: 68 ML/MIN/1.73SQ M
GLUCOSE SERPL-MCNC: 114 MG/DL (ref 65–140)
HCT VFR BLD AUTO: 43.4 % (ref 34.8–46.1)
HGB BLD-MCNC: 14.3 G/DL (ref 11.5–15.4)
IMM GRANULOCYTES # BLD AUTO: 0.01 THOUSAND/UL (ref 0–0.2)
IMM GRANULOCYTES NFR BLD AUTO: 0 % (ref 0–2)
INR PPP: 0.94 (ref 0.84–1.19)
LYMPHOCYTES # BLD AUTO: 1.5 THOUSANDS/ÂΜL (ref 0.6–4.47)
LYMPHOCYTES NFR BLD AUTO: 30 % (ref 14–44)
MAGNESIUM SERPL-MCNC: 2 MG/DL (ref 1.9–2.7)
MCH RBC QN AUTO: 30 PG (ref 26.8–34.3)
MCHC RBC AUTO-ENTMCNC: 32.9 G/DL (ref 31.4–37.4)
MCV RBC AUTO: 91 FL (ref 82–98)
MONOCYTES # BLD AUTO: 0.54 THOUSAND/ÂΜL (ref 0.17–1.22)
MONOCYTES NFR BLD AUTO: 11 % (ref 4–12)
NEUTROPHILS # BLD AUTO: 2.9 THOUSANDS/ÂΜL (ref 1.85–7.62)
NEUTS SEG NFR BLD AUTO: 56 % (ref 43–75)
NRBC BLD AUTO-RTO: 0 /100 WBCS
PLATELET # BLD AUTO: 265 THOUSANDS/UL (ref 149–390)
PMV BLD AUTO: 9.6 FL (ref 8.9–12.7)
POTASSIUM SERPL-SCNC: 3.3 MMOL/L (ref 3.5–5.3)
PROT SERPL-MCNC: 7.1 G/DL (ref 6.4–8.4)
PROTHROMBIN TIME: 12.5 SECONDS (ref 11.6–14.5)
RBC # BLD AUTO: 4.76 MILLION/UL (ref 3.81–5.12)
SODIUM SERPL-SCNC: 140 MMOL/L (ref 135–147)
WBC # BLD AUTO: 5.07 THOUSAND/UL (ref 4.31–10.16)

## 2023-11-26 PROCEDURE — 80053 COMPREHEN METABOLIC PANEL: CPT | Performed by: PHYSICIAN ASSISTANT

## 2023-11-26 PROCEDURE — 99285 EMERGENCY DEPT VISIT HI MDM: CPT | Performed by: PHYSICIAN ASSISTANT

## 2023-11-26 PROCEDURE — 84484 ASSAY OF TROPONIN QUANT: CPT | Performed by: PHYSICIAN ASSISTANT

## 2023-11-26 PROCEDURE — 85379 FIBRIN DEGRADATION QUANT: CPT | Performed by: PHYSICIAN ASSISTANT

## 2023-11-26 PROCEDURE — 85610 PROTHROMBIN TIME: CPT | Performed by: PHYSICIAN ASSISTANT

## 2023-11-26 PROCEDURE — 83735 ASSAY OF MAGNESIUM: CPT | Performed by: PHYSICIAN ASSISTANT

## 2023-11-26 PROCEDURE — 85025 COMPLETE CBC W/AUTO DIFF WBC: CPT | Performed by: PHYSICIAN ASSISTANT

## 2023-11-26 PROCEDURE — G1004 CDSM NDSC: HCPCS

## 2023-11-26 PROCEDURE — 99285 EMERGENCY DEPT VISIT HI MDM: CPT

## 2023-11-26 PROCEDURE — 85730 THROMBOPLASTIN TIME PARTIAL: CPT | Performed by: PHYSICIAN ASSISTANT

## 2023-11-26 PROCEDURE — 96374 THER/PROPH/DIAG INJ IV PUSH: CPT

## 2023-11-26 PROCEDURE — 71275 CT ANGIOGRAPHY CHEST: CPT

## 2023-11-26 PROCEDURE — 71045 X-RAY EXAM CHEST 1 VIEW: CPT

## 2023-11-26 PROCEDURE — 36415 COLL VENOUS BLD VENIPUNCTURE: CPT | Performed by: PHYSICIAN ASSISTANT

## 2023-11-26 PROCEDURE — 93005 ELECTROCARDIOGRAM TRACING: CPT

## 2023-11-26 RX ORDER — POTASSIUM CHLORIDE 20 MEQ/1
40 TABLET, EXTENDED RELEASE ORAL ONCE
Status: COMPLETED | OUTPATIENT
Start: 2023-11-26 | End: 2023-11-26

## 2023-11-26 RX ORDER — SODIUM CHLORIDE 9 MG/ML
3 INJECTION INTRAVENOUS
Status: DISCONTINUED | OUTPATIENT
Start: 2023-11-26 | End: 2023-11-26 | Stop reason: HOSPADM

## 2023-11-26 RX ORDER — KETOROLAC TROMETHAMINE 30 MG/ML
15 INJECTION, SOLUTION INTRAMUSCULAR; INTRAVENOUS ONCE
Status: COMPLETED | OUTPATIENT
Start: 2023-11-26 | End: 2023-11-26

## 2023-11-26 RX ORDER — METHOCARBAMOL 500 MG/1
500 TABLET, FILM COATED ORAL 2 TIMES DAILY PRN
Qty: 20 TABLET | Refills: 0 | Status: SHIPPED | OUTPATIENT
Start: 2023-11-26 | End: 2023-12-05 | Stop reason: ALTCHOICE

## 2023-11-26 RX ADMIN — IOHEXOL 85 ML: 350 INJECTION, SOLUTION INTRAVENOUS at 12:46

## 2023-11-26 RX ADMIN — KETOROLAC TROMETHAMINE 15 MG: 30 INJECTION, SOLUTION INTRAMUSCULAR at 14:11

## 2023-11-26 RX ADMIN — POTASSIUM CHLORIDE 40 MEQ: 1500 TABLET, EXTENDED RELEASE ORAL at 14:11

## 2023-11-26 NOTE — ED PROVIDER NOTES
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 tablet Take 1 tablet (100mg)  one time as needed for migraine.  Do not use more than one dose per day, or more than 8 doses per month, Normal      levothyroxine 75 mcg tablet Starting Wed 4/26/2023, Historical Med      liothyronine (CYTOMEL) 5 mcg tablet Take 5 mcg by mouth in the morning., Starting Mon 6/14/2021, Historical Med      Magnesium 400 MG CAPS Take 400 mg by mouth daily, Historical Med      Naproxen Sodium 220 MG CAPS Take 2 capsules by mouth as needed, Historical Med      NON FORMULARY Take 1 tablet by mouth 2 (two) times a day truadapt, Historical Med      OLANZapine (ZyPREXA) 5 mg tablet Take 1 tablet (5 mg total) by mouth daily at bedtime, Starting Wed 10/18/2023, Normal      Omega-3 Fatty Acids (FISH OIL) 1,000 mg Take 1,000 mg by mouth daily, Historical Med omeprazole (PriLOSEC) 40 MG capsule Take 1 capsule (40 mg total) by mouth daily, Starting Mon 8/7/2023, Normal      ondansetron (ZOFRAN-ODT) 8 mg disintegrating tablet Take 1 tablet (8 mg total) by mouth every 8 (eight) hours as needed for nausea or vomiting, Starting Wed 5/11/2022, Normal      patient supplied medication Take 1 each by mouth daily bergamot bpf, Historical Med      rimegepant sulfate (Nurtec) 75 mg TBDP Take 1 tablet (75 mg total) by mouth if needed (migraine) Limit of 1 in 24 hours, Starting Tue 8/1/2023, Normal      selenium 50 MCG TABS Take 50 mcg by mouth in the morning., Historical Med       !! - Potential duplicate medications found. Please discuss with provider. No discharge procedures on file.     PDMP Review       None            ED Provider  Electronically Signed by             Sarah Cruz PA-C  11/26/23 [id number]

## 2023-11-26 NOTE — DISCHARGE INSTRUCTIONS
Continue an anti-inflammatory as needed. Can supplement with OTC tylenol. Can trial the muscle relaxant. Caution sedation. Follow up with PCP or return to ER as needed.

## 2023-11-27 LAB
ATRIAL RATE: 109 BPM
ATRIAL RATE: 75 BPM
P AXIS: 74 DEGREES
P AXIS: 82 DEGREES
PR INTERVAL: 168 MS
PR INTERVAL: 184 MS
QRS AXIS: 187 DEGREES
QRS AXIS: 42 DEGREES
QRSD INTERVAL: 90 MS
QRSD INTERVAL: 90 MS
QT INTERVAL: 328 MS
QT INTERVAL: 374 MS
QTC INTERVAL: 417 MS
QTC INTERVAL: 441 MS
T WAVE AXIS: 75 DEGREES
T WAVE AXIS: 81 DEGREES
VENTRICULAR RATE: 109 BPM
VENTRICULAR RATE: 75 BPM

## 2023-12-05 ENCOUNTER — OFFICE VISIT (OUTPATIENT)
Dept: FAMILY MEDICINE CLINIC | Facility: CLINIC | Age: 58
End: 2023-12-05
Payer: COMMERCIAL

## 2023-12-05 VITALS
RESPIRATION RATE: 18 BRPM | HEIGHT: 65 IN | SYSTOLIC BLOOD PRESSURE: 126 MMHG | TEMPERATURE: 97.6 F | WEIGHT: 157.8 LBS | DIASTOLIC BLOOD PRESSURE: 70 MMHG | HEART RATE: 100 BPM | OXYGEN SATURATION: 96 % | BODY MASS INDEX: 26.29 KG/M2

## 2023-12-05 DIAGNOSIS — R94.31 ABNORMAL EKG: ICD-10-CM

## 2023-12-05 DIAGNOSIS — R07.9 CHEST PAIN, UNSPECIFIED TYPE: Primary | ICD-10-CM

## 2023-12-05 PROBLEM — G47.10 HYPERSOMNIA: Status: RESOLVED | Noted: 2018-05-17 | Resolved: 2023-12-05

## 2023-12-05 PROBLEM — G47.09 OTHER INSOMNIA: Status: RESOLVED | Noted: 2018-03-02 | Resolved: 2023-12-05

## 2023-12-05 PROBLEM — K59.09 OTHER CONSTIPATION: Status: RESOLVED | Noted: 2017-12-07 | Resolved: 2023-12-05

## 2023-12-05 PROCEDURE — 99214 OFFICE O/P EST MOD 30 MIN: CPT | Performed by: INTERNAL MEDICINE

## 2023-12-05 RX ORDER — SODIUM CHLORIDE 9 MG/ML
20 INJECTION, SOLUTION INTRAVENOUS ONCE
Status: CANCELLED | OUTPATIENT
Start: 2023-12-07

## 2023-12-05 NOTE — PROGRESS NOTES
Name: Kathie Rodarte      : 1965      MRN: [id number]  Encounter Provider: Miguel Rapp DO  Encounter Date: 2023   Encounter department: [address]     1. Chest pain, unspecified type  -     Echo stress test, exercise; Future; Expected date: 2023  Abnormal EKG with ongoing left sided chest pain warrants further testing and feel stress echo would provide most beneficial information for patient to rule out cardiac cause of sxs   Avoid exertional activity until testing completed     2. Abnormal EKG  -     Echo stress test, exercise; Future; Expected date: 2023        Depression Screening and Follow-up Plan: Patient was screened for depression during today's encounter. They screened negative with a PHQ-2 score of 0. Followup after testing above     Subjective      HPI  Pt was in ER for left sided cp/sob She has been under more stress on several levels recently and went to [address] were going to left neck and arm She had an EKG which showed nonspecific changes but other testing did not suggest acute cardiac issue She has had ongoing left sided chest pain and sxs do go down her left arm into the neck area She did get Toradol in the ER which reduced sxs to some degree but concerned they are ongoing   Review of Systems   Constitutional:  Negative for chills and fever. HENT: Negative. Eyes:  Negative for visual disturbance. Respiratory:  Negative for cough and shortness of breath. Cardiovascular:  Positive for chest pain. Negative for palpitations. Gastrointestinal: Negative. Genitourinary: Negative. Musculoskeletal:  Positive for neck stiffness. Neurological:  Negative for dizziness, light-headedness and headaches. Psychiatric/Behavioral:  Negative for sleep disturbance. The patient is not nervous/anxious.         Current Outpatient Medications on File Prior to Visit   Medication Sig    Alpha-Lipoic Acid 100 MG TABS Take 100 mg by mouth 2 (two) times a day     Ascorbic Acid (VITAMIN C) 1000 MG tablet Take 1,000 mg by mouth daily    Celecoxib (Elyxyb) 120 MG/4.8ML SOLN Take 120 mg by mouth as needed (migraine) Limit of 1 in 24hours    dicyclomine (BENTYL) 20 mg tablet Take 1 tablet (20 mg total) by mouth every 6 (six) hours as needed (abdominal pain)    diphenhydrAMINE (BENADRYL) 25 mg capsule Take 50 mg by mouth as needed (migraines)     ergocalciferol (VITAMIN D2) 50,000 units TAKE ONE CAPSULE BY MOUTH ONCE WEEKLY    Estriol 10 % CREA INSERT 1 GRAM INTRAVAGINALLY AT BEDTIME 3 TIMES A WEEK    famotidine (PEPCID) 40 MG tablet Take 1 tablet (40 mg total) by mouth daily at bedtime    ketorolac (TORADOL) 10 mg tablet 1 tabs at onset of migraine, t.i.d. P.r.n. Take with food/milk/antacid. lasmiditan succinate (REYVOW) 100 mg tablet Take 1 tablet (100mg)  one time as needed for migraine. Do not use more than one dose per day, or more than 8 doses per month    levothyroxine 75 mcg tablet     liothyronine (CYTOMEL) 5 mcg tablet Take 5 mcg by mouth in the morning. Magnesium 400 MG CAPS Take 400 mg by mouth daily    Naproxen Sodium 220 MG CAPS Take 2 capsules by mouth as needed    NON FORMULARY Take 1 tablet by mouth 2 (two) times a day truadapt    Omega-3 Fatty Acids (FISH OIL) 1,000 mg Take 1,000 mg by mouth daily    omeprazole (PriLOSEC) 40 MG capsule Take 1 capsule (40 mg total) by mouth daily    ondansetron (ZOFRAN-ODT) 8 mg disintegrating tablet Take 1 tablet (8 mg total) by mouth every 8 (eight) hours as needed for nausea or vomiting    rimegepant sulfate (Nurtec) 75 mg TBDP Take 1 tablet (75 mg total) by mouth if needed (migraine) Limit of 1 in 24 hours    selenium 50 MCG TABS Take 50 mcg by mouth in the morning.     Emgality 120 MG/ML SOAJ INJECT 120MG SUBCUTANEOUSLY EVERY 30 DAYS (Patient not taking: Reported on 12/5/2023)    [DISCONTINUED] Cholecalciferol (Vitamin D3) 1.25 MG (81331 UT) CAPS Take 1 capsule (50,000 Units total) by mouth once a week (Patient not taking: Reported on 12/5/2023)    [DISCONTINUED] dexamethasone (DECADRON) 1 mg tablet Take 1 tablet (1 mg total) by mouth daily as needed (for migraine on day 2) (Patient not taking: Reported on 10/31/2023)    [DISCONTINUED] dexamethasone (DECADRON) 4 mg tablet Take 2 tabs for 2 days then 1 tab for 3-5 days until headache breaks (Patient not taking: Reported on 12/5/2023)    [DISCONTINUED] divalproex sodium (DEPAKOTE ER) 500 mg 24 hr tablet Take 1 tablet (500 mg total) by mouth daily (Patient not taking: Reported on 12/5/2023)    [DISCONTINUED] methocarbamol (ROBAXIN) 500 mg tablet Take 1 tablet (500 mg total) by mouth 2 (two) times a day as needed for muscle spasms (Patient not taking: Reported on 12/5/2023)    [DISCONTINUED] OLANZapine (ZyPREXA) 5 mg tablet Take 1 tablet (5 mg total) by mouth daily at bedtime (Patient not taking: Reported on 10/31/2023)    [DISCONTINUED] patient supplied medication Take 1 each by mouth daily bergamot bpf (Patient not taking: Reported on 8/7/2023)       Objective     Pulse 100   Temp 97.6 °F (36.4 °C) (Temporal)   Ht 5' 5" (1.651 m)   Wt 71.6 kg (157 lb 12.8 oz)   LMP 01/01/2018   SpO2 96%   BMI 26.26 kg/m²     Physical Exam  Vitals and nursing note reviewed. Constitutional:       General: She is not in acute distress. Appearance: Normal appearance. She is not ill-appearing, toxic-appearing or diaphoretic. HENT:      Head: Normocephalic and atraumatic. Right Ear: External ear normal.      Left Ear: External ear normal.      Nose: Nose normal.      Mouth/Throat:      Mouth: Mucous membranes are moist.   Eyes:      General: No scleral icterus. Cardiovascular:      Rate and Rhythm: Normal rate and regular rhythm. Pulses: Normal pulses. Heart sounds: Normal heart sounds. No murmur heard. Pulmonary:      Effort: Pulmonary effort is normal. No respiratory distress. Breath sounds: Normal breath sounds. No wheezing.    Abdominal: General: Bowel sounds are normal.      Palpations: Abdomen is soft. Musculoskeletal:      Cervical back: Normal range of motion and neck supple. Right lower leg: No edema. Left lower leg: No edema. Lymphadenopathy:      Cervical: No cervical adenopathy. Skin:     General: Skin is warm and dry. Coloration: Skin is not jaundiced. Neurological:      General: No focal deficit present. Mental Status: She is alert and oriented to person, place, and time. Mental status is at baseline. Psychiatric:         Mood and Affect: Mood normal.         Behavior: Behavior normal.         Thought Content:  Thought content normal.         Judgment: Judgment normal.       Zulma Deutsch DO

## 2023-12-07 ENCOUNTER — HOSPITAL ENCOUNTER (OUTPATIENT)
Dept: INFUSION CENTER | Facility: HOSPITAL | Age: 58
Discharge: HOME | End: 2023-12-07
Attending: PSYCHIATRY & NEUROLOGY
Payer: COMMERCIAL

## 2023-12-07 DIAGNOSIS — G43.709 CHRONIC MIGRAINE WITHOUT AURA WITHOUT STATUS MIGRAINOSUS, NOT INTRACTABLE: Primary | ICD-10-CM

## 2023-12-07 PROCEDURE — 96365 THER/PROPH/DIAG IV INF INIT: CPT

## 2023-12-07 RX ORDER — SODIUM CHLORIDE 9 MG/ML
20 INJECTION, SOLUTION INTRAVENOUS ONCE
Status: COMPLETED | OUTPATIENT
Start: 2023-12-07 | End: 2023-12-07

## 2023-12-07 RX ORDER — SODIUM CHLORIDE 9 MG/ML
20 INJECTION, SOLUTION INTRAVENOUS ONCE
OUTPATIENT
Start: 2023-12-17

## 2023-12-07 RX ADMIN — EPTINEZUMAB-JJMR 100 MG: 100 INJECTION INTRAVENOUS at 13:38

## 2023-12-07 RX ADMIN — SODIUM CHLORIDE 20 ML/HR: 0.9 INJECTION, SOLUTION INTRAVENOUS at 13:30

## 2023-12-07 NOTE — PROGRESS NOTES
Pt here for Vyepti infusion. Pt offered no acute complaints today. Temp 97. 6. all vitals stable. Pt resting comfortably in chair with call bell in place.

## 2023-12-07 NOTE — PROGRESS NOTES
Lanre Crain  tolerated treatment well with no complications. Kevon Melgar is aware of future appt on *** at ***.      AVS printed and given to Lanre Crain:  Yes ***  No (Declined by Lanre Hilton) ***

## 2023-12-19 ENCOUNTER — HOSPITAL ENCOUNTER (OUTPATIENT)
Dept: NON INVASIVE DIAGNOSTICS | Facility: HOSPITAL | Age: 58
Discharge: HOME | End: 2023-12-19
Attending: INTERNAL MEDICINE
Payer: COMMERCIAL

## 2023-12-19 VITALS
WEIGHT: 157 LBS | HEART RATE: 76 BPM | DIASTOLIC BLOOD PRESSURE: 84 MMHG | BODY MASS INDEX: 26.16 KG/M2 | HEIGHT: 65 IN | SYSTOLIC BLOOD PRESSURE: 110 MMHG

## 2023-12-19 DIAGNOSIS — R94.31 ABNORMAL EKG: ICD-10-CM

## 2023-12-19 DIAGNOSIS — R07.9 CHEST PAIN, UNSPECIFIED TYPE: ICD-10-CM

## 2023-12-19 LAB
AORTIC ROOT: 2.8 CM
APICAL FOUR CHAMBER EJECTION FRACTION: 48 %
ASCENDING AORTA: 2.8 CM
DOP CALC LVOT AREA: 3.14 CM2
DOP CALC LVOT DIAMETER: 2 CM
E WAVE DECELERATION TIME: 194 MS
E/A RATIO: 0.89
FRACTIONAL SHORTENING: 37 (ref 28–44)
INTERVENTRICULAR SEPTUM IN DIASTOLE (PARASTERNAL SHORT AXIS VIEW): 0.8 CM
INTERVENTRICULAR SEPTUM: 0.8 CM (ref 0.6–1.1)
LAAS-AP4: 9 CM2
LEFT ATRIUM SIZE: 3.3 CM
LEFT INTERNAL DIMENSION IN SYSTOLE: 2.7 CM (ref 2.1–4)
LEFT VENTRICULAR INTERNAL DIMENSION IN DIASTOLE: 4.3 CM (ref 3.5–6)
LEFT VENTRICULAR POSTERIOR WALL IN END DIASTOLE: 0.8 CM
LEFT VENTRICULAR STROKE VOLUME: 56 ML
LVSV (TEICH): 56 ML
MAX HR PERCENT: 115 %
MAX HR: 190 BPM
MV E'TISSUE VEL-SEP: 11 CM/S
MV PEAK A VEL: 0.72 M/S
MV PEAK E VEL: 64 CM/S
MV STENOSIS PRESSURE HALF TIME: 56 MS
MV VALVE AREA P 1/2 METHOD: 3.93
RATE PRESSURE PRODUCT: NORMAL
RIGHT ATRIUM AREA SYSTOLE A4C: 10 CM2
RIGHT VENTRICLE ID DIMENSION: 2.8 CM
SL CV LV EF: 60
SL CV PED ECHO LEFT VENTRICLE DIASTOLIC VOLUME (MOD BIPLANE) 2D: 82 ML
SL CV PED ECHO LEFT VENTRICLE SYSTOLIC VOLUME (MOD BIPLANE) 2D: 26 ML
SL CV STRESS RECOVERY BP: NORMAL MMHG
SL CV STRESS RECOVERY HR: 113 BPM
SL CV STRESS RECOVERY O2 SAT: 97 %
SL CV STRESS STAGE REACHED: 4
STRESS BASELINE BP: NORMAL MMHG
STRESS BASELINE HR: 76 BPM
STRESS O2 SAT REST: 95 %
STRESS PEAK HR: 190 BPM
STRESS POST ESTIMATED WORKLOAD: 12.3 METS
STRESS POST EXERCISE DUR MIN: 10 MIN
STRESS POST EXERCISE DUR SEC: 24 SEC
STRESS POST O2 SAT PEAK: 97 %
STRESS POST PEAK BP: 190 MMHG
TR MAX PG: 21 MMHG
TR PEAK VELOCITY: 2.3 M/S
TRICUSPID ANNULAR PLANE SYSTOLIC EXCURSION: 1.9 CM
TRICUSPID VALVE PEAK REGURGITATION VELOCITY: 2.29 M/S

## 2023-12-19 PROCEDURE — 93350 STRESS TTE ONLY: CPT | Performed by: INTERNAL MEDICINE

## 2023-12-19 PROCEDURE — 93350 STRESS TTE ONLY: CPT

## 2023-12-23 LAB
CHEST PAIN STATEMENT: NORMAL
MAX DIASTOLIC BP: 80 MMHG
MAX PREDICTED HEART RATE: 164 BPM
PROTOCOL NAME: NORMAL
STRESS POST EXERCISE DUR MIN: 10 MIN
STRESS POST EXERCISE DUR SEC: 21 SEC
STRESS POST PEAK HR: 190 BPM
STRESS POST PEAK SYSTOLIC BP: 154 MMHG
TARGET HR FORMULA: NORMAL

## 2023-12-30 ENCOUNTER — OFFICE VISIT (OUTPATIENT)
Dept: URGENT CARE | Facility: MEDICAL CENTER | Age: 58
End: 2023-12-30
Payer: COMMERCIAL

## 2023-12-30 VITALS
RESPIRATION RATE: 22 BRPM | OXYGEN SATURATION: 97 % | HEART RATE: 111 BPM | SYSTOLIC BLOOD PRESSURE: 112 MMHG | TEMPERATURE: 98 F | BODY MASS INDEX: 25.96 KG/M2 | WEIGHT: 156 LBS | DIASTOLIC BLOOD PRESSURE: 84 MMHG

## 2023-12-30 DIAGNOSIS — B34.9 VIRAL ILLNESS: Primary | ICD-10-CM

## 2023-12-30 PROCEDURE — 87636 SARSCOV2 & INF A&B AMP PRB: CPT | Performed by: PHYSICIAN ASSISTANT

## 2023-12-30 PROCEDURE — 99212 OFFICE O/P EST SF 10 MIN: CPT | Performed by: PHYSICIAN ASSISTANT

## 2023-12-30 RX ORDER — ALBUTEROL SULFATE 90 UG/1
2 AEROSOL, METERED RESPIRATORY (INHALATION) EVERY 4 HOURS PRN
Qty: 18 G | Refills: 0 | Status: SHIPPED | OUTPATIENT
Start: 2023-12-30 | End: 2024-01-29

## 2023-12-30 RX ORDER — BENZONATATE 200 MG/1
200 CAPSULE ORAL 3 TIMES DAILY PRN
Qty: 30 CAPSULE | Refills: 0 | Status: SHIPPED | OUTPATIENT
Start: 2023-12-30

## 2023-12-30 RX ORDER — IPRATROPIUM BROMIDE AND ALBUTEROL SULFATE 2.5; .5 MG/3ML; MG/3ML
3 SOLUTION RESPIRATORY (INHALATION) ONCE
Status: COMPLETED | OUTPATIENT
Start: 2023-12-30 | End: 2023-12-30

## 2023-12-30 RX ADMIN — IPRATROPIUM BROMIDE AND ALBUTEROL SULFATE 3 ML: 2.5; .5 SOLUTION RESPIRATORY (INHALATION) at 11:18
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
## 2023-12-30 NOTE — PATIENT INSTRUCTIONS
Use Albuterol for cough if this does not improve the cough take Tessalon.  Tylenol or Ibuprofen as needed for fever or pain  Drink plenty of fluids  Over the Counter cold medication to control symptoms  If symptoms fail to improve follow up with PCP  If symptoms worsen have yourself rechecked

## 2023-12-31 LAB
FLUAV RNA RESP QL NAA+PROBE: NEGATIVE
FLUBV RNA RESP QL NAA+PROBE: NEGATIVE
SARS-COV-2 RNA RESP QL NAA+PROBE: NEGATIVE

## 2024-02-11 DIAGNOSIS — E55.9 VITAMIN D DEFICIENCY: ICD-10-CM

## 2024-02-11 RX ORDER — ERGOCALCIFEROL 1.25 MG/1
CAPSULE ORAL
Qty: 12 CAPSULE | Refills: 0 | Status: SHIPPED | OUTPATIENT
Start: 2024-02-11

## 2024-02-29 ENCOUNTER — HOSPITAL ENCOUNTER (OUTPATIENT)
Dept: INFUSION CENTER | Facility: HOSPITAL | Age: 59
Discharge: HOME | End: 2024-02-29
Attending: PSYCHIATRY & NEUROLOGY
Payer: COMMERCIAL

## 2024-02-29 VITALS
DIASTOLIC BLOOD PRESSURE: 57 MMHG | RESPIRATION RATE: 18 BRPM | SYSTOLIC BLOOD PRESSURE: 109 MMHG | TEMPERATURE: 96.6 F | HEART RATE: 67 BPM

## 2024-02-29 DIAGNOSIS — G43.709 CHRONIC MIGRAINE WITHOUT AURA WITHOUT STATUS MIGRAINOSUS, NOT INTRACTABLE: Primary | ICD-10-CM

## 2024-02-29 PROCEDURE — 96365 THER/PROPH/DIAG IV INF INIT: CPT

## 2024-02-29 RX ORDER — SODIUM CHLORIDE 9 MG/ML
20 INJECTION, SOLUTION INTRAVENOUS ONCE
OUTPATIENT
Start: 2024-05-23

## 2024-02-29 RX ORDER — SODIUM CHLORIDE 9 MG/ML
20 INJECTION, SOLUTION INTRAVENOUS ONCE
Status: COMPLETED | OUTPATIENT
Start: 2024-02-29 | End: 2024-02-29

## 2024-02-29 RX ADMIN — SODIUM CHLORIDE 20 ML/HR: 0.9 INJECTION, SOLUTION INTRAVENOUS at 09:12

## 2024-02-29 RX ADMIN — EPTINEZUMAB-JJMR 100 MG: 100 INJECTION INTRAVENOUS at 10:19

## 2024-02-29 NOTE — PROGRESS NOTES
Patient tolerated infusion without incident. Denies need for AVS, aware of next scheduled appt on 5/23 @ 9am.

## 2024-03-19 ENCOUNTER — OFFICE VISIT (OUTPATIENT)
Dept: NEUROLOGY | Facility: CLINIC | Age: 59
End: 2024-03-19
Payer: COMMERCIAL

## 2024-03-19 ENCOUNTER — PATIENT MESSAGE (OUTPATIENT)
Dept: NEUROLOGY | Facility: CLINIC | Age: 59
End: 2024-03-19

## 2024-03-19 VITALS
HEIGHT: 65 IN | TEMPERATURE: 98 F | WEIGHT: 164.4 LBS | DIASTOLIC BLOOD PRESSURE: 72 MMHG | SYSTOLIC BLOOD PRESSURE: 118 MMHG | BODY MASS INDEX: 27.39 KG/M2 | HEART RATE: 66 BPM

## 2024-03-19 DIAGNOSIS — G43.011 INTRACTABLE MIGRAINE WITHOUT AURA AND WITH STATUS MIGRAINOSUS: ICD-10-CM

## 2024-03-19 DIAGNOSIS — G43.009 MIGRAINE WITHOUT AURA AND WITHOUT STATUS MIGRAINOSUS, NOT INTRACTABLE: ICD-10-CM

## 2024-03-19 DIAGNOSIS — G47.00 INSOMNIA, UNSPECIFIED TYPE: Primary | ICD-10-CM

## 2024-03-19 PROCEDURE — 99215 OFFICE O/P EST HI 40 MIN: CPT | Performed by: PHYSICIAN ASSISTANT

## 2024-03-19 RX ORDER — RIMEGEPANT SULFATE 75 MG/75MG
75 TABLET, ORALLY DISINTEGRATING ORAL AS NEEDED
Qty: 16 TABLET | Refills: 11 | Status: SHIPPED | OUTPATIENT
Start: 2024-03-19

## 2024-03-19 RX ORDER — DEXAMETHASONE 4 MG/1
TABLET ORAL
Qty: 9 TABLET | Refills: 0 | Status: SHIPPED | OUTPATIENT
Start: 2024-03-19

## 2024-03-19 RX ORDER — DIVALPROEX SODIUM 500 MG/1
500 TABLET, EXTENDED RELEASE ORAL DAILY
Qty: 5 TABLET | Refills: 0 | Status: SHIPPED | OUTPATIENT
Start: 2024-03-19

## 2024-03-19 NOTE — ASSESSMENT & PLAN NOTE
Preventive:   Continue doing magnesium oxide 400mg in am daily.   Increase Vyepti  to 300 mg IV every 3 months    Abortive:   At the onset of mild headache patient's take Aleve.  At onset of migraine, patient is to take Nurtec 75 mg.  Limit of 1 in 24 hours  In addition if this isn't effective use Elyxyb 1 vial.  Limit of 1 in 24 hours  May use prochlorperazine 10 mg in addition.  IF needed may use Reyvow 100 mg.  Limit of 1 in 24 hours.  Do not drive within 8 hours (may take on same day as Nurtec)    Decadron 4 mg--take 2 tabs for 2 days then 1 tab for 3-5 days.  You may stop when your headache breaks  In addition, take Depakote 500 mg at bedtime on the nights of your Decadron

## 2024-03-19 NOTE — PATIENT INSTRUCTIONS
Preventive:   Continue doing magnesium oxide 400mg in am daily.   Increase Vyepti  to 300 mg IV every 3 months    Abortive:   At the onset of mild headache patient's take Aleve.  At onset of migraine, patient is to take Nurtec 75 mg.  Limit of 1 in 24 hours  In addition if this isn't effective use Elyxyb 1 vial.  Limit of 1 in 24 hours  May use prochlorperazine 10 mg in addition.  IF needed may use Reyvow 100 mg.  Limit of 1 in 24 hours.  Do not drive within 8 hours (may take on same day as Nurtec)    Decadron 4 mg--take 2 tabs for 2 days then 1 tab for 3-5 days.  You may stop when your headache breaks  In addition, take Depakote 500 mg at bedtime on the nights of your Decadron      Medication overuse headaches:   - We discussed medication overuse headache (MOH) and how to avoid it in the future. It was explained that all analgesics have the potential to cause medication overuse headache (MOH) and analgesic overuse can negate the effectiveness of headache preventive measures. After successful MOH treatment, preventive medications for an underlying primary headache disorder have a greater chance for success. Avoid medications with narcotics, barbiturates, or caffeine in them as these can cause rebound headaches after very few doses and can interfere with other headache medicine efficacy. Taking any analgesics for more than 2-3 days a week can cause medication overuse headache.     Reproductive age women: Should take folic acid daily when taking anti-seizure drugs especially Depakote.     [place] Prescription Drug Monitoring Program report was reviewed and was appropriate      Headache management instructions  - When patient has a moderate to severe headache, they should seek rest, initiate relaxation and apply cold compresses to the head.   - Maintain regular sleep schedule. Adults need at least 7-8 hours of uninterrupted a night.   - Limit over the counter medications such as Tylenol, Ibuprofen, Aleve,  "Excedrin. (No more than 3 times a week).  - Maintain headache diary.  We discussed an NANCY for a smart phone is \"Migraine shelley\"  - Limit caffeine to 1-2 cups 8 to 16 oz a day or less.  - Avoid dietary trigger. (aged cheese, peanuts, MSG, aspartame and nitrates).  - Patient is to have regular frequent meals to prevent headache onset.    - Please drink at least 64 ounces of water a day to help remain hydrated.    Please call with any questions or concerns. Office number is [phone]  "
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
## 2024-03-20 RX ORDER — LASMIDITAN 100 MG/1
TABLET ORAL
Qty: 8 TABLET | Refills: 0 | Status: SHIPPED | OUTPATIENT
Start: 2024-03-20

## 2024-03-30 NOTE — PATIENT COMMUNICATION
VM 3/27 at 10:33 am:    Clara, my name is Zia. I am calling from the [facility] [employer] on behalf of [employer]. We are calling for one of Kaila Kan's patients. Dorrine Pana birthday is [birth year]. We were just trying to follow up on the prior authorization for Nurtec if you would give us call back at [phone]

## 2024-04-01 NOTE — PATIENT COMMUNICATION
Called [facility] back and the pharmacist reconfirmed that [facility] does not carry Elyxyb 120 mg/4.8 mL solution. Routed to provider to make her aware of this. Clinical team also to work on prior auths for Nurtec and Reyvow.

## 2024-04-01 NOTE — PATIENT COMMUNICATION
"Spoke with pt to follow up on her messages. Pt stated that she was unable to get the Elyxyb, Reyvow, and Nurtec from the pharmacy. Her migraine broke on Saturday.     Called [facility] and was advised that they don't have a script for Elyxyb 120 mg/4.8 mL. The pharmacist also stated that they don't carry that medication and was questioning if it was sent to a specialty pharmacy. Pt's chart reflects that it was sent to [facility]. The Nurtec and Reyvow both require a prior auth.     Called pt back and made her aware of this. Pt stated that she is almost positive the Elyxyb came from [facility]. She also stated that she thinks Kaila wanted her to get the Reyvow script filled before the Nurtec. She stated that provider has a \"way of going around things\" to get the prescriptions filled, and she isn't sure where the medications have come from in the past. She requested to ask provider.    Insurance information for prior auths:    [employer]  ID# [id number]  BIN# [id number]  [facility]  Group [id number]  "

## 2024-04-02 ENCOUNTER — TELEPHONE (OUTPATIENT)
Dept: NEUROLOGY | Facility: CLINIC | Age: 59
End: 2024-04-02

## 2024-04-02 ENCOUNTER — PATIENT MESSAGE (OUTPATIENT)
Dept: NEUROLOGY | Facility: CLINIC | Age: 59
End: 2024-04-02

## 2024-04-02 RX ORDER — CELECOXIB 120 MG/4.8ML
120 LIQUID ORAL AS NEEDED
Qty: 28.8 ML | Refills: 11 | Status: SHIPPED | OUTPATIENT
Start: 2024-04-02

## 2024-04-02 NOTE — TELEPHONE ENCOUNTER
Patient has absolutely obtained from [facility] before     Dispensed Days Supply Quantity Provider Pharmacy   ELYXYB 120 MG/4.8 ML SOLUTION 03/20/2024 30 28.8 mL Kaila Kan PA-C [facility]...   ELYXYB 120 MG/4.8 ML SOLUTION 03/15/2024 30 28.8 mL Kaila Kan PA-C [facility] [place]...   ELYXYB 120 MG/4.8 ML SOLUTION 11/10/2023 30 28.8 mL Kaila Kan PA-C [facility] [place]...        Can we find out what the change is?

## 2024-04-02 NOTE — TELEPHONE ENCOUNTER
Regarding: Sharonjose  Contact: [phone]  ----- Message from Denice Santiago RN sent at 4/1/2024  3:42 PM EDT -----  Johnnie Bright  [facility] does not carry celecoxib solution, only the capsules. This pt stated that she was positive that she got the medication from [facility], but I called twice and was told they do not carry it. Also, the clinical team needs to do a prior auth for both Reyvow and Nurtec. The pt stated that you wanted to be sure that she gets the Reyvow filled first before the Nurtec? I just wanted to double check with you to see if you had any recommendations/guidance regarding this. Thank you!     ----- Message from Lanre Crain to Kaila Kan PA-C sent at 3/28/2024  2:27 PM -----   I can’t get my elyxb filled. Something with the program.   I wanted to get my reyvow filled before she put the request in for the nuetec. I have gotten nothing   Thank you       ----- Message -----       From:Marya NAGEL       Sent:3/28/2024  1:54 PM EDT         To:Lanre Crain    Subject:Yanci De Dios,  Can you please clarify what medications you are talking about?       ----- Message -----       From:Lanre Crain       Sent:3/28/2024  4:56 AM EDT         To:Patient Medical Advice Request Message List    Subject:Yanci Bright    The medication that I just took to try to break my cycle for my migraines , it was horrible. I got 4 days in and could not take them anymore. The side effects. OMG Also it gave me more of a headache. I am going on   The 4th day with a headache.   Also,  would I be able to file for FMLA?  I can’t keep pushing myself and trying to work with all this. I just want to feel better. There is so many things going on with trying to get my medication refill. I can’t even keep up nor do I want to anymore. I just want to feel better.   Thank you       ----- Message -----       From:Lanre Crain       Sent:3/19/2024  4:24 PM EDT         To:Kaila Kan PA-C     Subject:Yanci Bright   I tried calling in for the Reyvow, but I had to have it filled by 3/12/24. I did asked the pharmacy to reach or to you.   Thank you

## 2024-04-04 NOTE — PATIENT COMMUNICATION
Yanci has been approved through 4/4/25. Called [facility] and made the pharmacist aware. Spoke with pt and made her aware of the approval. She is going to get the medication from the pharmacy. Once she picks up the Reyvow and elyxyb she will contact the office [facility]

## 2024-04-16 ENCOUNTER — TELEPHONE (OUTPATIENT)
Dept: NEUROLOGY | Facility: CLINIC | Age: 59
End: 2024-04-16

## 2024-04-17 ENCOUNTER — TELEPHONE (OUTPATIENT)
Dept: NEUROLOGY | Facility: CLINIC | Age: 59
End: 2024-04-17

## 2024-04-17 NOTE — TELEPHONE ENCOUNTER
Pt scheduled for Vyepti on 5/23 and PA expires on 5/17    Per last office note-Increase Vyepti to 300 mg IV every 3 months     Last office note faxed to [facility] [employer] [facility] at [phone]  On cover sheet noted   Vyepti PA (), [id number], [id number]  This is a continuation of therapy but we are requesting dose be increased to 300mg IV every 84 days  Lanre Crain, [birth year]  ID-[id number]  Ordering provider-Kaila Kan PA-C  NPI-[id number]  [phone]  Khi-[phone]    Administered at [facility]  NPI-[id number]  Phone -[phone]  Xak-[phone]    Pt next scheduled for 5/23/24

## 2024-04-18 NOTE — TELEPHONE ENCOUNTER
Urgent PA initiated on CMM. Key: [id number]  Additional Information Required  The [employer] Prior Authorization Team is unable to review this request for prior authorization as there is an ongoing prior authorization case in review for this request, Case ID: [id number].    Will call PA dept tmrw

## 2024-04-19 NOTE — TELEPHONE ENCOUNTER
Called PA dept [phone], spoke to Justice winters: below. States that there is no ongoing PA for Nurtec.    PA initiated over the phone  -Nurtec 75 mg tabs, 16 tabs per 30 days  -dx Migraine without aura and without status migrainosus, not intractable [G43.009]   -tried and failed: motrin, tylenol, aleve, Depakote  Olanzapine, Rizatriptan, sumatriptan  Metoclopramide, , prochlorperazine, Ubrelvy  -acute tx  -rebound HA been ruled out  Case# [id number]    72 hr turnaround time     Awaiting determination

## 2024-04-24 NOTE — TELEPHONE ENCOUNTER
Called PA dept to check status. Spoke to Mariella. Bad connection.     Called PA dept again, spoke to Mariah and states PA is still under review. She will maria del carmen it as urgent    24-72 hr turnaround time    Awaiting determination

## 2024-04-24 NOTE — TELEPHONE ENCOUNTER
Determination not received yet.  Called [employer] at [phone] ext [phone].  Spoke to misha.    Vyepti approved from 5/23/24-11/23/24  Approval #[id number]  [employer]    She will refax approval letter

## 2024-05-01 ENCOUNTER — APPOINTMENT (OUTPATIENT)
Dept: LAB | Facility: HOSPITAL | Age: 59
End: 2024-05-01
Payer: COMMERCIAL

## 2024-05-01 DIAGNOSIS — E03.9 MYXEDEMA HEART DISEASE: ICD-10-CM

## 2024-05-01 DIAGNOSIS — I51.9 MYXEDEMA HEART DISEASE: ICD-10-CM

## 2024-05-01 DIAGNOSIS — R53.82 CHRONIC FATIGUE: ICD-10-CM

## 2024-05-01 DIAGNOSIS — R68.82 DECREASED LIBIDO: ICD-10-CM

## 2024-05-01 DIAGNOSIS — G43.001 MIGRAINE WITHOUT AURA AND WITH STATUS MIGRAINOSUS, NOT INTRACTABLE: ICD-10-CM

## 2024-05-01 DIAGNOSIS — G47.00 INSOMNIA, UNSPECIFIED TYPE: ICD-10-CM

## 2024-05-01 LAB
25(OH)D3 SERPL-MCNC: 51.8 NG/ML (ref 30–100)
BASOPHILS # BLD AUTO: 0.02 THOUSANDS/ÂΜL (ref 0–0.1)
BASOPHILS NFR BLD AUTO: 1 % (ref 0–1)
CHOLEST SERPL-MCNC: 191 MG/DL
EOSINOPHIL # BLD AUTO: 0.07 THOUSAND/ÂΜL (ref 0–0.61)
EOSINOPHIL NFR BLD AUTO: 2 % (ref 0–6)
ERYTHROCYTE [DISTWIDTH] IN BLOOD BY AUTOMATED COUNT: 11.9 % (ref 11.6–15.1)
EST. AVERAGE GLUCOSE BLD GHB EST-MCNC: 120 MG/DL
ESTRADIOL SERPL-MCNC: <15 PG/ML
FERRITIN SERPL-MCNC: 25 NG/ML (ref 11–307)
HBA1C MFR BLD: 5.8 %
HCT VFR BLD AUTO: 39.2 % (ref 34.8–46.1)
HDLC SERPL-MCNC: 60 MG/DL
HGB BLD-MCNC: 13.2 G/DL (ref 11.5–15.4)
IMM GRANULOCYTES # BLD AUTO: 0 THOUSAND/UL (ref 0–0.2)
IMM GRANULOCYTES NFR BLD AUTO: 0 % (ref 0–2)
IRON SATN MFR SERPL: 30 % (ref 15–50)
IRON SERPL-MCNC: 102 UG/DL (ref 50–212)
LDLC SERPL CALC-MCNC: 116 MG/DL (ref 0–100)
LYMPHOCYTES # BLD AUTO: 1.64 THOUSANDS/ÂΜL (ref 0.6–4.47)
LYMPHOCYTES NFR BLD AUTO: 42 % (ref 14–44)
MCH RBC QN AUTO: 30.3 PG (ref 26.8–34.3)
MCHC RBC AUTO-ENTMCNC: 33.7 G/DL (ref 31.4–37.4)
MCV RBC AUTO: 90 FL (ref 82–98)
MONOCYTES # BLD AUTO: 0.42 THOUSAND/ÂΜL (ref 0.17–1.22)
MONOCYTES NFR BLD AUTO: 11 % (ref 4–12)
NEUTROPHILS # BLD AUTO: 1.74 THOUSANDS/ÂΜL (ref 1.85–7.62)
NEUTS SEG NFR BLD AUTO: 44 % (ref 43–75)
NONHDLC SERPL-MCNC: 131 MG/DL
NRBC BLD AUTO-RTO: 0 /100 WBCS
PLATELET # BLD AUTO: 249 THOUSANDS/UL (ref 149–390)
PMV BLD AUTO: 9.7 FL (ref 8.9–12.7)
RBC # BLD AUTO: 4.35 MILLION/UL (ref 3.81–5.12)
T3FREE SERPL-MCNC: 4.8 PG/ML (ref 2.5–3.9)
T4 FREE SERPL-MCNC: 0.77 NG/DL (ref 0.61–1.12)
TIBC SERPL-MCNC: 340 UG/DL (ref 250–450)
TRIGL SERPL-MCNC: 75 MG/DL
TSH SERPL DL<=0.05 MIU/L-ACNC: 0.95 UIU/ML (ref 0.45–4.5)
UIBC SERPL-MCNC: 238 UG/DL (ref 155–355)
VIT B12 SERPL-MCNC: 208 PG/ML (ref 180–914)
WBC # BLD AUTO: 3.89 THOUSAND/UL (ref 4.31–10.16)

## 2024-05-01 PROCEDURE — 86800 THYROGLOBULIN ANTIBODY: CPT

## 2024-05-01 PROCEDURE — 82679 ASSAY OF ESTRONE: CPT

## 2024-05-01 PROCEDURE — 83540 ASSAY OF IRON: CPT

## 2024-05-01 PROCEDURE — 84432 ASSAY OF THYROGLOBULIN: CPT

## 2024-05-01 PROCEDURE — 82728 ASSAY OF FERRITIN: CPT

## 2024-05-01 PROCEDURE — 82607 VITAMIN B-12: CPT

## 2024-05-01 PROCEDURE — 83550 IRON BINDING TEST: CPT

## 2024-05-01 PROCEDURE — 36415 COLL VENOUS BLD VENIPUNCTURE: CPT

## 2024-05-01 PROCEDURE — 85025 COMPLETE CBC W/AUTO DIFF WBC: CPT

## 2024-05-01 PROCEDURE — 86376 MICROSOMAL ANTIBODY EACH: CPT

## 2024-05-01 PROCEDURE — 84402 ASSAY OF FREE TESTOSTERONE: CPT

## 2024-05-01 PROCEDURE — 82306 VITAMIN D 25 HYDROXY: CPT

## 2024-05-01 PROCEDURE — 84403 ASSAY OF TOTAL TESTOSTERONE: CPT

## 2024-05-01 PROCEDURE — 82627 DEHYDROEPIANDROSTERONE: CPT

## 2024-05-01 PROCEDURE — 84443 ASSAY THYROID STIM HORMONE: CPT

## 2024-05-01 PROCEDURE — 80061 LIPID PANEL: CPT

## 2024-05-01 PROCEDURE — 84439 ASSAY OF FREE THYROXINE: CPT

## 2024-05-01 PROCEDURE — 84481 FREE ASSAY (FT-3): CPT

## 2024-05-01 PROCEDURE — 82670 ASSAY OF TOTAL ESTRADIOL: CPT

## 2024-05-01 PROCEDURE — 83036 HEMOGLOBIN GLYCOSYLATED A1C: CPT

## 2024-05-01 PROCEDURE — 80053 COMPREHEN METABOLIC PANEL: CPT

## 2024-05-02 LAB
DHEA-S SERPL-MCNC: 67.1 UG/DL (ref 29.4–220.5)
THYROGLOB AB SERPL-ACNC: <1 IU/ML (ref 0–0.9)
THYROGLOB SERPL-MCNC: 1.1 NG/ML (ref 1.5–38.5)
THYROPEROXIDASE AB SERPL-ACNC: <9 IU/ML (ref 0–34)

## 2024-05-03 LAB
TESTOST FREE SERPL-MCNC: 3.1 PG/ML (ref 0–4.2)
TESTOST SERPL-MCNC: 22 NG/DL (ref 4–50)

## 2024-05-06 LAB — ESTRONE SERPL-MCNC: 86 PG/ML

## 2024-05-08 LAB
ALBUMIN SERPL BCP-MCNC: 4.3 G/DL (ref 3.5–5)
ALP SERPL-CCNC: 100 U/L (ref 34–104)
ALT SERPL W P-5'-P-CCNC: 16 U/L (ref 7–52)
ANION GAP SERPL CALCULATED.3IONS-SCNC: 10 MMOL/L (ref 4–13)
AST SERPL W P-5'-P-CCNC: 23 U/L (ref 13–39)
BILIRUB SERPL-MCNC: 0.64 MG/DL (ref 0.2–1)
BUN SERPL-MCNC: 17 MG/DL (ref 5–25)
CALCIUM SERPL-MCNC: 9.5 MG/DL (ref 8.4–10.2)
CHLORIDE SERPL-SCNC: 105 MMOL/L (ref 96–108)
CO2 SERPL-SCNC: 25 MMOL/L (ref 21–32)
CREAT SERPL-MCNC: 1 MG/DL (ref 0.6–1.3)
GFR SERPL CREATININE-BSD FRML MDRD: 63 ML/MIN/1.73SQ M
GLUCOSE P FAST SERPL-MCNC: 98 MG/DL (ref 65–99)
POTASSIUM SERPL-SCNC: 3.8 MMOL/L (ref 3.5–5.3)
PROT SERPL-MCNC: 6.9 G/DL (ref 6.4–8.4)
SODIUM SERPL-SCNC: 140 MMOL/L (ref 135–147)

## 2024-05-19 DIAGNOSIS — E55.9 VITAMIN D DEFICIENCY: ICD-10-CM

## 2024-05-19 RX ORDER — ERGOCALCIFEROL 1.25 MG/1
CAPSULE ORAL
Qty: 12 CAPSULE | Refills: 0 | Status: SHIPPED | OUTPATIENT
Start: 2024-05-19

## 2024-05-23 ENCOUNTER — HOSPITAL ENCOUNTER (OUTPATIENT)
Dept: INFUSION CENTER | Facility: HOSPITAL | Age: 59
Discharge: HOME | End: 2024-05-23
Attending: PSYCHIATRY & NEUROLOGY
Payer: COMMERCIAL

## 2024-05-23 VITALS
DIASTOLIC BLOOD PRESSURE: 65 MMHG | HEART RATE: 84 BPM | TEMPERATURE: 97.3 F | RESPIRATION RATE: 18 BRPM | SYSTOLIC BLOOD PRESSURE: 148 MMHG

## 2024-05-23 DIAGNOSIS — G43.709 CHRONIC MIGRAINE WITHOUT AURA WITHOUT STATUS MIGRAINOSUS, NOT INTRACTABLE: Primary | ICD-10-CM

## 2024-05-23 PROCEDURE — 96365 THER/PROPH/DIAG IV INF INIT: CPT

## 2024-05-23 RX ORDER — SODIUM CHLORIDE 9 MG/ML
20 INJECTION, SOLUTION INTRAVENOUS ONCE
Status: COMPLETED | OUTPATIENT
Start: 2024-05-23 | End: 2024-05-23

## 2024-05-23 RX ORDER — SODIUM CHLORIDE 9 MG/ML
20 INJECTION, SOLUTION INTRAVENOUS ONCE
OUTPATIENT
Start: 2024-08-15

## 2024-05-23 RX ADMIN — EPTINEZUMAB-JJMR 300 MG: 100 INJECTION INTRAVENOUS at 09:49

## 2024-05-23 RX ADMIN — SODIUM CHLORIDE 20 ML/HR: 0.9 INJECTION, SOLUTION INTRAVENOUS at 09:07

## 2024-05-23 NOTE — PROGRESS NOTES
Lanre Crain tolerated Vyepti infusion well with no complications. Pt denies any recent abx use or s/s of infection/illness.    Lanre Crain is aware of future appt on 8/15 at 3PM.     AVS printed and given to Lanre Crain.

## 2024-06-21 ENCOUNTER — APPOINTMENT (OUTPATIENT)
Dept: LAB | Facility: HOSPITAL | Age: 59
End: 2024-06-21
Payer: COMMERCIAL

## 2024-06-21 DIAGNOSIS — R68.82 DECREASED LIBIDO: ICD-10-CM

## 2024-06-21 DIAGNOSIS — N95.1 SYMPTOMATIC MENOPAUSAL OR FEMALE CLIMACTERIC STATES: ICD-10-CM

## 2024-06-21 DIAGNOSIS — G43.001 MIGRAINE WITHOUT AURA AND WITH STATUS MIGRAINOSUS, NOT INTRACTABLE: ICD-10-CM

## 2024-06-21 DIAGNOSIS — E03.9 HYPOTHYROIDISM, ADULT: ICD-10-CM

## 2024-06-21 DIAGNOSIS — R53.82 CHRONIC FATIGUE: ICD-10-CM

## 2024-06-21 LAB
T3FREE SERPL-MCNC: 3.09 PG/ML (ref 2.5–3.9)
T4 FREE SERPL-MCNC: 0.9 NG/DL (ref 0.61–1.12)
TSH SERPL DL<=0.05 MIU/L-ACNC: 2.14 UIU/ML (ref 0.45–4.5)

## 2024-06-21 PROCEDURE — 84443 ASSAY THYROID STIM HORMONE: CPT

## 2024-06-21 PROCEDURE — 84481 FREE ASSAY (FT-3): CPT

## 2024-06-21 PROCEDURE — 36415 COLL VENOUS BLD VENIPUNCTURE: CPT

## 2024-06-21 PROCEDURE — 84439 ASSAY OF FREE THYROXINE: CPT

## 2024-07-08 ENCOUNTER — ANNUAL EXAM (OUTPATIENT)
Dept: GYNECOLOGY | Facility: CLINIC | Age: 59
End: 2024-07-08
Payer: COMMERCIAL

## 2024-07-08 VITALS
DIASTOLIC BLOOD PRESSURE: 82 MMHG | SYSTOLIC BLOOD PRESSURE: 124 MMHG | HEIGHT: 65 IN | HEART RATE: 75 BPM | WEIGHT: 155 LBS | BODY MASS INDEX: 25.83 KG/M2

## 2024-07-08 DIAGNOSIS — Z12.31 SCREENING MAMMOGRAM FOR BREAST CANCER: ICD-10-CM

## 2024-07-08 DIAGNOSIS — Z80.41 FAMILY HISTORY OF OVARIAN CANCER: ICD-10-CM

## 2024-07-08 DIAGNOSIS — Z01.411 ENCOUNTER FOR GYNECOLOGICAL EXAMINATION (GENERAL) (ROUTINE) WITH ABNORMAL FINDINGS: Primary | ICD-10-CM

## 2024-07-08 DIAGNOSIS — R10.2 PELVIC PAIN: ICD-10-CM

## 2024-07-08 DIAGNOSIS — Z01.419 ENCOUNTER FOR GYNECOLOGICAL EXAMINATION WITH PAPANICOLAOU SMEAR OF CERVIX: ICD-10-CM

## 2024-07-08 DIAGNOSIS — Z80.3 FAMILY HISTORY OF BREAST CANCER: ICD-10-CM

## 2024-07-08 PROCEDURE — G0476 HPV COMBO ASSAY CA SCREEN: HCPCS | Performed by: OBSTETRICS & GYNECOLOGY

## 2024-07-08 PROCEDURE — S0612 ANNUAL GYNECOLOGICAL EXAMINA: HCPCS | Performed by: OBSTETRICS & GYNECOLOGY

## 2024-07-08 PROCEDURE — G0145 SCR C/V CYTO,THINLAYER,RESCR: HCPCS | Performed by: OBSTETRICS & GYNECOLOGY

## 2024-07-08 NOTE — PROGRESS NOTES
Assessment/Plan:    Will check TVU given pelvic cramping.   Referral placed to genetic counseling.   Recommended monthly SBE, annual CBE and annual screening mammo. ASCCP guidelines reviewed and pap with cotesting noted to be up to date; this low risk patient was advised she meets criteria to d/c pap screening at age 67. Pap done. DEXA and colonoscopy noted to be up to date. Reviewed diet/activity recommendations Calcium 1200 mg and Vit D 600-1000 IU daily.  Discussed postmenopausal considerations and symptoms to report. Kegel exercises as instructed. RTO in one year for routine annual gyn exam or sooner PRN.        Diagnoses and all orders for this visit:    Encounter for gynecological examination (general) (routine) without abnormal findings    Screening mammogram for breast cancer    Family history of ovarian cancer    Family history of breast cancer        Subjective:      Patient ID: Lanre Crain is a 58 y.o. female.    This patient presents for routine annual gyn exam.   She follows with Dr. Blandon for hormonal health. Of note, on work up with Dr. Blandon, patient is positive for Factor V.   Patient also has hx of migraine without aura which have improved with Emgality.  Pt also reports intermittent pelvic cramping occurring 2-3 times per week for about 3 months. Discomfort lasts a few minutes and resolves on its own. Pt denies association with bowel, bladder, sex, activity, food.      Family hx of PGM ovarian cancer and breast cancer, age 42. Paternal aunt ovarian and breast cancer, age 42. Father  of cancer within 14 weeks of diagnosis and patient states primary source was not established. Have discussed genetic testing in the past. Pt agreeable to genetic counseling.   She denies  bleeding or spotting,  breast concerns, abnormal discharge, bowel/bladder dysfunction, depression/anx.   , sexually active and is monogamous.   Last pap/HPV 21.  Mammography up to date and normal, 10/13/23.  "Colonoscopy done in 2018 per patient. DXA 6/28/22, osteopenia.          The following portions of the patient's history were reviewed and updated as appropriate: allergies, current medications, past family history, past medical history, past social history, past surgical history and problem list.    Review of Systems   Constitutional: Negative.    Respiratory: Negative.     Cardiovascular: Negative.    Gastrointestinal: Negative.    Endocrine: Negative.    Genitourinary:  Positive for pelvic pain. Negative for dyspareunia, dysuria, frequency, urgency, vaginal bleeding, vaginal discharge and vaginal pain.   Musculoskeletal: Negative.    Skin: Negative.    Neurological: Negative.    Psychiatric/Behavioral: Negative.           Objective:      /82   Pulse 75   Ht 5' 5\" (1.651 m)   Wt 70.3 kg (155 lb)   LMP 01/01/2018   BMI 25.79 kg/m²          Physical Exam  Vitals and nursing note reviewed. Exam conducted with a chaperone present.   Constitutional:       Appearance: Normal appearance. She is well-developed.   HENT:      Head: Normocephalic and atraumatic.   Neck:      Thyroid: No thyroid mass or thyromegaly.   Cardiovascular:      Rate and Rhythm: Normal rate and regular rhythm.      Heart sounds: Normal heart sounds.   Pulmonary:      Effort: Pulmonary effort is normal.      Breath sounds: Normal breath sounds.   Chest:   Breasts:     Breasts are symmetrical.      Right: No inverted nipple, mass, nipple discharge, skin change or tenderness.      Left: No inverted nipple, mass, nipple discharge, skin change or tenderness.   Abdominal:      General: Bowel sounds are normal.      Palpations: Abdomen is soft.      Tenderness: There is no abdominal tenderness.      Hernia: There is no hernia in the left inguinal area or right inguinal area.   Genitourinary:     General: Normal vulva.      Exam position: Supine.      Pubic Area: No rash.       Labia:         Right: No rash, tenderness, lesion or injury.         " Left: No rash, tenderness, lesion or injury.       Urethra: No prolapse, urethral pain, urethral swelling or urethral lesion.      Vagina: Normal. No signs of injury and foreign body. No vaginal discharge, erythema, tenderness, bleeding, lesions or prolapsed vaginal walls.      Cervix: No cervical motion tenderness, discharge, friability, lesion, erythema, cervical bleeding or eversion.      Uterus: Not deviated, not enlarged, not fixed, not tender and no uterine prolapse.       Adnexa:         Right: No mass, tenderness or fullness.          Left: No mass, tenderness or fullness.        Rectum: No external hemorrhoid.      Comments: Urethra normal without lesions  No bladder tenderness  Limited pelvic exam secondary to taut abdomen  Musculoskeletal:         General: Normal range of motion.      Cervical back: Normal range of motion and neck supple.   Lymphadenopathy:      Lower Body: No right inguinal adenopathy. No left inguinal adenopathy.   Skin:     General: Skin is warm and dry.   Neurological:      Mental Status: She is alert and oriented to person, place, and time.   Psychiatric:         Speech: Speech normal.         Behavior: Behavior normal. Behavior is cooperative.

## 2024-07-12 LAB
LAB AP GYN PRIMARY INTERPRETATION: NORMAL
Lab: NORMAL

## 2024-07-18 ENCOUNTER — OFFICE VISIT (OUTPATIENT)
Dept: FAMILY MEDICINE CLINIC | Facility: CLINIC | Age: 59
End: 2024-07-18
Payer: COMMERCIAL

## 2024-07-18 VITALS
HEIGHT: 65 IN | DIASTOLIC BLOOD PRESSURE: 74 MMHG | RESPIRATION RATE: 18 BRPM | WEIGHT: 153.4 LBS | HEART RATE: 79 BPM | OXYGEN SATURATION: 98 % | SYSTOLIC BLOOD PRESSURE: 128 MMHG | BODY MASS INDEX: 25.56 KG/M2 | TEMPERATURE: 97.6 F

## 2024-07-18 DIAGNOSIS — R00.1 BRADYCARDIA: ICD-10-CM

## 2024-07-18 DIAGNOSIS — G45.1 CAROTID ARTERY SYNDROME: ICD-10-CM

## 2024-07-18 DIAGNOSIS — Z13.6 ENCOUNTER FOR SCREENING FOR STENOSIS OF CAROTID ARTERY: ICD-10-CM

## 2024-07-18 DIAGNOSIS — Z00.00 ANNUAL PHYSICAL EXAM: Primary | ICD-10-CM

## 2024-07-18 DIAGNOSIS — E78.5 HYPERLIPIDEMIA, UNSPECIFIED HYPERLIPIDEMIA TYPE: ICD-10-CM

## 2024-07-18 PROCEDURE — 99396 PREV VISIT EST AGE 40-64: CPT | Performed by: INTERNAL MEDICINE
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
## 2024-07-18 NOTE — PATIENT INSTRUCTIONS
"Patient Education     Routine physical for adults   The Basics   Written by the doctors and editors at [facility]   What is a physical? -- A physical is a routine visit, or \"check-up,\" with your doctor. You might also hear it called a \"wellness visit\" or \"preventive visit.\"  During each visit, the doctor will:   Ask about your physical and mental health   Ask about your habits, behaviors, and lifestyle   Do an exam   Give you vaccines if needed   Talk to you about any medicines you take   Give advice about your health   Answer your questions  Getting regular check-ups is an important part of taking care of your health. It can help your doctor find and treat any problems you have. But it's also important for preventing health problems.  A routine physical is different from a \"sick visit.\" A sick visit is when you see a doctor because of a health concern or problem. Since physicals are scheduled ahead of time, you can think about what you want to ask the doctor.  How often should I get a physical? -- It depends on your age and health. In general, for people age 21 years and older:   If you are younger than 50 years, you might be able to get a physical every 3 years.   If you are 50 years or older, your doctor might recommend a physical every year.  If you have an ongoing health condition, like diabetes or high blood pressure, your doctor will probably want to see you more often.  What happens during a physical? -- In general, each visit will include:   Physical exam - The doctor or nurse will check your height, weight, heart rate, and blood pressure. They will also look at your eyes and ears. They will ask about how you are feeling and whether you have any symptoms that bother you.   Medicines - It's a good idea to bring a list of all the medicines you take to each doctor visit. Your doctor will talk to you about your medicines and answer any questions. Tell them if you are having any side effects that bother you. You " "should also tell them if you are having trouble paying for any of your medicines.   Habits and behaviors - This includes:   Your diet   Your exercise habits   Whether you smoke, drink alcohol, or use drugs   Whether you are sexually active   Whether you feel safe at home  Your doctor will talk to you about things you can do to improve your health and lower your risk of health problems. They will also offer help and support. For example, if you want to quit smoking, they can give you advice and might prescribe medicines. If you want to improve your diet or get more physical activity, they can help you with this, too.   Lab tests, if needed - The tests you get will depend on your age and situation. For example, your doctor might want to check your:   Cholesterol   Blood sugar   Iron level   Vaccines - The recommended vaccines will depend on your age, health, and what vaccines you already had. Vaccines are very important because they can prevent certain serious or deadly infections.   Discussion of screening - \"Screening\" means checking for diseases or other health problems before they cause symptoms. Your doctor can recommend screening based on your age, risk, and preferences. This might include tests to check for:   Cancer, such as breast, prostate, cervical, ovarian, colorectal, prostate, lung, or skin cancer   Sexually transmitted infections, such as chlamydia and gonorrhea   Mental health conditions like depression and anxiety  Your doctor will talk to you about the different types of screening tests. They can help you decide which screenings to have. They can also explain what the results might mean.   Answering questions - The physical is a good time to ask the doctor or nurse questions about your health. If needed, they can refer you to other doctors or specialists, too.  Adults older than 65 years often need other care, too. As you get older, your doctor will talk to you about:   How to prevent falling at " home   Hearing or vision tests   Memory testing   How to take your medicines safely   Making sure that you have the help and support you need at home  All topics are updated as new evidence becomes available and our peer review process is complete.  This topic retrieved from [employer] on: May 02, 2024.  Topic 526430 Version 1.0  Release: 32.4.3 - C32.122  © 2024 UpToDate, Inc. and/or its affiliates. All rights reserved.  Consumer Information Use and Disclaimer   Disclaimer: This generalized information is a limited summary of diagnosis, treatment, and/or medication information. It is not meant to be comprehensive and should be used as a tool to help the user understand and/or assess potential diagnostic and treatment options. It does NOT include all information about conditions, treatments, medications, side effects, or risks that may apply to a specific patient. It is not intended to be medical advice or a substitute for the medical advice, diagnosis, or treatment of a health care provider based on the health care provider's examination and assessment of a patient's specific and unique circumstances. Patients must speak with a health care provider for complete information about their health, medical questions, and treatment options, including any risks or benefits regarding use of medications. This information does not endorse any treatments or medications as safe, effective, or approved for treating a specific patient. UpToDate, Inc. and its affiliates disclaim any warranty or liability relating to this information or the use thereof.The use of this information is governed by the Terms of Use, available at https://www.woltersFabrusuwer.com/en/know/clinical-effectiveness-terms. 2024© UpToDate, Inc. and its affiliates and/or licensors. All rights reserved.  Copyright   © 2024 UpToDate, Inc. and/or its affiliates. All rights reserved.

## 2024-07-30 ENCOUNTER — TELEPHONE (OUTPATIENT)
Dept: GYNECOLOGY | Facility: CLINIC | Age: 59
End: 2024-07-30

## 2024-08-13 ENCOUNTER — TELEPHONE (OUTPATIENT)
Dept: GYNECOLOGY | Facility: CLINIC | Age: 59
End: 2024-08-13

## 2024-08-13 NOTE — TELEPHONE ENCOUNTER
----- Message from GUILLERMO Devries sent at 2024 12:41 PM EDT -----  [employer]ard[employer] letter  Pls send letter about overdue TVU

## 2024-08-15 ENCOUNTER — HOSPITAL ENCOUNTER (OUTPATIENT)
Dept: INFUSION CENTER | Facility: HOSPITAL | Age: 59
Discharge: HOME | End: 2024-08-15
Attending: PSYCHIATRY & NEUROLOGY
Payer: COMMERCIAL

## 2024-08-15 VITALS
SYSTOLIC BLOOD PRESSURE: 113 MMHG | TEMPERATURE: 98.1 F | DIASTOLIC BLOOD PRESSURE: 60 MMHG | HEART RATE: 70 BPM | RESPIRATION RATE: 16 BRPM | OXYGEN SATURATION: 97 %

## 2024-08-15 DIAGNOSIS — G43.709 CHRONIC MIGRAINE WITHOUT AURA WITHOUT STATUS MIGRAINOSUS, NOT INTRACTABLE: Primary | ICD-10-CM

## 2024-08-15 PROCEDURE — 96365 THER/PROPH/DIAG IV INF INIT: CPT

## 2024-08-15 RX ORDER — SODIUM CHLORIDE 9 MG/ML
20 INJECTION, SOLUTION INTRAVENOUS ONCE
OUTPATIENT
Start: 2024-11-07

## 2024-08-15 RX ORDER — SODIUM CHLORIDE 9 MG/ML
20 INJECTION, SOLUTION INTRAVENOUS ONCE
Status: COMPLETED | OUTPATIENT
Start: 2024-08-15 | End: 2024-08-15

## 2024-08-15 RX ADMIN — SODIUM CHLORIDE 20 ML/HR: 0.9 INJECTION, SOLUTION INTRAVENOUS at 15:15

## 2024-08-15 RX ADMIN — EPTINEZUMAB-JJMR 300 MG: 100 INJECTION INTRAVENOUS at 15:36

## 2024-08-15 NOTE — PROGRESS NOTES
Lanre Crain tolerated treatment well with no complications.      Lanre Crain is aware of future appt on 11/7/24 at 1500.     AVS declined, patient has MyChart.

## 2024-08-17 DIAGNOSIS — E55.9 VITAMIN D DEFICIENCY: ICD-10-CM

## 2024-08-18 RX ORDER — ERGOCALCIFEROL 1.25 MG/1
CAPSULE, LIQUID FILLED ORAL
Qty: 12 CAPSULE | Refills: 1 | Status: SHIPPED | OUTPATIENT
Start: 2024-08-18

## 2024-08-20 DIAGNOSIS — K21.9 GASTROESOPHAGEAL REFLUX DISEASE WITHOUT ESOPHAGITIS: ICD-10-CM

## 2024-08-21 RX ORDER — FAMOTIDINE 40 MG/1
40 TABLET, FILM COATED ORAL
Qty: 90 TABLET | Refills: 0 | Status: SHIPPED | OUTPATIENT
Start: 2024-08-21 | End: 2024-11-19

## 2024-08-30 ENCOUNTER — HOSPITAL ENCOUNTER (OUTPATIENT)
Dept: ULTRASOUND IMAGING | Facility: HOSPITAL | Age: 59
Discharge: HOME | End: 2024-08-30
Payer: COMMERCIAL

## 2024-08-30 DIAGNOSIS — R10.2 PELVIC PAIN: ICD-10-CM

## 2024-08-30 PROCEDURE — 76856 US EXAM PELVIC COMPLETE: CPT

## 2024-08-30 PROCEDURE — 76830 TRANSVAGINAL US NON-OB: CPT

## 2024-09-04 ENCOUNTER — TELEPHONE (OUTPATIENT)
Dept: GYNECOLOGY | Facility: CLINIC | Age: 59
End: 2024-09-04

## 2024-09-04 NOTE — TELEPHONE ENCOUNTER
I spoke to pt about adenomyosis on TVU which would normally not be an issue post menopausally, but given that pt is on hormone, tissue may be stimulated, although at low doses, it is unlikely. Advised to discuss with Dr. Blandon. Pt also has appt with GI next week and will follow up on cramping with her as well.

## 2024-09-11 ENCOUNTER — OFFICE VISIT (OUTPATIENT)
Dept: SLEEP CENTER | Facility: CLINIC | Age: 59
End: 2024-09-11
Payer: COMMERCIAL

## 2024-09-11 VITALS
SYSTOLIC BLOOD PRESSURE: 100 MMHG | WEIGHT: 150 LBS | TEMPERATURE: 98 F | HEIGHT: 65 IN | BODY MASS INDEX: 24.99 KG/M2 | OXYGEN SATURATION: 99 % | RESPIRATION RATE: 16 BRPM | DIASTOLIC BLOOD PRESSURE: 78 MMHG | HEART RATE: 75 BPM

## 2024-09-11 DIAGNOSIS — G47.10 HYPERSOMNIA: ICD-10-CM

## 2024-09-11 DIAGNOSIS — F51.04 CHRONIC INSOMNIA: Primary | ICD-10-CM

## 2024-09-11 PROCEDURE — 99203 OFFICE O/P NEW LOW 30 MIN: CPT
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
## 2024-09-11 NOTE — PATIENT INSTRUCTIONS
Sleep Hygiene  TIPS FOR A BETTER NIGHT OF SLEEP BEFORE GETTING INTO BED:  -Establish a regular routine for bedtime.  -Create a positive sleep environment.  -Relax before getting into bed.  -Avoid alcohol, smoking, caffeine for at least a few hours before bedtime.  -Do not go to bed unless you are sleepy.    WHILE IN BED:  -Turn your clock around (or cover it) and use your alarm if needed.  If you can't fall asleep in 20 minutes (based on your internal sense of time), get out of bed and do something relaxing or boring (reading, listening to music, etc). Return to bed only when sleepy.  -Use your bed only for sleep.    IN THE MORNING AND DURING THE DAYTIME:  -Wake up at the same time every morning, even on weekends.  -Avoid naps during the day.  -Avoid caffeinated beverages and food in the evening.  -Exercise regularly but not within 4 hours of bedtime.

## 2024-09-18 ENCOUNTER — OFFICE VISIT (OUTPATIENT)
Dept: NEUROLOGY | Facility: CLINIC | Age: 59
End: 2024-09-18
Payer: COMMERCIAL

## 2024-09-18 VITALS
DIASTOLIC BLOOD PRESSURE: 58 MMHG | BODY MASS INDEX: 24.84 KG/M2 | SYSTOLIC BLOOD PRESSURE: 114 MMHG | HEIGHT: 65 IN | HEART RATE: 52 BPM | WEIGHT: 149.1 LBS | TEMPERATURE: 98.4 F

## 2024-09-18 DIAGNOSIS — G43.709 CHRONIC MIGRAINE WITHOUT AURA WITHOUT STATUS MIGRAINOSUS, NOT INTRACTABLE: ICD-10-CM

## 2024-09-18 DIAGNOSIS — G43.011 INTRACTABLE MIGRAINE WITHOUT AURA AND WITH STATUS MIGRAINOSUS: ICD-10-CM

## 2024-09-18 PROCEDURE — 99214 OFFICE O/P EST MOD 30 MIN: CPT | Performed by: PHYSICIAN ASSISTANT

## 2024-09-18 RX ORDER — KETOROLAC TROMETHAMINE 10 MG/1
TABLET, FILM COATED ORAL
Qty: 10 TABLET | Refills: 0 | Status: SHIPPED | OUTPATIENT
Start: 2024-09-18

## 2024-09-18 RX ORDER — CYCLOBENZAPRINE HCL 10 MG
10 TABLET ORAL
Qty: 90 TABLET | Refills: 3 | Status: SHIPPED | OUTPATIENT
Start: 2024-09-18

## 2024-09-18 RX ORDER — ONDANSETRON 8 MG/1
8 TABLET, ORALLY DISINTEGRATING ORAL EVERY 8 HOURS PRN
Qty: 20 TABLET | Refills: 0 | Status: SHIPPED | OUTPATIENT
Start: 2024-09-18
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
## 2024-09-18 NOTE — ASSESSMENT & PLAN NOTE
Preventive:   Continue doing magnesium oxide 400mg in am daily.   Continue Emgality 1 pen every 30 days--continue until Vyepti  Vyepti 300 mg IV every 3 months at infusion center.      Abortive:   At the onset of mild headache patient's take Aleve.  At onset of migraine, patient is to take Nurtec 75 mg.  Limit of 1 in 24 hours  In addition if this may use zofran  May use elyxyb 1 vial in 24 hours    IF needed may use Reyvow 100 mg.  Limit of 1 in 24 hours.  Do not drive within 8 hours (may take on same day as Nurtec)

## 2024-09-18 NOTE — PATIENT INSTRUCTIONS
1. Chronic migraine without aura without status migrainosus, not intractable  -     cyclobenzaprine (FLEXERIL) 10 mg tablet; Take 1 tablet (10 mg total) by mouth daily at bedtime Start with 1/2 tab for 7 nights then increase to full tab  -     ondansetron (ZOFRAN-ODT) 8 mg disintegrating tablet; Take 1 tablet (8 mg total) by mouth every 8 (eight) hours as needed for nausea or vomiting  2. Intractable migraine without aura and with status migrainosus  -     ketorolac (TORADOL) 10 mg tablet; 1 tabs at onset of migraine, t.i.d. P.r.n. Take with food/milk/antacid.

## 2024-09-25 ENCOUNTER — OFFICE VISIT (OUTPATIENT)
Dept: GASTROENTEROLOGY | Facility: CLINIC | Age: 59
End: 2024-09-25
Payer: COMMERCIAL

## 2024-09-25 VITALS
TEMPERATURE: 97.6 F | DIASTOLIC BLOOD PRESSURE: 79 MMHG | SYSTOLIC BLOOD PRESSURE: 116 MMHG | HEART RATE: 60 BPM | BODY MASS INDEX: 24.99 KG/M2 | HEIGHT: 65 IN | WEIGHT: 150 LBS | OXYGEN SATURATION: 96 %

## 2024-09-25 DIAGNOSIS — Z12.11 SCREENING FOR COLON CANCER: ICD-10-CM

## 2024-09-25 DIAGNOSIS — R63.4 WEIGHT LOSS, ABNORMAL: Primary | ICD-10-CM

## 2024-09-25 DIAGNOSIS — R10.31 RLQ ABDOMINAL PAIN: ICD-10-CM

## 2024-09-25 DIAGNOSIS — K76.0 HEPATIC STEATOSIS: ICD-10-CM

## 2024-09-25 DIAGNOSIS — K21.9 GASTROESOPHAGEAL REFLUX DISEASE WITHOUT ESOPHAGITIS: ICD-10-CM

## 2024-09-25 DIAGNOSIS — K59.00 CONSTIPATION, UNSPECIFIED CONSTIPATION TYPE: ICD-10-CM

## 2024-09-25 DIAGNOSIS — K31.84 GASTROPARESIS: ICD-10-CM

## 2024-09-25 PROCEDURE — 99214 OFFICE O/P EST MOD 30 MIN: CPT | Performed by: PHYSICIAN ASSISTANT

## 2024-09-25 RX ORDER — DICYCLOMINE HCL 20 MG
20 TABLET ORAL EVERY 6 HOURS PRN
Qty: 60 TABLET | Refills: 1 | Status: SHIPPED | OUTPATIENT
Start: 2024-09-25
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
## 2024-10-04 ENCOUNTER — APPOINTMENT (OUTPATIENT)
Dept: LAB | Facility: HOSPITAL | Age: 59
End: 2024-10-04
Payer: COMMERCIAL

## 2024-10-04 DIAGNOSIS — R63.4 WEIGHT LOSS, ABNORMAL: ICD-10-CM

## 2024-10-04 DIAGNOSIS — R10.31 RLQ ABDOMINAL PAIN: ICD-10-CM

## 2024-10-04 LAB
ANION GAP SERPL CALCULATED.3IONS-SCNC: 10 MMOL/L (ref 4–13)
BUN SERPL-MCNC: 21 MG/DL (ref 5–25)
CALCIUM SERPL-MCNC: 9 MG/DL (ref 8.4–10.2)
CHLORIDE SERPL-SCNC: 104 MMOL/L (ref 96–108)
CO2 SERPL-SCNC: 26 MMOL/L (ref 21–32)
CREAT SERPL-MCNC: 0.86 MG/DL (ref 0.6–1.3)
GFR SERPL CREATININE-BSD FRML MDRD: 75 ML/MIN/1.73SQ M
GLUCOSE SERPL-MCNC: 82 MG/DL (ref 65–140)
POTASSIUM SERPL-SCNC: 3.8 MMOL/L (ref 3.5–5.3)
SODIUM SERPL-SCNC: 140 MMOL/L (ref 135–147)

## 2024-10-04 PROCEDURE — 36415 COLL VENOUS BLD VENIPUNCTURE: CPT

## 2024-10-04 PROCEDURE — 80048 BASIC METABOLIC PNL TOTAL CA: CPT

## 2024-10-05 ENCOUNTER — HOSPITAL ENCOUNTER (OUTPATIENT)
Dept: CT IMAGING | Facility: HOSPITAL | Age: 59
Discharge: HOME | End: 2024-10-05
Payer: COMMERCIAL

## 2024-10-05 DIAGNOSIS — R63.4 WEIGHT LOSS, ABNORMAL: ICD-10-CM

## 2024-10-05 DIAGNOSIS — R10.31 RLQ ABDOMINAL PAIN: ICD-10-CM

## 2024-10-05 DIAGNOSIS — K21.9 GASTROESOPHAGEAL REFLUX DISEASE WITHOUT ESOPHAGITIS: ICD-10-CM

## 2024-10-05 PROCEDURE — 74177 CT ABD & PELVIS W/CONTRAST: CPT

## 2024-10-05 RX ADMIN — IOHEXOL 100 ML: 350 INJECTION, SOLUTION INTRAVENOUS at 08:36

## 2024-10-07 RX ORDER — OMEPRAZOLE 40 MG/1
CAPSULE, DELAYED RELEASE ORAL
Qty: 90 CAPSULE | Refills: 0 | Status: SHIPPED | OUTPATIENT
Start: 2024-10-07

## 2024-10-08 ENCOUNTER — HOSPITAL ENCOUNTER (OUTPATIENT)
Dept: NON INVASIVE DIAGNOSTICS | Facility: HOSPITAL | Age: 59
Discharge: HOME | End: 2024-10-08
Attending: INTERNAL MEDICINE
Payer: COMMERCIAL

## 2024-10-08 DIAGNOSIS — G45.1 CAROTID ARTERY SYNDROME: ICD-10-CM

## 2024-10-08 DIAGNOSIS — Z13.6 ENCOUNTER FOR SCREENING FOR STENOSIS OF CAROTID ARTERY: ICD-10-CM

## 2024-10-08 DIAGNOSIS — E78.5 HYPERLIPIDEMIA, UNSPECIFIED HYPERLIPIDEMIA TYPE: ICD-10-CM

## 2024-10-08 PROCEDURE — 93880 EXTRACRANIAL BILAT STUDY: CPT

## 2024-10-08 PROCEDURE — 93880 EXTRACRANIAL BILAT STUDY: CPT | Performed by: SURGERY

## 2024-10-14 ENCOUNTER — HOSPITAL ENCOUNTER (OUTPATIENT)
Dept: MAMMOGRAPHY | Facility: HOSPITAL | Age: 59
Discharge: HOME | End: 2024-10-14
Payer: COMMERCIAL

## 2024-10-14 VITALS — HEIGHT: 65 IN | BODY MASS INDEX: 24.99 KG/M2 | WEIGHT: 150 LBS

## 2024-10-14 DIAGNOSIS — Z12.31 SCREENING MAMMOGRAM FOR BREAST CANCER: ICD-10-CM

## 2024-10-14 PROCEDURE — 77067 SCR MAMMO BI INCL CAD: CPT

## 2024-10-14 PROCEDURE — 77063 BREAST TOMOSYNTHESIS BI: CPT

## 2024-10-17 ENCOUNTER — TELEPHONE (OUTPATIENT)
Dept: FAMILY MEDICINE CLINIC | Facility: CLINIC | Age: 59
End: 2024-10-17

## 2024-10-17 DIAGNOSIS — R92.8 ABNORMAL MAMMOGRAM: Primary | ICD-10-CM

## 2024-10-17 NOTE — TELEPHONE ENCOUNTER
LMOM to call office to review results     ----- Message from Megan Ziegler DO sent at 10/17/2024 10:37 AM EDT -----  Left breast needs additional imaging for assymetry seen on this Mammo

## 2024-10-28 ENCOUNTER — TELEPHONE (OUTPATIENT)
Dept: NEUROLOGY | Facility: CLINIC | Age: 59
End: 2024-10-28

## 2024-10-28 ENCOUNTER — DOCUMENTATION (OUTPATIENT)
Dept: GENETICS | Facility: CLINIC | Age: 59
End: 2024-10-28

## 2024-10-30 ENCOUNTER — CONSULT (OUTPATIENT)
Dept: GENETICS | Facility: CLINIC | Age: 59
End: 2024-10-30
Payer: COMMERCIAL

## 2024-10-30 DIAGNOSIS — Z80.41 FAMILY HISTORY OF OVARIAN CANCER: ICD-10-CM

## 2024-10-30 DIAGNOSIS — Z80.3 FAMILY HISTORY OF BREAST CANCER: ICD-10-CM

## 2024-10-30 PROCEDURE — 96040 PR MEDICAL GENETICS COUNSELING EACH 30 MINUTES: CPT | Performed by: GENETIC COUNSELOR, MS
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
## 2024-11-01 ENCOUNTER — APPOINTMENT (OUTPATIENT)
Dept: LAB | Facility: HOSPITAL | Age: 59
End: 2024-11-01
Payer: COMMERCIAL

## 2024-11-01 DIAGNOSIS — E03.9 ACQUIRED HYPOTHYROIDISM: ICD-10-CM

## 2024-11-01 DIAGNOSIS — N95.1 SYMPTOMATIC MENOPAUSAL OR FEMALE CLIMACTERIC STATES: ICD-10-CM

## 2024-11-01 DIAGNOSIS — R53.82 CHRONIC FATIGUE: ICD-10-CM

## 2024-11-01 DIAGNOSIS — R68.82 DECREASED LIBIDO: ICD-10-CM

## 2024-11-01 DIAGNOSIS — G43.001 MIGRAINE WITHOUT AURA, NOT INTRACTABLE, WITH STATUS MIGRAINOSUS: ICD-10-CM

## 2024-11-01 LAB
ALBUMIN SERPL BCG-MCNC: 4.4 G/DL (ref 3.5–5)
ALP SERPL-CCNC: 93 U/L (ref 34–104)
ALT SERPL W P-5'-P-CCNC: 14 U/L (ref 7–52)
ANION GAP SERPL CALCULATED.3IONS-SCNC: 10 MMOL/L (ref 4–13)
AST SERPL W P-5'-P-CCNC: 23 U/L (ref 13–39)
BILIRUB SERPL-MCNC: 0.56 MG/DL (ref 0.2–1)
BUN SERPL-MCNC: 18 MG/DL (ref 5–25)
CALCIUM SERPL-MCNC: 9.5 MG/DL (ref 8.4–10.2)
CHLORIDE SERPL-SCNC: 104 MMOL/L (ref 96–108)
CO2 SERPL-SCNC: 26 MMOL/L (ref 21–32)
CREAT SERPL-MCNC: 0.91 MG/DL (ref 0.6–1.3)
EST. AVERAGE GLUCOSE BLD GHB EST-MCNC: 120 MG/DL
GFR SERPL CREATININE-BSD FRML MDRD: 70 ML/MIN/1.73SQ M
GLUCOSE P FAST SERPL-MCNC: 92 MG/DL (ref 65–99)
HBA1C MFR BLD: 5.8 %
POTASSIUM SERPL-SCNC: 3.9 MMOL/L (ref 3.5–5.3)
PROT SERPL-MCNC: 6.8 G/DL (ref 6.4–8.4)
SODIUM SERPL-SCNC: 140 MMOL/L (ref 135–147)
T3FREE SERPL-MCNC: 3.06 PG/ML (ref 2.5–3.9)
T4 FREE SERPL-MCNC: 1.01 NG/DL (ref 0.61–1.12)
TSH SERPL DL<=0.05 MIU/L-ACNC: 2.08 UIU/ML (ref 0.45–4.5)
VIT B12 SERPL-MCNC: 260 PG/ML (ref 180–914)

## 2024-11-01 PROCEDURE — 84481 FREE ASSAY (FT-3): CPT

## 2024-11-01 PROCEDURE — 86800 THYROGLOBULIN ANTIBODY: CPT

## 2024-11-01 PROCEDURE — 80053 COMPREHEN METABOLIC PANEL: CPT

## 2024-11-01 PROCEDURE — 83036 HEMOGLOBIN GLYCOSYLATED A1C: CPT

## 2024-11-01 PROCEDURE — 84439 ASSAY OF FREE THYROXINE: CPT

## 2024-11-01 PROCEDURE — 84443 ASSAY THYROID STIM HORMONE: CPT

## 2024-11-01 PROCEDURE — 84432 ASSAY OF THYROGLOBULIN: CPT

## 2024-11-01 PROCEDURE — 82607 VITAMIN B-12: CPT

## 2024-11-01 PROCEDURE — 86376 MICROSOMAL ANTIBODY EACH: CPT

## 2024-11-01 PROCEDURE — 36415 COLL VENOUS BLD VENIPUNCTURE: CPT

## 2024-11-02 LAB
THYROGLOB AB SERPL-ACNC: <1 IU/ML (ref 0–0.9)
THYROGLOB SERPL-MCNC: 1.2 NG/ML (ref 1.5–38.5)

## 2024-11-03 LAB — THYROPEROXIDASE AB SERPL-ACNC: 19 IU/ML (ref 0–34)

## 2024-11-11 DIAGNOSIS — G43.709 CHRONIC MIGRAINE WITHOUT AURA WITHOUT STATUS MIGRAINOSUS, NOT INTRACTABLE: Primary | ICD-10-CM

## 2024-11-11 RX ORDER — SODIUM CHLORIDE 9 MG/ML
20 INJECTION, SOLUTION INTRAVENOUS ONCE
Status: CANCELLED | OUTPATIENT
Start: 2024-11-20

## 2024-11-11 NOTE — TELEPHONE ENCOUNTER
Patient was a  no show to last scheduled infusion. Rescheduled for 11/20/2024. Will require auth after this infusion.

## 2024-11-13 ENCOUNTER — HOSPITAL ENCOUNTER (OUTPATIENT)
Dept: RADIOLOGY | Facility: CLINIC | Age: 59
Discharge: HOME | End: 2024-11-13
Payer: COMMERCIAL

## 2024-11-13 VITALS — HEIGHT: 65 IN | WEIGHT: 150 LBS | BODY MASS INDEX: 24.99 KG/M2

## 2024-11-13 DIAGNOSIS — R92.8 ABNORMAL SCREENING MAMMOGRAM: ICD-10-CM

## 2024-11-13 PROCEDURE — G0279 TOMOSYNTHESIS, MAMMO: HCPCS

## 2024-11-13 PROCEDURE — 77065 DX MAMMO INCL CAD UNI: CPT

## 2024-11-13 PROCEDURE — 76642 ULTRASOUND BREAST LIMITED: CPT

## 2024-11-20 ENCOUNTER — HOSPITAL ENCOUNTER (OUTPATIENT)
Dept: INFUSION CENTER | Facility: HOSPITAL | Age: 59
Discharge: HOME | End: 2024-11-20
Attending: PSYCHIATRY & NEUROLOGY
Payer: COMMERCIAL

## 2024-11-20 VITALS
SYSTOLIC BLOOD PRESSURE: 119 MMHG | HEART RATE: 78 BPM | DIASTOLIC BLOOD PRESSURE: 64 MMHG | RESPIRATION RATE: 16 BRPM | OXYGEN SATURATION: 100 % | TEMPERATURE: 97.5 F

## 2024-11-20 DIAGNOSIS — G43.709 CHRONIC MIGRAINE WITHOUT AURA WITHOUT STATUS MIGRAINOSUS, NOT INTRACTABLE: Primary | ICD-10-CM

## 2024-11-20 PROCEDURE — 96365 THER/PROPH/DIAG IV INF INIT: CPT

## 2024-11-20 RX ORDER — SODIUM CHLORIDE 9 MG/ML
20 INJECTION, SOLUTION INTRAVENOUS ONCE
Status: COMPLETED | OUTPATIENT
Start: 2024-11-20 | End: 2024-11-20

## 2024-11-20 RX ORDER — SODIUM CHLORIDE 9 MG/ML
20 INJECTION, SOLUTION INTRAVENOUS ONCE
OUTPATIENT
Start: 2025-02-12

## 2024-11-20 RX ADMIN — SODIUM CHLORIDE 20 ML/HR: 0.9 INJECTION, SOLUTION INTRAVENOUS at 09:40

## 2024-11-20 RX ADMIN — EPTINEZUMAB-JJMR 300 MG: 100 INJECTION INTRAVENOUS at 10:27

## 2024-11-20 NOTE — PROGRESS NOTES
Patient denies any recent infection or being on antibiotics.  Patient tolerated IV Vyepti without reaction or issues.      Lanre Crain is aware of future appt on 2/12/24 at 0930.     AVS -  No (Declined by Lanre Crain) Patient has mychart.    Patient ambulated off unit without incident.  All personal belongings taken with patient.

## 2024-11-21 NOTE — TELEPHONE ENCOUNTER
Vyepti 300 mg IV every 3 months   [facility] request for next DOS 2/12/2025    Last office note faxed to [facility] at [phone]    [address] [id number], [id number]  This is a continuation of therapy 300mg every 84 days  Lanre Crain, [birth year]  ID-[id number]  Ordering provider-Kaila Kan PA-C  NPI-[id number]  Oxjgq-[phone]  Dft-[phone]     Administered at [facility]  NPI-[id number]  Phone -[phone]  Akn-[phone]     Pt next scheduled for 2/12/2025    [facility] fax.

## 2024-11-26 ENCOUNTER — TELEPHONE (OUTPATIENT)
Dept: NEUROLOGY | Facility: CLINIC | Age: 59
End: 2024-11-26

## 2024-11-26 DIAGNOSIS — K21.9 GASTROESOPHAGEAL REFLUX DISEASE WITHOUT ESOPHAGITIS: ICD-10-CM

## 2024-11-26 RX ORDER — FAMOTIDINE 40 MG/1
40 TABLET, FILM COATED ORAL
Qty: 90 TABLET | Refills: 3 | Status: SHIPPED | OUTPATIENT
Start: 2024-11-26 | End: 2025-11-21

## 2024-12-29 ENCOUNTER — OFFICE VISIT (OUTPATIENT)
Dept: URGENT CARE | Facility: MEDICAL CENTER | Age: 59
End: 2024-12-29
Payer: COMMERCIAL

## 2024-12-29 VITALS
BODY MASS INDEX: 24.16 KG/M2 | SYSTOLIC BLOOD PRESSURE: 150 MMHG | TEMPERATURE: 100.1 F | HEART RATE: 90 BPM | HEIGHT: 65 IN | WEIGHT: 145 LBS | RESPIRATION RATE: 23 BRPM | OXYGEN SATURATION: 99 % | DIASTOLIC BLOOD PRESSURE: 80 MMHG

## 2024-12-29 DIAGNOSIS — J02.9 PHARYNGITIS, UNSPECIFIED ETIOLOGY: ICD-10-CM

## 2024-12-29 DIAGNOSIS — U07.1 COVID-19: Primary | ICD-10-CM

## 2024-12-29 PROCEDURE — 99213 OFFICE O/P EST LOW 20 MIN: CPT | Performed by: FAMILY MEDICINE

## 2024-12-29 PROCEDURE — 96372 THER/PROPH/DIAG INJ SC/IM: CPT | Performed by: FAMILY MEDICINE

## 2024-12-29 RX ORDER — CODEINE PHOSPHATE AND GUAIFENESIN 10; 100 MG/5ML; MG/5ML
5 SOLUTION ORAL 3 TIMES DAILY PRN
Qty: 118 ML | Refills: 0 | Status: SHIPPED | OUTPATIENT
Start: 2024-12-29

## 2024-12-29 RX ORDER — METHYLPREDNISOLONE SODIUM SUCCINATE 40 MG/ML
80 INJECTION, POWDER, LYOPHILIZED, FOR SOLUTION INTRAMUSCULAR; INTRAVENOUS ONCE
Status: COMPLETED | OUTPATIENT
Start: 2024-12-29 | End: 2024-12-29

## 2024-12-29 RX ORDER — PREDNISONE 50 MG/1
50 TABLET ORAL DAILY
Qty: 5 TABLET | Refills: 0 | Status: SHIPPED | OUTPATIENT
Start: 2024-12-29 | End: 2025-01-03

## 2024-12-29 RX ADMIN — METHYLPREDNISOLONE SODIUM SUCCINATE 80 MG: 40 INJECTION, POWDER, LYOPHILIZED, FOR SOLUTION INTRAMUSCULAR; INTRAVENOUS at 17:58

## 2024-12-29 NOTE — PATIENT INSTRUCTIONS
Start prednisone tomorrow.  No ibuprofen while taking prednisone.  May use 50 mg of Benadryl this evening to help with congestion and sleep.    Follow up with PCP in 3-5 days.  Proceed to  ER if symptoms worsen.
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
## 2025-02-10 DIAGNOSIS — G43.709 CHRONIC MIGRAINE WITHOUT AURA WITHOUT STATUS MIGRAINOSUS, NOT INTRACTABLE: Primary | ICD-10-CM

## 2025-02-12 ENCOUNTER — HOSPITAL ENCOUNTER (OUTPATIENT)
Dept: INFUSION CENTER | Facility: HOSPITAL | Age: 60
Discharge: HOME | End: 2025-02-12
Attending: PSYCHIATRY & NEUROLOGY
Payer: COMMERCIAL

## 2025-02-12 VITALS
DIASTOLIC BLOOD PRESSURE: 70 MMHG | TEMPERATURE: 98.5 F | OXYGEN SATURATION: 98 % | RESPIRATION RATE: 16 BRPM | SYSTOLIC BLOOD PRESSURE: 131 MMHG | HEART RATE: 85 BPM

## 2025-02-12 DIAGNOSIS — G43.709 CHRONIC MIGRAINE WITHOUT AURA WITHOUT STATUS MIGRAINOSUS, NOT INTRACTABLE: Primary | ICD-10-CM

## 2025-02-12 RX ORDER — SODIUM CHLORIDE 9 MG/ML
20 INJECTION, SOLUTION INTRAVENOUS ONCE
Status: COMPLETED | OUTPATIENT
Start: 2025-02-12 | End: 2025-02-12

## 2025-02-12 RX ORDER — SODIUM CHLORIDE 9 MG/ML
20 INJECTION, SOLUTION INTRAVENOUS ONCE
OUTPATIENT
Start: 2025-05-07

## 2025-02-12 RX ADMIN — SODIUM CHLORIDE 20 ML/HR: 0.9 INJECTION, SOLUTION INTRAVENOUS at 08:15

## 2025-02-12 RX ADMIN — EPTINEZUMAB-JJMR 300 MG: 100 INJECTION INTRAVENOUS at 08:46

## 2025-02-12 NOTE — PLAN OF CARE
Problem: Potential for Falls  Goal: Patient will remain free of falls  Description: INTERVENTIONS:  - Educate patient/family on patient safety including physical limitations  - Instruct patient to call for assistance with activity   - Consult OT/PT to assist with strengthening/mobility   - Keep Call bell within reach  - Keep bed low and locked with side rails adjusted as appropriate  - Keep care items and personal belongings within reach  - Initiate and maintain comfort rounds  - Make Fall Risk Sign visible to staff  -- Apply yellow socks and bracelet for high fall risk patients  - Consider moving patient to room near nurses station  2/12/2025 0812 by Ayanna Fitch, MONO  Outcome: Progressing  2/12/2025 0812 by Ayanna Fitch, MONO  Outcome: Progressing

## 2025-02-12 NOTE — PROGRESS NOTES
Pt here for Vyepti pt offered no acute complaints. No recent infections or fevers.  Next appt 5/7 at 830 uses [facility].

## 2025-02-15 DIAGNOSIS — E55.9 VITAMIN D DEFICIENCY: ICD-10-CM

## 2025-02-15 RX ORDER — ERGOCALCIFEROL 1.25 MG/1
50000 CAPSULE, LIQUID FILLED ORAL WEEKLY
Qty: 12 CAPSULE | Refills: 0 | Status: SHIPPED | OUTPATIENT
Start: 2025-02-15 | End: 2025-02-17

## 2025-02-16 DIAGNOSIS — E55.9 VITAMIN D DEFICIENCY: ICD-10-CM

## 2025-02-17 RX ORDER — ERGOCALCIFEROL 1.25 MG/1
50000 CAPSULE, LIQUID FILLED ORAL WEEKLY
Qty: 12 CAPSULE | Refills: 0 | Status: SHIPPED | OUTPATIENT
Start: 2025-02-17

## 2025-03-21 ENCOUNTER — OFFICE VISIT (OUTPATIENT)
Dept: NEUROLOGY | Facility: CLINIC | Age: 60
End: 2025-03-21
Payer: COMMERCIAL

## 2025-03-21 VITALS
HEIGHT: 65 IN | TEMPERATURE: 97.9 F | BODY MASS INDEX: 23.88 KG/M2 | SYSTOLIC BLOOD PRESSURE: 98 MMHG | DIASTOLIC BLOOD PRESSURE: 60 MMHG | HEART RATE: 61 BPM | WEIGHT: 143.3 LBS

## 2025-03-21 DIAGNOSIS — G43.009 MIGRAINE WITHOUT AURA AND WITHOUT STATUS MIGRAINOSUS, NOT INTRACTABLE: ICD-10-CM

## 2025-03-21 DIAGNOSIS — G43.709 CHRONIC MIGRAINE WITHOUT AURA WITHOUT STATUS MIGRAINOSUS, NOT INTRACTABLE: ICD-10-CM

## 2025-03-21 DIAGNOSIS — G43.011 INTRACTABLE MIGRAINE WITHOUT AURA AND WITH STATUS MIGRAINOSUS: ICD-10-CM

## 2025-03-21 PROCEDURE — 99214 OFFICE O/P EST MOD 30 MIN: CPT | Performed by: PHYSICIAN ASSISTANT

## 2025-03-21 RX ORDER — ONDANSETRON 8 MG/1
8 TABLET, ORALLY DISINTEGRATING ORAL EVERY 8 HOURS PRN
Qty: 20 TABLET | Refills: 0 | Status: SHIPPED | OUTPATIENT
Start: 2025-03-21

## 2025-03-21 RX ORDER — KETOROLAC TROMETHAMINE 10 MG/1
TABLET, FILM COATED ORAL
Qty: 10 TABLET | Refills: 0 | Status: SHIPPED | OUTPATIENT
Start: 2025-03-21

## 2025-03-21 RX ORDER — RIMEGEPANT SULFATE 75 MG/75MG
75 TABLET, ORALLY DISINTEGRATING ORAL AS NEEDED
Qty: 16 TABLET | Refills: 11 | Status: SHIPPED | OUTPATIENT
Start: 2025-03-21

## 2025-03-21 NOTE — PATIENT INSTRUCTIONS
1. Migraine without aura and without status migrainosus, not intractable  -     rimegepant sulfate (Nurtec) 75 mg TBDP; Take 1 tablet (75 mg total) by mouth if needed (migraine) Limit of 1 in 24 hours  2. Intractable migraine without aura and with status migrainosus  -     lasmiditan succinate (Reyvow) 100 mg tablet; Take 1 tablet (100 mg total) by mouth if needed (migraine) Take 1 tablet (100mg)  one time as needed for migraine. Do not use more than one dose per day.  Do not drive within 8 hours after taking  -     ketorolac (TORADOL) 10 mg tablet; 1 tabs at onset of migraine, t.i.d. P.r.n. Take with food/milk/antacid.  3. Chronic migraine without aura without status migrainosus, not intractable  Assessment & Plan:   Preventive:   Continue doing magnesium oxide 400mg in am daily.   Vyepti 300 mg IV every 3 months at infusion center.      Abortive:   At the onset of mild headache patient's take Aleve.  At onset of migraine, patient is to take Nurtec 75 mg.  Limit of 1 in 24 hours  In addition if this may use zofran  May use elyxyb 1 vial in 24 hours    IF needed may use Reyvow 100 mg.  Limit of 1 in 24 hours.  Do not drive within 8 hours (may take on same day as Nurtec)    Orders:  -     ondansetron (ZOFRAN-ODT) 8 mg disintegrating tablet; Take 1 tablet (8 mg total) by mouth every 8 (eight) hours as needed for nausea or vomiting

## 2025-03-21 NOTE — ASSESSMENT & PLAN NOTE
Preventive:   Continue doing magnesium oxide 400mg in am daily.   Vyepti  to 300 mg IV every 3 months     Abortive:   At the onset of mild headache patient's take Aleve.  At onset of migraine, patient is to take Nurtec 75 mg.  Limit of 1 in 24 hours  In addition if this isn't effective use Elyxyb 1 vial.  Limit of 1 in 24 hours  May use prochlorperazine 10 mg in addition.  IF needed may use Reyvow 100 mg.  Limit of 1 in 24 hours.  Do not drive within 8 hours (may take on same day as Nurtec)  Orders:  •  rimegepant sulfate (Nurtec) 75 mg TBDP; Take 1 tablet (75 mg total) by mouth if needed (migraine) Limit of 1 in 24 hours
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
## 2025-03-21 NOTE — ASSESSMENT & PLAN NOTE
Preventive:   Continue doing magnesium oxide 400mg in am daily.   Vyepti 300 mg IV every 3 months at infusion center.      Abortive:   At the onset of mild headache patient's take Aleve.  At onset of migraine, patient is to take Nurtec 75 mg.  Limit of 1 in 24 hours  In addition if this may use zofran  May use elyxyb 1 vial in 24 hours    IF needed may use Reyvow 100 mg.  Limit of 1 in 24 hours.  Do not drive within 8 hours (may take on same day as Nurtec)    Orders:  •  ondansetron (ZOFRAN-ODT) 8 mg disintegrating tablet; Take 1 tablet (8 mg total) by mouth every 8 (eight) hours as needed for nausea or vomiting   crib

## 2025-03-25 ENCOUNTER — OFFICE VISIT (OUTPATIENT)
Dept: GASTROENTEROLOGY | Facility: CLINIC | Age: 60
End: 2025-03-25
Payer: COMMERCIAL

## 2025-03-25 VITALS
BODY MASS INDEX: 23.82 KG/M2 | WEIGHT: 143 LBS | OXYGEN SATURATION: 99 % | TEMPERATURE: 97.7 F | HEART RATE: 81 BPM | DIASTOLIC BLOOD PRESSURE: 73 MMHG | HEIGHT: 65 IN | SYSTOLIC BLOOD PRESSURE: 111 MMHG

## 2025-03-25 DIAGNOSIS — K31.84 GASTROPARESIS: ICD-10-CM

## 2025-03-25 DIAGNOSIS — K76.0 HEPATIC STEATOSIS: ICD-10-CM

## 2025-03-25 DIAGNOSIS — K21.9 GASTROESOPHAGEAL REFLUX DISEASE WITHOUT ESOPHAGITIS: Primary | ICD-10-CM

## 2025-03-25 DIAGNOSIS — R11.0 NAUSEA: ICD-10-CM

## 2025-03-25 DIAGNOSIS — Z12.11 SCREENING FOR COLON CANCER: ICD-10-CM

## 2025-03-25 DIAGNOSIS — R10.10 PAIN OF UPPER ABDOMEN: ICD-10-CM

## 2025-03-25 PROCEDURE — 99214 OFFICE O/P EST MOD 30 MIN: CPT | Performed by: PHYSICIAN ASSISTANT

## 2025-03-25 RX ORDER — OMEPRAZOLE 40 MG/1
40 CAPSULE, DELAYED RELEASE ORAL DAILY
Qty: 90 CAPSULE | Refills: 3 | Status: SHIPPED | OUTPATIENT
Start: 2025-03-25

## 2025-03-25 NOTE — ASSESSMENT & PLAN NOTE
Pt with hx of heartburn, hx of cholecystectomy, gastroparesis.   EGD in 06/2023 with gastritis and hiatal hernia, negative for h pylori infection, intestinal metaplasia.   Having some intermittent upper abd discomfort and nausea.   Unclear if peptic process, unclear if gastroparesis, or other etiol.   Encourage pt to continue PPI daily and H2RA now.     Recommend diet and lifestyle modifications for GERD.  This includes avoiding spicy, saucy, greasy/oily foods, citrus, EtOH, NSAIDs, tobacco.  Avoid eating within 2 to 3 hours of bed.  Elevating height of bed 6 inches on blocks may be beneficial.    Orders:    [place] UPPER GI UGI; Future    Small intestinal bacterial overgrowth    omeprazole (PriLOSEC) 40 MG capsule; Take 1 capsule (40 mg total) by mouth daily

## 2025-03-25 NOTE — PATIENT INSTRUCTIONS
Stay on omeprazole every morning.   Stay on famotidine every evening.     Have upper GI test completed.   Have SIBO testing completed.     Okay to try dicyclomine which helps with abdominal cramping.
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
## 2025-03-26 ENCOUNTER — TELEPHONE (OUTPATIENT)
Age: 60
End: 2025-03-26

## 2025-03-26 NOTE — ASSESSMENT & PLAN NOTE
Hx of such on distant GES in 2014.   Recommend gastroparesis type diet including small frequent meals, avoid saturated fats and difficult to digest raw fibrous foods.   She has anti-emetic on hand for symptom relief.   Consider trial of pro-kinetic in future such as metoclopramide, though hold off for now as pt wants to avoid given possible neurologic SE including TD.  Consider repeat GES in future.

## 2025-03-26 NOTE — TELEPHONE ENCOUNTER
Received fax from [facility]. Yanci WISE is about to   Key [id number]    Per enc 3/19/24, Yanci WISE will  25    Yanci WISE started on [facility]    One of the question from the plan-  Will the patient be using Reyvow in combination with another acute migraine therapy (i.e., triptan, 5HT-1F, acute use CGRP, ergotamine)?     BlogHer message sent

## 2025-03-31 NOTE — TELEPHONE ENCOUNTER
Called and advised pt of the below. She verbalized understanding. Pt states that she is taking Nurtec prn and preferred to take this instead of the Reyvow.     Chart reviewed  [facility] PA will  on 25    [facility] PA initiated on CMM. (Key: [id number])   PA Case: [id number], Status: Approved, Coverage Starts on: 3/31/2025 12:00 AM, Coverage Ends on: 3/31/2026 12:00 AM.

## 2025-04-03 ENCOUNTER — RESULTS FOLLOW-UP (OUTPATIENT)
Dept: FAMILY MEDICINE CLINIC | Facility: CLINIC | Age: 60
End: 2025-04-03

## 2025-04-03 ENCOUNTER — APPOINTMENT (OUTPATIENT)
Dept: LAB | Facility: HOSPITAL | Age: 60
End: 2025-04-03
Payer: COMMERCIAL

## 2025-04-03 DIAGNOSIS — Z00.8 ENCOUNTER FOR OTHER GENERAL EXAMINATION: ICD-10-CM

## 2025-04-03 DIAGNOSIS — N95.1 SYMPTOMATIC MENOPAUSAL OR FEMALE CLIMACTERIC STATES: ICD-10-CM

## 2025-04-03 DIAGNOSIS — I51.9 MYXEDEMA HEART DISEASE: ICD-10-CM

## 2025-04-03 DIAGNOSIS — R68.82 DECREASED LIBIDO: ICD-10-CM

## 2025-04-03 DIAGNOSIS — G43.001 MIGRAINE WITHOUT AURA AND WITH STATUS MIGRAINOSUS, NOT INTRACTABLE: ICD-10-CM

## 2025-04-03 DIAGNOSIS — R53.82 CHRONIC FATIGUE: ICD-10-CM

## 2025-04-03 DIAGNOSIS — E03.9 MYXEDEMA HEART DISEASE: ICD-10-CM

## 2025-04-03 LAB
25(OH)D3 SERPL-MCNC: 63.5 NG/ML (ref 30–100)
ALBUMIN SERPL BCG-MCNC: 4.5 G/DL (ref 3.5–5)
ALP SERPL-CCNC: 114 U/L (ref 34–104)
ALT SERPL W P-5'-P-CCNC: 21 U/L (ref 7–52)
ANION GAP SERPL CALCULATED.3IONS-SCNC: 9 MMOL/L (ref 4–13)
AST SERPL W P-5'-P-CCNC: 30 U/L (ref 13–39)
BASOPHILS # BLD AUTO: 0.04 THOUSANDS/ÂΜL (ref 0–0.1)
BASOPHILS NFR BLD AUTO: 1 % (ref 0–1)
BILIRUB SERPL-MCNC: 0.59 MG/DL (ref 0.2–1)
BUN SERPL-MCNC: 21 MG/DL (ref 5–25)
CALCIUM SERPL-MCNC: 9.6 MG/DL (ref 8.4–10.2)
CHLORIDE SERPL-SCNC: 103 MMOL/L (ref 96–108)
CHOLEST SERPL-MCNC: 199 MG/DL (ref ?–200)
CO2 SERPL-SCNC: 28 MMOL/L (ref 21–32)
CREAT SERPL-MCNC: 0.91 MG/DL (ref 0.6–1.3)
EOSINOPHIL # BLD AUTO: 0.29 THOUSAND/ÂΜL (ref 0–0.61)
EOSINOPHIL NFR BLD AUTO: 8 % (ref 0–6)
ERYTHROCYTE [DISTWIDTH] IN BLOOD BY AUTOMATED COUNT: 12.1 % (ref 11.6–15.1)
EST. AVERAGE GLUCOSE BLD GHB EST-MCNC: 120 MG/DL
GFR SERPL CREATININE-BSD FRML MDRD: 70 ML/MIN/1.73SQ M
GLUCOSE P FAST SERPL-MCNC: 89 MG/DL (ref 65–99)
HBA1C MFR BLD: 5.8 %
HCT VFR BLD AUTO: 39.9 % (ref 34.8–46.1)
HDLC SERPL-MCNC: 63 MG/DL
HGB BLD-MCNC: 13 G/DL (ref 11.5–15.4)
IMM GRANULOCYTES # BLD AUTO: 0 THOUSAND/UL (ref 0–0.2)
IMM GRANULOCYTES NFR BLD AUTO: 0 % (ref 0–2)
LDLC SERPL CALC-MCNC: 124 MG/DL (ref 0–100)
LYMPHOCYTES # BLD AUTO: 1.33 THOUSANDS/ÂΜL (ref 0.6–4.47)
LYMPHOCYTES NFR BLD AUTO: 37 % (ref 14–44)
MCH RBC QN AUTO: 30 PG (ref 26.8–34.3)
MCHC RBC AUTO-ENTMCNC: 32.6 G/DL (ref 31.4–37.4)
MCV RBC AUTO: 92 FL (ref 82–98)
MONOCYTES # BLD AUTO: 0.37 THOUSAND/ÂΜL (ref 0.17–1.22)
MONOCYTES NFR BLD AUTO: 10 % (ref 4–12)
NEUTROPHILS # BLD AUTO: 1.6 THOUSANDS/ÂΜL (ref 1.85–7.62)
NEUTS SEG NFR BLD AUTO: 44 % (ref 43–75)
NONHDLC SERPL-MCNC: 136 MG/DL
NRBC BLD AUTO-RTO: 0 /100 WBCS
PLATELET # BLD AUTO: 235 THOUSANDS/UL (ref 149–390)
PMV BLD AUTO: 9.3 FL (ref 8.9–12.7)
POTASSIUM SERPL-SCNC: 4 MMOL/L (ref 3.5–5.3)
PROT SERPL-MCNC: 7.2 G/DL (ref 6.4–8.4)
RBC # BLD AUTO: 4.34 MILLION/UL (ref 3.81–5.12)
SODIUM SERPL-SCNC: 140 MMOL/L (ref 135–147)
T3FREE SERPL-MCNC: 3.11 PG/ML (ref 2.5–3.9)
T4 FREE SERPL-MCNC: 0.99 NG/DL (ref 0.61–1.12)
TRIGL SERPL-MCNC: 60 MG/DL (ref ?–150)
TSH SERPL DL<=0.05 MIU/L-ACNC: 2.76 UIU/ML (ref 0.45–4.5)
VIT B12 SERPL-MCNC: 391 PG/ML (ref 180–914)
WBC # BLD AUTO: 3.63 THOUSAND/UL (ref 4.31–10.16)

## 2025-04-03 PROCEDURE — 83036 HEMOGLOBIN GLYCOSYLATED A1C: CPT

## 2025-04-03 PROCEDURE — 84439 ASSAY OF FREE THYROXINE: CPT

## 2025-04-03 PROCEDURE — 84432 ASSAY OF THYROGLOBULIN: CPT

## 2025-04-03 PROCEDURE — 80061 LIPID PANEL: CPT

## 2025-04-03 PROCEDURE — 84443 ASSAY THYROID STIM HORMONE: CPT

## 2025-04-03 PROCEDURE — 82306 VITAMIN D 25 HYDROXY: CPT

## 2025-04-03 PROCEDURE — 82607 VITAMIN B-12: CPT

## 2025-04-03 PROCEDURE — 36415 COLL VENOUS BLD VENIPUNCTURE: CPT

## 2025-04-03 PROCEDURE — 86376 MICROSOMAL ANTIBODY EACH: CPT

## 2025-04-03 PROCEDURE — 85025 COMPLETE CBC W/AUTO DIFF WBC: CPT

## 2025-04-03 PROCEDURE — 80053 COMPREHEN METABOLIC PANEL: CPT

## 2025-04-03 PROCEDURE — 84481 FREE ASSAY (FT-3): CPT

## 2025-04-03 PROCEDURE — 86800 THYROGLOBULIN ANTIBODY: CPT

## 2025-04-04 LAB
THYROGLOB AB SERPL-ACNC: <1 IU/ML (ref 0–0.9)
THYROGLOB AB SERPL-ACNC: <1 IU/ML (ref 0–0.9)
THYROGLOB SERPL-MCNC: 1.4 NG/ML (ref 1.5–38.5)
THYROPEROXIDASE AB SERPL-ACNC: 20 IU/ML (ref 0–34)

## 2025-05-07 ENCOUNTER — HOSPITAL ENCOUNTER (OUTPATIENT)
Dept: INFUSION CENTER | Facility: HOSPITAL | Age: 60
Discharge: HOME | End: 2025-05-07
Attending: PSYCHIATRY & NEUROLOGY
Payer: COMMERCIAL

## 2025-05-07 VITALS
RESPIRATION RATE: 16 BRPM | HEART RATE: 88 BPM | TEMPERATURE: 97.2 F | SYSTOLIC BLOOD PRESSURE: 116 MMHG | DIASTOLIC BLOOD PRESSURE: 77 MMHG

## 2025-05-07 DIAGNOSIS — G43.709 CHRONIC MIGRAINE WITHOUT AURA WITHOUT STATUS MIGRAINOSUS, NOT INTRACTABLE: Primary | ICD-10-CM

## 2025-05-07 PROCEDURE — 96365 THER/PROPH/DIAG IV INF INIT: CPT

## 2025-05-07 RX ORDER — SODIUM CHLORIDE 9 MG/ML
20 INJECTION, SOLUTION INTRAVENOUS ONCE
Status: COMPLETED | OUTPATIENT
Start: 2025-05-07 | End: 2025-05-07

## 2025-05-07 RX ORDER — SODIUM CHLORIDE 9 MG/ML
20 INJECTION, SOLUTION INTRAVENOUS ONCE
OUTPATIENT
Start: 2025-07-30

## 2025-05-07 RX ADMIN — SODIUM CHLORIDE 20 ML/HR: 0.9 INJECTION, SOLUTION INTRAVENOUS at 09:34

## 2025-05-07 RX ADMIN — EPTINEZUMAB-JJMR 300 MG: 100 INJECTION INTRAVENOUS at 09:32

## 2025-05-07 NOTE — PROGRESS NOTES
Lanre Crain  tolerated Vyepti treatment well with no complications.      Lanre Crain is aware of future appt on 7/30/25 at 0830.     AVS printed and given to Lanre Crain:   No (Declined by Lanre Crain)        36.7

## 2025-05-07 NOTE — PLAN OF CARE
Problem: PAIN - ADULT  Goal: Verbalizes/displays adequate comfort level or baseline comfort level  Description: Interventions:- Encourage patient to monitor pain and request assistance- Assess pain using appropriate pain scale- Administer analgesics based on type and severity of pain and evaluate response- Implement non-pharmacological measures as appropriate and evaluate response- Consider cultural and social influences on pain and pain management- Notify physician/advanced practitioner if interventions unsuccessful or patient reports new pain  Outcome: Progressing     Problem: Knowledge Deficit  Goal: Patient/family/caregiver demonstrates understanding of disease process, treatment plan, medications, and discharge instructions  Description: Complete learning assessment and assess knowledge base.Interventions:- Provide teaching at level of understanding- Provide teaching via preferred learning methods  Outcome: Progressing

## 2025-05-15 ENCOUNTER — APPOINTMENT (OUTPATIENT)
Dept: LAB | Facility: HOSPITAL | Age: 60
End: 2025-05-15
Payer: COMMERCIAL

## 2025-05-15 DIAGNOSIS — R68.82 DECREASED LIBIDO: ICD-10-CM

## 2025-05-15 DIAGNOSIS — R53.82 CHRONIC FATIGUE: ICD-10-CM

## 2025-05-15 DIAGNOSIS — G43.001 MIGRAINE WITHOUT AURA AND WITH STATUS MIGRAINOSUS, NOT INTRACTABLE: ICD-10-CM

## 2025-05-15 DIAGNOSIS — I51.9 MYXEDEMA HEART DISEASE: ICD-10-CM

## 2025-05-15 DIAGNOSIS — E03.9 MYXEDEMA HEART DISEASE: ICD-10-CM

## 2025-05-15 LAB
T3FREE SERPL-MCNC: 2.78 PG/ML (ref 2.5–3.9)
T4 FREE SERPL-MCNC: 1.07 NG/DL (ref 0.61–1.12)
TSH SERPL DL<=0.05 MIU/L-ACNC: 2.22 UIU/ML (ref 0.45–4.5)

## 2025-05-15 PROCEDURE — 36415 COLL VENOUS BLD VENIPUNCTURE: CPT

## 2025-05-15 PROCEDURE — 84439 ASSAY OF FREE THYROXINE: CPT

## 2025-05-15 PROCEDURE — 84443 ASSAY THYROID STIM HORMONE: CPT

## 2025-05-15 PROCEDURE — 84481 FREE ASSAY (FT-3): CPT

## 2025-07-09 NOTE — PROGRESS NOTES
Name: Lanre Crain      : 1965      MRN: [id number]  Encounter Provider: GUILLERMO Harper  Encounter Date: 2025   Encounter department: [facility]  :  Assessment & Plan  Encounter for gynecological examination (general) (routine) with abnormal findings  Recommended monthly SBE, annual CBE and annual screening mammo. ASCCP guidelines reviewed and pap with cotesting noted to be up to date; this low risk patient was advised she meets criteria to d/c pap screening at age 67. No pap done. DEXA script given with instructions to check ins co for coverage and colonoscopy noted to be up to date. Reviewed diet/activity recommendations Calcium 1200 mg and Vit D 600-1000 IU daily.  Discussed postmenopausal considerations and symptoms to report. Kegel exercises as instructed. RTO in one year for routine annual gyn exam or sooner PRN.           Screening mammogram for breast cancer    Orders:    Mammo screening bilateral w 3d and cad; Future    Osteoporosis screening    Orders:    DXA bone density spine hip and pelvis; Future    Menopause    Orders:    DXA bone density spine hip and pelvis; Future    OAB (overactive bladder)  Discussed OAB at length. Bladder irritants reviewed. Given water intake, pt advised to void every hour to avoid significant urge. Will trial for 3 months. Pt advised on the option of starting med if bladder retraining ineffective. She was advised to call if she would like to start med.        Dyspareunia in female  Will continue care with Dr. Wing's office.            History of Present Illness   This patient presents for routine annual gyn exam.   Pt reports a sense of incomplete bladder emptying secondary to post void dribbling. Also has feelings of urinary urgency. Has 4-5 bottles of water with rasperry/watermelon refresher. No other bladder irritants.    She follows with Dr. Blandon for hormonal health. Of note, on work up with Dr. Blandon,  "patient has HX OF FACTOR V.   Patient also has hx of migraine without aura.  Pt also reported intermittent pelvic cramping occurring 2-3 times per week at last annual. TVU 2024 showed changed consistent with adenomyosis. Denies any further pelvic pain today.     Family hx of PGM ovarian cancer and breast cancer, age 42. Paternal aunt ovarian and breast cancer, age 42. Father  of cancer within 14 weeks of diagnosis and patient states primary source was not established. Pt had  genetic counseling. She states she no further testing recommended because she has had testing in the past.    She denies  bleeding or spotting,  breast concerns, abnormal discharge, bowel/bladder dysfunction, depression/anx.   , sexually active. Continues with vaginal dryness with dyspareunia. She is no longer using estrogen vaginal cream and is set to have vaginal laser treatment at Dr. Wing's office.   Last pap/HPV 24.  Mammography up to date, 10/14/24, left asymmetry. Diagnostic imaging 24 benign with recommendation to RTS. Colonoscopy done in 2018 per patient. DXA 22, osteopenia.            Review of Systems   Constitutional: Negative.    Respiratory: Negative.     Cardiovascular: Negative.    Gastrointestinal: Negative.    Endocrine: Negative.    Genitourinary:  Positive for dyspareunia and frequency. Negative for dysuria, pelvic pain, urgency, vaginal bleeding, vaginal discharge and vaginal pain.   Musculoskeletal: Negative.    Skin: Negative.    Neurological: Negative.    Psychiatric/Behavioral: Negative.            Objective   /64 (BP Location: Right arm, Patient Position: Sitting, Cuff Size: Standard)   Ht 5' 5\" (1.651 m)   Wt 66.2 kg (146 lb)   LMP 2018   BMI 24.30 kg/m²      Physical Exam  Vitals and nursing note reviewed.   Constitutional:       General: She is not in acute distress.     Appearance: She is well-developed.   HENT:      Head: Normocephalic and atraumatic.     Eyes:      " Conjunctiva/sclera: Conjunctivae normal.       Cardiovascular:      Rate and Rhythm: Normal rate and regular rhythm.      Heart sounds: No murmur heard.  Pulmonary:      Effort: Pulmonary effort is normal. No respiratory distress.      Breath sounds: Normal breath sounds.   Chest:   Breasts:     Right: No swelling, bleeding, inverted nipple, mass, nipple discharge, skin change or tenderness.      Left: No swelling, bleeding, inverted nipple, mass, nipple discharge, skin change or tenderness.   Abdominal:      Palpations: Abdomen is soft.      Tenderness: There is no abdominal tenderness.   Genitourinary:     General: Normal vulva.      Exam position: Supine.      Pubic Area: No rash.       Labia:         Right: No rash, tenderness, lesion or injury.         Left: No rash, tenderness, lesion or injury.       Urethra: No urethral pain, urethral swelling or urethral lesion.      Vagina: No signs of injury and foreign body. No vaginal discharge, erythema, tenderness, bleeding, lesions or prolapsed vaginal walls.      Cervix: No cervical motion tenderness, discharge, friability, lesion, erythema, cervical bleeding or eversion.      Uterus: Not deviated, not enlarged, not fixed, not tender and no uterine prolapse.       Adnexa:         Right: No mass, tenderness or fullness.          Left: No mass, tenderness or fullness.        Comments: Moderate to severe VVA    Musculoskeletal:         General: No swelling.      Cervical back: Neck supple.     Skin:     General: Skin is warm and dry.     Neurological:      Mental Status: She is alert.     Psychiatric:         Mood and Affect: Mood normal.

## 2025-07-14 ENCOUNTER — ANNUAL EXAM (OUTPATIENT)
Dept: GYNECOLOGY | Facility: CLINIC | Age: 60
End: 2025-07-14
Payer: COMMERCIAL

## 2025-07-14 VITALS
BODY MASS INDEX: 24.32 KG/M2 | HEIGHT: 65 IN | SYSTOLIC BLOOD PRESSURE: 110 MMHG | WEIGHT: 146 LBS | DIASTOLIC BLOOD PRESSURE: 64 MMHG

## 2025-07-14 DIAGNOSIS — Z13.820 OSTEOPOROSIS SCREENING: ICD-10-CM

## 2025-07-14 DIAGNOSIS — N94.10 DYSPAREUNIA IN FEMALE: ICD-10-CM

## 2025-07-14 DIAGNOSIS — N32.81 OAB (OVERACTIVE BLADDER): ICD-10-CM

## 2025-07-14 DIAGNOSIS — Z78.0 MENOPAUSE: ICD-10-CM

## 2025-07-14 DIAGNOSIS — Z01.411 ENCOUNTER FOR GYNECOLOGICAL EXAMINATION (GENERAL) (ROUTINE) WITH ABNORMAL FINDINGS: Primary | ICD-10-CM

## 2025-07-14 DIAGNOSIS — Z12.31 SCREENING MAMMOGRAM FOR BREAST CANCER: ICD-10-CM

## 2025-07-14 PROCEDURE — S0612 ANNUAL GYNECOLOGICAL EXAMINA: HCPCS | Performed by: OBSTETRICS & GYNECOLOGY

## 2025-07-14 NOTE — ASSESSMENT & PLAN NOTE
Discussed OAB at length. Bladder irritants reviewed. Given water intake, pt advised to void every hour to avoid significant urge. Will trial for 3 months. Pt advised on the option of starting med if bladder retraining ineffective. She was advised to call if she would like to start med.

## 2025-07-15 ENCOUNTER — OFFICE VISIT (OUTPATIENT)
Dept: FAMILY MEDICINE CLINIC | Facility: CLINIC | Age: 60
End: 2025-07-15
Payer: COMMERCIAL

## 2025-07-15 VITALS
BODY MASS INDEX: 23.99 KG/M2 | SYSTOLIC BLOOD PRESSURE: 122 MMHG | HEIGHT: 65 IN | RESPIRATION RATE: 18 BRPM | WEIGHT: 144 LBS | DIASTOLIC BLOOD PRESSURE: 76 MMHG | HEART RATE: 76 BPM | TEMPERATURE: 97.3 F

## 2025-07-15 DIAGNOSIS — E03.9 HYPOTHYROIDISM, UNSPECIFIED TYPE: ICD-10-CM

## 2025-07-15 DIAGNOSIS — Z00.00 ANNUAL PHYSICAL EXAM: Primary | ICD-10-CM

## 2025-07-15 PROCEDURE — 99396 PREV VISIT EST AGE 40-64: CPT | Performed by: INTERNAL MEDICINE

## 2025-07-30 ENCOUNTER — HOSPITAL ENCOUNTER (OUTPATIENT)
Dept: INFUSION CENTER | Facility: HOSPITAL | Age: 60
Discharge: HOME | End: 2025-07-30
Attending: PSYCHIATRY & NEUROLOGY
Payer: COMMERCIAL

## 2025-07-30 ENCOUNTER — HOSPITAL ENCOUNTER (OUTPATIENT)
Dept: BONE DENSITY | Facility: HOSPITAL | Age: 60
Discharge: HOME | End: 2025-07-30
Payer: COMMERCIAL

## 2025-07-30 VITALS
RESPIRATION RATE: 18 BRPM | DIASTOLIC BLOOD PRESSURE: 59 MMHG | HEART RATE: 61 BPM | SYSTOLIC BLOOD PRESSURE: 129 MMHG | TEMPERATURE: 97 F

## 2025-07-30 DIAGNOSIS — G43.709 CHRONIC MIGRAINE WITHOUT AURA WITHOUT STATUS MIGRAINOSUS, NOT INTRACTABLE: Primary | ICD-10-CM

## 2025-07-30 DIAGNOSIS — Z13.820 OSTEOPOROSIS SCREENING: ICD-10-CM

## 2025-07-30 DIAGNOSIS — Z78.0 MENOPAUSE: ICD-10-CM

## 2025-07-30 PROCEDURE — 96365 THER/PROPH/DIAG IV INF INIT: CPT

## 2025-07-30 PROCEDURE — 77080 DXA BONE DENSITY AXIAL: CPT

## 2025-07-30 RX ORDER — SODIUM CHLORIDE 9 MG/ML
20 INJECTION, SOLUTION INTRAVENOUS ONCE
Status: COMPLETED | OUTPATIENT
Start: 2025-07-30 | End: 2025-07-30

## 2025-07-30 RX ORDER — SODIUM CHLORIDE 9 MG/ML
20 INJECTION, SOLUTION INTRAVENOUS ONCE
OUTPATIENT
Start: 2025-10-22

## 2025-07-30 RX ADMIN — EPTINEZUMAB-JJMR 300 MG: 100 INJECTION INTRAVENOUS at 09:08

## 2025-07-30 RX ADMIN — SODIUM CHLORIDE 20 ML/HR: 9 INJECTION, SOLUTION INTRAVENOUS at 09:08

## 2025-08-08 ENCOUNTER — TELEPHONE (OUTPATIENT)
Dept: GYNECOLOGY | Facility: CLINIC | Age: 60
End: 2025-08-08

## 2025-08-08 DIAGNOSIS — M81.0 OSTEOPOROSIS WITHOUT CURRENT PATHOLOGICAL FRACTURE, UNSPECIFIED OSTEOPOROSIS TYPE: Primary | ICD-10-CM

## 2025-08-11 ENCOUNTER — TELEPHONE (OUTPATIENT)
Dept: FAMILY MEDICINE CLINIC | Facility: CLINIC | Age: 60
End: 2025-08-11

## 2025-08-19 DIAGNOSIS — E55.9 VITAMIN D DEFICIENCY: ICD-10-CM

## 2025-08-20 ENCOUNTER — TELEPHONE (OUTPATIENT)
Dept: PAIN MEDICINE | Facility: CLINIC | Age: 60
End: 2025-08-20

## 2025-08-21 RX ORDER — ERGOCALCIFEROL 1.25 MG/1
50000 CAPSULE, LIQUID FILLED ORAL WEEKLY
Qty: 12 CAPSULE | Refills: 0 | Status: SHIPPED | OUTPATIENT
Start: 2025-08-21

## (undated) DEVICE — FORCEPS BIOPSY ALLIGATOR JAW W/NEEDLE 2.8MM

## (undated) DEVICE — CONMED SCOPE SAVER BITE BLOCK, 20X27 MM: Brand: SCOPE SAVER

## (undated) DEVICE — 2000CC GUARDIAN II: Brand: GUARDIAN

## (undated) DEVICE — TUBING SUCTION 5MM X 12 FT

## (undated) DEVICE — LUBRICANT SURGILUBE TUBE 4 OZ  FLIP TOP